# Patient Record
Sex: FEMALE | Race: WHITE | NOT HISPANIC OR LATINO | Employment: OTHER | URBAN - METROPOLITAN AREA
[De-identification: names, ages, dates, MRNs, and addresses within clinical notes are randomized per-mention and may not be internally consistent; named-entity substitution may affect disease eponyms.]

---

## 2017-05-05 ENCOUNTER — OFFICE VISIT (OUTPATIENT)
Dept: FAMILY MEDICINE | Facility: CLINIC | Age: 42
End: 2017-05-05
Payer: COMMERCIAL

## 2017-05-05 ENCOUNTER — DOCUMENTATION ONLY (OUTPATIENT)
Dept: FAMILY MEDICINE | Facility: CLINIC | Age: 42
End: 2017-05-05

## 2017-05-05 VITALS
HEIGHT: 67 IN | TEMPERATURE: 98 F | BODY MASS INDEX: 35.5 KG/M2 | RESPIRATION RATE: 12 BRPM | HEART RATE: 69 BPM | OXYGEN SATURATION: 96 % | SYSTOLIC BLOOD PRESSURE: 123 MMHG | WEIGHT: 226.19 LBS | DIASTOLIC BLOOD PRESSURE: 80 MMHG

## 2017-05-05 DIAGNOSIS — R31.9 HEMATURIA: ICD-10-CM

## 2017-05-05 DIAGNOSIS — N39.0 URINARY TRACT INFECTION WITH HEMATURIA, SITE UNSPECIFIED: Primary | ICD-10-CM

## 2017-05-05 DIAGNOSIS — R31.9 URINARY TRACT INFECTION WITH HEMATURIA, SITE UNSPECIFIED: Primary | ICD-10-CM

## 2017-05-05 LAB
BILIRUB SERPL-MCNC: ABNORMAL MG/DL
BLOOD URINE, POC: 250
COLOR, POC UA: ABNORMAL
GLUCOSE UR QL STRIP: ABNORMAL
KETONES UR QL STRIP: ABNORMAL
LEUKOCYTE ESTERASE URINE, POC: ABNORMAL
NITRITE, POC UA: ABNORMAL
PH, POC UA: 5
PROTEIN, POC: ABNORMAL
SPECIFIC GRAVITY, POC UA: 1.01
UROBILINOGEN, POC UA: ABNORMAL

## 2017-05-05 PROCEDURE — 87086 URINE CULTURE/COLONY COUNT: CPT

## 2017-05-05 PROCEDURE — 87077 CULTURE AEROBIC IDENTIFY: CPT

## 2017-05-05 PROCEDURE — 87088 URINE BACTERIA CULTURE: CPT

## 2017-05-05 PROCEDURE — 87186 SC STD MICRODIL/AGAR DIL: CPT

## 2017-05-05 PROCEDURE — 81002 URINALYSIS NONAUTO W/O SCOPE: CPT | Mod: S$GLB,,, | Performed by: FAMILY MEDICINE

## 2017-05-05 PROCEDURE — 1160F RVW MEDS BY RX/DR IN RCRD: CPT | Mod: S$GLB,,, | Performed by: FAMILY MEDICINE

## 2017-05-05 PROCEDURE — 99999 PR PBB SHADOW E&M-EST. PATIENT-LVL IV: CPT | Mod: PBBFAC,,, | Performed by: FAMILY MEDICINE

## 2017-05-05 PROCEDURE — 99213 OFFICE O/P EST LOW 20 MIN: CPT | Mod: 25,S$GLB,, | Performed by: FAMILY MEDICINE

## 2017-05-05 RX ORDER — SULFAMETHOXAZOLE AND TRIMETHOPRIM 800; 160 MG/1; MG/1
1 TABLET ORAL 2 TIMES DAILY
Qty: 20 TABLET | Refills: 0 | Status: SHIPPED | OUTPATIENT
Start: 2017-05-05 | End: 2017-08-31

## 2017-05-05 NOTE — MR AVS SNAPSHOT
[facility]  [address] [facility] [place] [address]  Phone: [phone]  Fax: [phone]                  Carolina Andrews   2017 11:20 AM   Office Visit    Description:  Female : 1975   Provider:  Desean Crump MD   Department:  [facility] Medicine           Reason for Visit     Urinary Tract Infection           Diagnoses this Visit        Comments    Urinary tract infection with hematuria, site unspecified    -  Primary     Hematuria                To Do List           Goals (5 Years of Data)     None       These Medications        Disp Refills Start End    sulfamethoxazole-trimethoprim 800-160mg (BACTRIM DS) 800-160 mg Tab 20 tablet 0 2017     Take 1 tablet by mouth 2 (two) times daily. - Oral    Pharmacy: [id number]  [address]  #: [phone]         [facility] On [facility]     [facility] On [facility] Nurse Care Line -  Assistance  Unless otherwise directed by your provider, please contact [facility], our nurse care line that is available for  assistance.     Registered nurses in the [facility] provide: appointment scheduling, clinical advisement, health education, and other advisory services.  Call: [phone] (toll free)               Medications           Message regarding Medications     Verify the changes and/or additions to your medication regime listed below are the same as discussed with your clinician today.  If any of these changes or additions are incorrect, please notify your healthcare provider.        START taking these NEW medications        Refills    sulfamethoxazole-trimethoprim 800-160mg (BACTRIM DS) 800-160 mg Tab 0    Sig: Take 1 tablet by mouth 2 (two) times daily.    Class: Normal    Route: Oral      STOP taking these medications     ketoconazole (NIZORAL) 2 % shampoo Apply topically every 30 days.            Verify that the below list of medications is an accurate representation of  "the medications you are currently taking.  If none reported, the list may be blank. If incorrect, please contact your healthcare provider. Carry this list with you in case of emergency.           Current Medications     clobetasol (CLOBEX) 0.05 % topical spray Apply topically once a week.     sulfamethoxazole-trimethoprim 800-160mg (BACTRIM DS) 800-160 mg Tab Take 1 tablet by mouth 2 (two) times daily.           Clinical Reference Information           Your Vitals Were     BP Pulse Temp Resp Height Weight    123/80 (BP Location: Right arm, Patient Position: Sitting, BP Method: Automatic) 69 97.9 °F (36.6 °C) (Oral) 12 5' 7" (1.702 m) 102.6 kg (226 lb 3.1 oz)    Last Period SpO2 BMI          04/25/2017 (Exact Date) 96% 35.43 kg/m2        Blood Pressure          Most Recent Value    BP  123/80      Allergies as of 5/5/2017     Pcn [Penicillins]      Immunizations Administered on Date of Encounter - 5/5/2017     None      Orders Placed During Today's Visit      Normal Orders This Visit    POCT URINE DIPSTICK WITHOUT MICROSCOPE     Urine culture     Future Labs/Procedures Expected by Expires    Urinalysis  5/5/2017 5/6/2018    Urine culture  5/5/2017 5/6/2018      Instructions      Walking for Fitness  Fitness walking has something for everyone, even people who are already fit. Walking is one of the safest ways to condition your body aerobically. It can boost energy, help you lose weight, and reduce stress.    Physical benefits  · Walking strengthens your heart and lungs, and tones your muscles.  · When walking, your feet land with less impact than in other sports. This reduces chances of muscle, bone, and joint injury.  · Regular walking improves your cholesterol levels and lowers your risk of heart disease. And it helps you control your blood sugar if you have diabetes.  · Walking is a weight-bearing activity, which helps maintain bone density. This can help prevent osteoporosis.  Personal rewards  · Taking walks can " help you relax and manage stress. And fitness walking may make you feel better about yourself.  · Walking can help you sleep better at night and make you less likely to be depressed.  · Regular walking may help maintain your memory as you get older.  · Walking is a great way to spend extra time with friends and family members. Be sure to invite your dog along!  Q&A about fitness walking  Q: Will walking keep me fit?  A: Yes. Regular walking at the right pace gives you all the benefits of other aerobic activities, such as jogging and swimming.  Q: Will walking help me lose weight and keep it off?  A: Yes. Per mile, walking can burn as many calories as jogging. Your health care provider can help work walking into your weight-loss plan.  Q: Is walking safe for my health?  A: Yes. Walking is safe if you have high blood pressure, diabetes, heart disease, or other conditions. Talk to your health care provider before you start.  Date Last Reviewed: 5/9/2015 © 2000-2016 [employer]. [address] [place] [address]. All rights reserved. This information is not intended as a substitute for professional medical care. Always follow your healthcare professional's instructions.        Weight Management: Getting Started  Healthy bodies come in all shapes and sizes. Not all bodies are made to be thin. For some people, a healthy weight is higher than the average weight listed on weight charts. Your healthcare provider can help you decide on a healthy weight for you.    Reasons to lose weight  Losing weight can help with some health problems, such as high blood pressure, heart disease, diabetes, sleep apnea, and arthritis. You may also feel more energy.  Set your long-term goal  Your goal doesn't even have to be a specific weight. You may decide on a fitness goal (such as being able to walk 10 miles a week), or a health goal (such as lowering your blood pressure). Choose a goal that is measurable and  reasonable, so you know when you've reached it. A goal of reaching a BMI of less than 25 is not always reasonable (or possible).   Make an action plan  Habits dont change overnight. Setting your goals too high can leave you feeling discouraged if you cant reach them. Be realistic. Choose one or two small changes you can make now. Set an action plan for how you are going to make these changes. When you can stick to this plan, keep making a few more small changes. Taking small steps will help you stay on the path to success.  Track your progress  Write down your goals. Then, keep a daily record of your progress. Write down what you eat and how active you are. This record lets you look back on how much youve done. It may also help when youre feeling frustrated. Reward yourself for success. Even if you dont reach every goal, give yourself credit for what you do get done.  Get support  Encouragement from others can help make losing weight easier. Ask your family members and friends for support. They may even want to join you. Also look to your healthcare provider, registered dietitian, and  for help. Your local hospital can give you more information about nutrition, exercise, and weight loss.  Date Last Reviewed: 1/31/2016  © 2785-1169 [employer] [address] [place] [address], [place] [address]. All rights reserved. This information is not intended as a substitute for professional medical care. Always follow your healthcare professional's instructions.             Language Assistance Services     ATTENTION: Language assistance services are available, free of charge. Please call [phone].      ATENCIÓN: Si habla español, tiene a gooden disposición servicios gratuitos de asistencia lingüística. Llame al [phone].     CHÚ Ý: N?u b?n nói Ti?ng Vi?t, có các d?ch v? h? tr? ngôn ng? mi?n phí dành cho b?n. G?i s? [phone].         [facility] complies with applicable  Federal civil rights laws and does not discriminate on the basis of race, color, national origin, age, disability, or sex.

## 2017-05-05 NOTE — PROGRESS NOTES
Subjective:       Patient ID: Carolina Andrews is a 41 y.o. female.    Chief Complaint: Urinary Tract Infection    HPI Comments: 41 year old female with history of urinary tract infections comes in with pain in the right flank last week and blood in urine associated with frequency and urgency last night.  She is pain free at this time.  She has no fever or chills.  Last uti was E.coli partially resistant to macrobid and ampicillin.      Past Medical History:  No date: Blindness  No date: Mental disorder      Comment: Periodic antidepressant use since childhood,                not currently taking or endorsing depressive                ideation  No date: Pinched nerve    Past Surgical History:  2009: ANKLE FRACTURE SURGERY      Current Outpatient Prescriptions:     clobetasol (CLOBEX) 0.05 % topical spray, Apply topically once a week. , Disp: , Rfl:     sulfamethoxazole-trimethoprim 800-160mg (BACTRIM DS) 800-160 mg Tab, Take 1 tablet by mouth 2 (two) times daily., Disp: 20 tablet, Rfl: 0        Urinary Tract Infection    Associated symptoms include flank pain, frequency, hematuria and urgency. Pertinent negatives include no chills, nausea or vomiting.     Review of Systems   Constitutional: Negative for chills and fever.   Gastrointestinal: Negative for nausea and vomiting.   Genitourinary: Positive for flank pain, frequency, hematuria and urgency.   Musculoskeletal: Positive for back pain.       Objective:      Physical Exam   Constitutional: She appears well-developed. No distress.   Good blood pressure control  Afebrile     Abdominal: Soft. Normal appearance and bowel sounds are normal. She exhibits no distension, no pulsatile liver, no fluid wave, no ascites and no mass. There is no hepatosplenomegaly. There is tenderness in the suprapubic area and left lower quadrant. There is no rigidity, no rebound, no guarding and no CVA tenderness.   Skin: She is not diaphoretic.   Nursing note and vitals reviewed.       Assessment:       1. Urinary tract infection with hematuria, site unspecified    2. Hematuria        Plan:       1. Urinary tract infection with hematuria, site unspecified  Urine with trace leukocytes and negative nitrate but 250+red cells, suspicious for stone  She is asymptomatic at this time.  Offered CT/renal stone study now or treat for infection, await culture, and recheck urine for blood in two weeks, scan if still blood or if symptoms develop.  She prefers to wait on the scan  - POCT URINE DIPSTICK WITHOUT MICROSCOPE  - Urine culture; Future  - Urine culture  - Urinalysis; Future  - sulfamethoxazole-trimethoprim 800-160mg (BACTRIM DS) 800-160 mg Tab; Take 1 tablet by mouth 2 (two) times daily.  Dispense: 20 tablet; Refill: 0    2. Hematuria

## 2017-05-05 NOTE — PATIENT INSTRUCTIONS

## 2017-05-05 NOTE — PROGRESS NOTES
Pre-Visit Chart Review  For Appointment Scheduled on 5-4-17    Health Maintenance Due   Topic Date Due    TETANUS VACCINE  06/09/1994

## 2017-05-08 ENCOUNTER — PATIENT MESSAGE (OUTPATIENT)
Dept: FAMILY MEDICINE | Facility: CLINIC | Age: 42
End: 2017-05-08

## 2017-05-08 LAB — BACTERIA UR CULT: NORMAL

## 2017-08-31 ENCOUNTER — OFFICE VISIT (OUTPATIENT)
Dept: OBSTETRICS AND GYNECOLOGY | Facility: CLINIC | Age: 42
End: 2017-08-31
Payer: COMMERCIAL

## 2017-08-31 VITALS
HEIGHT: 67 IN | HEART RATE: 80 BPM | BODY MASS INDEX: 36.22 KG/M2 | WEIGHT: 230.81 LBS | SYSTOLIC BLOOD PRESSURE: 129 MMHG | DIASTOLIC BLOOD PRESSURE: 90 MMHG

## 2017-08-31 DIAGNOSIS — Z12.31 SCREENING MAMMOGRAM, ENCOUNTER FOR: ICD-10-CM

## 2017-08-31 DIAGNOSIS — Z01.419 ENCOUNTER FOR WELL WOMAN EXAM: Primary | ICD-10-CM

## 2017-08-31 DIAGNOSIS — N92.6 IRREGULAR PERIODS/MENSTRUAL CYCLES: ICD-10-CM

## 2017-08-31 PROCEDURE — 99999 PR PBB SHADOW E&M-EST. PATIENT-LVL IV: CPT | Mod: PBBFAC,,, | Performed by: NURSE PRACTITIONER

## 2017-08-31 PROCEDURE — 99396 PREV VISIT EST AGE 40-64: CPT | Mod: S$GLB,,, | Performed by: NURSE PRACTITIONER

## 2017-08-31 PROCEDURE — 88175 CYTOPATH C/V AUTO FLUID REDO: CPT

## 2017-08-31 NOTE — PROGRESS NOTES
Chief Complaint   Patient presents with    Well Woman       History of Present Illness: Carolina Andrews is a 42 y.o. female that presents today 8/31/2017 for well gyn visit.  Pt here for well woman exam. Pt denies ever having any abnormal pap smears in the past. Pt reports that her cycles have been irregular her entire life and she usually has a cycle about every 8 weeks. She did have a regular cycle in May and then in June (6/17) she had about a day of spotting. She had no cycle in July and then she reports that she has had off and on spotting in August for about 2 weeks. Pt has never been pregnant before and she is not on birth control and does not desire to be on birth control. She is sexually active with her  and does not desire STD testing. Pt reports that she is due for a mammogram. No other complaints or concerns noted.        Past Medical History:   Diagnosis Date    Blindness     Mental disorder     Periodic antidepressant use since childhood, not currently taking or endorsing depressive ideation    Pinched nerve        Past Surgical History:   Procedure Laterality Date    ANKLE FRACTURE SURGERY  2009       Family History   Problem Relation Age of Onset    Hypertension Father     Lung disease Father      calapse x 3     Cancer Sister 23     polyps found incidentally    Vision loss Sister     Retinal detachment Mother     Colon cancer Paternal Grandfather     Cancer Paternal Grandfather      colon    Stroke Maternal Grandmother     Colon cancer Paternal Aunt     Cancer Paternal Aunt      colon    No Known Problems Maternal Uncle     No Known Problems Paternal Uncle     Parkinsonism Maternal Grandfather     Cancer Maternal Grandfather      prostate    Cancer Paternal Grandmother      skin cancer in colon     Alzheimer's disease Paternal Grandmother     Heart disease Paternal Aunt     Breast cancer Neg Hx     Diabetes Neg Hx     Miscarriages / Stillbirths Neg Hx        Social  "History     Social History    Marital status:      Spouse name: N/A    Number of children: N/A    Years of education: N/A     Occupational History     Religion [employer]     Social History Main Topics    Smoking status: Never Smoker    Smokeless tobacco: Never Used    Alcohol use 3.6 oz/week     6 Glasses of wine per week    Drug use: No    Sexual activity: Yes     Partners: Male     Birth control/ protection: None     Other Topics Concern    None     Social History Narrative    None       OB History    Para Term  AB Living   0 0 0 0 0 0   SAB TAB Ectopic Multiple Live Births   0 0 0 0               Current Outpatient Prescriptions   Medication Sig Dispense Refill    clobetasol (CLOBEX) 0.05 % topical spray Apply topically once a week.        No current facility-administered medications for this visit.        Review of patient's allergies indicates:   Allergen Reactions    Pcn [penicillins]      rash       Review of Symptoms:  GENERAL: Denies weight gain or weight loss. Feeling well overall.   SKIN: Denies rash or lesions.   ABDOMEN: No abdominal pain, constipation, diarrhea, nausea, vomiting or rectal bleeding.   URINARY: No frequency, dysuria, hematuria, or burning on urination.    BP (!) 129/90   Pulse 80   Ht 5' 7" (1.702 m)   Wt 104.7 kg (230 lb 13.2 oz)   LMP 2017     Physical Exam:  APPEARANCE: Well nourished, well developed, in no acute distress.  SKIN: Normal skin turgor, no lesions.  RESPIRATORY: Normal respiratory effort with no retractions or use of accessory muscles  ABDOMEN: Soft. No tenderness or masses. No hepatosplenomegaly. No hernias.  BREASTS: Symmetrical, no skin changes or visible lesions. No palpable masses, nipple discharge or adenopathy bilaterally.  PELVIC: Normal external female genitalia without lesions. Normal hair distribution. Adequate perineal body, normal urethral meatus. Urethra with no masses.  Bladder nontender. Vagina moist and well rugated " without lesions or discharge; old/brown blood in vaginal vault. Cervix pink and without lesions. No significant cystocele or rectocele. Bimanual exam showed uterus normal size, shape, position, mobile and nontender. Adnexa without masses or tenderness. Urethra and bladder normal. PAP DONE    ASSESSMENT/PLAN:  Encounter for well woman exam  -     Mammo Digital Screening Bilat With CAD; Future; Expected date: 08/31/2017  -     Liquid-based pap smear, screening    Irregular periods/menstrual cycles  -     US Pelvis Complete Non OB; Future; Expected date: 08/31/2017  -     TSH; Future; Expected date: 08/31/2017    Screening mammogram, encounter for  -     Mammo Digital Screening Bilat With CAD; Future; Expected date: 08/31/2017      -Instructed pt that an order would be placed for a pelvic ultrasound to rule out any abnormalities that may be causing the irregularity of her cycles/bleeding. Also, informed her that if her thyroid level is out of normal range this can also cause some irregularities with the menstrual cycle. Pt okay with this testing and verbalized understanding. She also reports, after my explanation to her that birth control would be an option to help regulate her cycle, that she does not want to be on birth control.     Patient was counseled today on Pap guidelines, recommendation for pelvic exams, mammograms starting annually at age 40, Colonoscopy after the age of 50, Dexa Bone Scan and calcium and vitamin D supplementation in menopause and to see her PCP for other health maintenance.       Follow-up:  Will follow-up once results are received  RTC if symptoms worsen or do not improve  RTC in 1 year for annual exam or as needed

## 2017-09-05 ENCOUNTER — HOSPITAL ENCOUNTER (OUTPATIENT)
Dept: RADIOLOGY | Facility: CLINIC | Age: 42
Discharge: HOME | End: 2017-09-05
Attending: NURSE PRACTITIONER
Payer: COMMERCIAL

## 2017-09-05 ENCOUNTER — PATIENT MESSAGE (OUTPATIENT)
Dept: OBSTETRICS AND GYNECOLOGY | Facility: CLINIC | Age: 42
End: 2017-09-05

## 2017-09-05 DIAGNOSIS — Z01.419 ENCOUNTER FOR WELL WOMAN EXAM: ICD-10-CM

## 2017-09-05 DIAGNOSIS — Z12.31 SCREENING MAMMOGRAM, ENCOUNTER FOR: ICD-10-CM

## 2017-09-05 DIAGNOSIS — N92.6 IRREGULAR PERIODS/MENSTRUAL CYCLES: ICD-10-CM

## 2017-09-05 PROCEDURE — 77067 SCR MAMMO BI INCL CAD: CPT | Mod: TC

## 2017-09-05 PROCEDURE — 76830 TRANSVAGINAL US NON-OB: CPT | Mod: 26,,, | Performed by: RADIOLOGY

## 2017-09-05 PROCEDURE — 77063 BREAST TOMOSYNTHESIS BI: CPT | Mod: 26,,, | Performed by: RADIOLOGY

## 2017-09-05 PROCEDURE — 76856 US EXAM PELVIC COMPLETE: CPT | Mod: 26,,, | Performed by: RADIOLOGY

## 2017-09-05 PROCEDURE — 77067 SCR MAMMO BI INCL CAD: CPT | Mod: 26,,, | Performed by: RADIOLOGY

## 2017-09-05 PROCEDURE — 76856 US EXAM PELVIC COMPLETE: CPT | Mod: TC,PO

## 2017-09-07 ENCOUNTER — TELEPHONE (OUTPATIENT)
Dept: OBSTETRICS AND GYNECOLOGY | Facility: CLINIC | Age: 42
End: 2017-09-07

## 2017-09-07 NOTE — TELEPHONE ENCOUNTER
Please inform pt that her thyroid level came back normal. Instruct her that if she would like to do menopausal lab work related to her irregular cycles we can order that. However, many things can cause cycle irregularities such as stress, diet changes, exercise changes, etc... I understanding she had spotting throughout the month of August, but since this is a one time occurrence I would like her to just monitor her cycles and if this continues to occur then let us know.     Thanks!

## 2017-09-07 NOTE — TELEPHONE ENCOUNTER
Patient informed of results and recommendations as noted by SHAUNA Vazquez NP. Patient verbalized understanding.  She declines menopause labs at this time./lpd

## 2017-09-08 ENCOUNTER — OFFICE VISIT (OUTPATIENT)
Dept: OPTOMETRY | Facility: CLINIC | Age: 42
End: 2017-09-08
Payer: COMMERCIAL

## 2017-09-08 DIAGNOSIS — H52.7 REFRACTIVE ERROR: ICD-10-CM

## 2017-09-08 DIAGNOSIS — H35.53 CONE DYSTROPHY: Primary | ICD-10-CM

## 2017-09-08 DIAGNOSIS — H54.8 LEGALLY BLIND: ICD-10-CM

## 2017-09-08 PROCEDURE — 92014 COMPRE OPH EXAM EST PT 1/>: CPT | Mod: S$GLB,,, | Performed by: OPTOMETRIST

## 2017-09-08 PROCEDURE — 99999 PR PBB SHADOW E&M-EST. PATIENT-LVL I: CPT | Mod: PBBFAC,,, | Performed by: OPTOMETRIST

## 2017-09-08 NOTE — PROGRESS NOTES
HPI     Presenting Complaint: Pt here today for yearly ocular health check. Pt   has seen Dr. Morel 07/16/2015 and determined legally blind. Pt has has no   changes to vision in the last year.         (-) headaches  (-) diplopia   (-) flashes / (-) floaters      Last edited by Remy Boss, OD on 9/8/2017  1:42 PM. (History)            Assessment /Plan     For exam results, see Encounter Report.    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 Stable VA, no improvement on refraction. Hx of ocular auras, continue to monitor. Return if any problems, otherwise 1 year for comprehensive.

## 2017-11-30 ENCOUNTER — HOSPITAL ENCOUNTER (OUTPATIENT)
Dept: RADIOLOGY | Facility: HOSPITAL | Age: 42
Discharge: HOME | End: 2017-11-30
Attending: FAMILY MEDICINE
Payer: COMMERCIAL

## 2017-11-30 ENCOUNTER — OFFICE VISIT (OUTPATIENT)
Dept: FAMILY MEDICINE | Facility: CLINIC | Age: 42
End: 2017-11-30
Payer: COMMERCIAL

## 2017-11-30 ENCOUNTER — TELEPHONE (OUTPATIENT)
Dept: FAMILY MEDICINE | Facility: CLINIC | Age: 42
End: 2017-11-30

## 2017-11-30 VITALS
SYSTOLIC BLOOD PRESSURE: 126 MMHG | RESPIRATION RATE: 18 BRPM | WEIGHT: 228.81 LBS | HEIGHT: 67 IN | TEMPERATURE: 98 F | BODY MASS INDEX: 35.91 KG/M2 | HEART RATE: 62 BPM | DIASTOLIC BLOOD PRESSURE: 84 MMHG

## 2017-11-30 DIAGNOSIS — R51.9 SEVERE HEADACHE: Primary | ICD-10-CM

## 2017-11-30 DIAGNOSIS — R51.9 SEVERE HEADACHE: ICD-10-CM

## 2017-11-30 PROCEDURE — 70450 CT HEAD/BRAIN W/O DYE: CPT | Mod: 26,,, | Performed by: RADIOLOGY

## 2017-11-30 PROCEDURE — 99214 OFFICE O/P EST MOD 30 MIN: CPT | Mod: S$GLB,,, | Performed by: FAMILY MEDICINE

## 2017-11-30 PROCEDURE — 70450 CT HEAD/BRAIN W/O DYE: CPT | Mod: TC

## 2017-11-30 PROCEDURE — 99999 PR PBB SHADOW E&M-EST. PATIENT-LVL III: CPT | Mod: PBBFAC,,, | Performed by: FAMILY MEDICINE

## 2017-11-30 RX ORDER — BUTALBITAL, ACETAMINOPHEN AND CAFFEINE 50; 325; 40 MG/1; MG/1; MG/1
1 TABLET ORAL EVERY 4 HOURS PRN
Qty: 30 TABLET | Refills: 0 | Status: SHIPPED | OUTPATIENT
Start: 2017-11-30 | End: 2017-12-30

## 2017-11-30 NOTE — TELEPHONE ENCOUNTER
Patient had pain start last night at base of head and spread to her temples- states pain was sever- pain has not resolved but is less than last night- appt made today.

## 2017-11-30 NOTE — TELEPHONE ENCOUNTER
----- Message from Francisca Leroy sent at 11/30/2017  9:10 AM CST -----  Pt states / last night she had a pain in her head / started at base of skull / spread to both sides of ears .. Did ease up / but still having some pain / asking to be seen today / call pt at  [phone]

## 2017-11-30 NOTE — PROGRESS NOTES
"Subjective:       Patient ID: Carolina Andrews is a 42 y.o. female.    Chief Complaint: Headache (pain in back on head/neck, started to feel hot then painful, vision blurred a little )    Remote (teens) history of migraines; none like this in the past.      Headache    This is a new problem. The current episode started yesterday. The problem occurs constantly. The problem has been waxing and waning. The pain is located in the bilateral, occipital and temporal region. The pain quality is not similar to prior headaches. The quality of the pain is described as aching. The pain is severe ("most excrutiating headache of my life"). Associated symptoms include blurred vision and nausea. Pertinent negatives include no abdominal pain, eye watering, fever, numbness or weakness. She has tried NSAIDs for the symptoms. The treatment provided mild (Pain from "11" to a "6") relief.     Review of Systems   Constitutional: Negative for fever.   Eyes: Positive for blurred vision.   Respiratory: Negative for shortness of breath.    Cardiovascular: Negative for chest pain.   Gastrointestinal: Positive for nausea. Negative for abdominal pain.   Skin: Negative for rash.   Neurological: Positive for headaches. Negative for weakness and numbness.   All other systems reviewed and are negative.      Objective:      Physical Exam   Constitutional: She appears well-developed and well-nourished.   Eyes: Pupils are equal, round, and reactive to light. No scleral icterus.   Neck: Neck supple.   Cardiovascular: Normal rate and regular rhythm.    No murmur heard.  Pulmonary/Chest: Effort normal and breath sounds normal.   Musculoskeletal: She exhibits no edema or tenderness.   Lymphadenopathy:     She has no cervical adenopathy.   Neurological: She is alert. She has normal strength. No cranial nerve deficit. She displays a negative Romberg sign.   Reflex Scores:       Patellar reflexes are 2+ on the right side and 1+ on the left side.  Skin: Skin is " warm and dry.       Assessment:       1. Severe headache        Plan:         Carolina was seen today for headache.    Diagnoses and all orders for this visit:    Severe headache  -     CT Head Without Contrast; Future    Stat CT now; R/O SAH; anuerysm.    CT normal; Fioricet prescribed.  F/u with PCP/Team tomorrow if not improved.

## 2017-12-05 ENCOUNTER — DOCUMENTATION ONLY (OUTPATIENT)
Dept: FAMILY MEDICINE | Facility: CLINIC | Age: 42
End: 2017-12-05

## 2017-12-05 NOTE — PROGRESS NOTES
Pre-Visit Chart Review  For Appointment Scheduled on 12-11-17    Health Maintenance Due   Topic Date Due    TETANUS VACCINE  06/09/1994    Influenza Vaccine  08/01/2017

## 2017-12-11 ENCOUNTER — OFFICE VISIT (OUTPATIENT)
Dept: OPTOMETRY | Facility: CLINIC | Age: 42
End: 2017-12-11
Payer: COMMERCIAL

## 2017-12-11 ENCOUNTER — OFFICE VISIT (OUTPATIENT)
Dept: FAMILY MEDICINE | Facility: CLINIC | Age: 42
End: 2017-12-11
Attending: FAMILY MEDICINE
Payer: COMMERCIAL

## 2017-12-11 VITALS
HEART RATE: 63 BPM | DIASTOLIC BLOOD PRESSURE: 78 MMHG | HEIGHT: 67 IN | SYSTOLIC BLOOD PRESSURE: 104 MMHG | WEIGHT: 230.63 LBS | RESPIRATION RATE: 12 BRPM | TEMPERATURE: 98 F | OXYGEN SATURATION: 98 % | BODY MASS INDEX: 36.2 KG/M2

## 2017-12-11 DIAGNOSIS — H54.7 POOR VISION: ICD-10-CM

## 2017-12-11 DIAGNOSIS — M54.81 BILATERAL OCCIPITAL NEURALGIA: Primary | ICD-10-CM

## 2017-12-11 DIAGNOSIS — H53.10 SUBJECTIVE VISUAL DISTURBANCE: Primary | ICD-10-CM

## 2017-12-11 DIAGNOSIS — H35.53 CONE DYSTROPHY: ICD-10-CM

## 2017-12-11 PROCEDURE — 99999 PR PBB SHADOW E&M-EST. PATIENT-LVL II: CPT | Mod: PBBFAC,,, | Performed by: OPTOMETRIST

## 2017-12-11 PROCEDURE — 99213 OFFICE O/P EST LOW 20 MIN: CPT | Mod: S$GLB,,, | Performed by: FAMILY MEDICINE

## 2017-12-11 PROCEDURE — 99999 PR PBB SHADOW E&M-EST. PATIENT-LVL IV: CPT | Mod: PBBFAC,,, | Performed by: FAMILY MEDICINE

## 2017-12-11 PROCEDURE — 92012 INTRM OPH EXAM EST PATIENT: CPT | Mod: S$GLB,,, | Performed by: OPTOMETRIST

## 2017-12-11 RX ORDER — GABAPENTIN 300 MG/1
CAPSULE ORAL
Qty: 90 CAPSULE | Refills: 5 | Status: SHIPPED | OUTPATIENT
Start: 2017-12-11 | End: 2018-06-08 | Stop reason: ALTCHOICE
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
## 2017-12-11 NOTE — PROGRESS NOTES
"HPI     Presenting Complaint: Pt here today for increase blurry vision over the 8   days. Pt states got severe headache in lower back of head and since then   vision has been more blurry that usual. Pt states vision is dim and says   she get "heat wave" dark and clear spots. Pt states she is struggling to   get the details of what she is looking at near and far.    (-) headaches  (-) diplopia   (+) flashes pt states flashes of circles are less than previous visit     (-) floaters      Last edited by Remy Boss, OD on 12/11/2017  1:44 PM. (History)            Assessment /Plan     For exam results, see Encounter Report.    Subjective visual disturbance    Cone dystrophy      Subjective visual disturbance, dx with occipital neuralgia. Pt also notes low BP. Stable ocular health, IOP within normal limits. Discussed possible etiologies. [facility] improved in office today with new [facility] charts. Continue treatment and f/u as directed by PCP. Return with me if worsening or no resolution.                  "

## 2017-12-11 NOTE — PATIENT INSTRUCTIONS

## 2018-06-08 ENCOUNTER — OFFICE VISIT (OUTPATIENT)
Dept: FAMILY MEDICINE | Facility: CLINIC | Age: 43
End: 2018-06-08
Payer: MEDICAID

## 2018-06-08 VITALS
TEMPERATURE: 98 F | BODY MASS INDEX: 35.94 KG/M2 | HEIGHT: 67 IN | SYSTOLIC BLOOD PRESSURE: 132 MMHG | HEART RATE: 78 BPM | WEIGHT: 229 LBS | DIASTOLIC BLOOD PRESSURE: 79 MMHG

## 2018-06-08 DIAGNOSIS — E66.9 OBESITY (BMI 30-39.9): ICD-10-CM

## 2018-06-08 DIAGNOSIS — M67.472 GANGLION CYST OF LEFT FOOT: Primary | ICD-10-CM

## 2018-06-08 DIAGNOSIS — H35.53 CONE DYSTROPHY: ICD-10-CM

## 2018-06-08 PROCEDURE — 99999 PR PBB SHADOW E&M-EST. PATIENT-LVL III: CPT | Mod: PBBFAC,,, | Performed by: NURSE PRACTITIONER

## 2018-06-08 PROCEDURE — 99213 OFFICE O/P EST LOW 20 MIN: CPT | Mod: PBBFAC,PO | Performed by: NURSE PRACTITIONER

## 2018-06-08 PROCEDURE — 99213 OFFICE O/P EST LOW 20 MIN: CPT | Mod: S$PBB,,, | Performed by: NURSE PRACTITIONER

## 2018-06-08 NOTE — PATIENT INSTRUCTIONS
Ganglion Cyst    A ganglion cyst usually is a painless bump on the wrist or finger joint. It connects to the joint capsule and grows like a balloon on a stalk. It is filled with joint fluid. The cause of a ganglion cyst is not known.   If the cyst puts pressure on a nearby nerve it may cause pain. Otherwise, cysts are painless and harmless. Most tend to disappear over time without treatment. Do not try to drain or break the cyst at home. This can cause harm and does not cure the problem.  If you are having pain from the cyst, a temporary wrist splint may be helpful to limit wrist motion. If this does not help, the fluid can be removed from the cyst. This should shrink the size of the cyst. If this doesnt give relief, the ganglion can be removed by surgery.  Home care  · If you are having wrist pain, use a wrist splint for 1-2 weeks at a time. You can buy one at many drug stores without a prescription.  · You may use over-the-counter pain medicine to control pain, unless another medicine was prescribed. If you have chronic liver or kidney disease or ever had a stomach ulcer or GI bleeding, talk with your healthcare provider before using these medicines.    · If a needle was used to drain the cyst fluid or inject medicine into it, keep the site clean and covered with a bandage for the first 24 hours. If a pressure dressing was applied, leave it in place for the time advised.  Follow-up care  Follow up with your healthcare provider, or as advised by our staff. Make an appointment for a repeat exam if pain continues for more than 2 weeks in a wrist splint.  When to seek medical advice  Call your healthcare provider right away if any of these occur:  · Increasing pain in the wrist  · Redness over the cyst  · Fluid draining from the cyst  · Numbness or tingling in the hand or arm  Date Last Reviewed: 11/20/2015  © 8333-7523 [employer]. [address] [place] [address], [place] [address]. All rights reserved. This  information is not intended as a substitute for professional medical care. Always follow your healthcare professional's instructions.        Ganglion Cyst: Foot  A ganglion is a fluid-filled swelling of the lining of a joint or tendon. Ganglions can form on any part of the foot. But they most often appear on the ankle or top of the foot. Ganglions tend to change in size and often grow slowly.  Causes  Repeated irritation can weaken the lining of a joint or tendon. This can lead to ganglions. People who wear boots are more likely to get ganglions. This type of footwear puts stress on the foot and ankle. Bone spurs (bony outgrowths) may also cause ganglions by irritating the joints and tendons.  Symptoms  Ganglions often form with no symptoms. But the ganglion may put pressure on the nerves and the overlying skin. This can cause tingling, numbness, or pain. Ganglions sometimes swell. Their size can change with different activities or a change in weather.  How are they diagnosed?  Ganglions are sometimes mistaken for tumors. So its important to have a complete exam done. Tests may be done to confirm the diagnosis.  Medical history  Your healthcare provider will ask you questions. These include how long youve had the ganglion and what kind of symptoms youre feeling. He or she may ask if its changed in size or if its size varies based on your activities.  Physical exam  Your healthcare provider may do a translumination exam. This involves shining a light through the swelling. (Usually, you can see through a ganglion, but not through a tumor.) When your foot is palpated (pressed), a ganglion feels spongy and the fluid moves from side to side.  Tests  If a bone spur is suspected, X-rays may be needed. Fluid removal (needle aspiration) may be done. It also helps to decrease pain. To confirm a ganglion, magnetic resonance imaging (MRI) may be done. This reveals images of soft tissue and bone. Sometimes, special dyes may  be injected into the area to show the outline.  How are ganglions treated?  Ganglions may be hard to treat without surgery. But nonsurgical methods may be helpful in relieving some of your symptoms.  Nonsurgical care  · Pads placed around the ganglion can ease pressure and friction.  · Fluid removal may also relieve symptoms. This is done with a needle. A steroid may be injected at the same time. But ganglions may recur.  · Limiting movements or activities that increase pain may bring relief.  · Icing the ganglion for 15 to 20 minutes may relieve inflammation and pain for a short time.  · If inflammation is severe, your healthcare provider may treat your symptoms with medicine.  Surgical treatment  Surgery may be needed if a ganglion is causing ongoing or severe pain. The entire ganglion wall is removed during the procedure. Some nearby tissue may also be removed.  After surgery  You may feel pain, swelling, numbness, or tingling for several weeks following surgery. You should be able to walk soon afterward. But your foot may need to be wrapped or in a cast, boot, or hard shoe. Be sure to see your healthcare provider if you notice any future problems. Surgery is usually successful. But there is a chance that the ganglion will recur.  Date Last Reviewed: 11/14/2015  © 6845-8700 [employer]. [address] [place] [address], [place] [address]. All rights reserved. This information is not intended as a substitute for professional medical care. Always follow your healthcare professional's instructions.

## 2018-06-08 NOTE — PROGRESS NOTES
Subjective:       Patient ID: Carolina Andrews is a 43 y.o. female.    Chief Complaint: Foot Pain    HPI   Chief Complaint  Chief Complaint   Patient presents with    Foot Pain       HPI  Carolina Andrews is a 43 y.o. female with medical diagnoses as listed in the medical history and problem list that presents with c/o left foot pain for 3 weeks and rates a '2' on 0-10 scale today. First noticed a small pea sized lump on top center of left foot that eventually enlarged to current size of about 1.5 cm in diameter and raised.  Painful if she wears a shoe that presses on the area.  Describes as an ache to the area affected.  Appears to be a ganglion cyst. Patient has not tried any relieving treatment.  Aggravating factors include wearing a shoe that covers area affected.  Symptoms are constant,  BMI today is 35.87 and patient has lost 1 pound since last visit 12/11/18.  Established patient with last clinic appointment 12/11/2017.        PAST MEDICAL HISTORY:  Past Medical History:   Diagnosis Date    Blindness     Mental disorder     Periodic antidepressant use since childhood, not currently taking or endorsing depressive ideation    Pinched nerve        PAST SURGICAL HISTORY:  Past Surgical History:   Procedure Laterality Date    ANKLE FRACTURE SURGERY  2009       SOCIAL HISTORY:  Social History     Social History    Marital status:      Spouse name: N/A    Number of children: N/A    Years of education: N/A     Occupational History     [employer] [address]     Social History Main Topics    Smoking status: Never Smoker    Smokeless tobacco: Never Used    Alcohol use 3.6 oz/week     6 Glasses of wine per week    Drug use: No    Sexual activity: Yes     Partners: Male     Birth control/ protection: None     Other Topics Concern    Not on file     Social History Narrative    No narrative on file       FAMILY HISTORY:  Family History   Problem Relation Age of Onset    Hypertension Father     Lung disease  Father         calapse x 3     Stroke Father         in eye     Cancer Sister 23        polyps found incidentally    Vision loss Sister     Lung disease Sister         spontanious lung collapse    Retinal detachment Mother     Colon cancer Paternal Grandfather     Cancer Paternal Grandfather         colon    Stroke Maternal Grandmother     Colon cancer Paternal Aunt     Cancer Paternal Aunt         colon    No Known Problems Maternal Uncle     No Known Problems Paternal Uncle     Parkinsonism Maternal Grandfather     Cancer Maternal Grandfather         prostate    Cancer Paternal Grandmother         skin cancer in colon     Alzheimer's disease Paternal Grandmother     Heart disease Paternal Aunt     Breast cancer Neg Hx     Diabetes Neg Hx     Miscarriages / Stillbirths Neg Hx        ALLERGIES AND MEDICATIONS: updated and reviewed.  Review of patient's allergies indicates:   Allergen Reactions    Pcn [penicillins]      rash     No current outpatient prescriptions on file.     No current facility-administered medications for this visit.      Review of Systems   Constitutional: Negative for activity change, appetite change, chills, fatigue and fever.        Patient has active lifestyle, swims for exercise in summer.   HENT: Negative for congestion, rhinorrhea, sinus pain, sinus pressure and sore throat.         Some sinus allergies and takes claritin over the counter with relief.   Eyes: Positive for visual disturbance.        Legally blind both eyes, congenital cone dysfunction   Respiratory: Negative for cough and shortness of breath.    Cardiovascular: Negative for chest pain and palpitations.   Gastrointestinal: Negative for abdominal pain, constipation, diarrhea, nausea and vomiting.   Genitourinary: Negative for difficulty urinating, dysuria and menstrual problem.        Periods are irregular, LMP 5/5/18   Musculoskeletal: Positive for arthralgias.        Pain to top of left foot.   Skin:  "Negative for rash and wound.        Lump to top center left foot   Neurological: Negative for dizziness, numbness and headaches.   Psychiatric/Behavioral: Negative for dysphoric mood and sleep disturbance. The patient is not nervous/anxious.        Objective:       Vitals:    06/08/18 1349   BP: 132/79   BP Location: Right arm   Patient Position: Sitting   BP Method: Large (Automatic)   Pulse: 78   Temp: 98.2 °F (36.8 °C)   TempSrc: Oral   Weight: 103.9 kg (229 lb)   Height: 5' 7" (1.702 m)     Physical Exam   Constitutional: She is oriented to person, place, and time. Vital signs are normal. She appears well-developed and well-nourished. No distress.   HENT:   Head: Normocephalic and atraumatic.   Right Ear: External ear normal.   Left Ear: External ear normal.   Eyes: Conjunctivae and lids are normal. Right conjunctiva is not injected. Left conjunctiva is not injected.   Neck: Normal carotid pulses present. Carotid bruit is not present.   Cardiovascular: Normal rate, regular rhythm, S1 normal, S2 normal, normal heart sounds, intact distal pulses and normal pulses.    No murmur heard.  Pulses:       Carotid pulses are 2+ on the right side, and 2+ on the left side.       Radial pulses are 2+ on the right side, and 2+ on the left side.        Posterior tibial pulses are 2+ on the right side, and 2+ on the left side.   Left ankle is swollen/larger compared to right, musculoskeletal related, previous fracture with surgical repair to left ankle   Pulmonary/Chest: Effort normal and breath sounds normal. No respiratory distress. She has no decreased breath sounds. She has no wheezes. She has no rhonchi. She has no rales.   Musculoskeletal: Normal range of motion.   Lymphadenopathy:     She has no cervical adenopathy.   Neurological: She is alert and oriented to person, place, and time. She has normal strength.   Skin: Skin is warm, dry and intact. She is not diaphoretic. No pallor.        Psychiatric: She has a normal " mood and affect. Her speech is normal and behavior is normal. Judgment and thought content normal. Cognition and memory are normal.   Nursing note and vitals reviewed.      Assessment:       1. Ganglion cyst of left foot    2. Cone dystrophy    3. Obesity (BMI 30-39.9)        Plan:   Carolina was seen today for foot pain.    Diagnoses and all orders for this visit:    Ganglion cyst of left foot   Treatment options discussed including watch and wait, drainage, and surgery   Will watch and wait for now as will be wearing open shoes for summer and is not likely to be painful or cause a problem   Educational handouts provided. Ganglion cyst   Advised will refer to appropriate surgeon if removal is desired in future  Cone dystrophy   Patient is legally blind to bilateral eyes  Obesity (BMI 30-39.9)   BMI 35.87 with 1 pound weight loss since 12/11/17   Weight loss recommended with well balanced diet and portion controlled meals and increased activity.     Follow-up if symptoms worsen or fail to improve.     Patient readiness: acceptance and barriers:readiness    During the course of the visit the patient was educated and counseled about the following:     Obesity:   General weight loss/lifestyle modification strategies discussed (elicit support from others; identify saboteurs; non-food rewards, etc).  Informal exercise measures discussed, e.g. taking stairs instead of elevator.  Regular aerobic exercise program discussed.    Goals: Obesity: Reduce calorie intake and BMI    Did patient meet goals/outcomes: Yes    The following self management tools provided: declined    Patient Instructions (the written plan) was given to the patient/family.     Time spent with patient: 30 minutes    Barriers to medications present (no )    Adverse reactions to current medications (no)    Over the counter medications reviewed (Yes)

## 2018-06-09 PROBLEM — M67.472 GANGLION CYST OF LEFT FOOT: Status: ACTIVE | Noted: 2018-06-09

## 2018-10-17 ENCOUNTER — HOSPITAL ENCOUNTER (EMERGENCY)
Facility: HOSPITAL | Age: 43
Discharge: HOME | End: 2018-10-17
Attending: EMERGENCY MEDICINE
Payer: MEDICAID

## 2018-10-17 ENCOUNTER — DOCUMENTATION ONLY (OUTPATIENT)
Dept: FAMILY MEDICINE | Facility: CLINIC | Age: 43
End: 2018-10-17

## 2018-10-17 ENCOUNTER — TELEPHONE (OUTPATIENT)
Dept: SURGERY | Facility: CLINIC | Age: 43
End: 2018-10-17

## 2018-10-17 VITALS
TEMPERATURE: 97 F | DIASTOLIC BLOOD PRESSURE: 72 MMHG | OXYGEN SATURATION: 97 % | SYSTOLIC BLOOD PRESSURE: 125 MMHG | HEART RATE: 93 BPM | RESPIRATION RATE: 20 BRPM

## 2018-10-17 DIAGNOSIS — K82.8 PORCELAIN GALLBLADDER: ICD-10-CM

## 2018-10-17 DIAGNOSIS — R16.0 LIVER MASSES: ICD-10-CM

## 2018-10-17 DIAGNOSIS — F41.0 ANXIETY ATTACK: Primary | ICD-10-CM

## 2018-10-17 LAB
ALBUMIN SERPL BCP-MCNC: 4 G/DL
ALP SERPL-CCNC: 82 U/L
ALT SERPL W/O P-5'-P-CCNC: 27 U/L
ANION GAP SERPL CALC-SCNC: 13 MMOL/L
AST SERPL-CCNC: 25 U/L
B-HCG UR QL: NEGATIVE
BASOPHILS # BLD AUTO: 0 K/UL
BASOPHILS NFR BLD: 0.2 %
BILIRUB SERPL-MCNC: 0.3 MG/DL
BNP SERPL-MCNC: 14 PG/ML
BUN SERPL-MCNC: 9 MG/DL
CALCIUM SERPL-MCNC: 9.5 MG/DL
CHLORIDE SERPL-SCNC: 110 MMOL/L
CO2 SERPL-SCNC: 18 MMOL/L
CREAT SERPL-MCNC: 0.8 MG/DL
CTP QC/QA: YES
D DIMER PPP IA.FEU-MCNC: 0.64 MG/L FEU
DIFFERENTIAL METHOD: NORMAL
EOSINOPHIL # BLD AUTO: 0.1 K/UL
EOSINOPHIL NFR BLD: 1.5 %
ERYTHROCYTE [DISTWIDTH] IN BLOOD BY AUTOMATED COUNT: 12.9 %
EST. GFR  (AFRICAN AMERICAN): >60 ML/MIN/1.73 M^2
EST. GFR  (NON AFRICAN AMERICAN): >60 ML/MIN/1.73 M^2
GLUCOSE SERPL-MCNC: 112 MG/DL
HCT VFR BLD AUTO: 43.9 %
HGB BLD-MCNC: 15.1 G/DL
LYMPHOCYTES # BLD AUTO: 3.3 K/UL
LYMPHOCYTES NFR BLD: 38.8 %
MCH RBC QN AUTO: 30.8 PG
MCHC RBC AUTO-ENTMCNC: 34.5 G/DL
MCV RBC AUTO: 89 FL
MONOCYTES # BLD AUTO: 0.4 K/UL
MONOCYTES NFR BLD: 4.9 %
NEUTROPHILS # BLD AUTO: 4.6 K/UL
NEUTROPHILS NFR BLD: 54.6 %
PLATELET # BLD AUTO: 246 K/UL
PMV BLD AUTO: 9.9 FL
POTASSIUM SERPL-SCNC: 3.7 MMOL/L
PROT SERPL-MCNC: 7.9 G/DL
RBC # BLD AUTO: 4.92 M/UL
SODIUM SERPL-SCNC: 141 MMOL/L
TROPONIN I SERPL DL<=0.01 NG/ML-MCNC: <0.006 NG/ML
WBC # BLD AUTO: 8.4 K/UL

## 2018-10-17 PROCEDURE — 94640 AIRWAY INHALATION TREATMENT: CPT

## 2018-10-17 PROCEDURE — 85025 COMPLETE CBC W/AUTO DIFF WBC: CPT

## 2018-10-17 PROCEDURE — 99285 EMERGENCY DEPT VISIT HI MDM: CPT | Mod: 25

## 2018-10-17 PROCEDURE — 83880 ASSAY OF NATRIURETIC PEPTIDE: CPT

## 2018-10-17 PROCEDURE — 36415 COLL VENOUS BLD VENIPUNCTURE: CPT

## 2018-10-17 PROCEDURE — 63600175 PHARM REV CODE 636 W HCPCS: Performed by: EMERGENCY MEDICINE

## 2018-10-17 PROCEDURE — 84484 ASSAY OF TROPONIN QUANT: CPT

## 2018-10-17 PROCEDURE — 81025 URINE PREGNANCY TEST: CPT | Performed by: EMERGENCY MEDICINE

## 2018-10-17 PROCEDURE — 25000242 PHARM REV CODE 250 ALT 637 W/ HCPCS: Performed by: EMERGENCY MEDICINE

## 2018-10-17 PROCEDURE — 80053 COMPREHEN METABOLIC PANEL: CPT

## 2018-10-17 PROCEDURE — 85379 FIBRIN DEGRADATION QUANT: CPT

## 2018-10-17 PROCEDURE — 25500020 PHARM REV CODE 255: Performed by: EMERGENCY MEDICINE

## 2018-10-17 PROCEDURE — 96374 THER/PROPH/DIAG INJ IV PUSH: CPT | Mod: 59

## 2018-10-17 RX ORDER — LORAZEPAM 2 MG/ML
1 INJECTION INTRAMUSCULAR
Status: COMPLETED | OUTPATIENT
Start: 2018-10-17 | End: 2018-10-17

## 2018-10-17 RX ORDER — ALBUTEROL SULFATE 2.5 MG/.5ML
2.5 SOLUTION RESPIRATORY (INHALATION) EVERY 4 HOURS PRN
Status: DISCONTINUED | OUTPATIENT
Start: 2018-10-17 | End: 2018-10-17 | Stop reason: HOSPADM

## 2018-10-17 RX ORDER — SODIUM CHLORIDE 9 MG/ML
INJECTION, SOLUTION INTRAVENOUS
Status: DISCONTINUED
Start: 2018-10-17 | End: 2018-10-17 | Stop reason: HOSPADM

## 2018-10-17 RX ADMIN — ALBUTEROL SULFATE 2.5 MG: 2.5 SOLUTION RESPIRATORY (INHALATION) at 01:10

## 2018-10-17 RX ADMIN — IOHEXOL 100 ML: 350 INJECTION, SOLUTION INTRAVENOUS at 03:10

## 2018-10-17 RX ADMIN — LORAZEPAM 1 MG: 2 INJECTION, SOLUTION INTRAMUSCULAR; INTRAVENOUS at 02:10

## 2018-10-17 NOTE — DISCHARGE INSTRUCTIONS
You have several masses in the  liver that need to be further evaluated with an MRI and possibly a gastroenterologist .  This can be arranged by your regular physician.  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  Please call your regular physician in the general surgeon this week to schedule the next available follow-up appointment.

## 2018-10-17 NOTE — TELEPHONE ENCOUNTER
Pt has medicaid and will be switching to medicare on 11/01/18 she will call back when insurance changes

## 2018-10-17 NOTE — TELEPHONE ENCOUNTER
----- Message from Rei Garcia sent at 10/17/2018 12:10 PM CDT -----  Contact: patient  Type:  Sooner Apoointment Request    Caller is requesting a sooner appointment.  Caller declined first available appointment listed below.  Caller will not accept being placed on the waitlist and is requesting a message be sent to doctor.    Name of Caller:  Carolina  When is the first available appointment?  n/a  Symptoms:  ER f/u, Eval. Gall Bladder  Best Call Back Number:  [phone], or home [phone]  Additional Information:  Was advised to follow up as soon as possible.

## 2018-10-17 NOTE — PROGRESS NOTES
Pre-Visit Chart Review  For Appointment Scheduled on 11-2-18    Health Maintenance Due   Topic Date Due    TETANUS VACCINE  06/09/1994    Influenza Vaccine  08/01/2018

## 2018-10-17 NOTE — ED PROVIDER NOTES
Encounter Date: 10/17/2018       History     Chief Complaint   Patient presents with    Shortness of Breath     having trouble taking a breath     Patient is a 43-year-old female with a past medical history of blindness, depression, who presents the emergency room for evaluation of shortness of breath with difficulty taking a deep breath.  Patient states around 12 30 this evening she was lying down about the fall asleep when she had difficulty breathing in and breathing out.  She states it is hard to breathe in her out at the same time feels like she needs to breathe more air into her lungs and breathing out was hard on the way here.  She denies that her throat is closing.  She denies any dysphagia or odynophagia.  She has no rash or facial swelling or tongue swelling.  She denies any new medicines.  She denies any esophageal foreign bodies of food bolus sensations.  She has no wheezing.  She feels like a heaviness over the top part of her chest.  She denies any history of pulmonary embolism, CHF, acute flash pulmonary edema, acute coronary syndrome, COPD, pneumonia, asthma.  She does not have any stridor is cough.  She is very anxious.          Review of patient's allergies indicates:   Allergen Reactions    Pcn [penicillins]      rash     Past Medical History:   Diagnosis Date    Blindness     Mental disorder     Periodic antidepressant use since childhood, not currently taking or endorsing depressive ideation    Pinched nerve      Past Surgical History:   Procedure Laterality Date    ANKLE FRACTURE SURGERY  2009     Family History   Problem Relation Age of Onset    Hypertension Father     Lung disease Father         calapse x 3     Stroke Father         in eye     Cancer Sister 23        polyps found incidentally    Vision loss Sister     Lung disease Sister         spontanious lung collapse    Retinal detachment Mother     Colon cancer Paternal Grandfather     Cancer Paternal Grandfather          colon    Stroke Maternal Grandmother     Colon cancer Paternal Aunt     Cancer Paternal Aunt         colon    No Known Problems Maternal Uncle     No Known Problems Paternal Uncle     Parkinsonism Maternal Grandfather     Cancer Maternal Grandfather         prostate    Cancer Paternal Grandmother         skin cancer in colon     Alzheimer's disease Paternal Grandmother     Heart disease Paternal Aunt     Breast cancer Neg Hx     Diabetes Neg Hx     Miscarriages / Stillbirths Neg Hx      Social History     Tobacco Use    Smoking status: Never Smoker    Smokeless tobacco: Never Used   Substance Use Topics    Alcohol use: Yes     Alcohol/week: 3.6 oz     Types: 6 Glasses of wine per week    Drug use: No     Review of Systems   Constitutional: Negative for fatigue and fever.   HENT: Negative for congestion, postnasal drip, rhinorrhea, sore throat, trouble swallowing and voice change.    Respiratory: Positive for shortness of breath. Negative for cough, chest tightness, wheezing and stridor.    Cardiovascular: Negative for chest pain, palpitations and leg swelling.   Gastrointestinal: Negative for abdominal pain, constipation, nausea and vomiting.   Endocrine: Negative for polydipsia and polyuria.   Genitourinary: Negative for dysuria.   Musculoskeletal: Negative for arthralgias and myalgias.   Skin: Negative for rash.   Neurological: Negative for weakness, numbness and headaches.   Psychiatric/Behavioral: The patient is nervous/anxious.        Physical Exam     Initial Vitals [10/17/18 0105]   BP Pulse Resp Temp SpO2   (!) 200/97 102 18 97 °F (36.1 °C) 98 %      MAP       --         Physical Exam    Nursing note and vitals reviewed.  Constitutional: She appears well-developed and well-nourished. She appears distressed (Patient appears very anxious and mild respiratory distress).   HENT:   Head: Normocephalic and atraumatic.   Right Ear: External ear normal.   Left Ear: External ear normal.    Mouth/Throat: Oropharynx is clear and moist.   No stridor or hoarseness.  Patient appears to be breath-holding.   Eyes: Conjunctivae and EOM are normal. Pupils are equal, round, and reactive to light. No scleral icterus.   Neck: Neck supple. No JVD present.   No tenderness over the trachea.   Cardiovascular: Normal rate, regular rhythm and normal heart sounds.   Pulmonary/Chest: Breath sounds normal. No stridor. She has no wheezes. She has no rhonchi. She has no rales.   Patient almost appears to be breath-holding.  No expiratory stridor.  She will not answer questions.  She does not appear to have wheezing rales or rhonchi.   Abdominal: Soft. There is no tenderness.   Musculoskeletal: She exhibits no edema.   Neurological: She is alert and oriented to person, place, and time. She has normal strength. No sensory deficit.   Skin: Skin is warm and dry.   Psychiatric: She has a normal mood and affect.         ED Course   Procedures  Labs Reviewed   CBC W/ AUTO DIFFERENTIAL   COMPREHENSIVE METABOLIC PANEL   D DIMER, QUANTITATIVE   TROPONIN I   B-TYPE NATRIURETIC PEPTIDE     EKG Readings: (Independently Interpreted)   Other EKG Interpretations: Rate 101, sinus tachycardia with a mild sinus arrhythmia, normal axis, normal intervals, no ST segment elevation or depression.       Imaging Results    None       X-Rays:   Independently Interpreted Readings:   Other Readings:  Chest x-ray shows no acute cardiopulmonary process.    Medical Decision Making:   Initial Assessment:   Patient presents very anxious and holding her breath.  Differential Diagnosis:   Appears to be an anxiety attack and I doubt this is allergic reaction, stridor, foreign body in the airway, heart failure, pulmonary embolism, pneumothorax, pneumonia.  ED Management:  Patient was given Ativan and albuterol nebulizer.  She was not wheezing but after Ativan and albuterol she is now able to speak and breathe with no breath-holding.  Chest x-ray shows no  acute cardiopulmonary process.  I believe this is mostly anxiety that could have been triggered by aspiration when lying down with possible mild reflux.  I discussed the findings of the CT with the patient that she needs close follow-up with her regular doctor for further outpatient imaging with an MRI.                      Clinical Impression:   The primary encounter diagnosis was Anxiety attack. Diagnoses of Liver masses and Porcelain gallbladder were also pertinent to this visit.                             Hipolito Feliz MD  10/18/18 0000

## 2018-10-18 ENCOUNTER — TELEPHONE (OUTPATIENT)
Dept: SURGERY | Facility: CLINIC | Age: 43
End: 2018-10-18

## 2018-10-18 ENCOUNTER — TELEPHONE (OUTPATIENT)
Dept: FAMILY MEDICINE | Facility: CLINIC | Age: 43
End: 2018-10-18

## 2018-10-18 NOTE — TELEPHONE ENCOUNTER
----- Message from Andria Grimes sent at 10/18/2018  3:43 PM CDT -----  Contact: Self  Type:  Patient Returning Call    Who Called:  Patient   Who Left Message for Patient:  Mercedes   Does the patient know what this is regarding?:    Best Call Back Number:  [phone] or [phone]  Additional Information:

## 2018-10-18 NOTE — TELEPHONE ENCOUNTER
----- Message from Mario Reyes sent at 10/18/2018 11:34 AM CDT -----  Type:  Patient Returning Call    Who Called:  Patient  Who Left Message for Patient:  Monty Lynne  Does the patient know what this is regarding?:  No  Best Call Back Number: [phone]

## 2018-10-19 ENCOUNTER — DOCUMENTATION ONLY (OUTPATIENT)
Dept: FAMILY MEDICINE | Facility: CLINIC | Age: 43
End: 2018-10-19

## 2018-10-25 ENCOUNTER — OFFICE VISIT (OUTPATIENT)
Dept: FAMILY MEDICINE | Facility: CLINIC | Age: 43
End: 2018-10-25
Attending: FAMILY MEDICINE
Payer: MEDICAID

## 2018-10-25 VITALS
OXYGEN SATURATION: 96 % | BODY MASS INDEX: 36.02 KG/M2 | WEIGHT: 229.5 LBS | RESPIRATION RATE: 12 BRPM | DIASTOLIC BLOOD PRESSURE: 74 MMHG | HEART RATE: 67 BPM | TEMPERATURE: 98 F | SYSTOLIC BLOOD PRESSURE: 100 MMHG | HEIGHT: 67 IN

## 2018-10-25 DIAGNOSIS — K82.8 PORCELAIN GALLBLADDER: ICD-10-CM

## 2018-10-25 DIAGNOSIS — R91.1 PULMONARY NODULE, LEFT: ICD-10-CM

## 2018-10-25 DIAGNOSIS — E66.01 SEVERE OBESITY (BMI 35.0-35.9 WITH COMORBIDITY): ICD-10-CM

## 2018-10-25 DIAGNOSIS — R10.11 RUQ ABDOMINAL PAIN: Primary | ICD-10-CM

## 2018-10-25 DIAGNOSIS — D37.6 NEOPLASM OF UNCERTAIN BEHAVIOR OF LIVER: ICD-10-CM

## 2018-10-25 DIAGNOSIS — R06.02 SHORTNESS OF BREATH: ICD-10-CM

## 2018-10-25 DIAGNOSIS — D49.0 LIVER NEOPLASM: ICD-10-CM

## 2018-10-25 DIAGNOSIS — K80.20 GALLSTONES: ICD-10-CM

## 2018-10-25 PROCEDURE — 99214 OFFICE O/P EST MOD 30 MIN: CPT | Mod: S$PBB,,, | Performed by: FAMILY MEDICINE

## 2018-10-25 PROCEDURE — 99214 OFFICE O/P EST MOD 30 MIN: CPT | Mod: PBBFAC,PO | Performed by: FAMILY MEDICINE

## 2018-10-25 PROCEDURE — 99999 PR PBB SHADOW E&M-EST. PATIENT-LVL IV: CPT | Mod: PBBFAC,,, | Performed by: FAMILY MEDICINE

## 2018-10-25 RX ORDER — GABAPENTIN 300 MG/1
300 CAPSULE ORAL 2 TIMES DAILY PRN
COMMUNITY
Start: 2018-07-02 | End: 2019-05-06 | Stop reason: SDUPTHER

## 2018-10-25 NOTE — PROGRESS NOTES
Subjective:       Patient ID: Carolina Andrews is a 43 y.o. female.    Chief Complaint: Hospital Follow Up    43-year-old female comes in to follow-up from an emergency room visit at [facility] on October 17.  The patient had been lying on her stomach listening to television when she got up to get ready for bed.  On arising she felt a restriction in her ability to inhale.  She states it felt as if her diaphragm was paralyzed.  She had some discomfort in the right upper quadrant but did not note radiation.  She did feel some bloating but no nausea vomiting and did not recall any bowel changes.  She thinks she had eaten pizza prior to onset.  She went to the emergency room where a D-dimer was mildly elevated, a chest x-ray was negative so she had a CTA done that was negative for pulmonary embolism.  It did show a small 5 mm left lower lobe nodule that needs a follow-up in a year but also seen was a 5 cm and an 8 cm liver mass along with porcelain gallbladder and gallstones.  The patient does have a family history of gallbladder disease but no known personal history.  She did have an ultrasound of the abdomen in May of 2002 at [facility] that reported 4 small hepatic nodules ranging from subcentimeter sized to a maximum of 1.6 cm that were felt to be hemangiomas.  She has no other known liver imaging in the past.  She has no smoking history and would be considered low risk for the lung nodule    Past Medical History:  No date: Blindness  No date: Mental disorder      Comment:  Periodic antidepressant use since childhood, not                currently taking or endorsing depressive ideation  No date: Pinched nerve    Past Surgical History:  2009: ANKLE FRACTURE SURGERY    Current Outpatient Medications on File Prior to Visit:  gabapentin (NEURONTIN) 300 MG capsule, Take 300 mg by mouth daily as needed., Disp: , Rfl:     No current facility-administered medications on file prior to visit.            Review of Systems   Constitutional: Negative for chills and fever.   Respiratory: Positive for shortness of breath. Negative for cough, chest tightness and wheezing.    Cardiovascular: Negative for chest pain and palpitations.   Gastrointestinal: Positive for abdominal distention and abdominal pain. Negative for constipation, diarrhea, nausea and vomiting.       Objective:      Physical Exam   Constitutional: She appears well-developed. No distress.   Good blood pressure control  Severe obesity with a BMI of 35.9 she is down 1.1 lb from her December 11, 2017 visit   Cardiovascular: Normal rate, regular rhythm and normal heart sounds. Exam reveals no gallop and no friction rub.   No murmur heard.  Pulmonary/Chest: Effort normal and breath sounds normal. No stridor. No respiratory distress. She has no wheezes. She has no rales. She exhibits no tenderness.   Abdominal: Soft. Bowel sounds are normal. She exhibits no distension. There is no hepatosplenomegaly. There is tenderness in the right upper quadrant and left lower quadrant. There is no rigidity, no rebound, no guarding, no CVA tenderness, no tenderness at McBurney's point and negative Chino's sign.   Skin: She is not diaphoretic.   Psychiatric: She has a normal mood and affect. Her behavior is normal. Thought content normal.   Nursing note and vitals reviewed.      Assessment:       1. RUQ abdominal pain    2. Shortness of breath    3. Neoplasm of uncertain behavior of liver    4. Porcelain gallbladder    5. Gallstones    6. Pulmonary nodule, left    7. Liver neoplasm    8. Severe obesity (BMI 35.0-35.9 with comorbidity)        Plan:       1. RUQ abdominal pain  Suspect she had an episode of choledocholithiasis now resolved but does have evidence of liver neoplasms possibly enlarged hemangiomas from previous scan as well as porcelain gallbladder  - MRI Abdomen W WO Contrast; Future    2. Shortness of breath  Probably diaphragmatic.  Patient does not  typically have anxiety issues  - MRI Abdomen W WO Contrast; Future    3. Neoplasm of uncertain behavior of liver  See above  - MRI Abdomen W WO Contrast; Future    4. Porcelain gallbladder  See above  - MRI Abdomen W WO Contrast; Future    5. Gallstones  See above  - MRI Abdomen W WO Contrast; Future    6. Pulmonary nodule, left  Follow-up CT scan chest without contrast in 12 months  - MRI Abdomen W WO Contrast; Future    7. Liver neoplasm  See above  - MRI Abdomen W WO Contrast; Future    8. Severe obesity (BMI 35.0-35.9 with comorbidity)

## 2018-10-25 NOTE — PATIENT INSTRUCTIONS

## 2018-10-31 ENCOUNTER — HOSPITAL ENCOUNTER (OUTPATIENT)
Dept: RADIOLOGY | Facility: HOSPITAL | Age: 43
Discharge: HOME | End: 2018-10-31
Attending: FAMILY MEDICINE
Payer: MEDICAID

## 2018-10-31 DIAGNOSIS — R06.02 SHORTNESS OF BREATH: ICD-10-CM

## 2018-10-31 DIAGNOSIS — K82.8 PORCELAIN GALLBLADDER: ICD-10-CM

## 2018-10-31 DIAGNOSIS — K80.20 GALLSTONES: ICD-10-CM

## 2018-10-31 DIAGNOSIS — D49.0 LIVER NEOPLASM: ICD-10-CM

## 2018-10-31 DIAGNOSIS — R91.1 PULMONARY NODULE, LEFT: ICD-10-CM

## 2018-10-31 DIAGNOSIS — D37.6 NEOPLASM OF UNCERTAIN BEHAVIOR OF LIVER: ICD-10-CM

## 2018-10-31 DIAGNOSIS — R10.11 RUQ ABDOMINAL PAIN: ICD-10-CM

## 2018-10-31 PROCEDURE — 74183 MRI ABD W/O CNTR FLWD CNTR: CPT | Mod: TC

## 2018-10-31 PROCEDURE — 74183 MRI ABD W/O CNTR FLWD CNTR: CPT | Mod: 26,,, | Performed by: RADIOLOGY

## 2018-10-31 PROCEDURE — 25500020 PHARM REV CODE 255: Performed by: FAMILY MEDICINE

## 2018-10-31 PROCEDURE — A9585 GADOBUTROL INJECTION: HCPCS | Performed by: FAMILY MEDICINE

## 2018-10-31 RX ORDER — GADOBUTROL 604.72 MG/ML
INJECTION INTRAVENOUS
Status: DISCONTINUED
Start: 2018-10-31 | End: 2018-11-01 | Stop reason: HOSPADM

## 2018-10-31 RX ORDER — GADOBUTROL 604.72 MG/ML
10 INJECTION INTRAVENOUS
Status: COMPLETED | OUTPATIENT
Start: 2018-10-31 | End: 2018-10-31

## 2018-10-31 RX ORDER — SODIUM CHLORIDE 9 MG/ML
INJECTION, SOLUTION INTRAVENOUS
Status: DISCONTINUED
Start: 2018-10-31 | End: 2018-11-01 | Stop reason: HOSPADM

## 2018-10-31 RX ADMIN — GADOBUTROL 10 ML: 604.72 INJECTION INTRAVENOUS at 06:10

## 2018-11-02 ENCOUNTER — OFFICE VISIT (OUTPATIENT)
Dept: FAMILY MEDICINE | Facility: CLINIC | Age: 43
End: 2018-11-02
Attending: FAMILY MEDICINE
Payer: MEDICARE

## 2018-11-02 VITALS
DIASTOLIC BLOOD PRESSURE: 80 MMHG | HEIGHT: 67 IN | WEIGHT: 229.94 LBS | OXYGEN SATURATION: 95 % | TEMPERATURE: 98 F | SYSTOLIC BLOOD PRESSURE: 112 MMHG | RESPIRATION RATE: 12 BRPM | HEART RATE: 70 BPM | BODY MASS INDEX: 36.09 KG/M2

## 2018-11-02 DIAGNOSIS — R10.9 ABDOMINAL PAIN, UNSPECIFIED ABDOMINAL LOCATION: Primary | ICD-10-CM

## 2018-11-02 DIAGNOSIS — K21.9 GASTROESOPHAGEAL REFLUX DISEASE, ESOPHAGITIS PRESENCE NOT SPECIFIED: ICD-10-CM

## 2018-11-02 DIAGNOSIS — K80.20 CALCULUS OF GALLBLADDER WITHOUT CHOLECYSTITIS WITHOUT OBSTRUCTION: ICD-10-CM

## 2018-11-02 DIAGNOSIS — K29.70 GASTRITIS, PRESENCE OF BLEEDING UNSPECIFIED, UNSPECIFIED CHRONICITY, UNSPECIFIED GASTRITIS TYPE: ICD-10-CM

## 2018-11-02 PROCEDURE — 99999 PR PBB SHADOW E&M-EST. PATIENT-LVL IV: CPT | Mod: PBBFAC,,, | Performed by: FAMILY MEDICINE

## 2018-11-02 PROCEDURE — 3008F BODY MASS INDEX DOCD: CPT | Mod: S$GLB,,, | Performed by: FAMILY MEDICINE

## 2018-11-02 PROCEDURE — 99214 OFFICE O/P EST MOD 30 MIN: CPT | Mod: S$GLB,,, | Performed by: FAMILY MEDICINE

## 2018-11-02 RX ORDER — PANTOPRAZOLE SODIUM 40 MG/1
40 TABLET, DELAYED RELEASE ORAL DAILY
Qty: 30 TABLET | Refills: 1 | Status: SHIPPED | OUTPATIENT
Start: 2018-11-02 | End: 2019-01-29

## 2018-11-02 RX ORDER — IBUPROFEN 200 MG
400 TABLET ORAL 2 TIMES DAILY PRN
COMMUNITY
End: 2019-01-29

## 2018-11-02 NOTE — PROGRESS NOTES
Subjective:       Patient ID: Carolina Andrews is a 43 y.o. female.    Chief Complaint: Hospital Follow Up (ER mass on liver )    43-year-old female comes in for an ER follow-up when she presented October 17 with shortness of breath.  History suggested anxiety and there is also suggestion of a possible aspiration.  She does have frequent episodes of reflux while lying in bed.  During the workup they did a CTA to rule out pulmonary embolism and found a 5 mm left lower lobe pulmonary nodule in a low risk patient that will need follow-up.  Also found were 8 cm and 5 cm masses in the dome of the liver along with what appeared to be a porcelain gallbladder.  The patient was referred here for follow-up.  MRI did not confirm a porcelain gallbladder but she did have a 3.6 cm gallstone with no evidence of biliary obstruction and an 8 cm and 5 cm mass in the dome of the liver consistent with hemangiomas.  There were 2 smaller lesions also consistent with hemangiomas.  The patient is having abdominal and chest symptoms somewhat variable in presentation.  The pain starts in the epigastric area and in most cases it radiates downward into the abdomen and sometimes to the left subscapular area.  On 1 event the 1 that presented to the emergency room it radiated to the right upper quadrant and somewhat to the right subscapular area that would be consistent more with gallbladder disease but that was only 1 event.  The pains usually occur later at night in the evening and she has not associated it with eating or any particular foods.  She does have some bloating but is not a prominent symptom.  She has not had a change in bowel habits.  She has not noted any urinary complaints.  At this moment she is symptom free.    Past Medical History:  No date: Blindness  No date: Mental disorder      Comment:  Periodic antidepressant use since childhood, not                currently taking or endorsing depressive ideation  No date: Pinched  nerve    Past Surgical History:  2009: ANKLE FRACTURE SURGERY    Current Outpatient Medications on File Prior to Visit:  gabapentin (NEURONTIN) 300 MG capsule, Take 300 mg by mouth daily as needed., Disp: , Rfl:   ibuprofen (ADVIL,MOTRIN) 200 MG tablet, Take 400 mg by mouth 2 (two) times daily as needed for Pain., Disp: , Rfl:     No current facility-administered medications on file prior to visit.           Review of Systems   Constitutional: Negative for chills and fever.   Gastrointestinal: Positive for abdominal pain. Negative for abdominal distention, anal bleeding, blood in stool, constipation, diarrhea, nausea and vomiting.       Objective:      Physical Exam   Constitutional: She appears well-developed. No distress.   Severe obesity with a BMI of 36.0 she is up 0.4 lb from her October 25, 2018 visit.  Blood pressure is under good control with no fever normal respiration and normal oxygen saturation     HENT:   Head: Normocephalic and atraumatic.   Abdominal: Soft. Bowel sounds are normal. She exhibits no distension and no mass. There is no hepatosplenomegaly, splenomegaly or hepatomegaly. There is tenderness in the right upper quadrant and epigastric area. There is no rebound, no guarding, no tenderness at McBurney's point and negative Chino's sign. No hernia.   Skin: She is not diaphoretic.   Psychiatric: She has a normal mood and affect. Her behavior is normal. Thought content normal.   Nursing note and vitals reviewed.      Assessment:       1. Abdominal pain, unspecified abdominal location    2. Calculus of gallbladder without cholecystitis without obstruction    3. Gastritis, presence of bleeding unspecified, unspecified chronicity, unspecified gastritis type    4. Gastroesophageal reflux disease, esophagitis presence not specified        Plan:       1. Abdominal pain, unspecified abdominal location  Pain somewhat variable in presentation.  Her most predominant symptoms suggest reflux and possibly  gastritis or an ulcer.  She has been using ibuprofen which puts her at increased risk.  Will start her on pantoprazole 40 mg daily for 1-2 months and get GI consult for possible upper endoscopy.  - Ambulatory referral to Gastroenterology    2. Calculus of gallbladder without cholecystitis without obstruction  Large 3.6 cm gallstone solitary stone with no sign of obstruction.  One event did suggest choledocholithiasis but only 1 out of a number of occurrences.  We could do a HIDA scan but I doubt it would add to understanding of her problem.  Will await GI consult results and she may need to have a cholecystectomy.  - Ambulatory referral to Gastroenterology    3. Gastritis, presence of bleeding unspecified, unspecified chronicity, unspecified gastritis type  See above  - pantoprazole (PROTONIX) 40 MG tablet; Take 1 tablet (40 mg total) by mouth once daily.  Dispense: 30 tablet; Refill: 1  - Ambulatory referral to Gastroenterology    4. Gastroesophageal reflux disease, esophagitis presence not specified  Anti reflux measures discussed including avoiding eating or drinking within 2 hr of bedtime and raising head of bed.  - pantoprazole (PROTONIX) 40 MG tablet; Take 1 tablet (40 mg total) by mouth once daily.  Dispense: 30 tablet; Refill: 1  - Ambulatory referral to Gastroenterology

## 2018-11-02 NOTE — PATIENT INSTRUCTIONS

## 2018-11-07 ENCOUNTER — OFFICE VISIT (OUTPATIENT)
Dept: GASTROENTEROLOGY | Facility: CLINIC | Age: 43
End: 2018-11-07
Attending: FAMILY MEDICINE
Payer: MEDICARE

## 2018-11-07 VITALS
BODY MASS INDEX: 35.88 KG/M2 | DIASTOLIC BLOOD PRESSURE: 69 MMHG | SYSTOLIC BLOOD PRESSURE: 120 MMHG | HEART RATE: 81 BPM | WEIGHT: 229.06 LBS

## 2018-11-07 DIAGNOSIS — R07.9 NONSPECIFIC CHEST PAIN: ICD-10-CM

## 2018-11-07 DIAGNOSIS — D18.03 LIVER HEMANGIOMA: ICD-10-CM

## 2018-11-07 DIAGNOSIS — R10.13 EPIGASTRIC PAIN: Primary | ICD-10-CM

## 2018-11-07 DIAGNOSIS — R93.2 ABNORMAL FINDINGS ON DIAGNOSTIC IMAGING OF GALL BLADDER: ICD-10-CM

## 2018-11-07 DIAGNOSIS — K80.20 CALCULUS OF GALLBLADDER WITHOUT CHOLECYSTITIS WITHOUT OBSTRUCTION: ICD-10-CM

## 2018-11-07 DIAGNOSIS — K59.09 CHRONIC CONSTIPATION: ICD-10-CM

## 2018-11-07 DIAGNOSIS — R12 HEARTBURN: ICD-10-CM

## 2018-11-07 PROCEDURE — 99204 OFFICE O/P NEW MOD 45 MIN: CPT | Mod: S$GLB,,, | Performed by: NURSE PRACTITIONER

## 2018-11-07 PROCEDURE — 3008F BODY MASS INDEX DOCD: CPT | Mod: S$GLB,,, | Performed by: NURSE PRACTITIONER

## 2018-11-07 PROCEDURE — 99999 PR PBB SHADOW E&M-EST. PATIENT-LVL III: CPT | Mod: PBBFAC,,, | Performed by: NURSE PRACTITIONER

## 2018-11-07 NOTE — PROGRESS NOTES
"Subjective:       Patient ID: Carolina Andrews is a 43 y.o. female Body mass index is 35.88 kg/m².    Chief Complaint: Abdominal Pain (& Gastroesophageal Reflux)    This patient is new to me.  Referring Provider:  Dr. Crump for abdominal pain, gallstones, gastritis and GERD    Patient is here with a family member, whom assisted with history.      Abdominal Pain   This is a recurrent problem. Episode onset: had similar pain in 2012, recurred in 10/2018. The problem occurs intermittently (once every few months typically but over the past month occurring about once a week). The most recent episode lasted 4 hours. The problem has been unchanged. Pain location: starts in mid-chest then to epigastric pain. The pain is at a severity of 0/10 (currently). Quality: pressure; "feels like a gas bubble moving through intestines" The abdominal pain radiates to the back. Associated symptoms include belching (increased the day after she has abdominal pain), constipation (chronic, bowel movements once every 2-3 days) and flatus. Pertinent negatives include no diarrhea, dysuria, fever, hematochezia, melena, nausea, vomiting or weight loss. Nothing aggravates the pain. Relieved by: drinking water helps. She has tried proton pump inhibitors (protonix 40 mg daily, ibuprofen PRN taken maybe once a week to once every few weeks) for the symptoms. Prior diagnostic workup includes ultrasound (& MRI abdomen). Her past medical history is significant for gallstones and GERD (heartburn occurs once every 2 weeks and lasts a few days, worse with bending forward). There is no history of Crohn's disease, pancreatitis or PUD.     Review of Systems   Constitutional: Positive for appetite change (decreased when symptoms first started, improving since the ER visit 10/17/18). Negative for chills, fatigue, fever and weight loss.   HENT: Negative for sore throat and trouble swallowing.    Respiratory: Negative for cough, choking and shortness of breath (has " resolved since the ER).    Cardiovascular: Positive for chest pain (denies currently).   Gastrointestinal: Positive for abdominal pain, constipation (chronic, bowel movements once every 2-3 days) and flatus. Negative for anal bleeding, blood in stool, diarrhea, hematochezia, melena, nausea, rectal pain and vomiting.   Genitourinary: Negative for difficulty urinating, dysuria and flank pain.   Neurological: Negative for weakness.       Past Medical History:   Diagnosis Date    Blindness     Cholelithiasis     Liver hemangioma     Mental disorder     Periodic antidepressant use since childhood, not currently taking or endorsing depressive ideation    Pinched nerve      Past Surgical History:   Procedure Laterality Date    ANKLE FRACTURE SURGERY  2009    UPPER GASTROINTESTINAL ENDOSCOPY  ~1998    normal findings per patient report     Family History   Problem Relation Age of Onset    Hypertension Father     Lung disease Father         calapse x 3     Stroke Father         in eye     Cancer Sister 23        polyps found incidentally    Vision loss Sister     Lung disease Sister         spontanious lung collapse    GI problems Sister     Retinal detachment Mother     Cancer Paternal Grandfather     Stroke Maternal Grandmother     Colon cancer Paternal Aunt     Colon polyps Maternal Uncle     No Known Problems Paternal Uncle     Parkinsonism Maternal Grandfather     Cancer Maternal Grandfather         prostate    Colon polyps Maternal Grandfather     Cancer Paternal Grandmother         skin cancer in colon     Alzheimer's disease Paternal Grandmother     Heart disease Paternal Aunt     Breast cancer Neg Hx     Diabetes Neg Hx     Miscarriages / Stillbirths Neg Hx     Crohn's disease Neg Hx     Ulcerative colitis Neg Hx      Wt Readings from Last 10 Encounters:   11/07/18 103.9 kg (229 lb 0.9 oz)   11/02/18 104.3 kg (229 lb 15 oz)   10/25/18 104.1 kg (229 lb 8 oz)   06/08/18 103.9 kg (229 lb)    12/11/17 104.6 kg (230 lb 9.6 oz)   11/30/17 103.8 kg (228 lb 13.4 oz)   08/31/17 104.7 kg (230 lb 13.2 oz)   05/05/17 102.6 kg (226 lb 3.1 oz)   11/14/16 101.1 kg (222 lb 14.2 oz)   06/14/16 100.7 kg (222 lb 0.1 oz)     Lab Results   Component Value Date    WBC 8.40 10/17/2018    HGB 15.1 10/17/2018    HCT 43.9 10/17/2018    MCV 89 10/17/2018     10/17/2018     CMP  Sodium   Date Value Ref Range Status   10/17/2018 141 136 - 145 mmol/L Final     Potassium   Date Value Ref Range Status   10/17/2018 3.7 3.5 - 5.1 mmol/L Final     Chloride   Date Value Ref Range Status   10/17/2018 110 95 - 110 mmol/L Final     CO2   Date Value Ref Range Status   10/17/2018 18 (L) 23 - 29 mmol/L Final     Glucose   Date Value Ref Range Status   10/17/2018 112 (H) 70 - 110 mg/dL Final     BUN, Bld   Date Value Ref Range Status   10/17/2018 9 6 - 20 mg/dL Final     Creatinine   Date Value Ref Range Status   10/17/2018 0.8 0.5 - 1.4 mg/dL Final     Calcium   Date Value Ref Range Status   10/17/2018 9.5 8.7 - 10.5 mg/dL Final     Total Protein   Date Value Ref Range Status   10/17/2018 7.9 6.0 - 8.4 g/dL Final     Albumin   Date Value Ref Range Status   10/17/2018 4.0 3.5 - 5.2 g/dL Final     Total Bilirubin   Date Value Ref Range Status   10/17/2018 0.3 0.1 - 1.0 mg/dL Final     Comment:     For infants and newborns, interpretation of results should be based  on gestational age, weight and in agreement with clinical  observations.  Premature Infant recommended reference ranges:  Up to 24 hours.............<8.0 mg/dL  Up to 48 hours............<12.0 mg/dL  3-5 days..................<15.0 mg/dL  6-29 days.................<15.0 mg/dL       Alkaline Phosphatase   Date Value Ref Range Status   10/17/2018 82 55 - 135 U/L Final     AST   Date Value Ref Range Status   10/17/2018 25 10 - 40 U/L Final     ALT   Date Value Ref Range Status   10/17/2018 27 10 - 44 U/L Final     Anion Gap   Date Value Ref Range Status   10/17/2018 13 8 - 16  mmol/L Final     eGFR if    Date Value Ref Range Status   10/17/2018 >60 >60 mL/min/1.73 m^2 Final     eGFR if non    Date Value Ref Range Status   10/17/2018 >60 >60 mL/min/1.73 m^2 Final     Comment:     Calculation used to obtain the estimated glomerular filtration  rate (eGFR) is the CKD-EPI equation.        Lab Results   Component Value Date    TSH 1.975 09/05/2017     Reviewed prior medical records including radiology report of 10/17/18 CTA chest and ER visit note; 10/31/18 MRI abdomen; & endoscopy history (see surgical history).    Objective:      Physical Exam   Constitutional: She is oriented to person, place, and time. She appears well-developed and well-nourished. No distress.   HENT:   Mouth/Throat: Oropharynx is clear and moist and mucous membranes are normal. No oral lesions. No oropharyngeal exudate.   Eyes: Conjunctivae are normal. No scleral icterus.   Cardiovascular: Normal rate.   Pulmonary/Chest: Effort normal and breath sounds normal. No respiratory distress. She has no wheezes.   Abdominal: Soft. Normal appearance and bowel sounds are normal. She exhibits no distension, no abdominal bruit and no mass. There is tenderness (mild) in the epigastric area. There is no rigidity, no rebound, no guarding, no tenderness at McBurney's point and negative Chino's sign. No hernia.   Neurological: She is alert and oriented to person, place, and time.   Skin: Skin is warm and dry. No rash noted. She is not diaphoretic. No erythema. No pallor.   Non-jaundiced   Psychiatric: She has a normal mood and affect. Her behavior is normal. Judgment and thought content normal.   Nursing note and vitals reviewed.      Assessment:       1. Epigastric pain    2. Liver hemangioma    3. Calculus of gallbladder without cholecystitis without obstruction    4. Abnormal findings on diagnostic imaging of gall bladder    5. Nonspecific chest pain    6. Heartburn    7. Chronic constipation         Plan:       Epigastric pain  - schedule EGD, discussed procedure with patient, patient verbalized understanding    Liver hemangioma  - follow-up with PCP for continued evaluation and management; if specialist is needed, recommend seeing hepatology.    Calculus of gallbladder without cholecystitis without obstruction & Abnormal findings on diagnostic imaging of gall bladder  -     Ambulatory referral to General Surgery  - recommend low fat diet  - discussed diagnosis & that it can cause symptoms in some patients, while other patients with it can be asymptomatic; recommend seeing general surgery for further evaluation and management, patient verbalized understanding    Nonspecific chest pain  - follow-up with PCP &/or cardiologist for continued evaluation and management  - if experiencing symptoms of headache, chest pain, shortness of breath, and/or blurred vision, recommend going to ER for further evaluation and management    Heartburn  - schedule EGD, discussed procedure with patient, patient verbalized understanding  - CONTINUE PROTONIX 40 MG ONCE DAILY AS DIRECTED, take in the morning 30-60 minutes before breakfast, discussed about possible long term use of medication (prefer to use lowest effective dose or discontinuing if possible), pt verbalized understanding  -discussed about the different types of medications used to treat reflux and how to use them, antacids can be used PRN for breakthrough heartburn symptoms by reducing stomach acid that is already produced, H2 blockers (zantac) work by limiting the amount acid production, & PPI's work to block acid production and are taken daily, patient verbalized understanding.  -Educated patient on lifestyle modifications to help control/reduce reflux/abdominal pain including: avoid large meals, avoid eating within 2-3 hours of bedtime (avoid late night eating & lying down soon after eating), elevate head of bed if nocturnal symptoms are present, smoking cessation (if  current smoker), & weight loss (if overweight).   -Educated to avoid known foods which trigger reflux symptoms & to minimize/avoid high-fat foods, chocolate, caffeine, citrus, alcohol, & tomato products.  -Advised to avoid/limit use of NSAID's, since they can cause GI upset, bleeding, and/or ulcers. If needed, take with food.     Chronic constipation  Recommended daily exercise as tolerated, adequate water intake (six 8-oz glasses of water daily), and high fiber diet. OTC fiber supplements are recommended if diet does not reach daily fiber goal (25 grams daily), such as Metamucil, Citrucel, or FiberCon (take as directed, separate from other oral medications by >2 hours).  -Recommend taking an OTC stool softener such as Colace as directed to avoid hard stools and straining with bowel movements PRN  -If still no improvement with these measures, call/follow-up  - Possible COLONOSCOPY pending results of testing and if symptoms persist    Follow-up in about 1 month (around 12/7/2018), or if symptoms worsen or fail to improve.      If no improvement in symptoms or symptoms worsen, call/follow-up at clinic or go to ER.

## 2018-11-07 NOTE — LETTER
November 7, 2018      Desean Crump MD  [address]  [place] [address]           [facility]  [address]  [place] [address]  Phone: [phone]          Patient: Carolina Andrews   MR Number: [id number]   YOB: 1975   Date of Visit: 11/7/2018       Dear Dr. Desean Crump:    Thank you for referring Carolina Andrews to me for evaluation. Attached you will find relevant portions of my assessment and plan of care.    If you have questions, please do not hesitate to call me. I look forward to following Carolina Andrews along with you.    Sincerely,    Hafsa Power, [facility]    Enclosure  CC:  No Recipients    If you would like to receive this communication electronically, please contact [email or web address] or [phone] to request more information on [employer] Link access.    For providers and/or their staff who would like to refer a patient to [facility], please contact us through our one-stop-shop provider referral line, [facility] , at [phone].    If you feel you have received this communication in error or would no longer like to receive these types of communications, please e-mail [email or web address]

## 2018-11-07 NOTE — PATIENT INSTRUCTIONS
Uncertain Causes of Chest Pain    Chest pain can happen for a number of reasons. Sometimes the cause can't be determined. If your condition does not seem serious, and your pain does not appear to be coming from your heart, your healthcare provider may recommend watching it closely. Sometimes the signs of a serious problem take more time to appear. Many problems not related to your heart can cause chest pain.These include:  · Musculoskeletal. Costochondritis, an inflammation of the tissues around the ribs that can occur from trauma or overuse injuries  · Respiratory. Pneumonia, pneumothorax, or pneumonitis (inflammation of the lining of the chest and lungs)  · Gastrointestinal. Esophageal reflux, heartburn, or gallbladder disease  · Anxiety and panic disorders  · Nerve compression and neuritis  · Miscellaneous problems such as aortic aneurysm or pulmonary embolism (a blood clot in the lungs)  Home care  After your visit, follow these recommendations:  · Rest today and avoid strenuous activity.  · Take any prescribed medicine as directed.  · Be aware of any recurrent chest pain and notice any changes  Follow-up care  Follow up with your healthcare provider if you do not start to feel better within 24 hours, or as advised.  Call 911  Call 911 if any of these occur:  · A change in the type of pain: if it feels different, becomes more severe, lasts longer, or begins to spread into your shoulder, arm, neck, jaw or back  · Shortness of breath or increased pain with breathing  · Weakness, dizziness, or fainting  · Rapid heart beat  · Crushing sensation in your chest  When to seek medical advice  Call your healthcare provider right away if any of the following occur:  · Cough with dark colored sputum (phlegm) or blood  · Fever of 100.4ºF (38ºC) or higher, or as directed by your healthcare provider  · Swelling, pain or redness in one leg  · Shortness of breath  Date Last Reviewed: 12/30/2015  © 5452-4959 [facility]  [employer]. [address] [place] [address]. All rights reserved. This information is not intended as a substitute for professional medical care. Always follow your healthcare professional's instructions.        GERD (Adult)    The esophagus is a tube that carries food from the mouth to the stomach. A valve at the lower end of the esophagus prevents stomach acid from flowing upward. When this valve doesn't work properly, stomach contents may repeatedly flow back up (reflux) into the esophagus. This is called gastroesophageal reflux disease (GERD). GERD can irritate the esophagus. It can cause problems with swallowing or breathing. In severe cases, GERD can cause recurrent pneumonia or other serious problems.  Symptoms of reflux include burning, pressure or sharp pain in the upper abdomen or mid to lower chest. The pain can spread to the neck, back, or shoulder. There may be belching, an acid taste in the back of the throat, chronic cough, or sore throat or hoarseness. GERD symptoms often occur during the day after a big meal. They can also occur at night when lying down.   Home care  Lifestyle changes can help reduce symptoms. If needed, medicines may be prescribed. Symptoms often improve with treatment, but if treatment is stopped, the symptoms often return after a few months. So most persons with GERD will need to continue treatment.  Lifestyle changes  · Limit or avoid fatty, fried, and spicy foods, as well as coffee, chocolate, mint, and foods with high acid content such as tomatoes and citrus fruit and juices (orange, grapefruit, lemon).  · Dont eat large meals, especially at night. Frequent, smaller meals are best. Do not lie down right after eating. And dont eat anything 3 hours before going to bed.  · Avoid drinking alcohol and smoking. As much as possible, stay away from second hand smoke.  · If you are overweight, losing weight will reduce symptoms.   · Avoid wearing tight clothing around  "your stomach area.  · If your symptoms occur during sleep, use a foam wedge to elevate your upper body (not just your head.) Or, place 4" blocks under the head of your bed.  Medicines  If needed, medicines can help relieve the symptoms of GERD and prevent damage to the esophagus. Discuss a medicine plan with your healthcare provider. This may include one or more of the following medicines:  · Antacids to help neutralize the normal acids in your stomach.  · Acid blockers (H2 blockers) to decrease acid production.  · Acid inhibitors (PPIs) to decrease acid production in a different way than the blockers. They may work better, but can take a little longer to take effect.  Take an antacid 30-60 minutes after eating and at bedtime, but not at the same time as an acid blocker.  Try not to take medicines such as ibuprofen and aspirin. If you are taking aspirin for your heart or other medical reasons, talk to your healthcare provider about stopping it.  Follow-up care  Follow up with your healthcare provider or as advised by our staff.  When to seek medical advice  Call your healthcare provider if any of the following occur:  · Stomach pain gets worse or moves to the lower right abdomen (appendix area)  · Chest pain appears or gets worse, or spreads to the back, neck, shoulder, or arm  · Frequent vomiting (cant keep down liquids)  · Blood in the stool or vomit (red or black in color)  · Feeling weak or dizzy  · Fever of 100.4ºF (38ºC) or higher, or as directed by your healthcare provider  Date Last Reviewed: 6/23/2015  © 1552-7226 [employer]. [address] [place] [address], [place] [address]. All rights reserved. This information is not intended as a substitute for professional medical care. Always follow your healthcare professional's instructions.        "

## 2018-11-15 ENCOUNTER — OFFICE VISIT (OUTPATIENT)
Dept: OPTOMETRY | Facility: CLINIC | Age: 43
End: 2018-11-15
Payer: MEDICARE

## 2018-11-15 DIAGNOSIS — H35.53 CONE DYSTROPHY: Primary | ICD-10-CM

## 2018-11-15 DIAGNOSIS — H52.7 REFRACTIVE ERROR: ICD-10-CM

## 2018-11-15 PROCEDURE — 92015 DETERMINE REFRACTIVE STATE: CPT | Mod: S$GLB,,, | Performed by: OPTOMETRIST

## 2018-11-15 PROCEDURE — 92014 COMPRE OPH EXAM EST PT 1/>: CPT | Mod: S$GLB,,, | Performed by: OPTOMETRIST

## 2018-11-15 PROCEDURE — 99999 PR PBB SHADOW E&M-EST. PATIENT-LVL II: CPT | Mod: PBBFAC,,, | Performed by: OPTOMETRIST

## 2018-11-15 NOTE — PROGRESS NOTES
HPI     Presenting Complaint: Pt here today for yearly eye exam. History of cone   dystrophy. Pt states near and distance vision has been blurry. Pt states   she has been getting dizzy when walking in a store because her eyes are   having hard time adjusting to motion. Pt states near vision is blurry.     Ophthalmic medication / drops: None    (-) headaches  (-) diplopia   (-) flashes / (-) floaters      Last edited by Remy Boss, OD on 11/15/2018  9:40 AM. (History)            Assessment /Plan     For exam results, see Encounter Report.    Cone dystrophy    Refractive error      Cone dystrophy OU, stable. Refracted and trial framed in office with subjective improvement. Dispensed updated spectacle Rx. Discussed various spectacle lens options. Discussed adaptation period to new specs. Discussed low vision consult as needed.      RTC in 1 year for comprehensive eye exam, or sooner prn.

## 2018-11-19 ENCOUNTER — ANESTHESIA (OUTPATIENT)
Dept: ENDOSCOPY | Facility: HOSPITAL | Age: 43
End: 2018-11-19
Payer: MEDICARE

## 2018-11-19 ENCOUNTER — HOSPITAL ENCOUNTER (OUTPATIENT)
Facility: HOSPITAL | Age: 43
Discharge: HOME | End: 2018-11-19
Attending: INTERNAL MEDICINE | Admitting: INTERNAL MEDICINE
Payer: MEDICARE

## 2018-11-19 ENCOUNTER — ANESTHESIA EVENT (OUTPATIENT)
Dept: ENDOSCOPY | Facility: HOSPITAL | Age: 43
End: 2018-11-19
Payer: MEDICARE

## 2018-11-19 VITALS
OXYGEN SATURATION: 95 % | RESPIRATION RATE: 20 BRPM | BODY MASS INDEX: 35.94 KG/M2 | HEART RATE: 63 BPM | TEMPERATURE: 98 F | WEIGHT: 229 LBS | DIASTOLIC BLOOD PRESSURE: 75 MMHG | SYSTOLIC BLOOD PRESSURE: 103 MMHG | HEIGHT: 67 IN

## 2018-11-19 DIAGNOSIS — R10.13 EPIGASTRIC PAIN: ICD-10-CM

## 2018-11-19 DIAGNOSIS — K31.7 GASTRIC POLYP: ICD-10-CM

## 2018-11-19 DIAGNOSIS — K29.70 GASTRITIS, PRESENCE OF BLEEDING UNSPECIFIED, UNSPECIFIED CHRONICITY, UNSPECIFIED GASTRITIS TYPE: Primary | ICD-10-CM

## 2018-11-19 DIAGNOSIS — K44.9 HIATAL HERNIA: ICD-10-CM

## 2018-11-19 LAB
B-HCG UR QL: NEGATIVE
CTP QC/QA: YES

## 2018-11-19 PROCEDURE — 43251 EGD REMOVE LESION SNARE: CPT | Performed by: INTERNAL MEDICINE

## 2018-11-19 PROCEDURE — 25000003 PHARM REV CODE 250: Performed by: INTERNAL MEDICINE

## 2018-11-19 PROCEDURE — 63600175 PHARM REV CODE 636 W HCPCS: Performed by: NURSE ANESTHETIST, CERTIFIED REGISTERED

## 2018-11-19 PROCEDURE — 43239 EGD BIOPSY SINGLE/MULTIPLE: CPT | Mod: 59 | Performed by: INTERNAL MEDICINE

## 2018-11-19 PROCEDURE — D9220A PRA ANESTHESIA: Mod: ANES,,, | Performed by: ANESTHESIOLOGY

## 2018-11-19 PROCEDURE — 88305 TISSUE EXAM BY PATHOLOGIST: CPT | Mod: 26,,, | Performed by: PATHOLOGY

## 2018-11-19 PROCEDURE — 81025 URINE PREGNANCY TEST: CPT | Performed by: INTERNAL MEDICINE

## 2018-11-19 PROCEDURE — 88305 TISSUE EXAM BY PATHOLOGIST: CPT | Performed by: PATHOLOGY

## 2018-11-19 PROCEDURE — 27201089 HC SNARE, DISP (ANY): Performed by: INTERNAL MEDICINE

## 2018-11-19 PROCEDURE — 43239 EGD BIOPSY SINGLE/MULTIPLE: CPT | Mod: 59,,, | Performed by: INTERNAL MEDICINE

## 2018-11-19 PROCEDURE — D9220A PRA ANESTHESIA: Mod: CRNA,,, | Performed by: NURSE ANESTHETIST, CERTIFIED REGISTERED

## 2018-11-19 PROCEDURE — 37000008 HC ANESTHESIA 1ST 15 MINUTES: Performed by: INTERNAL MEDICINE

## 2018-11-19 PROCEDURE — 00731 ANES UPR GI NDSC PX NOS: CPT | Performed by: INTERNAL MEDICINE

## 2018-11-19 PROCEDURE — 43251 EGD REMOVE LESION SNARE: CPT | Mod: ,,, | Performed by: INTERNAL MEDICINE

## 2018-11-19 PROCEDURE — 27201012 HC FORCEPS, HOT/COLD, DISP: Performed by: INTERNAL MEDICINE

## 2018-11-19 RX ORDER — LIDOCAINE HYDROCHLORIDE 20 MG/ML
INJECTION, SOLUTION EPIDURAL; INFILTRATION; INTRACAUDAL; PERINEURAL
Status: DISCONTINUED
Start: 2018-11-19 | End: 2018-11-19 | Stop reason: HOSPADM

## 2018-11-19 RX ORDER — LIDOCAINE HCL/PF 100 MG/5ML
SYRINGE (ML) INTRAVENOUS
Status: DISCONTINUED | OUTPATIENT
Start: 2018-11-19 | End: 2018-11-19

## 2018-11-19 RX ORDER — PROPOFOL 10 MG/ML
INJECTION, EMULSION INTRAVENOUS
Status: COMPLETED
Start: 2018-11-19 | End: 2018-11-19

## 2018-11-19 RX ORDER — SODIUM CHLORIDE 9 MG/ML
INJECTION, SOLUTION INTRAVENOUS CONTINUOUS
Status: DISCONTINUED | OUTPATIENT
Start: 2018-11-19 | End: 2018-11-19 | Stop reason: HOSPADM

## 2018-11-19 RX ORDER — PROPOFOL 10 MG/ML
VIAL (ML) INTRAVENOUS
Status: DISCONTINUED | OUTPATIENT
Start: 2018-11-19 | End: 2018-11-19

## 2018-11-19 RX ADMIN — PROPOFOL 100 MG: 10 INJECTION, EMULSION INTRAVENOUS at 11:11

## 2018-11-19 RX ADMIN — LIDOCAINE HYDROCHLORIDE 75 MG: 20 INJECTION, SOLUTION INTRAVENOUS at 11:11

## 2018-11-19 RX ADMIN — PROPOFOL 40 MG: 10 INJECTION, EMULSION INTRAVENOUS at 11:11

## 2018-11-19 RX ADMIN — SODIUM CHLORIDE 1000 ML: 0.9 INJECTION, SOLUTION INTRAVENOUS at 09:11

## 2018-11-19 NOTE — INTERVAL H&P NOTE
The patient has been examined and the H&P has been reviewed:    I concur with the findings and no changes have occurred since H&P was written.    Anesthesia/Surgery risks, benefits and alternative options discussed and understood by patient/family.          Active Hospital Problems    Diagnosis  POA    Epigastric pain [R10.13]  Yes      Resolved Hospital Problems   No resolved problems to display.

## 2018-11-19 NOTE — DISCHARGE INSTRUCTIONS
"Discharge Instructions: After Your Surgery/Procedure  Youve just had surgery. During surgery you were given medicine called anesthesia to keep you relaxed and free of pain. After surgery you may have some pain or nausea. This is common. Here are some tips for feeling better and getting well after surgery.     Stay on schedule with your medication.   Going home  Your doctor or nurse will show you how to take care of yourself when you go home. He or she will also answer your questions. Have an adult family member or friend drive you home.      For your safety we recommend these precaution for the first 24 hours after your procedure:  · Do not drive or use heavy equipment.  · Do not make important decisions or sign legal papers.  · Do not drink alcohol.  · Have someone stay with you, if needed. He or she can watch for problems and help keep you safe.  · Your concentration, balance, coordination, and judgement may be impaired for many hours after anesthesia.  Use caution when ambulating or standing up.     · You may feel weak and "washed out" after anesthesia and surgery.      Subtle residual effects of general anesthesia or sedation with regional / local anesthesia can last more than 24 hours.  Rest for the remainder of the day or longer if your Doctor/Surgeon has advised you to do so.  Although you may feel normal within the first 24 hours, your reflexes and mental ability may be impaired without you realizing it.  You may feel dizzy, lightheaded or sleepy for 24 hours or longer.      Be sure to go to all follow-up visits with your doctor. And rest after your surgery for as long as your doctor tells you to.  Coping with pain  If you have pain after surgery, pain medicine will help you feel better. Take it as told, before pain becomes severe. Also, ask your doctor or pharmacist about other ways to control pain. This might be with heat, ice, or relaxation. And follow any other instructions your surgeon or nurse gives " you.  Tips for taking pain medicine  To get the best relief possible, remember these points:  · Pain medicines can upset your stomach. Taking them with a little food may help.  · Most pain relievers taken by mouth need at least 20 to 30 minutes to start to work.  · Taking medicine on a schedule can help you remember to take it. Try to time your medicine so that you can take it before starting an activity. This might be before you get dressed, go for a walk, or sit down for dinner.  · Constipation is a common side effect of pain medicines. Call your doctor before taking any medicines such as laxatives or stool softeners to help ease constipation. Also ask if you should skip any foods. Drinking lots of fluids and eating foods such as fruits and vegetables that are high in fiber can also help. Remember, do not take laxatives unless your surgeon has prescribed them.  · Drinking alcohol and taking pain medicine can cause dizziness and slow your breathing. It can even be deadly. Do not drink alcohol while taking pain medicine.  · Pain medicine can make you react more slowly to things. Do not drive or run machinery while taking pain medicine.  Your health care provider may tell you to take acetaminophen to help ease your pain. Ask him or her how much you are supposed to take each day. Acetaminophen or other pain relievers may interact with your prescription medicines or other over-the-counter (OTC) drugs. Some prescription medicines have acetaminophen and other ingredients. Using both prescription and OTC acetaminophen for pain can cause you to overdose. Read the labels on your OTC medicines with care. This will help you to clearly know the list of ingredients, how much to take, and any warnings. It may also help you not take too much acetaminophen. If you have questions or do not understand the information, ask your pharmacist or health care provider to explain it to you before you take the OTC medicine.  Managing  nausea  Some people have an upset stomach after surgery. This is often because of anesthesia, pain, or pain medicine, or the stress of surgery. These tips will help you handle nausea and eat healthy foods as you get better. If you were on a special food plan before surgery, ask your doctor if you should follow it while you get better. These tips may help:  · Do not push yourself to eat. Your body will tell you when to eat and how much.  · Start off with clear liquids and soup. They are easier to digest.  · Next try semi-solid foods, such as mashed potatoes, applesauce, and gelatin, as you feel ready.  · Slowly move to solid foods. Dont eat fatty, rich, or spicy foods at first.  · Do not force yourself to have 3 large meals a day. Instead eat smaller amounts more often.  · Take pain medicines with a small amount of solid food, such as crackers or toast, to avoid nausea.     Call your surgeon if  · You still have pain an hour after taking medicine. The medicine may not be strong enough.  · You feel too sleepy, dizzy, or groggy. The medicine may be too strong.  · You have side effects like nausea, vomiting, or skin changes, such as rash, itching, or hives.       If you have obstructive sleep apnea  You were given anesthesia medicine during surgery to keep you comfortable and free of pain. After surgery, you may have more apnea spells because of this medicine and other medicines you were given. The spells may last longer than usual.   At home:  · Keep using the continuous positive airway pressure (CPAP) device when you sleep. Unless your health care provider tells you not to, use it when you sleep, day or night. CPAP is a common device used to treat obstructive sleep apnea.  · Talk with your provider before taking any pain medicine, muscle relaxants, or sedatives. Your provider will tell you about the possible dangers of taking these medicines.  © 7273-0127 [employer]. [address] [place] [address]  [place] [address]. All rights reserved. This information is not intended as a substitute for professional medical care. Always follow your healthcare professional's instructions.    Gastritis (Adult)    Gastritis is inflammation and irritation of the stomach lining. It can be present for a short time (acute) or be long lasting (chronic). Gastritis is often caused by infection with bacteria called H pylori. More than a third of people in the [place] have this bacteria in their bodies. In many cases, H pylori causes no problems or symptoms. In some people, though, the infection irritates the stomach lining and causes gastritis. Other causes of stomach irritation include drinking alcohol or taking pain-relieving medicines called NSAIDs (such as aspirin or ibuprofen).   Symptoms of gastritis can include:  · Abdominal pain or bloating  · Loss of appetite  · Nausea or vomiting  · Vomiting blood or having black stools  · Feeling more tired than usual  An inflamed and irritated stomach lining is more likely to develop a sore called an ulcer. To help prevent this, gastritis should be treated.  Home care  If needed, medicines may be prescribed. If you have H pylori infection, treating it will likely relieve your symptoms. Other changes can help reduce stomach irritation and help it heal.  · If you have been prescribed medicines for H pylori infection, take them as directed. Take all of the medicine until it is finished or your healthcare provider tells you to stop, even if you feel better.  · Your healthcare provider may recommend avoiding NSAIDs. If you take daily aspirin for your heart or other medical reasons, do not stop without talking to your healthcare provider first.  · Avoid drinking alcohol.  · Stop smoking. Smoking can irritate the stomach and delay healing. As much as possible, stay away from second hand smoke.  Follow-up care  Follow up with your healthcare provider, or as advised by our staff. Testing may be needed to check  for inflammation or an ulcer.  When to seek medical advice  Call your healthcare provider for any of the following:  · Stomach pain that gets worse or moves to the lower right abdomen (appendix area)  · Chest pain that appears or gets worse, or spreads to the back, neck, shoulder, or arm  · Frequent vomiting (cant keep down liquids)  · Blood in the stool or vomit (red or black in color)  · Feeling weak or dizzy  · Fever of 100.4ºF (38ºC) or higher, or as directed by your healthcare provider  Date Last Reviewed: 6/22/2015  © 2030-2282 [employer]. [address] [place] [address], [place] [address]. All rights reserved. This information is not intended as a substitute for professional medical care. Always follow your healthcare professional's instructions.

## 2018-11-19 NOTE — [employer] PATIENT INSTRUCTIONS
Discharge Summary/Instructions after an Endoscopic Procedure  Patient Name: Carolina Andrews  Patient MRN: [id number]  Patient YOB: 1975 Monday, November 19, 2018  Dale Armstrong MD  RESTRICTIONS:  During your procedure today, you received medications for sedation.  These   medications may affect your judgment, balance and coordination.  Therefore,   for 24 hours, you have the following restrictions:   - DO NOT drive a car, operate machinery, make legal/financial decisions,   sign important papers or drink alcohol.    ACTIVITY:  Today: no heavy lifting, straining or running due to procedural   sedation/anesthesia.  The following day: return to full activity including work.  DIET:  Eat and drink normally unless instructed otherwise.     TREATMENT FOR COMMON SIDE EFFECTS:  - Mild abdominal pain, nausea, belching, bloating or excessive gas:  rest,   eat lightly and use a heating pad.  - Sore Throat: treat with throat lozenges and/or gargle with warm salt   water.  - Because air was used during the procedure, expelling large amounts of air   from your rectum or belching is normal.  - If a bowel prep was taken, you may not have a bowel movement for 1-3 days.    This is normal.  SYMPTOMS TO WATCH FOR AND REPORT TO YOUR PHYSICIAN:  1. Abdominal pain or bloating, other than gas cramps.  2. Chest pain.  3. Back pain.  4. Signs of infection such as: chills or fever occurring within 24 hours   after the procedure.  5. Rectal bleeding, which would show as bright red, maroon, or black stools.   (A tablespoon of blood from the rectum is not serious, especially if   hemorrhoids are present.)  6. Vomiting.  7. Weakness or dizziness.  GO DIRECTLY TO THE NEAREST EMERGENCY ROOM IF YOU HAVE ANY OF THE FOLLOWING:      Difficulty breathing              Chills and/or fever over 101 F   Persistent vomiting and/or vomiting blood   Severe abdominal pain   Severe chest pain   Black, tarry stools   Bleeding- more than one  tablespoon   Any other symptom or condition that you feel may need urgent attention  Your doctor recommends these additional instructions:  If any biopsies were taken, your doctors clinic will contact you in 1 to 2   weeks with any results.  - Patient has a contact number available for emergencies.  The signs and   symptoms of potential delayed complications were discussed with the   patient.  Return to normal activities tomorrow.  Written discharge   instructions were provided to the patient.   - Resume previous diet.   - Continue present medications.   - No aspirin, ibuprofen, naproxen, or other non-steroidal anti-inflammatory   drugs.   - Await pathology results.   - Discharge patient to home (ambulatory).   - Follow an antireflux regimen.   - Return to nurse practitioner in 6 weeks.  For questions, problems or results please call your physician - Dale Armstrong MD at Work:  [phone].  [facility], EMERGENCY ROOM PHONE NUMBER: [phone]  IF A COMPLICATION OR EMERGENCY SITUATION ARISES AND YOU ARE UNABLE TO REACH   YOUR PHYSICIAN - GO DIRECTLY TO THE EMERGENCY ROOM.  Dale Armstrong MD  11/19/2018 11:07:22 AM  This report has been verified and signed electronically.  [employer]

## 2018-11-19 NOTE — ANESTHESIA POSTPROCEDURE EVALUATION
"Anesthesia Post Evaluation    Patient: Carolina Andrews    Procedure(s) Performed: Procedure(s) (LRB):  EGD (ESOPHAGOGASTRODUODENOSCOPY) (N/A)    Final Anesthesia Type: general  Patient location during evaluation: PACU  Patient participation: Yes- Able to Participate  Level of consciousness: awake and alert and oriented  Post-procedure vital signs: reviewed and stable  Pain management: adequate  Airway patency: patent  PONV status at discharge: No PONV  Anesthetic complications: no      Cardiovascular status: blood pressure returned to baseline  Respiratory status: unassisted, spontaneous ventilation and room air  Hydration status: euvolemic  Follow-up not needed.        Visit Vitals  /75   Pulse 63   Temp 36.6 °C (97.9 °F) (Temporal)   Resp 20   Ht 5' 7" (1.702 m)   Wt 103.9 kg (229 lb)   LMP 10/20/2018 (Exact Date)   SpO2 95%   Breastfeeding? No   BMI 35.87 kg/m²       Pain/Radha Score: Pain Assessment Performed: Yes (11/19/2018  9:48 AM)  Presence of Pain: complains of pain/discomfort (11/19/2018  9:48 AM)  Pain Rating Prior to Med Admin: 2 (11/19/2018  9:48 AM)  Radha Score: 9 (11/19/2018 11:25 AM)        "

## 2018-11-19 NOTE — TRANSFER OF CARE
"Anesthesia Transfer of Care Note    Patient: Carolina Andrews    Procedure(s) Performed: Procedure(s) (LRB):  EGD (ESOPHAGOGASTRODUODENOSCOPY) (N/A)    Patient location: PACU    Anesthesia Type: general    Transport from OR: Transported from OR on room air with adequate spontaneous ventilation    Post pain: adequate analgesia    Post assessment: no apparent anesthetic complications and tolerated procedure well    Post vital signs: stable    Level of consciousness: awake, alert and oriented    Nausea/Vomiting: no nausea/vomiting    Complications: none    Transfer of care protocol was followed      Last vitals:   Visit Vitals  /77   Pulse 70   Temp 36.6 °C (97.9 °F) (Temporal)   Resp 20   Ht 5' 7" (1.702 m)   Wt 103.9 kg (229 lb)   LMP 10/20/2018 (Exact Date)   SpO2 97%   Breastfeeding? No   BMI 35.87 kg/m²     "

## 2018-11-19 NOTE — ANESTHESIA PREPROCEDURE EVALUATION
11/19/2018  Carolina Andrews is a 43 y.o., female.    Anesthesia Evaluation    I have reviewed the Patient Summary Reports.    I have reviewed the Nursing Notes.   I have reviewed the Medications.     Review of Systems  Anesthesia Hx:  No problems with previous Anesthesia Denies Hx of Anesthetic complications    Social:  Non-Smoker    Cardiovascular:   Denies Hypertension.  Denies MI.  Denies CAD.    Denies CABG/stent.   Denies Angina.    Pulmonary:   Denies COPD.  Denies Asthma.  Denies Recent URI.    Renal/:   Denies Chronic Renal Disease.     Hepatic/GI:   Denies GERD. Denies Liver Disease.    Neurological:   Denies TIA. Denies CVA. Denies Seizures.    Endocrine:   Denies Diabetes. Denies Hypothyroidism.    Psych:   Denies Psychiatric History.          Physical Exam  General:  Morbid Obesity    Airway/Jaw/Neck:  Airway Findings: Mouth Opening: Normal Tongue: Normal  General Airway Assessment: Adult, Good  Mallampati: II  Improves to II with phonation.  TM Distance: 4-6 cm      Dental:  Dental Findings: In tact   Chest/Lungs:  Chest/Lungs Findings: Clear to auscultation, Normal Respiratory Rate     Heart/Vascular:  Heart Findings: Rate: Normal  Rhythm: Regular Rhythm  Sounds: Normal  Heart murmur: negative       Mental Status:  Mental Status Findings:  Cooperative, Alert and Oriented         Anesthesia Plan  Type of Anesthesia, risks & benefits discussed:  Anesthesia Type:  general  Patient's Preference:   Intra-op Monitoring Plan: standard ASA monitors  Intra-op Monitoring Plan Comments:   Post Op Pain Control Plan:   Post Op Pain Control Plan Comments:   Induction:   IV  Beta Blocker:  Patient is not currently on a Beta-Blocker (No further documentation required).       Informed Consent: Patient understands risks and agrees with Anesthesia plan.  Questions answered. Anesthesia consent signed with  patient.  ASA Score: 2     Day of Surgery Review of History & Physical: I have interviewed and examined the patient. I have reviewed the patient's H&P dated:  There are no significant changes.          Ready For Surgery From Anesthesia Perspective.

## 2019-01-29 ENCOUNTER — OFFICE VISIT (OUTPATIENT)
Dept: GASTROENTEROLOGY | Facility: CLINIC | Age: 44
End: 2019-01-29
Payer: MEDICARE

## 2019-01-29 ENCOUNTER — HOSPITAL ENCOUNTER (OUTPATIENT)
Dept: RADIOLOGY | Facility: HOSPITAL | Age: 44
Discharge: HOME | End: 2019-01-29
Attending: NURSE PRACTITIONER
Payer: MEDICARE

## 2019-01-29 VITALS
SYSTOLIC BLOOD PRESSURE: 125 MMHG | BODY MASS INDEX: 36.13 KG/M2 | HEART RATE: 75 BPM | RESPIRATION RATE: 18 BRPM | HEIGHT: 67 IN | WEIGHT: 230.19 LBS | DIASTOLIC BLOOD PRESSURE: 76 MMHG

## 2019-01-29 DIAGNOSIS — K44.9 HIATAL HERNIA: ICD-10-CM

## 2019-01-29 DIAGNOSIS — K80.20 CALCULUS OF GALLBLADDER WITHOUT CHOLECYSTITIS WITHOUT OBSTRUCTION: ICD-10-CM

## 2019-01-29 DIAGNOSIS — R07.9 NONSPECIFIC CHEST PAIN: ICD-10-CM

## 2019-01-29 DIAGNOSIS — K59.09 CHRONIC CONSTIPATION: ICD-10-CM

## 2019-01-29 DIAGNOSIS — Z98.890 HISTORY OF ESOPHAGOGASTRODUODENOSCOPY (EGD): Primary | ICD-10-CM

## 2019-01-29 DIAGNOSIS — R10.13 EPIGASTRIC PAIN: ICD-10-CM

## 2019-01-29 DIAGNOSIS — R10.11 RUQ ABDOMINAL PAIN: ICD-10-CM

## 2019-01-29 DIAGNOSIS — Z87.19 HISTORY OF GASTRITIS: ICD-10-CM

## 2019-01-29 DIAGNOSIS — Z80.0 FAMILY HISTORY OF COLON CANCER: ICD-10-CM

## 2019-01-29 DIAGNOSIS — D18.03 LIVER HEMANGIOMA: ICD-10-CM

## 2019-01-29 PROCEDURE — 99214 OFFICE O/P EST MOD 30 MIN: CPT | Mod: S$GLB,,, | Performed by: NURSE PRACTITIONER

## 2019-01-29 PROCEDURE — 74019 XR ABDOMEN FLAT AND ERECT: ICD-10-PCS | Mod: 26,,, | Performed by: RADIOLOGY

## 2019-01-29 PROCEDURE — 99999 PR PBB SHADOW E&M-EST. PATIENT-LVL IV: CPT | Mod: PBBFAC,,, | Performed by: NURSE PRACTITIONER

## 2019-01-29 PROCEDURE — 74019 RADEX ABDOMEN 2 VIEWS: CPT | Mod: TC,FY

## 2019-01-29 PROCEDURE — 99999 PR PBB SHADOW E&M-EST. PATIENT-LVL IV: ICD-10-PCS | Mod: PBBFAC,,, | Performed by: NURSE PRACTITIONER

## 2019-01-29 PROCEDURE — 74019 RADEX ABDOMEN 2 VIEWS: CPT | Mod: 26,,, | Performed by: RADIOLOGY

## 2019-01-29 PROCEDURE — 3008F BODY MASS INDEX DOCD: CPT | Mod: S$GLB,,, | Performed by: NURSE PRACTITIONER

## 2019-01-29 PROCEDURE — 99214 PR OFFICE/OUTPT VISIT, EST, LEVL IV, 30-39 MIN: ICD-10-PCS | Mod: S$GLB,,, | Performed by: NURSE PRACTITIONER

## 2019-01-29 PROCEDURE — 3008F PR BODY MASS INDEX (BMI) DOCUMENTED: ICD-10-PCS | Mod: S$GLB,,, | Performed by: NURSE PRACTITIONER

## 2019-01-29 RX ORDER — ACETAMINOPHEN 500 MG
500 TABLET ORAL EVERY 6 HOURS PRN
COMMUNITY

## 2019-01-29 RX ORDER — POLYETHYLENE GLYCOL 3350 17 G/17G
17 POWDER, FOR SOLUTION ORAL DAILY
Qty: 510 G | Refills: 2 | COMMUNITY
Start: 2019-01-29 | End: 2019-02-28

## 2019-01-29 RX ORDER — PANTOPRAZOLE SODIUM 20 MG/1
20 TABLET, DELAYED RELEASE ORAL DAILY
Qty: 30 TABLET | Refills: 3 | Status: SHIPPED | OUTPATIENT
Start: 2019-01-29 | End: 2019-05-06 | Stop reason: SDUPTHER

## 2019-01-29 NOTE — PATIENT INSTRUCTIONS
Gastritis (Adult)    Gastritis is inflammation and irritation of the stomach lining. It can be present for a short time (acute) or be long lasting (chronic). Gastritis is often caused by infection with bacteria called H pylori. More than a third of people in the [place] have this bacteria in their bodies. In many cases, H pylori causes no problems or symptoms. In some people, though, the infection irritates the stomach lining and causes gastritis. Other causes of stomach irritation include drinking alcohol or taking pain-relieving medicines called NSAIDs (such as aspirin or ibuprofen).   Symptoms of gastritis can include:  · Abdominal pain or bloating  · Loss of appetite  · Nausea or vomiting  · Vomiting blood or having black stools  · Feeling more tired than usual  An inflamed and irritated stomach lining is more likely to develop a sore called an ulcer. To help prevent this, gastritis should be treated.  Home care  If needed, medicines may be prescribed. If you have H pylori infection, treating it will likely relieve your symptoms. Other changes can help reduce stomach irritation and help it heal.  · If you have been prescribed medicines for H pylori infection, take them as directed. Take all of the medicine until it is finished or your healthcare provider tells you to stop, even if you feel better.  · Your healthcare provider may recommend avoiding NSAIDs. If you take daily aspirin for your heart or other medical reasons, do not stop without talking to your healthcare provider first.  · Avoid drinking alcohol.  · Stop smoking. Smoking can irritate the stomach and delay healing. As much as possible, stay away from second hand smoke.  Follow-up care  Follow up with your healthcare provider, or as advised by our staff. Testing may be needed to check for inflammation or an ulcer.  When to seek medical advice  Call your healthcare provider for any of the following:  · Stomach pain that gets worse or moves to the lower  right abdomen (appendix area)  · Chest pain that appears or gets worse, or spreads to the back, neck, shoulder, or arm  · Frequent vomiting (cant keep down liquids)  · Blood in the stool or vomit (red or black in color)  · Feeling weak or dizzy  · Fever of 100.4ºF (38ºC) or higher, or as directed by your healthcare provider  Date Last Reviewed: 6/22/2015 © 2000-2017 [employer]. [address] [place] [address], [place] [address]. All rights reserved. This information is not intended as a substitute for professional medical care. Always follow your healthcare professional's instructions.        What are Gallstones?    The gallbladder stores bile, a fluid made by the liver. Bile helps digest fats in the foods you eat. Gallstones form when certain substances in the bile crystallize and become solid. In some cases, the stones dont cause any symptoms. In others, they irritate the walls of the gallbladder. More serious problems can occur if stones move into nearby ducts (such as the common bile duct) and cause blockages. This can block the flow of bile and lead to pain, nausea, and infection.  Common symptoms  Gallbladder problems can cause painful attacks, often after a meal. Some people have only one attack. Others have many. Common symptoms include:  · Severe, steady pain or aching in the upper right belly (abdomen), back, or right shoulder blade, lasting from 30 minutes to several hours  · A dull ache beneath the ribs or breastbone  · Nausea, upset stomach, or vomiting  · A buildup of too much bile in the blood (jaundice), which causes yellowing of the skin and eyes, dark urine, and itching. Call your healthcare provider right away if this occurs.  Risk factors for gallstones  You are more at risk for gallstones if you:  · Are a woman  · Are obese  · Have a history of very fast (rapid) weight loss  · Have certain intestinal diseases, such as Crohns disease  · Have certain blood diseases, such as sickle cell  anemia  · Have a family background that includes Native Americans or [place] Americans  Diagnosis  Ultrasound is often used to look at the gallbladder and measure the size and exact location of gallstone.  Blood tests are used to measure liver enzymes and bilirubin. Both of these may be high if the gallstones are blocking bile flow or irritating the liver.  Treating gallstones  If your stones are not causing symptoms, you may choose to delay treatment. But if youve had one or more painful attacks, your healthcare provider will likely recommend removing your gallbladder. This prevents more stones from forming and causing attacks. It also helps prevent problems, such as stones passing into the ducts and causing infection or pancreatitis. After the gallbladder is removed, your liver will still make bile to aid digestion.  If youre pregnant  Hormone changes during pregnancy can make bile more likely to form stones. If your gallbladder needs to be removed, your doctor will talk with you about the timing for surgery. In some cases, it can be delayed until after childbirth. In others, you may have surgery during pregnancy. This helps protect you and your babys health.   Date Last Reviewed: 12/1/2016  © 4129-3400 [employer]. [address] [place] [address]. All rights reserved. This information is not intended as a substitute for professional medical care. Always follow your healthcare professional's instructions.        Treating Gallstones    The gallbladder is an organ that stores bile. This is a substance that helps with digestion. Deposits in bile can clump together, creating hard, pebble-like stones. These gallstones may not cause any symptoms. In most cases, gallstones have no symptoms. In some cases, though, they irritate the walls of the gallbladder. Or they can block flow of bile out of the gallbladder. If they fall into the common bile duct, stones can block the flow of bile into the  small bowel. This can lead to jaundice, pain, or serious infection. Stones are treated only if you have symptoms. If treatment is needed, your healthcare provider will discuss your choices with you. The most common treatments are listed below.  Medicine  Medicines to dissolve gallstones don't really work.   ERCP  ERCP (endoscopic retrograde cholangiopancreatography) is an outpatient procedure that needs heavy sedation to remove stones. It uses a thin tube with video and X-rays to locate stones blocking the flow of bile. The stones are then removed. ERCP may be done alone. Or it may be used before surgery is done to remove the gallbladder.  Surgery  A cholecystectomy is an operation to remove the gallbladder and its contents (gallstones). Today, most cholecystectomies are done laparoscopically. This means using several very small abdominal incisions. Cholecystectomy may also be done with the traditional single, larger incision.   Prevent future symptoms  After treatment, you should follow a low-fat diet. This diet includes lean meats, lean poultry, and fish. Avoid full-fat dairy products. And limit vegetable oils. Read food labels to be sure theyre low in fat.   Date Last Reviewed: 5/1/2016  © 0340-2931 [employer]. [address] [place] [address], [place] [address]. All rights reserved. This information is not intended as a substitute for professional medical care. Always follow your healthcare professional's instructions.

## 2019-01-29 NOTE — PROGRESS NOTES
"Subjective:       Patient ID: Carolina Andrews is a 43 y.o. female Body mass index is 36.05 kg/m².    Chief Complaint: Abdominal Pain (pressure and burning in upper abd, GERD)    This patient is established with Dr. Armstrong & myself.    Abdominal Pain   This is a recurrent (follow-up) problem. Episode onset: had similar pain in 2012, recurred in 10/2018; resolved after EGD when on PPI; stopped PPI after 2 month course as advised by PCP which was ~1/1/19; recurred ~ 1/8/19- not as severe as before. Episode frequency: almost daily- lasts most of the day. The pain is located in the RUQ and epigastric region (starts in mid-chest then to epigastric & RUQ). The pain is at a severity of 2/10 (currently). Quality: pressure; "feels like a gas bubble moving through intestines" Associated symptoms include belching (frequent), constipation (chronic, bowel movements once every 2 days, varies from pellet-like stool to soft formed stool; drinks alot of water daily; reports having to strain more with bowel movements) and flatus. Pertinent negatives include no diarrhea, dysuria, fever, hematochezia, melena, nausea, vomiting or weight loss. Exacerbated by: bending forward. Relieved by: drinking water helps. She has tried proton pump inhibitors (PAST: protonix 40 mg- helped) for the symptoms. Prior diagnostic workup includes upper endoscopy (& MRI abdomen). Her past medical history is significant for gallstones and GERD (heartburn occurs once every 2 weeks and lasts a few hours). There is no history of Crohn's disease, pancreatitis or PUD.     Review of Systems   Constitutional: Negative for appetite change (back to normal), chills, fatigue, fever and weight loss.   HENT: Negative for sore throat and trouble swallowing.    Respiratory: Negative for cough, choking and shortness of breath.    Cardiovascular: Positive for chest pain (SEE HPI FOR ABDOMINAL PAIN).   Gastrointestinal: Positive for abdominal pain, constipation (chronic, bowel " movements once every 2 days, varies from pellet-like stool to soft formed stool; drinks alot of water daily; reports having to strain more with bowel movements) and flatus. Negative for anal bleeding, blood in stool, diarrhea, hematochezia, melena, nausea, rectal pain and vomiting.   Genitourinary: Negative for difficulty urinating, dysuria and flank pain.   Neurological: Negative for weakness.       Patient's last menstrual period was 01/26/2019 (exact date).    Past Medical History:   Diagnosis Date    Blindness     Cholelithiasis     Liver hemangioma     Mental disorder     Periodic antidepressant use since childhood, not currently taking or endorsing depressive ideation    Pinched nerve      Past Surgical History:   Procedure Laterality Date    ANKLE FRACTURE SURGERY  2009    EGD (ESOPHAGOGASTRODUODENOSCOPY) N/A 11/19/2018    Performed by [facility]    UPPER GASTROINTESTINAL ENDOSCOPY  ~1998    normal findings per patient report     Family History   Problem Relation Age of Onset    Hypertension Father     Lung disease Father         calapse x 3     Stroke Father         in eye     Cancer Sister 23        polyps found incidentally    Vision loss Sister     Lung disease Sister         spontanious lung collapse    GI problems Sister     Retinal detachment Mother     Cancer Paternal Grandfather     Stroke Maternal Grandmother     Colon cancer Paternal Aunt     Colon polyps Maternal Uncle     No Known Problems Paternal Uncle     Parkinsonism Maternal Grandfather     Cancer Maternal Grandfather         prostate    Colon polyps Maternal Grandfather     Cancer Paternal Grandmother         skin cancer in colon     Alzheimer's disease Paternal Grandmother     Heart disease Paternal Aunt     Breast cancer Neg Hx     Diabetes Neg Hx     Miscarriages / Stillbirths Neg Hx     Crohn's disease Neg Hx     Ulcerative colitis Neg Hx     Glaucoma Neg Hx      Wt Readings from  Last 10 Encounters:   01/29/19 104.4 kg (230 lb 2.6 oz)   11/19/18 103.9 kg (229 lb)   11/07/18 103.9 kg (229 lb 0.9 oz)   11/02/18 104.3 kg (229 lb 15 oz)   10/25/18 104.1 kg (229 lb 8 oz)   06/08/18 103.9 kg (229 lb)   12/11/17 104.6 kg (230 lb 9.6 oz)   11/30/17 103.8 kg (228 lb 13.4 oz)   08/31/17 104.7 kg (230 lb 13.2 oz)   05/05/17 102.6 kg (226 lb 3.1 oz)     Lab Results   Component Value Date    WBC 8.40 10/17/2018    HGB 15.1 10/17/2018    HCT 43.9 10/17/2018    MCV 89 10/17/2018     10/17/2018     CMP  Sodium   Date Value Ref Range Status   10/17/2018 141 136 - 145 mmol/L Final     Potassium   Date Value Ref Range Status   10/17/2018 3.7 3.5 - 5.1 mmol/L Final     Chloride   Date Value Ref Range Status   10/17/2018 110 95 - 110 mmol/L Final     CO2   Date Value Ref Range Status   10/17/2018 18 (L) 23 - 29 mmol/L Final     Glucose   Date Value Ref Range Status   10/17/2018 112 (H) 70 - 110 mg/dL Final     BUN, Bld   Date Value Ref Range Status   10/17/2018 9 6 - 20 mg/dL Final     Creatinine   Date Value Ref Range Status   10/17/2018 0.8 0.5 - 1.4 mg/dL Final     Calcium   Date Value Ref Range Status   10/17/2018 9.5 8.7 - 10.5 mg/dL Final     Total Protein   Date Value Ref Range Status   10/17/2018 7.9 6.0 - 8.4 g/dL Final     Albumin   Date Value Ref Range Status   10/17/2018 4.0 3.5 - 5.2 g/dL Final     Total Bilirubin   Date Value Ref Range Status   10/17/2018 0.3 0.1 - 1.0 mg/dL Final     Comment:     For infants and newborns, interpretation of results should be based  on gestational age, weight and in agreement with clinical  observations.  Premature Infant recommended reference ranges:  Up to 24 hours.............<8.0 mg/dL  Up to 48 hours............<12.0 mg/dL  3-5 days..................<15.0 mg/dL  6-29 days.................<15.0 mg/dL       Alkaline Phosphatase   Date Value Ref Range Status   10/17/2018 82 55 - 135 U/L Final     AST   Date Value Ref Range Status   10/17/2018 25 10 - 40 U/L  "Final     ALT   Date Value Ref Range Status   10/17/2018 27 10 - 44 U/L Final     Anion Gap   Date Value Ref Range Status   10/17/2018 13 8 - 16 mmol/L Final     eGFR if    Date Value Ref Range Status   10/17/2018 >60 >60 mL/min/1.73 m^2 Final     eGFR if non    Date Value Ref Range Status   10/17/2018 >60 >60 mL/min/1.73 m^2 Final     Comment:     Calculation used to obtain the estimated glomerular filtration  rate (eGFR) is the CKD-EPI equation.        Lab Results   Component Value Date    TSH 1.975 09/05/2017     Reviewed prior medical records including radiology report of 10/17/18 CTA chest and ER visit note; 10/31/18 MRI abdomen; & endoscopy history (see surgical history).    11/19/18 EGD was reviewed and procedure report states:   "Findings:       The examined esophagus was normal.       The Z-line was regular and was found 38 cm from the incisors.       A small hiatal hernia was present.       A single 5 mm sessile polyp with no stigmata of recent bleeding was        found in the gastric body. The polyp was removed with a cold snare.        Resection and retrieval were complete.       Diffuse mild inflammation characterized by congestion (edema) and        erythema was found in the gastric antrum. Biopsies were taken with a        cold forceps for histology.       The examined duodenum was normal.  Impression:          - Normal esophagus.                       - Z-line regular, 38 cm from the incisors.                       - Small hiatal hernia.                       - A single gastric polyp. Resected and retrieved.                       - Gastritis. Biopsied.                       - Normal examined duodenum.  Recommendation:      - Patient has a contact number available for                        emergencies. The signs and symptoms of potential                        delayed complications were discussed with the                        patient. Return to normal activities " "tomorrow.                        Written discharge instructions were provided to the                        patient.                       - Resume previous diet.                       - Continue present medications.                       - No aspirin, ibuprofen, naproxen, or other                        non-steroidal anti-inflammatory drugs.                       - Await pathology results.                       - Discharge patient to home (ambulatory).                       - Follow an antireflux regimen.                       - Return to nurse practitioner in 6 weeks.".  Biopsy results:   "FINAL PATHOLOGIC DIAGNOSIS  Fragments of gastric oxyntic and non-oxyntic mucosa (submitted as antrum and body biopsy):  -Mild-to-moderate chronic inflammation with focal lymphoid aggregate formation but no acute activity  predominating within the non-oxyntic component  -No Helicobacter are identified on H&E stain"  Objective:      Physical Exam   Constitutional: She is oriented to person, place, and time. She appears well-developed and well-nourished. No distress.   HENT:   Mouth/Throat: Mucous membranes are normal.   Eyes: No scleral icterus.   Cardiovascular: Normal rate.   Pulmonary/Chest: Effort normal and breath sounds normal. No respiratory distress. She has no wheezes.   Abdominal: Soft. Normal appearance and bowel sounds are normal. She exhibits no distension, no abdominal bruit and no mass. There is tenderness (mild) in the right upper quadrant. There is no rigidity, no rebound, no guarding, no tenderness at McBurney's point and negative Chino's sign.   Neurological: She is alert and oriented to person, place, and time.   Skin: Skin is warm and dry. No rash noted. She is not diaphoretic. No erythema. No pallor.   Non-jaundiced   Psychiatric: She has a normal mood and affect. Her behavior is normal. Judgment and thought content normal.   Nursing note and vitals reviewed.      Assessment:       1. History of " esophagogastroduodenoscopy (EGD)    2. Hiatal hernia    3. History of gastritis    4. Calculus of gallbladder without cholecystitis without obstruction    5. Chronic constipation    6. Family history of colon cancer    7. Nonspecific chest pain    8. RUQ abdominal pain    9. Epigastric pain        Plan:       History of esophagogastroduodenoscopy (EGD) & History of gastritis  -  START   pantoprazole (PROTONIX) 20 MG tablet; Take 1 tablet (20 mg total) by mouth once daily.  Dispense: 30 tablet; Refill: 3, - take in the morning 30-60 minutes before breakfast, discussed about possible long term use of medication (prefer to use lowest effective dose or discontinuing if possible) and discussed the risks & benefits with taking a reflux medication long term, and to take OTC calcium and vitamin d supplements as directed (such as Citracal +D), pt verbalized understanding  -discussed about the different types of medications used to treat reflux and how to use them, antacids can be used PRN for breakthrough heartburn symptoms by reducing stomach acid that is already produced, H2 blockers (zantac) work by limiting the amount acid production, & PPI's work to block acid production and are taken daily, patient verbalized understanding.  -Educated patient on lifestyle modifications to help control/reduce reflux/abdominal pain including: avoid large meals, avoid eating within 2-3 hours of bedtime (avoid late night eating & lying down soon after eating), elevate head of bed if nocturnal symptoms are present, smoking cessation (if current smoker), & weight loss (if overweight).   -Educated to avoid known foods which trigger reflux symptoms & to minimize/avoid high-fat foods, chocolate, caffeine, citrus, alcohol, & tomato products.  -Advised to avoid/limit use of NSAID's, since they can cause GI upset, bleeding, and/or ulcers. If needed, take with food.     Hiatal hernia  - START    pantoprazole (PROTONIX) 20 MG tablet; Take 1 tablet  (20 mg total) by mouth once daily.  Dispense: 30 tablet; Refill: 3  -discussed diagnosis with patient & that it is usually managed by controlling reflux symptoms, surgery is an option, but usually performed if reflux is uncontrolled by medication management and lifestyle/dietary modifications; if symptoms persist despite medication management and lifestyle/dietary modifications, we can refer to general surgery to consult about surgical options, patient verbalized understanding    Calculus of gallbladder without cholecystitis without obstruction  -     Ambulatory referral to General Surgery  - recommend low fat diet  - discussed diagnosis & that it can cause symptoms in some patients, while other patients with it can be asymptomatic; recommend seeing general surgery for further evaluation and management, patient verbalized understanding    Chronic constipation  -  START   polyethylene glycol (GLYCOLAX) 17 gram/dose powder; Take 17 g by mouth once daily.  Dispense: 510 g; Refill: 2 (OTC)  -     X-Ray Abdomen Flat And Erect; Future; Expected date: 01/29/2019  Recommended daily exercise as tolerated, adequate water intake (six 8-oz glasses of water daily), and high fiber diet. OTC fiber supplements are recommended if diet does not reach daily fiber goal (25 grams daily), such as Metamucil, Citrucel, or FiberCon (take as directed, separate from other oral medications by >2 hours).  -Recommend taking an OTC stool softener such as Colace as directed to avoid hard stools and straining with bowel movements PRN  - If no improvement with above recommendations, try intermittently dosed Dulcolax OTC as directed (every 3-4  days) PRN to facilitate bowel movements  -If still no improvement with these measures, call/follow-up  - schedule Colonoscopy, discussed procedure with the patient, patient verbalized understanding    Family history of colon cancer  - schedule Colonoscopy, discussed procedure with the patient, patient  verbalized understanding    RUQ abdominal pain & Epigastric pain  -     Ambulatory referral to General Surgery  START PROTONIX AS DIRECTED    Liver hemangioma  - follow-up with PCP for continued evaluation and management; if specialist is needed, recommend seeing hepatology.    Nonspecific chest pain  - follow-up with PCP &/or cardiologist for continued evaluation and management  - if experiencing symptoms of headache, chest pain, shortness of breath, and/or blurred vision, recommend going to ER for further evaluation and management    Follow-up in about 1 month (around 2/28/2019), or if symptoms worsen or fail to improve.      If no improvement in symptoms or symptoms worsen, call/follow-up at clinic or go to ER.

## 2019-01-29 NOTE — H&P (VIEW-ONLY)
"Subjective:       Patient ID: Carolina Andrews is a 43 y.o. female Body mass index is 36.05 kg/m².    Chief Complaint: Abdominal Pain (pressure and burning in upper abd, GERD)    This patient is established with Dr. Armstrong & myself.    Abdominal Pain   This is a recurrent (follow-up) problem. Episode onset: had similar pain in 2012, recurred in 10/2018; resolved after EGD when on PPI; stopped PPI after 2 month course as advised by PCP which was ~1/1/19; recurred ~ 1/8/19- not as severe as before. Episode frequency: almost daily- lasts most of the day. The pain is located in the RUQ and epigastric region (starts in mid-chest then to epigastric & RUQ). The pain is at a severity of 2/10 (currently). Quality: pressure; "feels like a gas bubble moving through intestines" Associated symptoms include belching (frequent), constipation (chronic, bowel movements once every 2 days, varies from pellet-like stool to soft formed stool; drinks alot of water daily; reports having to strain more with bowel movements) and flatus. Pertinent negatives include no diarrhea, dysuria, fever, hematochezia, melena, nausea, vomiting or weight loss. Exacerbated by: bending forward. Relieved by: drinking water helps. She has tried proton pump inhibitors (PAST: protonix 40 mg- helped) for the symptoms. Prior diagnostic workup includes upper endoscopy (& MRI abdomen). Her past medical history is significant for gallstones and GERD (heartburn occurs once every 2 weeks and lasts a few hours). There is no history of Crohn's disease, pancreatitis or PUD.     Review of Systems   Constitutional: Negative for appetite change (back to normal), chills, fatigue, fever and weight loss.   HENT: Negative for sore throat and trouble swallowing.    Respiratory: Negative for cough, choking and shortness of breath.    Cardiovascular: Positive for chest pain (SEE HPI FOR ABDOMINAL PAIN).   Gastrointestinal: Positive for abdominal pain, constipation (chronic, bowel " movements once every 2 days, varies from pellet-like stool to soft formed stool; drinks alot of water daily; reports having to strain more with bowel movements) and flatus. Negative for anal bleeding, blood in stool, diarrhea, hematochezia, melena, nausea, rectal pain and vomiting.   Genitourinary: Negative for difficulty urinating, dysuria and flank pain.   Neurological: Negative for weakness.       Patient's last menstrual period was 01/26/2019 (exact date).    Past Medical History:   Diagnosis Date    Blindness     Cholelithiasis     Liver hemangioma     Mental disorder     Periodic antidepressant use since childhood, not currently taking or endorsing depressive ideation    Pinched nerve      Past Surgical History:   Procedure Laterality Date    ANKLE FRACTURE SURGERY  2009    EGD (ESOPHAGOGASTRODUODENOSCOPY) N/A 11/19/2018    Performed by [facility]    UPPER GASTROINTESTINAL ENDOSCOPY  ~1998    normal findings per patient report     Family History   Problem Relation Age of Onset    Hypertension Father     Lung disease Father         calapse x 3     Stroke Father         in eye     Cancer Sister 23        polyps found incidentally    Vision loss Sister     Lung disease Sister         spontanious lung collapse    GI problems Sister     Retinal detachment Mother     Cancer Paternal Grandfather     Stroke Maternal Grandmother     Colon cancer Paternal Aunt     Colon polyps Maternal Uncle     No Known Problems Paternal Uncle     Parkinsonism Maternal Grandfather     Cancer Maternal Grandfather         prostate    Colon polyps Maternal Grandfather     Cancer Paternal Grandmother         skin cancer in colon     Alzheimer's disease Paternal Grandmother     Heart disease Paternal Aunt     Breast cancer Neg Hx     Diabetes Neg Hx     Miscarriages / Stillbirths Neg Hx     Crohn's disease Neg Hx     Ulcerative colitis Neg Hx     Glaucoma Neg Hx      Wt Readings from  Last 10 Encounters:   01/29/19 104.4 kg (230 lb 2.6 oz)   11/19/18 103.9 kg (229 lb)   11/07/18 103.9 kg (229 lb 0.9 oz)   11/02/18 104.3 kg (229 lb 15 oz)   10/25/18 104.1 kg (229 lb 8 oz)   06/08/18 103.9 kg (229 lb)   12/11/17 104.6 kg (230 lb 9.6 oz)   11/30/17 103.8 kg (228 lb 13.4 oz)   08/31/17 104.7 kg (230 lb 13.2 oz)   05/05/17 102.6 kg (226 lb 3.1 oz)     Lab Results   Component Value Date    WBC 8.40 10/17/2018    HGB 15.1 10/17/2018    HCT 43.9 10/17/2018    MCV 89 10/17/2018     10/17/2018     CMP  Sodium   Date Value Ref Range Status   10/17/2018 141 136 - 145 mmol/L Final     Potassium   Date Value Ref Range Status   10/17/2018 3.7 3.5 - 5.1 mmol/L Final     Chloride   Date Value Ref Range Status   10/17/2018 110 95 - 110 mmol/L Final     CO2   Date Value Ref Range Status   10/17/2018 18 (L) 23 - 29 mmol/L Final     Glucose   Date Value Ref Range Status   10/17/2018 112 (H) 70 - 110 mg/dL Final     BUN, Bld   Date Value Ref Range Status   10/17/2018 9 6 - 20 mg/dL Final     Creatinine   Date Value Ref Range Status   10/17/2018 0.8 0.5 - 1.4 mg/dL Final     Calcium   Date Value Ref Range Status   10/17/2018 9.5 8.7 - 10.5 mg/dL Final     Total Protein   Date Value Ref Range Status   10/17/2018 7.9 6.0 - 8.4 g/dL Final     Albumin   Date Value Ref Range Status   10/17/2018 4.0 3.5 - 5.2 g/dL Final     Total Bilirubin   Date Value Ref Range Status   10/17/2018 0.3 0.1 - 1.0 mg/dL Final     Comment:     For infants and newborns, interpretation of results should be based  on gestational age, weight and in agreement with clinical  observations.  Premature Infant recommended reference ranges:  Up to 24 hours.............<8.0 mg/dL  Up to 48 hours............<12.0 mg/dL  3-5 days..................<15.0 mg/dL  6-29 days.................<15.0 mg/dL       Alkaline Phosphatase   Date Value Ref Range Status   10/17/2018 82 55 - 135 U/L Final     AST   Date Value Ref Range Status   10/17/2018 25 10 - 40 U/L  "Final     ALT   Date Value Ref Range Status   10/17/2018 27 10 - 44 U/L Final     Anion Gap   Date Value Ref Range Status   10/17/2018 13 8 - 16 mmol/L Final     eGFR if    Date Value Ref Range Status   10/17/2018 >60 >60 mL/min/1.73 m^2 Final     eGFR if non    Date Value Ref Range Status   10/17/2018 >60 >60 mL/min/1.73 m^2 Final     Comment:     Calculation used to obtain the estimated glomerular filtration  rate (eGFR) is the CKD-EPI equation.        Lab Results   Component Value Date    TSH 1.975 09/05/2017     Reviewed prior medical records including radiology report of 10/17/18 CTA chest and ER visit note; 10/31/18 MRI abdomen; & endoscopy history (see surgical history).    11/19/18 EGD was reviewed and procedure report states:   "Findings:       The examined esophagus was normal.       The Z-line was regular and was found 38 cm from the incisors.       A small hiatal hernia was present.       A single 5 mm sessile polyp with no stigmata of recent bleeding was        found in the gastric body. The polyp was removed with a cold snare.        Resection and retrieval were complete.       Diffuse mild inflammation characterized by congestion (edema) and        erythema was found in the gastric antrum. Biopsies were taken with a        cold forceps for histology.       The examined duodenum was normal.  Impression:          - Normal esophagus.                       - Z-line regular, 38 cm from the incisors.                       - Small hiatal hernia.                       - A single gastric polyp. Resected and retrieved.                       - Gastritis. Biopsied.                       - Normal examined duodenum.  Recommendation:      - Patient has a contact number available for                        emergencies. The signs and symptoms of potential                        delayed complications were discussed with the                        patient. Return to normal activities " "tomorrow.                        Written discharge instructions were provided to the                        patient.                       - Resume previous diet.                       - Continue present medications.                       - No aspirin, ibuprofen, naproxen, or other                        non-steroidal anti-inflammatory drugs.                       - Await pathology results.                       - Discharge patient to home (ambulatory).                       - Follow an antireflux regimen.                       - Return to nurse practitioner in 6 weeks.".  Biopsy results:   "FINAL PATHOLOGIC DIAGNOSIS  Fragments of gastric oxyntic and non-oxyntic mucosa (submitted as antrum and body biopsy):  -Mild-to-moderate chronic inflammation with focal lymphoid aggregate formation but no acute activity  predominating within the non-oxyntic component  -No Helicobacter are identified on H&E stain"  Objective:      Physical Exam   Constitutional: She is oriented to person, place, and time. She appears well-developed and well-nourished. No distress.   HENT:   Mouth/Throat: Mucous membranes are normal.   Eyes: No scleral icterus.   Cardiovascular: Normal rate.   Pulmonary/Chest: Effort normal and breath sounds normal. No respiratory distress. She has no wheezes.   Abdominal: Soft. Normal appearance and bowel sounds are normal. She exhibits no distension, no abdominal bruit and no mass. There is tenderness (mild) in the right upper quadrant. There is no rigidity, no rebound, no guarding, no tenderness at McBurney's point and negative Chino's sign.   Neurological: She is alert and oriented to person, place, and time.   Skin: Skin is warm and dry. No rash noted. She is not diaphoretic. No erythema. No pallor.   Non-jaundiced   Psychiatric: She has a normal mood and affect. Her behavior is normal. Judgment and thought content normal.   Nursing note and vitals reviewed.      Assessment:       1. History of " esophagogastroduodenoscopy (EGD)    2. Hiatal hernia    3. History of gastritis    4. Calculus of gallbladder without cholecystitis without obstruction    5. Chronic constipation    6. Family history of colon cancer    7. Nonspecific chest pain    8. RUQ abdominal pain    9. Epigastric pain        Plan:       History of esophagogastroduodenoscopy (EGD) & History of gastritis  -  START   pantoprazole (PROTONIX) 20 MG tablet; Take 1 tablet (20 mg total) by mouth once daily.  Dispense: 30 tablet; Refill: 3, - take in the morning 30-60 minutes before breakfast, discussed about possible long term use of medication (prefer to use lowest effective dose or discontinuing if possible) and discussed the risks & benefits with taking a reflux medication long term, and to take OTC calcium and vitamin d supplements as directed (such as Citracal +D), pt verbalized understanding  -discussed about the different types of medications used to treat reflux and how to use them, antacids can be used PRN for breakthrough heartburn symptoms by reducing stomach acid that is already produced, H2 blockers (zantac) work by limiting the amount acid production, & PPI's work to block acid production and are taken daily, patient verbalized understanding.  -Educated patient on lifestyle modifications to help control/reduce reflux/abdominal pain including: avoid large meals, avoid eating within 2-3 hours of bedtime (avoid late night eating & lying down soon after eating), elevate head of bed if nocturnal symptoms are present, smoking cessation (if current smoker), & weight loss (if overweight).   -Educated to avoid known foods which trigger reflux symptoms & to minimize/avoid high-fat foods, chocolate, caffeine, citrus, alcohol, & tomato products.  -Advised to avoid/limit use of NSAID's, since they can cause GI upset, bleeding, and/or ulcers. If needed, take with food.     Hiatal hernia  - START    pantoprazole (PROTONIX) 20 MG tablet; Take 1 tablet  (20 mg total) by mouth once daily.  Dispense: 30 tablet; Refill: 3  -discussed diagnosis with patient & that it is usually managed by controlling reflux symptoms, surgery is an option, but usually performed if reflux is uncontrolled by medication management and lifestyle/dietary modifications; if symptoms persist despite medication management and lifestyle/dietary modifications, we can refer to general surgery to consult about surgical options, patient verbalized understanding    Calculus of gallbladder without cholecystitis without obstruction  -     Ambulatory referral to General Surgery  - recommend low fat diet  - discussed diagnosis & that it can cause symptoms in some patients, while other patients with it can be asymptomatic; recommend seeing general surgery for further evaluation and management, patient verbalized understanding    Chronic constipation  -  START   polyethylene glycol (GLYCOLAX) 17 gram/dose powder; Take 17 g by mouth once daily.  Dispense: 510 g; Refill: 2 (OTC)  -     X-Ray Abdomen Flat And Erect; Future; Expected date: 01/29/2019  Recommended daily exercise as tolerated, adequate water intake (six 8-oz glasses of water daily), and high fiber diet. OTC fiber supplements are recommended if diet does not reach daily fiber goal (25 grams daily), such as Metamucil, Citrucel, or FiberCon (take as directed, separate from other oral medications by >2 hours).  -Recommend taking an OTC stool softener such as Colace as directed to avoid hard stools and straining with bowel movements PRN  - If no improvement with above recommendations, try intermittently dosed Dulcolax OTC as directed (every 3-4  days) PRN to facilitate bowel movements  -If still no improvement with these measures, call/follow-up  - schedule Colonoscopy, discussed procedure with the patient, patient verbalized understanding    Family history of colon cancer  - schedule Colonoscopy, discussed procedure with the patient, patient  verbalized understanding    RUQ abdominal pain & Epigastric pain  -     Ambulatory referral to General Surgery  START PROTONIX AS DIRECTED    Liver hemangioma  - follow-up with PCP for continued evaluation and management; if specialist is needed, recommend seeing hepatology.    Nonspecific chest pain  - follow-up with PCP &/or cardiologist for continued evaluation and management  - if experiencing symptoms of headache, chest pain, shortness of breath, and/or blurred vision, recommend going to ER for further evaluation and management    Follow-up in about 1 month (around 2/28/2019), or if symptoms worsen or fail to improve.      If no improvement in symptoms or symptoms worsen, call/follow-up at clinic or go to ER.

## 2019-01-31 ENCOUNTER — TELEPHONE (OUTPATIENT)
Dept: GASTROENTEROLOGY | Facility: CLINIC | Age: 44
End: 2019-01-31

## 2019-01-31 ENCOUNTER — PATIENT MESSAGE (OUTPATIENT)
Dept: GASTROENTEROLOGY | Facility: CLINIC | Age: 44
End: 2019-01-31

## 2019-01-31 RX ORDER — SYRING-NEEDL,DISP,INSUL,0.3 ML 29 G X1/2"
295 SYRINGE, EMPTY DISPOSABLE MISCELLANEOUS ONCE
Qty: 295 ML | Refills: 0 | COMMUNITY
Start: 2019-01-31 | End: 2019-01-31

## 2019-01-31 NOTE — TELEPHONE ENCOUNTER
Please call to inform & review the results with the patient- radiology report of the Abdominal x-ray showed unremarkable bowel gas pattern, no signs of intestinal obstruction and moderate amount of stool in the colon which is consistent with/suggest history of constipation.  Can try one time dose of OTC magnesium citrate (295ml- take as directed) to help clear the stool out of the colon. Otherwise, continue previous recommendations as discussed.    If no improvement in symptoms or symptoms worsen, call/follow-up at clinic or go to ER.  Please release results to patient's mychart once you have discussed results and recommendations with patient.  Thanks,  Hafsa TAYLOR-C

## 2019-02-04 ENCOUNTER — ANESTHESIA (OUTPATIENT)
Dept: ENDOSCOPY | Facility: HOSPITAL | Age: 44
End: 2019-02-04
Payer: MEDICARE

## 2019-02-04 ENCOUNTER — HOSPITAL ENCOUNTER (OUTPATIENT)
Facility: HOSPITAL | Age: 44
Discharge: HOME | End: 2019-02-04
Attending: INTERNAL MEDICINE | Admitting: INTERNAL MEDICINE
Payer: MEDICARE

## 2019-02-04 ENCOUNTER — ANESTHESIA EVENT (OUTPATIENT)
Dept: ENDOSCOPY | Facility: HOSPITAL | Age: 44
End: 2019-02-04
Payer: MEDICARE

## 2019-02-04 VITALS
BODY MASS INDEX: 35.31 KG/M2 | DIASTOLIC BLOOD PRESSURE: 81 MMHG | WEIGHT: 225 LBS | TEMPERATURE: 98 F | RESPIRATION RATE: 16 BRPM | SYSTOLIC BLOOD PRESSURE: 122 MMHG | OXYGEN SATURATION: 95 % | HEART RATE: 68 BPM | HEIGHT: 67 IN

## 2019-02-04 DIAGNOSIS — K63.5 POLYP OF COLON, UNSPECIFIED PART OF COLON, UNSPECIFIED TYPE: Primary | ICD-10-CM

## 2019-02-04 DIAGNOSIS — R10.9 ABDOMINAL PAIN: ICD-10-CM

## 2019-02-04 DIAGNOSIS — K57.30 DIVERTICULOSIS OF LARGE INTESTINE WITHOUT HEMORRHAGE: ICD-10-CM

## 2019-02-04 DIAGNOSIS — K64.8 INTERNAL HEMORRHOIDS: ICD-10-CM

## 2019-02-04 LAB
B-HCG UR QL: NEGATIVE
CTP QC/QA: YES

## 2019-02-04 PROCEDURE — D9220A PRA ANESTHESIA: Mod: CRNA,,, | Performed by: NURSE ANESTHETIST, CERTIFIED REGISTERED

## 2019-02-04 PROCEDURE — 63600175 PHARM REV CODE 636 W HCPCS: Performed by: NURSE ANESTHETIST, CERTIFIED REGISTERED

## 2019-02-04 PROCEDURE — D9220A PRA ANESTHESIA: ICD-10-PCS | Mod: ANES,,, | Performed by: ANESTHESIOLOGY

## 2019-02-04 PROCEDURE — D9220A PRA ANESTHESIA: Mod: ANES,,, | Performed by: ANESTHESIOLOGY

## 2019-02-04 PROCEDURE — 27201012 HC FORCEPS, HOT/COLD, DISP: Performed by: INTERNAL MEDICINE

## 2019-02-04 PROCEDURE — 45385 PR COLONOSCOPY,REMV LESN,SNARE: ICD-10-PCS | Mod: ,,, | Performed by: INTERNAL MEDICINE

## 2019-02-04 PROCEDURE — 45385 COLONOSCOPY W/LESION REMOVAL: CPT | Performed by: INTERNAL MEDICINE

## 2019-02-04 PROCEDURE — 45385 COLONOSCOPY W/LESION REMOVAL: CPT | Mod: ,,, | Performed by: INTERNAL MEDICINE

## 2019-02-04 PROCEDURE — 45380 PR COLONOSCOPY,BIOPSY: ICD-10-PCS | Mod: 59,,, | Performed by: INTERNAL MEDICINE

## 2019-02-04 PROCEDURE — 88305 TISSUE SPECIMEN TO PATHOLOGY - SURGERY: ICD-10-PCS | Mod: 26,,, | Performed by: PATHOLOGY

## 2019-02-04 PROCEDURE — D9220A PRA ANESTHESIA: ICD-10-PCS | Mod: CRNA,,, | Performed by: NURSE ANESTHETIST, CERTIFIED REGISTERED

## 2019-02-04 PROCEDURE — 25000003 PHARM REV CODE 250: Performed by: INTERNAL MEDICINE

## 2019-02-04 PROCEDURE — 27201089 HC SNARE, DISP (ANY): Performed by: INTERNAL MEDICINE

## 2019-02-04 PROCEDURE — 37000008 HC ANESTHESIA 1ST 15 MINUTES: Performed by: INTERNAL MEDICINE

## 2019-02-04 PROCEDURE — 45380 COLONOSCOPY AND BIOPSY: CPT | Mod: 59,,, | Performed by: INTERNAL MEDICINE

## 2019-02-04 PROCEDURE — 81025 URINE PREGNANCY TEST: CPT | Performed by: INTERNAL MEDICINE

## 2019-02-04 PROCEDURE — 37000009 HC ANESTHESIA EA ADD 15 MINS: Performed by: INTERNAL MEDICINE

## 2019-02-04 PROCEDURE — 88305 TISSUE EXAM BY PATHOLOGIST: CPT | Mod: 59 | Performed by: PATHOLOGY

## 2019-02-04 PROCEDURE — 45380 COLONOSCOPY AND BIOPSY: CPT | Performed by: INTERNAL MEDICINE

## 2019-02-04 RX ORDER — LIDOCAINE HYDROCHLORIDE 20 MG/ML
INJECTION, SOLUTION EPIDURAL; INFILTRATION; INTRACAUDAL; PERINEURAL
Status: DISCONTINUED
Start: 2019-02-04 | End: 2019-02-04 | Stop reason: HOSPADM

## 2019-02-04 RX ORDER — PROPOFOL 10 MG/ML
INJECTION, EMULSION INTRAVENOUS
Status: COMPLETED
Start: 2019-02-04 | End: 2019-02-04

## 2019-02-04 RX ORDER — SODIUM CHLORIDE 9 MG/ML
INJECTION, SOLUTION INTRAVENOUS CONTINUOUS
Status: DISCONTINUED | OUTPATIENT
Start: 2019-02-04 | End: 2019-02-04 | Stop reason: HOSPADM

## 2019-02-04 RX ORDER — LIDOCAINE HCL/PF 100 MG/5ML
SYRINGE (ML) INTRAVENOUS
Status: DISCONTINUED | OUTPATIENT
Start: 2019-02-04 | End: 2019-02-04

## 2019-02-04 RX ORDER — PROPOFOL 10 MG/ML
VIAL (ML) INTRAVENOUS
Status: DISCONTINUED | OUTPATIENT
Start: 2019-02-04 | End: 2019-02-04

## 2019-02-04 RX ADMIN — PROPOFOL 50 MG: 10 INJECTION, EMULSION INTRAVENOUS at 11:02

## 2019-02-04 RX ADMIN — LIDOCAINE HYDROCHLORIDE 50 MG: 20 INJECTION, SOLUTION INTRAVENOUS at 10:02

## 2019-02-04 RX ADMIN — SODIUM CHLORIDE: 0.9 INJECTION, SOLUTION INTRAVENOUS at 09:02

## 2019-02-04 RX ADMIN — PROPOFOL 100 MG: 10 INJECTION, EMULSION INTRAVENOUS at 10:02

## 2019-02-04 RX ADMIN — PROPOFOL 50 MG: 10 INJECTION, EMULSION INTRAVENOUS at 10:02

## 2019-02-04 NOTE — TRANSFER OF CARE
"Anesthesia Transfer of Care Note    Patient: Carolina Andrews    Procedure(s) Performed: Procedure(s) (LRB):  COLONOSCOPY (N/A)    Patient location: PACU    Anesthesia Type: general    Transport from OR: Transported from OR on room air with adequate spontaneous ventilation    Post pain: adequate analgesia    Post assessment: no apparent anesthetic complications and tolerated procedure well    Post vital signs: stable    Level of consciousness: sedated    Nausea/Vomiting: no nausea/vomiting    Complications: none    Transfer of care protocol was followed      Last vitals:   Visit Vitals  /87 (BP Location: Left arm, Patient Position: Lying)   Pulse 89   Temp 36.5 °C (97.7 °F) (Temporal)   Resp 16   Ht 5' 7" (1.702 m)   Wt 102.1 kg (225 lb)   LMP 01/26/2019 (Exact Date)   SpO2 95%   Breastfeeding? No   BMI 35.24 kg/m²     "

## 2019-02-04 NOTE — ANESTHESIA PREPROCEDURE EVALUATION
02/04/2019  Carolina Andrews is a 43 y.o., female.    Pre-op Assessment    I have reviewed the Patient Summary Reports.     I have reviewed the Nursing Notes.   I have reviewed the Medications.     Review of Systems  Anesthesia Hx:  No problems with previous Anesthesia Denies Hx of Anesthetic complications    Social:  Non-Smoker    Cardiovascular:   Denies Hypertension.  Denies MI.  Denies CAD.    Denies CABG/stent.   Denies Angina.    Pulmonary:   Denies COPD.  Denies Asthma.  Denies Recent URI.    Renal/:   Denies Chronic Renal Disease.     Hepatic/GI:   Denies GERD. Denies Liver Disease.    Neurological:   Denies TIA. Denies CVA. Denies Seizures.    Endocrine:   Denies Diabetes. Denies Hypothyroidism.    Psych:   Denies Psychiatric History.          Physical Exam  General:  Morbid Obesity    Airway/Jaw/Neck:  Airway Findings: Mouth Opening: Normal Tongue: Normal  General Airway Assessment: Adult, Good  Mallampati: II  Improves to II with phonation.  TM Distance: 4-6 cm      Dental:  Dental Findings: In tact   Chest/Lungs:  Chest/Lungs Findings: Clear to auscultation, Normal Respiratory Rate     Heart/Vascular:  Heart Findings: Rate: Normal  Rhythm: Regular Rhythm  Sounds: Normal  Heart murmur: negative       Mental Status:  Mental Status Findings:  Cooperative, Alert and Oriented         Anesthesia Plan  Type of Anesthesia, risks & benefits discussed:  Anesthesia Type:  general  Patient's Preference:   Intra-op Monitoring Plan: standard ASA monitors  Intra-op Monitoring Plan Comments:   Post Op Pain Control Plan:   Post Op Pain Control Plan Comments:   Induction:   IV  Beta Blocker:  Patient is not currently on a Beta-Blocker (No further documentation required).       Informed Consent: Patient understands risks and agrees with Anesthesia plan.  Questions answered. Anesthesia consent signed with  patient.  ASA Score: 2     Day of Surgery Review of History & Physical: I have interviewed and examined the patient. I have reviewed the patient's H&P dated:  There are no significant changes.          Ready For Surgery From Anesthesia Perspective.                                                                                                                  02/04/2019  Carolina Andrews is a 43 y.o., female.    Anesthesia Evaluation    I have reviewed the Patient Summary Reports.    I have reviewed the Nursing Notes.   I have reviewed the Medications.     Review of Systems  Anesthesia Hx:  No problems with previous Anesthesia Denies Hx of Anesthetic complications    Social:  Non-Smoker    Cardiovascular:   Denies Hypertension.  Denies MI.  Denies CAD.    Denies CABG/stent.   Denies Angina.    Pulmonary:   Denies COPD.  Denies Asthma.  Denies Recent URI.    Renal/:   Denies Chronic Renal Disease.     Hepatic/GI:   Denies GERD. Denies Liver Disease.    Neurological:   Denies TIA. Denies CVA. Denies Seizures.    Endocrine:   Denies Diabetes. Denies Hypothyroidism.    Psych:   Denies Psychiatric History.          Physical Exam  General:  Morbid Obesity    Airway/Jaw/Neck:  Airway Findings: Mouth Opening: Normal Tongue: Normal  General Airway Assessment: Adult, Good  Mallampati: II  Improves to II with phonation.  TM Distance: 4-6 cm      Dental:  Dental Findings: In tact   Chest/Lungs:  Chest/Lungs Findings: Clear to auscultation, Normal Respiratory Rate     Heart/Vascular:  Heart Findings: Rate: Normal  Rhythm: Regular Rhythm  Sounds: Normal  Heart murmur: negative       Mental Status:  Mental Status Findings:  Cooperative, Alert and Oriented         Anesthesia Plan  Type of Anesthesia, risks & benefits discussed:  Anesthesia Type:  general  Patient's Preference:   Intra-op Monitoring Plan: standard ASA monitors  Intra-op Monitoring Plan Comments:   Post Op Pain Control Plan:   Post Op Pain Control Plan Comments:    Induction:   IV  Beta Blocker:  Patient is not currently on a Beta-Blocker (No further documentation required).       Informed Consent: Patient understands risks and agrees with Anesthesia plan.  Questions answered. Anesthesia consent signed with patient.  ASA Score: 2     Day of Surgery Review of History & Physical: I have interviewed and examined the patient. I have reviewed the patient's H&P dated:  There are no significant changes.          Ready For Surgery From Anesthesia Perspective.

## 2019-02-04 NOTE — ANESTHESIA POSTPROCEDURE EVALUATION
"Anesthesia Post Evaluation    Patient: Carolina Andrews    Procedure(s) Performed: Procedure(s) (LRB):  COLONOSCOPY (N/A)    Final Anesthesia Type: general  Patient location during evaluation: PACU  Patient participation: Yes- Able to Participate  Level of consciousness: awake and alert and oriented  Post-procedure vital signs: reviewed and stable  Pain management: adequate  Airway patency: patent  PONV status at discharge: No PONV  Anesthetic complications: no      Cardiovascular status: blood pressure returned to baseline  Respiratory status: unassisted, spontaneous ventilation and room air  Hydration status: euvolemic  Follow-up not needed.        Visit Vitals  /81   Pulse 68   Temp 36.6 °C (97.8 °F) (Temporal)   Resp 16   Ht 5' 7" (1.702 m)   Wt 102.1 kg (225 lb)   LMP 01/26/2019 (Exact Date)   SpO2 95%   Breastfeeding? No   BMI 35.24 kg/m²       Pain/Radha Score: No Data Recorded      "

## 2019-02-04 NOTE — PLAN OF CARE
Vss, brenda po fluids, denies pain, ambulates easily. IV removed, catheter intact. Discharge instructions provided and states understanding. States ready to go home.  Discharged from facility with family.

## 2019-02-04 NOTE — [employer] PATIENT INSTRUCTIONS
Discharge Summary/Instructions after an Endoscopic Procedure  Patient Name: Carolina Andrews  Patient MRN: [id number]  Patient YOB: 1975 Monday, February 04, 2019  Dale Armstrong MD  RESTRICTIONS:  During your procedure today, you received medications for sedation.  These   medications may affect your judgment, balance and coordination.  Therefore,   for 24 hours, you have the following restrictions:   - DO NOT drive a car, operate machinery, make legal/financial decisions,   sign important papers or drink alcohol.    ACTIVITY:  Today: no heavy lifting, straining or running due to procedural   sedation/anesthesia.  The following day: return to full activity including work.  DIET:  Eat and drink normally unless instructed otherwise.     TREATMENT FOR COMMON SIDE EFFECTS:  - Mild abdominal pain, nausea, belching, bloating or excessive gas:  rest,   eat lightly and use a heating pad.  - Sore Throat: treat with throat lozenges and/or gargle with warm salt   water.  - Because air was used during the procedure, expelling large amounts of air   from your rectum or belching is normal.  - If a bowel prep was taken, you may not have a bowel movement for 1-3 days.    This is normal.  SYMPTOMS TO WATCH FOR AND REPORT TO YOUR PHYSICIAN:  1. Abdominal pain or bloating, other than gas cramps.  2. Chest pain.  3. Back pain.  4. Signs of infection such as: chills or fever occurring within 24 hours   after the procedure.  5. Rectal bleeding, which would show as bright red, maroon, or black stools.   (A tablespoon of blood from the rectum is not serious, especially if   hemorrhoids are present.)  6. Vomiting.  7. Weakness or dizziness.  GO DIRECTLY TO THE NEAREST EMERGENCY ROOM IF YOU HAVE ANY OF THE FOLLOWING:      Difficulty breathing              Chills and/or fever over 101 F   Persistent vomiting and/or vomiting blood   Severe abdominal pain   Severe chest pain   Black, tarry stools   Bleeding- more than one  tablespoon   Any other symptom or condition that you feel may need urgent attention  Your doctor recommends these additional instructions:  If any biopsies were taken, your doctors clinic will contact you in 1 to 2   weeks with any results.  - Patient has a contact number available for emergencies.  The signs and   symptoms of potential delayed complications were discussed with the   patient.  Return to normal activities tomorrow.  Written discharge   instructions were provided to the patient.   - High fiber diet.   - Continue present medications.   - Await pathology results.   - Repeat colonoscopy in 3 - 5 years for surveillance.   - Discharge patient to home (ambulatory).   - Return to my office PRN.  For questions, problems or results please call your physician - Dale Armstrong MD at Work:  [phone].  [facility], EMERGENCY ROOM PHONE NUMBER: [phone]  IF A COMPLICATION OR EMERGENCY SITUATION ARISES AND YOU ARE UNABLE TO REACH   YOUR PHYSICIAN - GO DIRECTLY TO THE EMERGENCY ROOM.  Dale Armstrong MD  2/4/2019 11:22:06 AM  This report has been verified and signed electronically.  [employer]

## 2019-02-04 NOTE — DISCHARGE INSTRUCTIONS
Understanding Colon and Rectal Polyps    The colon (also called the large intestine) is a muscular tube that forms the last part of the digestive tract. It absorbs water and stores food waste. The colon is about 4 to 6 feet long. The rectum is the last 6 inches of the colon. The colon and rectum have a smooth lining composed of millions of cells. Changes in these cells can lead to growths in the colon that can become cancerous and should be removed. Multiple tests are available to screen for colon cancer, but the colonoscopy is the most recommended test. During colonoscopy, these polyps can be removed. How often you need this test depends on many things including your condition, your family history, symptoms, and what the findings were at the previous colonoscopy.   When the colon lining changes  Changes that happen in the cells that line the colon or rectum can lead to growths called polyps. Over a period of years, polyps can turn cancerous. Removing polyps early may prevent cancer from ever forming.  Polyps  Polyps are fleshy clumps of tissue that form on the lining of the colon or rectum. Small polyps are usually benign (not cancerous). However, over time, cells in a polyp can change and become cancerous. Certain types of polyps known as adenomatous polyps are premalignant. The risk for invasive cancer increases with the size of the polyp and certain cell and gene features. This means that they can become cancerous if they're not removed. Hyperplastic polyps are benign. They can grow quite large and not turn cancerous.   Cancer  Almost all colorectal cancers start when polyp cells begin growing abnormally. As a cancerous tumor grows, it may involve more and more of the colon or rectum. In time, cancer can also grow beyond the colon or rectum and spread to nearby organs or to glands called lymph nodes. The cells can also travel to other parts of the body. This is known as metastasis. The earlier a cancerous  tumor is removed, the better the chance of preventing its spread.    Date Last Reviewed: 8/1/2016  © 9650-4208 [employer]. [address] [place] [address], [place] [address]. All rights reserved. This information is not intended as a substitute for professional medical care. Always follow your healthcare professional's instructions.        High-Fiber Diet  Fiber is in fruits, vegetables, cereals, and grains. Fiber passes through your body undigested. A high-fiber diet helps food move through your intestinal tract. The added bulk is helpful in preventing constipation. In people with diverticulosis, fiber helps clean out the pouches along the colon wall. It also prevents new pouches from forming. A high-fiber diet reduces the risk of colon cancer. It also lowers blood cholesterol and prevents high blood sugar in people with diabetes.    The fiber-rich foods listed below should be part of your diet. If you are not used to high-fiber foods, start with 1 or 2 foods from this list. Every 3 to 4 days add a new one to your diet. Do this until you are eating 4 high-fiber foods per day. This should give you 20 to 35 grams of fiber a day. It is also important to drink a lot of water when you are on this diet. You should have 6 to 8 glasses of water a day. Water makes the fiber swell and increases the benefit.  Foods high in dietary fiber  The following foods are high in dietary fiber:  · Breads. Breads made with 100% whole-wheat flour; joshua, wheat, or rye crackers; whole-grain tortillas, bran muffins.  · Cereals. Whole-grain and bran cereals with bran (shredded wheat, wheat flakes, raisin bran, corn bran); oatmeal, rolled oats, granola, and brown rice.  · Fruits. Fresh fruits and their edible skins (pears, prunes, raisins, berries, apples, and apricots); bananas, citrus fruit, mangoes, pineapple; and prune juice.  · Nuts. Any nuts and seeds.  · Vegetables. Best served raw or lightly cooked. All types, especially: green  peas, celery, eggplant, potatoes, spinach, broccoli, [place] sprouts, winter squash, carrots, cauliflower, soybeans, lentils, and fresh and dried beans of all kinds.  · Other. Popcorn, any spices.  Date Last Reviewed: 8/1/2016 © 2000-2017 [employer]. [address] [place] [address], [place] [address]. All rights reserved. This information is not intended as a substitute for professional medical care. Always follow your healthcare professional's instructions.        Diverticulosis    Diverticulosis means that small pouches have formed in the wall of your large intestine (colon). Most often, this problem causes no symptoms and is common as people age. But the pouches in the colon are at risk of becoming infected. When this happens, the condition is called diverticulitis. Although most people with diverticulosis never develop diverticulitis, it is still not uncommon. Rectal bleeding can also occur and in less common situations, a type of colon inflammation called colitis.  While most people do not have symptoms, some people with diverticulosis may have:  · Abdominal cramps and pain  · Bloating  · Constipation  · Change in bowel habits  Causes  The exact cause of diverticulosis (and diverticulitis) has not been proved, but a few things are associated with the condition:  · Low-fiber diet  · Constipation  · Lack of exercise  Your healthcare provider will talk with you about how to manage your condition. Diet changes may be all that are needed to help control diverticulosis and prevent progression to diverticulitis. If you develop diverticulitis, you will likely need other treatments.  Home care  You may be told to take fiber supplements daily. Fiber adds bulk to the stool so that it passes through the colon more easily. Stool softeners may be recommended. You may also be given medications for pain relief. Be sure to take all medications as directed.  In the past, people were told to avoid corn, nuts, and seeds.  This is no longer necessary.  Follow these guidelines when caring for yourself at home:  · Eat unprocessed foods that are high in fiber. Whole grains, fruits, and vegetables are good choices.  · Drink 6 to 8 glasses of water every day unless your healthcare provider has you limit how much fluid you should have.  · Watch for changes in your bowel movements. Tell your provider if you notice any changes.  · Begin an exercise program. Ask your provider how to get started. Generally, walking is the best.  · Get plenty of rest and sleep.  Follow-up care  Follow up with your healthcare provider, or as advised. Regular visits may be needed to check on your health. Sometimes special procedures such as colonoscopy, are needed after an episode of diverticulitis or blooding. Be sure to keep all your appointments.  If a stool sample was taken, or cultures were done, you should be told if they are positive, or if your treatment needs to be changed. You can call as directed for the results.  If X-rays were done, a radiologist will look at them. You will be told if there is a change in your treatment.  If antibiotics were prescribed, be sure to finish them all.  When to seek medical advice  Call your healthcare provider right away if any of these occur:  · Fever of 100.4°F (38°C) or higher, or as directed by your healthcare provider  · Severe cramps in the lower left side of the abdomen or pain that is getting worse  · Tenderness in the lower left side of the abdomen or worsening pain throughout the abdomen  · Diarrhea or constipation that doesn't get better within 24 hours  · Nausea and vomiting  · Bleeding from the rectum  Call 911  Call emergency services if any of the following occur:  · Trouble breathing  · Confusion  · Very drowsy or trouble awakening  · Fainting or loss of consciousness  · Rapid heart rate  · Chest pain  Date Last Reviewed: 12/30/2015  © 4420-8250 [employer]. [address] [place] [address], [place],  "[place] [address]. All rights reserved. This information is not intended as a substitute for professional medical care. Always follow your healthcare professional's instructions.      Discharge Instructions: After Your Surgery/Procedure  Youve just had surgery. During surgery you were given medicine called anesthesia to keep you relaxed and free of pain. After surgery you may have some pain or nausea. This is common. Here are some tips for feeling better and getting well after surgery.     Stay on schedule with your medication.   Going home  Your doctor or nurse will show you how to take care of yourself when you go home. He or she will also answer your questions. Have an adult family member or friend drive you home.      For your safety we recommend these precaution for the first 24 hours after your procedure:  · Do not drive or use heavy equipment.  · Do not make important decisions or sign legal papers.  · Do not drink alcohol.  · Have someone stay with you, if needed. He or she can watch for problems and help keep you safe.  · Your concentration, balance, coordination, and judgement may be impaired for many hours after anesthesia.  Use caution when ambulating or standing up.     · You may feel weak and "washed out" after anesthesia and surgery.      Subtle residual effects of general anesthesia or sedation with regional / local anesthesia can last more than 24 hours.  Rest for the remainder of the day or longer if your Doctor/Surgeon has advised you to do so.  Although you may feel normal within the first 24 hours, your reflexes and mental ability may be impaired without you realizing it.  You may feel dizzy, lightheaded or sleepy for 24 hours or longer.      Be sure to go to all follow-up visits with your doctor. And rest after your surgery for as long as your doctor tells you to.  Coping with pain  If you have pain after surgery, pain medicine will help you feel better. Take it as told, before pain becomes severe. " Also, ask your doctor or pharmacist about other ways to control pain. This might be with heat, ice, or relaxation. And follow any other instructions your surgeon or nurse gives you.  Tips for taking pain medicine  To get the best relief possible, remember these points:  · Pain medicines can upset your stomach. Taking them with a little food may help.  · Most pain relievers taken by mouth need at least 20 to 30 minutes to start to work.  · Taking medicine on a schedule can help you remember to take it. Try to time your medicine so that you can take it before starting an activity. This might be before you get dressed, go for a walk, or sit down for dinner.  · Constipation is a common side effect of pain medicines. Call your doctor before taking any medicines such as laxatives or stool softeners to help ease constipation. Also ask if you should skip any foods. Drinking lots of fluids and eating foods such as fruits and vegetables that are high in fiber can also help. Remember, do not take laxatives unless your surgeon has prescribed them.  · Drinking alcohol and taking pain medicine can cause dizziness and slow your breathing. It can even be deadly. Do not drink alcohol while taking pain medicine.  · Pain medicine can make you react more slowly to things. Do not drive or run machinery while taking pain medicine.  Your health care provider may tell you to take acetaminophen to help ease your pain. Ask him or her how much you are supposed to take each day. Acetaminophen or other pain relievers may interact with your prescription medicines or other over-the-counter (OTC) drugs. Some prescription medicines have acetaminophen and other ingredients. Using both prescription and OTC acetaminophen for pain can cause you to overdose. Read the labels on your OTC medicines with care. This will help you to clearly know the list of ingredients, how much to take, and any warnings. It may also help you not take too  much acetaminophen. If you have questions or do not understand the information, ask your pharmacist or health care provider to explain it to you before you take the OTC medicine.  Managing nausea  Some people have an upset stomach after surgery. This is often because of anesthesia, pain, or pain medicine, or the stress of surgery. These tips will help you handle nausea and eat healthy foods as you get better. If you were on a special food plan before surgery, ask your doctor if you should follow it while you get better. These tips may help:  · Do not push yourself to eat. Your body will tell you when to eat and how much.  · Start off with clear liquids and soup. They are easier to digest.  · Next try semi-solid foods, such as mashed potatoes, applesauce, and gelatin, as you feel ready.  · Slowly move to solid foods. Dont eat fatty, rich, or spicy foods at first.  · Do not force yourself to have 3 large meals a day. Instead eat smaller amounts more often.  · Take pain medicines with a small amount of solid food, such as crackers or toast, to avoid nausea.     Call your surgeon if  · You still have pain an hour after taking medicine. The medicine may not be strong enough.  · You feel too sleepy, dizzy, or groggy. The medicine may be too strong.  · You have side effects like nausea, vomiting, or skin changes, such as rash, itching, or hives.       If you have obstructive sleep apnea  You were given anesthesia medicine during surgery to keep you comfortable and free of pain. After surgery, you may have more apnea spells because of this medicine and other medicines you were given. The spells may last longer than usual.   At home:  · Keep using the continuous positive airway pressure (CPAP) device when you sleep. Unless your health care provider tells you not to, use it when you sleep, day or night. CPAP is a common device used to treat obstructive sleep apnea.  · Talk with your provider before taking any pain medicine,  muscle relaxants, or sedatives. Your provider will tell you about the possible dangers of taking these medicines.  © 2331-7891 [employer]. [address] [place] [address], [place] [address]. All rights reserved. This information is not intended as a substitute for professional medical care. Always follow your healthcare professional's instructions.

## 2019-02-07 ENCOUNTER — PATIENT MESSAGE (OUTPATIENT)
Dept: GASTROENTEROLOGY | Facility: CLINIC | Age: 44
End: 2019-02-07

## 2019-05-06 ENCOUNTER — LAB VISIT (OUTPATIENT)
Dept: LAB | Facility: HOSPITAL | Age: 44
End: 2019-05-06
Attending: FAMILY MEDICINE
Payer: MEDICARE

## 2019-05-06 ENCOUNTER — DOCUMENTATION ONLY (OUTPATIENT)
Dept: FAMILY MEDICINE | Facility: CLINIC | Age: 44
End: 2019-05-06

## 2019-05-06 ENCOUNTER — OFFICE VISIT (OUTPATIENT)
Dept: FAMILY MEDICINE | Facility: CLINIC | Age: 44
End: 2019-05-06
Attending: FAMILY MEDICINE
Payer: MEDICARE

## 2019-05-06 VITALS
HEIGHT: 67 IN | DIASTOLIC BLOOD PRESSURE: 78 MMHG | WEIGHT: 229.94 LBS | OXYGEN SATURATION: 97 % | RESPIRATION RATE: 12 BRPM | HEART RATE: 70 BPM | SYSTOLIC BLOOD PRESSURE: 110 MMHG | BODY MASS INDEX: 36.09 KG/M2 | TEMPERATURE: 98 F

## 2019-05-06 DIAGNOSIS — E66.01 SEVERE OBESITY (BMI 35.0-39.9) WITH COMORBIDITY: ICD-10-CM

## 2019-05-06 DIAGNOSIS — H93.13 TINNITUS OF BOTH EARS: ICD-10-CM

## 2019-05-06 DIAGNOSIS — R25.1 TREMOR: ICD-10-CM

## 2019-05-06 DIAGNOSIS — Z87.19 HISTORY OF GASTRITIS: ICD-10-CM

## 2019-05-06 DIAGNOSIS — K44.9 HIATAL HERNIA: ICD-10-CM

## 2019-05-06 DIAGNOSIS — R41.3 MEMORY DEFICIT: Primary | ICD-10-CM

## 2019-05-06 DIAGNOSIS — R29.818 NEUROLOGICAL DEFICIT PRESENT: ICD-10-CM

## 2019-05-06 DIAGNOSIS — R42 LIGHTHEADED: ICD-10-CM

## 2019-05-06 DIAGNOSIS — R41.3 MEMORY DEFICIT: ICD-10-CM

## 2019-05-06 LAB
ALBUMIN SERPL BCP-MCNC: 3.9 G/DL (ref 3.5–5.2)
ALP SERPL-CCNC: 79 U/L (ref 55–135)
ALT SERPL W/O P-5'-P-CCNC: 37 U/L (ref 10–44)
ANION GAP SERPL CALC-SCNC: 9 MMOL/L (ref 8–16)
AST SERPL-CCNC: 32 U/L (ref 10–40)
BASOPHILS # BLD AUTO: 0.04 K/UL (ref 0–0.2)
BASOPHILS NFR BLD: 0.7 % (ref 0–1.9)
BILIRUB SERPL-MCNC: 0.5 MG/DL (ref 0.1–1)
BUN SERPL-MCNC: 11 MG/DL (ref 6–20)
CALCIUM SERPL-MCNC: 9.7 MG/DL (ref 8.7–10.5)
CHLORIDE SERPL-SCNC: 106 MMOL/L (ref 95–110)
CO2 SERPL-SCNC: 23 MMOL/L (ref 23–29)
CREAT SERPL-MCNC: 0.8 MG/DL (ref 0.5–1.4)
DIFFERENTIAL METHOD: ABNORMAL
EOSINOPHIL # BLD AUTO: 0.1 K/UL (ref 0–0.5)
EOSINOPHIL NFR BLD: 1.8 % (ref 0–8)
ERYTHROCYTE [DISTWIDTH] IN BLOOD BY AUTOMATED COUNT: 13.2 % (ref 11.5–14.5)
EST. GFR  (AFRICAN AMERICAN): >60 ML/MIN/1.73 M^2
EST. GFR  (NON AFRICAN AMERICAN): >60 ML/MIN/1.73 M^2
FOLATE SERPL-MCNC: 5 NG/ML (ref 4–24)
GLUCOSE SERPL-MCNC: 87 MG/DL (ref 70–110)
HCT VFR BLD AUTO: 44.2 % (ref 37–48.5)
HGB BLD-MCNC: 14.4 G/DL (ref 12–16)
IMM GRANULOCYTES # BLD AUTO: 0.04 K/UL (ref 0–0.04)
IMM GRANULOCYTES NFR BLD AUTO: 0.7 % (ref 0–0.5)
LYMPHOCYTES # BLD AUTO: 2 K/UL (ref 1–4.8)
LYMPHOCYTES NFR BLD: 32.9 % (ref 18–48)
MCH RBC QN AUTO: 30.6 PG (ref 27–31)
MCHC RBC AUTO-ENTMCNC: 32.6 G/DL (ref 32–36)
MCV RBC AUTO: 94 FL (ref 82–98)
MONOCYTES # BLD AUTO: 0.3 K/UL (ref 0.3–1)
MONOCYTES NFR BLD: 5.2 % (ref 4–15)
NEUTROPHILS # BLD AUTO: 3.6 K/UL (ref 1.8–7.7)
NEUTROPHILS NFR BLD: 58.7 % (ref 38–73)
NRBC BLD-RTO: 0 /100 WBC
PLATELET # BLD AUTO: 261 K/UL (ref 150–350)
PMV BLD AUTO: 11.2 FL (ref 9.2–12.9)
POTASSIUM SERPL-SCNC: 4.3 MMOL/L (ref 3.5–5.1)
PROT SERPL-MCNC: 7.7 G/DL (ref 6–8.4)
RBC # BLD AUTO: 4.71 M/UL (ref 4–5.4)
SODIUM SERPL-SCNC: 138 MMOL/L (ref 136–145)
TSH SERPL DL<=0.005 MIU/L-ACNC: 1.71 UIU/ML (ref 0.4–4)
VIT B12 SERPL-MCNC: 302 PG/ML (ref 210–950)
WBC # BLD AUTO: 6.11 K/UL (ref 3.9–12.7)

## 2019-05-06 PROCEDURE — 3008F PR BODY MASS INDEX (BMI) DOCUMENTED: ICD-10-PCS | Mod: S$GLB,,, | Performed by: FAMILY MEDICINE

## 2019-05-06 PROCEDURE — 99215 PR OFFICE/OUTPT VISIT, EST, LEVL V, 40-54 MIN: ICD-10-PCS | Mod: S$GLB,,, | Performed by: FAMILY MEDICINE

## 2019-05-06 PROCEDURE — 36415 COLL VENOUS BLD VENIPUNCTURE: CPT | Mod: PO

## 2019-05-06 PROCEDURE — 99999 PR PBB SHADOW E&M-EST. PATIENT-LVL V: CPT | Mod: PBBFAC,,, | Performed by: FAMILY MEDICINE

## 2019-05-06 PROCEDURE — 3008F BODY MASS INDEX DOCD: CPT | Mod: S$GLB,,, | Performed by: FAMILY MEDICINE

## 2019-05-06 PROCEDURE — 82300 ASSAY OF CADMIUM: CPT

## 2019-05-06 PROCEDURE — 82746 ASSAY OF FOLIC ACID SERUM: CPT

## 2019-05-06 PROCEDURE — 84443 ASSAY THYROID STIM HORMONE: CPT

## 2019-05-06 PROCEDURE — 85025 COMPLETE CBC W/AUTO DIFF WBC: CPT

## 2019-05-06 PROCEDURE — 82607 VITAMIN B-12: CPT

## 2019-05-06 PROCEDURE — 99999 PR PBB SHADOW E&M-EST. PATIENT-LVL V: ICD-10-PCS | Mod: PBBFAC,,, | Performed by: FAMILY MEDICINE

## 2019-05-06 PROCEDURE — 80053 COMPREHEN METABOLIC PANEL: CPT

## 2019-05-06 PROCEDURE — 99215 OFFICE O/P EST HI 40 MIN: CPT | Mod: S$GLB,,, | Performed by: FAMILY MEDICINE

## 2019-05-06 RX ORDER — GABAPENTIN 300 MG/1
300 CAPSULE ORAL 2 TIMES DAILY PRN
Qty: 180 CAPSULE | Refills: 1 | Status: SHIPPED | OUTPATIENT
Start: 2019-05-06 | End: 2019-05-16

## 2019-05-06 RX ORDER — PANTOPRAZOLE SODIUM 20 MG/1
20 TABLET, DELAYED RELEASE ORAL DAILY
Qty: 90 TABLET | Refills: 3 | Status: SHIPPED | OUTPATIENT
Start: 2019-05-06 | End: 2020-05-05

## 2019-05-06 NOTE — PROGRESS NOTES
Subjective:       Patient ID: Carolina Andrews is a 43 y.o. female.    Chief Complaint: Dizziness (light headed) and Memory Loss (memory problems)    A 43-year-old left-handed female comes in with a history of several months of increasingly worsening memory and concentration problems associated with tremors, lightheadedness and dizziness but not vertigo, and worsening tinnitus.  The patient has a history of cone dystrophy and legal blindness, depression, liver hemangioma, arthritis, and gallstones.  About a year and a half ago she was seen for a severe headache during which a CT scan of the brain was done which showed no evidence of any intracranial pathology.  She has been having superior nuchal line occipital headaches since that time and has been given gabapentin that she uses twice a day on an as needed basis.  She reports she has been having more frequent headaches and has been using the gabapentin somewhat more than usual.  She also takes Protonix for reflux symptoms but is only been using that steadily for several months.  I doubt she has had time to developed a B12 deficiency but it is consideration along with gabapentin potential for side effects, concentration impairment, and sedation.  The patient's  is present and confirms what she is saying, elaborating on the memory deficit.  She historically has had a very good memory and has been able to multi task but now has difficulty with even simple tasks.  [place] are on [place] but it is a part of a large Association and is managed by an independent company.  They do report that recently there was a very strong chlorine odor to the water but they were not notified that there was any problem with the system.    Past Medical History:  No date: Arthritis  No date: Blindness  No date: Cholelithiasis  No date: Liver hemangioma  No date: Mental disorder      Comment:  Periodic antidepressant use since childhood, not                currently taking or  endorsing depressive ideation  No date: Pinched nerve    Past Surgical History:  2009: ANKLE FRACTURE SURGERY  2/4/2019: COLONOSCOPY; N/A      Comment:  Performed by Dale Childers MD at [facility] ENDO  11/19/2018: EGD (ESOPHAGOGASTRODUODENOSCOPY); N/A      Comment:  Performed by Dale Childers MD at [facility] ENDO  ~1998: UPPER GASTROINTESTINAL ENDOSCOPY      Comment:  normal findings per patient report    Review of patient's family history indicates:  Problem: Hypertension      Relation: Father          Age of Onset: (Not Specified)  Problem: Lung disease      Relation: Father          Age of Onset: (Not Specified)          Comment: calapse x 3   Problem: Stroke      Relation: Father          Age of Onset: (Not Specified)          Comment: in eye   Problem: Cancer      Relation: Sister          Age of Onset: 23          Comment: polyps found incidentally  Problem: Vision loss      Relation: Sister          Age of Onset: (Not Specified)  Problem: Lung disease      Relation: Sister          Age of Onset: (Not Specified)          Comment: spontanious lung collapse  Problem: GI problems      Relation: Sister          Age of Onset: (Not Specified)  Problem: Retinal detachment      Relation: Mother          Age of Onset: (Not Specified)  Problem: Cancer      Relation: Paternal Grandfather          Age of Onset: (Not Specified)  Problem: Stroke      Relation: Maternal Grandmother          Age of Onset: (Not Specified)  Problem: Colon cancer      Relation: Paternal Aunt          Age of Onset: (Not Specified)  Problem: Colon polyps      Relation: Maternal Uncle          Age of Onset: (Not Specified)  Problem: No Known Problems      Relation: Paternal Uncle          Age of Onset: (Not Specified)  Problem: Parkinsonism      Relation: Maternal Grandfather          Age of Onset: (Not Specified)  Problem: Cancer      Relation: Maternal Grandfather          Age of Onset: (Not Specified)          Comment: prostate  Problem: Colon  polyps      Relation: Maternal Grandfather          Age of Onset: (Not Specified)  Problem: Cancer      Relation: Paternal Grandmother          Age of Onset: (Not Specified)          Comment: skin cancer in colon   Problem: Alzheimer's disease      Relation: Paternal Grandmother          Age of Onset: (Not Specified)  Problem: Heart disease      Relation: Paternal Aunt          Age of Onset: (Not Specified)  Problem: Breast cancer      Relation: Neg Hx          Age of Onset: (Not Specified)  Problem: Diabetes      Relation: Neg Hx          Age of Onset: (Not Specified)  Problem: Miscarriages / Stillbirths      Relation: Neg Hx          Age of Onset: (Not Specified)  Problem: Crohn's disease      Relation: Neg Hx          Age of Onset: (Not Specified)  Problem: Ulcerative colitis      Relation: Neg Hx          Age of Onset: (Not Specified)  Problem: Glaucoma      Relation: Neg Hx          Age of Onset: (Not Specified)    Social History    Tobacco Use      Smoking status: Never Smoker      Smokeless tobacco: Never Used    Alcohol use: Yes      Alcohol/week: 3.6 oz      Types: 6 Glasses of wine per week      Frequency: 2-3 times a week      Drinks per session: 1 or 2      Binge frequency: Less than monthly      Comment: 2-3/ week wine or beer    Drug use: No    Current Outpatient Medications on File Prior to Visit:  acetaminophen (TYLENOL) 500 MG tablet, Take 500 mg by mouth every 6 (six) hours as needed for Pain., Disp: , Rfl:   (DISCONTINUED) gabapentin (NEURONTIN) 300 MG capsule, Take 300 mg by mouth 2 (two) times daily as needed. , Disp: , Rfl:   (DISCONTINUED) pantoprazole (PROTONIX) 20 MG tablet, Take 1 tablet (20 mg total) by mouth once daily., Disp: 30 tablet, Rfl: 3    No current facility-administered medications on file prior to visit.         Review of Systems   Constitutional: Negative for activity change and unexpected weight change.   HENT: Negative for hearing loss, rhinorrhea and trouble swallowing.     Eyes: Negative for discharge and visual disturbance.   Respiratory: Negative for chest tightness and wheezing.    Cardiovascular: Negative for chest pain and palpitations.   Gastrointestinal: Negative for blood in stool, constipation, diarrhea and vomiting.   Endocrine: Negative for polydipsia and polyuria.   Genitourinary: Negative for difficulty urinating, dysuria, hematuria and menstrual problem.   Musculoskeletal: Positive for neck pain. Negative for arthralgias and joint swelling.   Neurological: Positive for weakness and headaches.   Psychiatric/Behavioral: Positive for confusion, decreased concentration and sleep disturbance (Minor increase in frequency of awakenings). Negative for dysphoric mood.       Objective:      Physical Exam   Constitutional: She is oriented to person, place, and time. She appears well-developed. No distress.   Good blood pressure control  Severe obesity with a BMI of 36.0 weight is steady in unchanged from her November 2, 2018 visit   HENT:   Head: Normocephalic and atraumatic.   Right Ear: External ear normal.   Left Ear: External ear normal.   Nose: Nose normal.   Mouth/Throat: Oropharynx is clear and moist. No oropharyngeal exudate.   Eyes: Pupils are equal, round, and reactive to light. Conjunctivae and EOM are normal. Right eye exhibits no discharge. Left eye exhibits no discharge. No scleral icterus.   Neck: Normal range of motion. Neck supple. No JVD present. No tracheal deviation present. No thyromegaly present.   Cardiovascular: Normal rate, regular rhythm and normal heart sounds. Exam reveals no gallop and no friction rub.   No murmur heard.  Carotid pulses 2+ no bruit   Pulmonary/Chest: Effort normal and breath sounds normal. No stridor. No respiratory distress. She has no wheezes. She has no rales. She exhibits no tenderness.   Abdominal: Soft. Bowel sounds are normal. She exhibits no distension and no mass. There is no tenderness. There is no rebound and no guarding.  No hernia.   Musculoskeletal: Normal range of motion. She exhibits no edema or tenderness.   Lymphadenopathy:     She has no cervical adenopathy.   Neurological: She is alert and oriented to person, place, and time. She has normal reflexes. She displays no atrophy and no tremor. No cranial nerve deficit. She exhibits normal muscle tone. She displays no seizure activity. GCS eye subscore is 4. GCS verbal subscore is 5. GCS motor subscore is 6.   Reflex Scores:       Tricep reflexes are 2+ on the right side and 2+ on the left side.       Bicep reflexes are 2+ on the right side and 2+ on the left side.       Brachioradialis reflexes are 2+ on the right side and 2+ on the left side.       Patellar reflexes are 2+ on the right side and 2+ on the left side.       Achilles reflexes are 2+ on the right side and 2+ on the left side.  Patient seems to have a slight decrease in strength in shoulder abduction, biceps and triceps on the left side.   strength is relatively symmetrical but I would expect it to be slightly better on her left/dominant side.  Patient was able to recall 4/4 terms immediately and remotely on a memory recall test   Skin: Skin is warm and dry. No rash noted. She is not diaphoretic. No erythema.   Psychiatric: She has a normal mood and affect. Her behavior is normal. Judgment and thought content normal.   Nursing note and vitals reviewed.      Assessment:       1. Memory deficit    2. Lightheaded    3. Tremor    4. Neurological deficit present    5. Tinnitus of both ears    6. Severe obesity (BMI 35.0-39.9) with comorbidity    7. Hiatal hernia    8. History of gastritis        Plan:       1. Memory deficit  - Folate; Future  - Vitamin B12; Future  - TSH; Future  - Comprehensive metabolic panel; Future  - CBC auto differential; Future  - Ambulatory referral to Neurology  - Heavy Metals Screen, Blood (Quantitative); Future    2. Lightheaded  - Ambulatory referral to Neurology    3. Tremor  - Ambulatory  referral to Neurology    4. Neurological deficit present  - MRI Brain W WO Contrast; Future  - Ambulatory referral to Neurology    5. Tinnitus of both ears  - MRI Brain W WO Contrast; Future  - Ambulatory referral to Neurology    6. Severe obesity (BMI 35.0-39.9) with comorbidity    7. Hiatal hernia  - pantoprazole (PROTONIX) 20 MG tablet; Take 1 tablet (20 mg total) by mouth once daily.  Dispense: 90 tablet; Refill: 3    8. History of gastritis  - pantoprazole (PROTONIX) 20 MG tablet; Take 1 tablet (20 mg total) by mouth once daily.  Dispense: 90 tablet; Refill: 3

## 2019-05-06 NOTE — PROGRESS NOTES
Pre-Visit Chart Review  For Appointment Scheduled on 5-6-19    Health Maintenance Due   Topic Date Due    TETANUS VACCINE  06/09/1994    Pneumococcal Vaccine (Medium Risk) (1 of 1 - PPSV23) 06/09/1995

## 2019-05-06 NOTE — PATIENT INSTRUCTIONS

## 2019-05-08 LAB
ARSENIC BLD-MCNC: <1 NG/ML (ref 0–12)
CADMIUM BLD-MCNC: <0.2 NG/ML (ref 0–4.9)
CITY: NORMAL
COUNTY: NORMAL
GUARDIAN FIRST NAME: NORMAL
GUARDIAN LAST NAME: NORMAL
HOME PHONE: NORMAL
LEAD BLDV-MCNC: <1 MCG/DL (ref 0–4.9)
MERCURY BLD-MCNC: 1 NG/ML (ref 0–9)
RACE: NORMAL
STATE: NORMAL
STREET ADDRESS: NORMAL
VENOUS/CAPILLARY: NORMAL
ZIP: NORMAL

## 2019-05-10 ENCOUNTER — HOSPITAL ENCOUNTER (OUTPATIENT)
Dept: RADIOLOGY | Facility: HOSPITAL | Age: 44
Discharge: HOME | End: 2019-05-10
Attending: FAMILY MEDICINE
Payer: MEDICARE

## 2019-05-10 DIAGNOSIS — H93.13 TINNITUS OF BOTH EARS: ICD-10-CM

## 2019-05-10 DIAGNOSIS — R29.818 NEUROLOGICAL DEFICIT PRESENT: ICD-10-CM

## 2019-05-10 PROCEDURE — 70553 MRI BRAIN STEM W/O & W/DYE: CPT | Mod: TC

## 2019-05-10 PROCEDURE — A9585 GADOBUTROL INJECTION: HCPCS | Performed by: FAMILY MEDICINE

## 2019-05-10 PROCEDURE — 70553 MRI BRAIN STEM W/O & W/DYE: CPT | Mod: 26,,, | Performed by: RADIOLOGY

## 2019-05-10 PROCEDURE — 25500020 PHARM REV CODE 255: Performed by: FAMILY MEDICINE

## 2019-05-10 PROCEDURE — 70553 MRI BRAIN W WO CONTRAST: ICD-10-PCS | Mod: 26,,, | Performed by: RADIOLOGY

## 2019-05-10 RX ORDER — GADOBUTROL 604.72 MG/ML
10 INJECTION INTRAVENOUS
Status: COMPLETED | OUTPATIENT
Start: 2019-05-10 | End: 2019-05-10

## 2019-05-10 RX ADMIN — GADOBUTROL 10 ML: 604.72 INJECTION INTRAVENOUS at 06:05

## 2019-05-16 ENCOUNTER — OFFICE VISIT (OUTPATIENT)
Dept: NEUROLOGY | Facility: CLINIC | Age: 44
End: 2019-05-16
Attending: FAMILY MEDICINE
Payer: MEDICARE

## 2019-05-16 VITALS
RESPIRATION RATE: 20 BRPM | WEIGHT: 230.5 LBS | HEIGHT: 67 IN | BODY MASS INDEX: 36.18 KG/M2 | HEART RATE: 82 BPM | SYSTOLIC BLOOD PRESSURE: 123 MMHG | DIASTOLIC BLOOD PRESSURE: 86 MMHG

## 2019-05-16 DIAGNOSIS — H93.19 TINNITUS, UNSPECIFIED LATERALITY: ICD-10-CM

## 2019-05-16 DIAGNOSIS — R41.3 COMPLAINTS OF MEMORY DISTURBANCE: Primary | ICD-10-CM

## 2019-05-16 DIAGNOSIS — I99.9 VASCULAR ABNORMALITY: ICD-10-CM

## 2019-05-16 DIAGNOSIS — R42 DIZZINESS: ICD-10-CM

## 2019-05-16 DIAGNOSIS — R42 VERTIGO: ICD-10-CM

## 2019-05-16 PROCEDURE — 3008F BODY MASS INDEX DOCD: CPT | Mod: S$GLB,,, | Performed by: PSYCHIATRY & NEUROLOGY

## 2019-05-16 PROCEDURE — 3008F PR BODY MASS INDEX (BMI) DOCUMENTED: ICD-10-PCS | Mod: S$GLB,,, | Performed by: PSYCHIATRY & NEUROLOGY

## 2019-05-16 PROCEDURE — 99205 OFFICE O/P NEW HI 60 MIN: CPT | Mod: S$GLB,,, | Performed by: PSYCHIATRY & NEUROLOGY

## 2019-05-16 PROCEDURE — 99205 PR OFFICE/OUTPT VISIT, NEW, LEVL V, 60-74 MIN: ICD-10-PCS | Mod: S$GLB,,, | Performed by: PSYCHIATRY & NEUROLOGY

## 2019-05-16 PROCEDURE — 99999 PR PBB SHADOW E&M-EST. PATIENT-LVL IV: ICD-10-PCS | Mod: PBBFAC,,, | Performed by: PSYCHIATRY & NEUROLOGY

## 2019-05-16 PROCEDURE — 99999 PR PBB SHADOW E&M-EST. PATIENT-LVL IV: CPT | Mod: PBBFAC,,, | Performed by: PSYCHIATRY & NEUROLOGY

## 2019-05-16 RX ORDER — GABAPENTIN 300 MG/1
600 CAPSULE ORAL 2 TIMES DAILY
Qty: 120 CAPSULE | Refills: 11 | Status: ON HOLD | OUTPATIENT
Start: 2019-05-16 | End: 2019-08-06 | Stop reason: CLARIF

## 2019-05-16 NOTE — LETTER
May 16, 2019      Desean Crump MD  [address] [facility] [address]           [facility]  [address] [facility] [address] [place] [address]  Phone: [phone]  Fax: [phone]          Patient: Carolina Andrews   MR Number: [id number]   YOB: 1975   Date of Visit: 5/16/2019       Dear Dr. Desean Crump:    Thank you for referring Carolina Andrews to me for evaluation. Attached you will find relevant portions of my assessment and plan of care.    If you have questions, please do not hesitate to call me. I look forward to following Carolina Andrews along with you.    Sincerely,    Eugenie Leroy MD    Enclosure  CC:  No Recipients    If you would like to receive this communication electronically, please contact [email or web address] or [phone] to request more information on [employer] access.    For providers and/or their staff who would like to refer a patient to [facility], please contact us through our one-stop-shop provider referral line, [facility], at [phone].    If you feel you have received this communication in error or would no longer like to receive these types of communications, please e-mail [email or web address]

## 2019-05-16 NOTE — PROGRESS NOTES
Date: 5/16/2019    Patient ID: Carolina Andrews is a 43 y.o. female.    Referring Provider:  Desean Crump MD    Chief Complaint: Memory Loss; Headache; and Dizziness      History of Present Illness:  Ms. Andrews is a 43 y.o. female who presents referred by Desean Crump MD today for evaluation of memory troubles, dizziness, and tinnitus with abnormal MRI. The patient was accompanied by her  who also contributed to the following history.     She describes several months of worsening memory and concentration associated with tremors, lightheadedness, and dizziness and worsening tinnitus. She noticed memory troubles at first. She has had trouble remembering things. She is lightheaded or dizzy and looses balance. Her  notes that the memory trouble has been present for about 1 year. She has always had tinnitus mildly but it is much more noticeable in the past 4-5 months. She can't go to sleep without having noise on because in the silence, the tinnitus is very bothersome. The dizziness she describes as coming on when she stops and her head feels like it continues to move.     In the past couple of years, she has lost a lot of vision and is legally blind now. She states she was born with a cone dysfunction and this has been worsening recent since the age of 40. One of her sisters has a similar condition with the eyes.     She has been placed on gabapentin for the headaches and this worked previously. She stopped taking it when the headaches when away. Now her headaches are behind the right eye and the temple area. She describes it as a sharp pain. She is having headaches every day. She will find that her nose, lips, or fingers are tingly. Her neck also hurts significantly. She is now taking gabapentin 300 mg BID. She thinks the headaches are slightly better but still not gone. No kidney issues. She has liver hemangiomas and she has a large gallstone.     Three glasses of wine will help with her  headaches and neck pain. Her  states she does not snore at night.     She last had a hearing test through work 15 years ago. No history of deafness in the family other than older family members with hearing loss.     She has anxiety and has woken up in a panic attack before. She feels like her anxiety is worsening lately too.     Review of Systems:   14 systems reviewed - All other systems were reviewed and negative except as mentioned in the HPI    Allergies:  Review of patient's allergies indicates:   Allergen Reactions    Pcn [penicillins]      rash       Current Medications:  Current Outpatient Medications   Medication Sig Dispense Refill    acetaminophen (TYLENOL) 500 MG tablet Take 500 mg by mouth every 6 (six) hours as needed for Pain.      gabapentin (NEURONTIN) 300 MG capsule Take 2 capsules (600 mg total) by mouth 2 (two) times daily. 120 capsule 11    pantoprazole (PROTONIX) 20 MG tablet Take 1 tablet (20 mg total) by mouth once daily. 90 tablet 3     No current facility-administered medications for this visit.        Past Medical History:  Past Medical History:   Diagnosis Date    Arthritis     Blindness     Cholelithiasis     Liver hemangioma     Mental disorder     Periodic antidepressant use since childhood, not currently taking or endorsing depressive ideation    Pinched nerve        Past Surgical History:  Past Surgical History:   Procedure Laterality Date    ANKLE FRACTURE SURGERY  2009    COLONOSCOPY N/A 2/4/2019    Performed by [facility]ers MD [facility]O    EGD (ESOPHAGOGASTRODUODENOSCOPY) N/A 11/19/2018    Performed by [facility] MD at [facility] ENDO    UPPER GASTROINTESTINAL ENDOSCOPY  ~1998    normal findings per patient report       Family History:  family history includes Alzheimer's disease in her paternal grandmother; Cancer in her maternal grandfather, paternal grandfather, and paternal grandmother; Cancer (age of onset: 23) in her sister; Colon cancer in her  "paternal aunt; Colon polyps in her maternal grandfather and maternal uncle; GI problems in her sister; Heart disease in her paternal aunt; Hypertension in her father; Lung disease in her father and sister; No Known Problems in her paternal uncle; Parkinsonism in her maternal grandfather; Retinal detachment in her mother; Stroke in her father and maternal grandmother; Vision loss in her sister.    Social History:   reports that she has never smoked. She has never used smokeless tobacco. She reports that she drinks about 3.6 oz of alcohol per week. She reports that she does not use drugs.    Physical Exam:  Vitals:    05/16/19 1355   BP: 123/86   Pulse: 82   Resp: 20   Weight: 104.6 kg (230 lb 8 oz)   Height: 5' 7" (1.702 m)   PainSc:   3   PainLoc: Head     Body mass index is 36.1 kg/m².  General: Well developed, well nourished.  No acute distress.  Eyes: no ocular hemorrhages  Musculoskeletal: No obvious joint deformities, moves all extremities well.  Peripheral vascular: No edema noted    Neurological Exam:  Mental status: Awake, alert, and oriented to person, place, and time. Recent and remote memory appear to be intact. Attention, concentration, and fund of knowledge regarding recent events normal. MOCA 26/30  Speech/Language: No dysarthria or aphasia on conversation.   Cranial nerves: Visual fields full. Pupils equal round and reactive to light, extraocular movements intact, facial strength and sensation intact bilaterally, palate and tongue midline, hearing grossly intact bilaterally. Shoulder shrug normal bilaterally.   Motor: 5 out of 5 strength throughout the upper and lower extremities bilaterally. Normal bulk and tone.   Sensation: Intact to light touch and vibration bilaterally.  DTR: 2+ at the knees and biceps bilaterally.  Coordination: Finger-nose-finger testing and rapid alternating movements normal bilaterally. No tremor.   Gait: Normal gait            Data:  I have personally reviewed the referring " provider's notes, labs, & imaging made available to me today.       Labs:  CBC:   Lab Results   Component Value Date    WBC 6.11 05/06/2019    HGB 14.4 05/06/2019    HCT 44.2 05/06/2019     05/06/2019    MCV 94 05/06/2019    RDW 13.2 05/06/2019     BMP:   Lab Results   Component Value Date     05/06/2019    K 4.3 05/06/2019     05/06/2019    CO2 23 05/06/2019    BUN 11 05/06/2019    CREATININE 0.8 05/06/2019    GLU 87 05/06/2019    CALCIUM 9.7 05/06/2019     LFTS;   Lab Results   Component Value Date    PROT 7.7 05/06/2019    ALBUMIN 3.9 05/06/2019    BILITOT 0.5 05/06/2019    AST 32 05/06/2019    ALKPHOS 79 05/06/2019    ALT 37 05/06/2019     COAGS: No results found for: INR, PROTIME, PTT  FLP:   Lab Results   Component Value Date    CHOL 206 (H) 08/05/2014    HDL 50 08/05/2014    LDLCALC 117.2 08/05/2014    TRIG 194 (H) 08/05/2014    CHOLHDL 24.3 08/05/2014         Imaging:  I have personally reviewed the imaging, MRI brain shows normal brain parenchyma. There is a reported vascular loop in the right inner ear.     Assessment and Plan:  Ms. Andrews is a 43 y.o. female referred to me by Desean Crump MD for evaluation of memory loss, dizziness, tinnitus, and abnormal MRI.     In regards to the memory, we discussed that her MOCA is on the cusp of normal. I suspect it is likely inattention and anxiety. We will check neuropsych testing, EEG, and vitamin D level. I will see her back after testing to review.     In regards to the vertigo and tinnitus, I will refer to ENT for evaluation and to comment on the vascular loop in the IAC and if that is playing any role in this. We will obtain MRA brain for further evaluation of the reported vascular abnormality.     In regards to the headaches, I advised she can continue to uptitrate the gabapentin. It worked for her previously--she just might need higher doses.     Complaints of memory disturbance  -     VITAMIN D; Future; Expected date: 05/16/2019  -      EEG,w/awake & asleep record; Future  -     Neuropsychological testing; Future; Expected date: 05/17/2019    Dizziness  -     Ambulatory consult to ENT  -     VITAMIN D; Future; Expected date: 05/16/2019  -     EEG,w/awake & asleep record; Future  -     MRA Brain; Future; Expected date: 05/16/2019  -     Neuropsychological testing; Future; Expected date: 05/17/2019    Vascular abnormality    Vertigo    Tinnitus, unspecified laterality    Other orders  -     gabapentin (NEURONTIN) 300 MG capsule; Take 2 capsules (600 mg total) by mouth 2 (two) times daily.  Dispense: 120 capsule; Refill: 11

## 2019-05-25 ENCOUNTER — HOSPITAL ENCOUNTER (OUTPATIENT)
Dept: RADIOLOGY | Facility: HOSPITAL | Age: 44
Discharge: HOME | End: 2019-05-25
Attending: PSYCHIATRY & NEUROLOGY
Payer: MEDICARE

## 2019-05-25 DIAGNOSIS — R42 DIZZINESS: ICD-10-CM

## 2019-05-25 PROCEDURE — 70544 MR ANGIOGRAPHY HEAD W/O DYE: CPT | Mod: 26,,, | Performed by: RADIOLOGY

## 2019-05-25 PROCEDURE — 70544 MR ANGIOGRAPHY HEAD W/O DYE: CPT | Mod: TC

## 2019-05-25 PROCEDURE — 70544 MRA BRAIN WITHOUT CONTRAST: ICD-10-PCS | Mod: 26,,, | Performed by: RADIOLOGY

## 2019-05-28 ENCOUNTER — PATIENT MESSAGE (OUTPATIENT)
Dept: NEUROLOGY | Facility: CLINIC | Age: 44
End: 2019-05-28

## 2019-05-28 ENCOUNTER — HOSPITAL ENCOUNTER (OUTPATIENT)
Dept: NEUROLOGY | Facility: HOSPITAL | Age: 44
Discharge: HOME | End: 2019-05-28
Attending: PSYCHIATRY & NEUROLOGY
Payer: MEDICARE

## 2019-05-28 DIAGNOSIS — R42 DIZZINESS: ICD-10-CM

## 2019-05-28 DIAGNOSIS — R41.3 COMPLAINTS OF MEMORY DISTURBANCE: ICD-10-CM

## 2019-05-28 PROCEDURE — 95816 PR EEG,W/AWAKE & DROWSY RECORD: ICD-10-PCS | Mod: 26,,, | Performed by: PSYCHIATRY & NEUROLOGY

## 2019-05-28 PROCEDURE — 95816 EEG AWAKE AND DROWSY: CPT | Mod: 26,,, | Performed by: PSYCHIATRY & NEUROLOGY

## 2019-05-28 PROCEDURE — 95819 EEG AWAKE AND ASLEEP: CPT

## 2019-05-28 NOTE — PROCEDURES
DATE OF STUDY:  05/28/2019    EEG NUMBER:  ON-.    REQUESTING BY:  Eugenie Leroy M.D.    ELECTROENCEPHALOGRAM REPORT    METHODOLOGY:  Electroencephalographic (EEG) recording is recorded with   electrodes placed according to the International 10-20 placement system.  Thirty   two (32) channels of digital signal (sampling rate of 512/sec), including T1   and T2, were simultaneously recorded from the scalp and may include EKG, EMG,   and/or eye monitors.  Recording band pass was 0.1 to 512 Hz.  Digital video   recording of the patient is simultaneously recorded with the EEG.  The patient   is instructed to report clinical symptoms which may occur during the recording   session.  EEG and video recording are stored and archived in digital format.    Activation procedures, which include photic stimulation, hyperventilation and   instructing patients to perform simple tasks, are done in selected patients.    The EEG is displayed on a monitor screen and can be reviewed using different   montages.  Computer assisted-analysis is employed to detect spike and   electrographic seizure activity.  The entire record is submitted for computer   analysis.  The entire recording is visually reviewed, and the times identified   by computer analysis as being spikes or seizures are reviewed again.    Compressed spectral analysis (CSA) is also performed on the activity recorded   from each individual channel.  This is displayed as a power display of   frequencies from 0 to 30 Hz over time.  The CSA is reviewed looking for   asymmetries in power between homologous areas of the scalp, then compared with   the original EEG recording.    eSNF software was also utilized in the review of this study.  This software   suite analyzes the EEG recording in multiple domains.  Coherence and rhythmicity   are computed to identify EEG sections which may contain organized seizures.    Each channel undergoes analysis to detect the presence of  spike and sharp waves   which have special and morphological characteristics of epileptic activity.  The   routine EEG recording is converted from special into frequency domain.  This is   then displayed comparing homologous areas to identify areas of significant   asymmetry.  Algorithm to identify non-cortically generated artifact is used to   separate artifact from the EEG.    EEG FINDINGS:  The patient was awake at the onset of the recording.  There was a   voltage asymmetry noted between the 2 hemispheres with amplitude higher on the   left.  The intrinsic frequency is, however, the same.  There was an irregular   posterior dominant rhythm of 9 Hz to 10 Hz seen in the occipital, parietal and   posterior temporal areas bilaterally.  There was a mixture of midrange and some   lower range beta activity intermixed noted diffusely.  When the patient was   quiet, the posterior dominant was fairly rhythmic and remained symmetrical.    With sleep, the posterior dominant rhythm was replaced by a generalized mixture   of theta and beta frequencies and was punctuated by sleep spindles and   generalized bursts of vertex maximum delta.  No significant lateralized or focal   findings were noted.  No spike or sharp wave activity was recorded.    Intermittent photic stimulation was carried out, which produced a mild driving   response without provoking any pathological discharges.  ____ minutes of   hyperventilation was carried out, which produced some mild symmetrical   disorganization and slowing, which normalized soon after overbreathing ceased.    IMPRESSION:  Normal waking and sleeping EEG.    CLINICAL CORRELATION:  The patient is a 43-year-old female who reported having   problems with memory and forgets tasks that she is trying to complete.  She also   reports brief sensations of dizziness.  She also has a history of anxiety and   is having more difficulty in managing this symptom.  This recording, however,   does not  show any evidence of cortical dysfunction nor an irritative process.    There are significant asymmetries in amplitude, which usually is a result of   different thickness of the scalp or skull, however, in reviewing the MRI, this   was not noted.  However, it was felt that the electrocortical activity was   symmetrical and normal.      RR/IN  dd: 05/28/2019 09:46:51 (CDT)  td: 05/28/2019 10:04:30 (CDT)  Doc ID   #[id number]  Job ID #[id number]    CC:

## 2019-05-31 ENCOUNTER — TELEPHONE (OUTPATIENT)
Dept: NEUROLOGY | Facility: CLINIC | Age: 44
End: 2019-05-31

## 2019-05-31 NOTE — TELEPHONE ENCOUNTER
Spoke with patient regarding scheduled appointment with Dr. Lopez . Date, time, and location confirmed.

## 2019-05-31 NOTE — TELEPHONE ENCOUNTER
----- Message from Lana Crump sent at 5/31/2019  9:15 AM CDT -----  Contact: Carolina  Type: Needs Medical Advice    Who Called:  Patient   Best Call Back Number: [phone]  Additional Information:  Calling as was told via [facility] that she needed to see a neurosurgeon but has not heard from office regarding the appointment. Thanks!

## 2019-06-12 ENCOUNTER — TELEPHONE (OUTPATIENT)
Dept: FAMILY MEDICINE | Facility: CLINIC | Age: 44
End: 2019-06-12

## 2019-06-12 ENCOUNTER — HOSPITAL ENCOUNTER (EMERGENCY)
Facility: HOSPITAL | Age: 44
Discharge: HOME | End: 2019-06-12
Attending: EMERGENCY MEDICINE
Payer: MEDICARE

## 2019-06-12 VITALS
SYSTOLIC BLOOD PRESSURE: 143 MMHG | HEART RATE: 86 BPM | RESPIRATION RATE: 16 BRPM | OXYGEN SATURATION: 100 % | HEIGHT: 67 IN | WEIGHT: 225 LBS | TEMPERATURE: 98 F | DIASTOLIC BLOOD PRESSURE: 58 MMHG | BODY MASS INDEX: 35.31 KG/M2

## 2019-06-12 DIAGNOSIS — F41.1 ANXIETY REACTION: Primary | ICD-10-CM

## 2019-06-12 LAB
ALBUMIN SERPL BCP-MCNC: 3.9 G/DL (ref 3.5–5.2)
ALP SERPL-CCNC: 80 U/L (ref 55–135)
ALT SERPL W/O P-5'-P-CCNC: 27 U/L (ref 10–44)
ANION GAP SERPL CALC-SCNC: 9 MMOL/L (ref 8–16)
AST SERPL-CCNC: 23 U/L (ref 10–40)
B-HCG UR QL: NEGATIVE
BASOPHILS # BLD AUTO: 0 K/UL (ref 0–0.2)
BASOPHILS NFR BLD: 0.3 % (ref 0–1.9)
BILIRUB SERPL-MCNC: 0.4 MG/DL (ref 0.1–1)
BUN SERPL-MCNC: 9 MG/DL (ref 6–20)
CALCIUM SERPL-MCNC: 10 MG/DL (ref 8.7–10.5)
CHLORIDE SERPL-SCNC: 107 MMOL/L (ref 95–110)
CO2 SERPL-SCNC: 26 MMOL/L (ref 23–29)
CREAT SERPL-MCNC: 0.8 MG/DL (ref 0.5–1.4)
CTP QC/QA: YES
DIFFERENTIAL METHOD: NORMAL
EOSINOPHIL # BLD AUTO: 0.1 K/UL (ref 0–0.5)
EOSINOPHIL NFR BLD: 1.2 % (ref 0–8)
ERYTHROCYTE [DISTWIDTH] IN BLOOD BY AUTOMATED COUNT: 13.6 % (ref 11.5–14.5)
EST. GFR  (AFRICAN AMERICAN): >60 ML/MIN/1.73 M^2
EST. GFR  (NON AFRICAN AMERICAN): >60 ML/MIN/1.73 M^2
GLUCOSE SERPL-MCNC: 90 MG/DL (ref 70–110)
HCT VFR BLD AUTO: 43.4 % (ref 37–48.5)
HGB BLD-MCNC: 14.6 G/DL (ref 12–16)
LYMPHOCYTES # BLD AUTO: 2.4 K/UL (ref 1–4.8)
LYMPHOCYTES NFR BLD: 32 % (ref 18–48)
MCH RBC QN AUTO: 30.2 PG (ref 27–31)
MCHC RBC AUTO-ENTMCNC: 33.5 G/DL (ref 32–36)
MCV RBC AUTO: 90 FL (ref 82–98)
MONOCYTES # BLD AUTO: 0.4 K/UL (ref 0.3–1)
MONOCYTES NFR BLD: 5.7 % (ref 4–15)
NEUTROPHILS # BLD AUTO: 4.5 K/UL (ref 1.8–7.7)
NEUTROPHILS NFR BLD: 60.8 % (ref 38–73)
PLATELET # BLD AUTO: 253 K/UL (ref 150–350)
PMV BLD AUTO: 9.3 FL (ref 9.2–12.9)
POTASSIUM SERPL-SCNC: 3.8 MMOL/L (ref 3.5–5.1)
PROT SERPL-MCNC: 7.7 G/DL (ref 6–8.4)
RBC # BLD AUTO: 4.81 M/UL (ref 4–5.4)
SODIUM SERPL-SCNC: 142 MMOL/L (ref 136–145)
WBC # BLD AUTO: 7.4 K/UL (ref 3.9–12.7)

## 2019-06-12 PROCEDURE — 25000003 PHARM REV CODE 250: Performed by: EMERGENCY MEDICINE

## 2019-06-12 PROCEDURE — 80053 COMPREHEN METABOLIC PANEL: CPT

## 2019-06-12 PROCEDURE — 99284 EMERGENCY DEPT VISIT MOD MDM: CPT

## 2019-06-12 PROCEDURE — 81025 URINE PREGNANCY TEST: CPT | Performed by: EMERGENCY MEDICINE

## 2019-06-12 PROCEDURE — 36415 COLL VENOUS BLD VENIPUNCTURE: CPT

## 2019-06-12 PROCEDURE — 85025 COMPLETE CBC W/AUTO DIFF WBC: CPT

## 2019-06-12 RX ORDER — ALPRAZOLAM 0.25 MG/1
0.5 TABLET ORAL
Status: COMPLETED | OUTPATIENT
Start: 2019-06-12 | End: 2019-06-12

## 2019-06-12 RX ORDER — CLONAZEPAM 1 MG/1
1 TABLET ORAL 2 TIMES DAILY PRN
Qty: 15 TABLET | Refills: 0 | Status: ON HOLD | OUTPATIENT
Start: 2019-06-12 | End: 2019-07-15 | Stop reason: CLARIF

## 2019-06-12 RX ADMIN — ALPRAZOLAM 0.5 MG: 0.25 TABLET ORAL at 04:06

## 2019-06-12 NOTE — ED NOTES
C/o facial, hands, and feet tingling with blurry vision and increased anxiety for the past week even non labored respirations family remains in room aware to notify nurse of needs or concerns.

## 2019-06-12 NOTE — ED PROVIDER NOTES
Encounter Date: 6/12/2019    SCRIBE #1 NOTE: I, Abbie Cox, am scribing for, and in the presence of, Dr. Silverio Waldrop.       History     Chief Complaint   Patient presents with    Tingling     pt reports tingling in face, and all 4 extremities for several days       Time seen by provider: 4:04 PM on 06/12/2019    Carolina Andrews is a 44 y.o. female who presents the ED with complaints of tingling on her face, hands, and feet since x1 week ago. The patient reports that an MRA x2 weeks ago detected an aneurism and she has an appointment with her surgeon tomorrow. The patient complains of light-headedness, dizziness, and worsening vision. The patient describes bilateral distal extremity paresthesias and perioral paresthesia. She also reports that her anxiety has worsened that past couple of months. She states that she is having a hard time functioning. She denies being pregnant but mentions that she has a vitamin deficiency. PMHx of mental disorder, blindness, pinched nerve, liver hemangioma, cholelithiasis, and arthritis. SHx includes ankle fracture surgery, upper gastrointestinal endoscopy, esophagogastroduodenoscopy, and colonoscopy. Penicillin allergy noted.    The history is provided by the patient.     Review of patient's allergies indicates:   Allergen Reactions    Pcn [penicillins]      rash     Past Medical History:   Diagnosis Date    Arthritis     Blindness     Cholelithiasis     Liver hemangioma     Mental disorder     Periodic antidepressant use since childhood, not currently taking or endorsing depressive ideation    Pinched nerve      Past Surgical History:   Procedure Laterality Date    ANKLE FRACTURE SURGERY  2009    COLONOSCOPY N/A 2/4/2019    [facility]formed [facility] at [facility]    EGD (ESOPHAGOGASTRODUODENOSCOPY) N/A 11/19/2018    [facility] at [facility]    UPPER GASTROINTESTINAL ENDOSCOPY  ~1998    normal findings per patient report     Family History  "  Problem Relation Age of Onset    Hypertension Father     Lung disease Father         calapse x 3     Stroke Father         in eye     Cancer Sister 23        polyps found incidentally    Vision loss Sister     Lung disease Sister         spontanious lung collapse    GI problems Sister     Retinal detachment Mother     Cancer Paternal Grandfather     Stroke Maternal Grandmother     Colon cancer Paternal Aunt     Colon polyps Maternal Uncle     No Known Problems Paternal Uncle     Parkinsonism Maternal Grandfather     Cancer Maternal Grandfather         prostate    Colon polyps Maternal Grandfather     Cancer Paternal Grandmother         skin cancer in colon     Alzheimer's disease Paternal Grandmother     Heart disease Paternal Aunt     Breast cancer Neg Hx     Diabetes Neg Hx     Miscarriages / Stillbirths Neg Hx     Crohn's disease Neg Hx     Ulcerative colitis Neg Hx     Glaucoma Neg Hx      Social History     Tobacco Use    Smoking status: Never Smoker    Smokeless tobacco: Never Used   Substance Use Topics    Alcohol use: Yes     Alcohol/week: 3.6 oz     Types: 6 Glasses of wine per week     Frequency: 2-3 times a week     Drinks per session: 1 or 2     Binge frequency: Less than monthly     Comment: 2-3/ week wine or beer    Drug use: No     Review of Systems   Constitutional: Positive for activity change ("hard time functioning"). Negative for appetite change, chills, fatigue and fever.   HENT: Negative for facial swelling.    Eyes: Positive for visual disturbance ("worsening vision").   Respiratory: Negative for apnea and shortness of breath.    Cardiovascular: Negative for chest pain and palpitations.   Gastrointestinal: Negative for abdominal distention and abdominal pain.   Genitourinary: Negative for difficulty urinating.   Musculoskeletal: Negative for neck pain.   Skin: Negative for pallor and rash.   Neurological: Positive for dizziness, light-headedness and numbness " "("tingling" in face, hands, and feet). Negative for headaches.   Hematological: Does not bruise/bleed easily.   Psychiatric/Behavioral: Negative for agitation. The patient is nervous/anxious.        Physical Exam     Initial Vitals [06/12/19 1553]   BP Pulse Resp Temp SpO2   (!) 143/58 86 16 98 °F (36.7 °C) 100 %      MAP       --         Physical Exam    Nursing note and vitals reviewed.  Constitutional: She appears well-developed and well-nourished.  Non-toxic appearance. No distress.   HENT:   Head: Normocephalic and atraumatic.   Eyes: EOM are normal. Pupils are equal, round, and reactive to light.   Neck: Normal range of motion. Neck supple. No neck rigidity. No JVD present.   Cardiovascular: Normal rate, regular rhythm, normal heart sounds and intact distal pulses. Exam reveals no gallop and no friction rub.    No murmur heard.  Pulmonary/Chest: Breath sounds normal. She has no wheezes. She has no rhonchi. She has no rales.   Abdominal: Soft. Bowel sounds are normal. She exhibits no distension. There is no tenderness. There is no rebound and no guarding.   Musculoskeletal: Normal range of motion.   Neurological: She is alert and oriented to person, place, and time. She has normal strength and normal reflexes. No cranial nerve deficit or sensory deficit. She exhibits normal muscle tone. Coordination normal. GCS eye subscore is 4. GCS verbal subscore is 5. GCS motor subscore is 6.   Skin: Skin is warm and dry.   Psychiatric: Her speech is normal and behavior is normal. Her mood appears anxious. She is not actively hallucinating.   Anxious and tearful.         ED Course   Procedures  Labs Reviewed   CBC W/ AUTO DIFFERENTIAL   COMPREHENSIVE METABOLIC PANEL   POCT URINE PREGNANCY          Imaging Results    None          Medical Decision Making:   History:   Old Medical Records: I decided to obtain old medical records.  Initial Assessment:   44-year-old woman presents with tingling in bilateral hands and feet and " perioral paresthesias for 1 week.  She was diagnosed 2 weeks ago with an anterior communicating artery cerebral aneurysm.  She denies any headache at this time.  She has had gradually worsening vision changes but no acute changes at this time.  Patient became very tearful on examination and showing tachypnea with obvious anxiety. I have low suspicion that she has an acute aneurysmal rupture.  Laboratory evaluation performed showed no grave electrolyte abnormalities.  I believe she what she is experiencing is likely anxiety reaction related.  She was given Xanax in the emergency department with improvement.  She has close follow-up with Neurosurgery in 1 day.  I believe she is appropriate for outpatient follow-up and further evaluation.  She will be given a short course of benzodiazepine therapy as she is rightfully anxious given this recent diagnosis and to prevent increase in blood pressure due to her anxiety. Patient is discharged improved in no acute distress.  Return precautions discussed.  Clinical Tests:   Lab Tests: Reviewed            Scribe Attestation:   Scribe #1: I performed the above scribed service and the documentation accurately describes the services I performed. I attest to the accuracy of the note.    I, Benjie Hannon, personally performed the services described in this documentation. All medical record entries made by the scribe were at my direction and in my presence.  I have reviewed the chart and agree that the record reflects my personal performance and is accurate and complete. Silverio Waldrop MD.  11:38 AM 06/13/2019       DISCLAIMER: This note was prepared with [employer] Direct voice recognition transcription software. Garbled syntax, mangled pronouns, and other bizarre constructions may be attributed to that software system.             Clinical Impression:       ICD-10-CM ICD-9-CM   1. Anxiety reaction F41.1 300.00         Disposition:   Disposition: Discharged  Condition:  Stable                        Silverio Waldrop MD  06/13/19 1139

## 2019-06-12 NOTE — TELEPHONE ENCOUNTER
Pt presented to clinic today with her mother for her 3:40pm appointment with JAEL Hollis, however the appointment was made in error for tomorrow. Pt c/o tingling sensation on her face, BUE and BLE. Pt also c/o weakness. She stated she recently Dx with aneurysm and seeing neurosurgeon tomorrow regarding this matter. I apologized to patient regarding appointment being made in error and advised to go directly to ER. Pt verbalized understanding and her mother will be the one driving her to ER.

## 2019-06-13 ENCOUNTER — INITIAL CONSULT (OUTPATIENT)
Dept: NEUROSURGERY | Facility: CLINIC | Age: 44
End: 2019-06-13
Payer: MEDICARE

## 2019-06-13 VITALS
DIASTOLIC BLOOD PRESSURE: 90 MMHG | HEIGHT: 67 IN | HEART RATE: 78 BPM | BODY MASS INDEX: 36.34 KG/M2 | SYSTOLIC BLOOD PRESSURE: 123 MMHG | WEIGHT: 231.5 LBS

## 2019-06-13 DIAGNOSIS — I67.1 ANEURYSM OF ANTERIOR COMMUNICATING ARTERY: Primary | ICD-10-CM

## 2019-06-13 PROCEDURE — 99999 PR PBB SHADOW E&M-EST. PATIENT-LVL III: CPT | Mod: PBBFAC,,, | Performed by: PHYSICIAN ASSISTANT

## 2019-06-13 PROCEDURE — 3008F PR BODY MASS INDEX (BMI) DOCUMENTED: ICD-10-PCS | Mod: S$GLB,,, | Performed by: PHYSICIAN ASSISTANT

## 2019-06-13 PROCEDURE — 99204 OFFICE O/P NEW MOD 45 MIN: CPT | Mod: S$GLB,,, | Performed by: PHYSICIAN ASSISTANT

## 2019-06-13 PROCEDURE — 99204 PR OFFICE/OUTPT VISIT, NEW, LEVL IV, 45-59 MIN: ICD-10-PCS | Mod: S$GLB,,, | Performed by: PHYSICIAN ASSISTANT

## 2019-06-13 PROCEDURE — 3008F BODY MASS INDEX DOCD: CPT | Mod: S$GLB,,, | Performed by: PHYSICIAN ASSISTANT

## 2019-06-13 PROCEDURE — 99999 PR PBB SHADOW E&M-EST. PATIENT-LVL III: ICD-10-PCS | Mod: PBBFAC,,, | Performed by: PHYSICIAN ASSISTANT

## 2019-06-13 NOTE — LETTER
June 13, 2019      Eugenie Leroy MD  [address] [facility] [address] [place] [address]           [facility]  [address] [facility] [address] [place] [address]  Phone: [phone]  Fax: [phone]          Patient: Carolina Andrews   MR Number: [id number]   YOB: 1975   Date of Visit: 6/13/2019       Dear Dr. Eugenie Leroy:    Thank you for referring Carolina Andrews to me for evaluation. Attached you will find relevant portions of my assessment and plan of care.    If you have questions, please do not hesitate to call me. I look forward to following Carolina Andrews along with you.    Sincerely,    Larisa Hernández PA-C    Enclosure  CC:  No Recipients    If you would like to receive this communication electronically, please contact [email or web address] or [phone] to request more information on [email or web address] Link access.    For providers and/or their staff who would like to refer a patient to [facility], please contact us through our one-stop-shop provider referral line, [facility] , at [phone].    If you feel you have received this communication in error or would no longer like to receive these types of communications, please e-mail [email or web address]

## 2019-06-13 NOTE — PROGRESS NOTES
Neurosurgery History & Physical    Patient ID: Carolina Andrews is a 44 y.o. female.    Chief Complaint   Patient presents with    Neurologic Problem     cerebral aneurysm       Review of Systems    Past Medical History:   Diagnosis Date    Arthritis     Blindness     Cholelithiasis     Liver hemangioma     Mental disorder     Periodic antidepressant use since childhood, not currently taking or endorsing depressive ideation    Pinched nerve      Social History     Socioeconomic History    Marital status:      Spouse name: Not on file    Number of children: Not on file    Years of education: Not on file    Highest education level: Not on file   Occupational History     Employer: [employer]   Social Needs    Financial resource strain: Not hard at all    Food insecurity:     Worry: Never true     Inability: Never true    Transportation needs:     Medical: No     Non-medical: No   Tobacco Use    Smoking status: Never Smoker    Smokeless tobacco: Never Used   Substance and Sexual Activity    Alcohol use: Yes     Alcohol/week: 3.6 oz     Types: 6 Glasses of wine per week     Frequency: 2-3 times a week     Drinks per session: 1 or 2     Binge frequency: Less than monthly     Comment: 2-3/ week wine or beer    Drug use: No    Sexual activity: Yes     Partners: Male     Birth control/protection: None   Lifestyle    Physical activity:     Days per week: 0 days     Minutes per session: Not on file    Stress: To some extent   Relationships    Social connections:     Talks on phone: More than three times a week     Gets together: Three times a week     Attends [employer] service: Not on file     Active member of club or organization: No     Attends meetings of clubs or organizations: Never     Relationship status:    Other Topics Concern    Not on file   Social History Narrative    Not on file     Family History   Problem Relation Age of Onset    Hypertension Father     Lung disease  "Father         calapse x 3     Stroke Father         in eye     Cancer Sister 23        polyps found incidentally    Vision loss Sister     Lung disease Sister         spontanious lung collapse    GI problems Sister     Retinal detachment Mother     Cancer Paternal Grandfather     Stroke Maternal Grandmother     Colon cancer Paternal Aunt     Colon polyps Maternal Uncle     No Known Problems Paternal Uncle     Parkinsonism Maternal Grandfather     Cancer Maternal Grandfather         prostate    Colon polyps Maternal Grandfather     Cancer Paternal Grandmother         skin cancer in colon     Alzheimer's disease Paternal Grandmother     Heart disease Paternal Aunt     Breast cancer Neg Hx     Diabetes Neg Hx     Miscarriages / Stillbirths Neg Hx     Crohn's disease Neg Hx     Ulcerative colitis Neg Hx     Glaucoma Neg Hx      Review of patient's allergies indicates:   Allergen Reactions    Pcn [penicillins]      rash       Current Outpatient Medications:     acetaminophen (TYLENOL) 500 MG tablet, Take 500 mg by mouth every 6 (six) hours as needed for Pain., Disp: , Rfl:     clonazePAM (KLONOPIN) 1 MG tablet, Take 1 tablet (1 mg total) by mouth 2 (two) times daily as needed for Anxiety., Disp: 15 tablet, Rfl: 0    gabapentin (NEURONTIN) 300 MG capsule, Take 2 capsules (600 mg total) by mouth 2 (two) times daily., Disp: 120 capsule, Rfl: 11    pantoprazole (PROTONIX) 20 MG tablet, Take 1 tablet (20 mg total) by mouth once daily., Disp: 90 tablet, Rfl: 3  No current facility-administered medications for this visit.     Vitals:    06/13/19 1014   BP: (!) 123/90   Pulse: 78   Weight: 105 kg (231 lb 7.7 oz)   Height: 5' 7" (1.702 m)       Physical Exam    Neurologic Exam      Provider dictation:  44 year old right handed female nonsmoker is referred by Dr. Leroy for cerebral aneurysm noted on imaging as she was evaluated for headaches, tinnitus, poor concentration, headaches.    She has a " history of daily headaches, particularly at the right eye and temporal area.  Headaches are associated with facial tingling in the nose, lips.  She has a history of a congenital cone dyfunction with vision disturbance.  Since age 41 her vision has significantly declines and she is legally blind at this time.   PHQ:  12.     On exam she has Cranial Nerves II-XII intact with full 5/5 strength and no sensory deficits.       I have reviewed MRI with and without contrast and MRA brain revealing 4 mm anterior communicating artery aneurysm.  Vascular loop of the right AICA.  No hemodynamically significant stenosis of the intracranial arterial vasculature.    Ms. Andrews has known ROLAND aneurysm and right AICA vascular loop.  Recommend further assessment with DSA angiogram with Dr. Salgado and will schedule for follow up with Dr. Lopez in clinic after imaging is complete for further recommendations.    Visit Diagnosis:  Aneurysm of anterior communicating artery        Total time spent counseling greater than fifty percent of total visit time.  Counseling included discussion regarding imaging findings, diagnosis possibilities, treatment options, risks and benefits.   The patient had many questions regarding the options and long-term effects.

## 2019-06-14 ENCOUNTER — OFFICE VISIT (OUTPATIENT)
Dept: FAMILY MEDICINE | Facility: CLINIC | Age: 44
End: 2019-06-14
Attending: FAMILY MEDICINE
Payer: MEDICARE

## 2019-06-14 ENCOUNTER — DOCUMENTATION ONLY (OUTPATIENT)
Dept: FAMILY MEDICINE | Facility: CLINIC | Age: 44
End: 2019-06-14

## 2019-06-14 VITALS
BODY MASS INDEX: 36.27 KG/M2 | HEART RATE: 83 BPM | DIASTOLIC BLOOD PRESSURE: 72 MMHG | HEIGHT: 67 IN | OXYGEN SATURATION: 97 % | TEMPERATURE: 98 F | RESPIRATION RATE: 16 BRPM | SYSTOLIC BLOOD PRESSURE: 114 MMHG | WEIGHT: 231.06 LBS

## 2019-06-14 DIAGNOSIS — H93.A9 TINNITUS OF VASCULAR ORIGIN: ICD-10-CM

## 2019-06-14 DIAGNOSIS — F43.22 ADJUSTMENT DISORDER WITH ANXIETY: Primary | ICD-10-CM

## 2019-06-14 DIAGNOSIS — I67.1 ANTERIOR COMMUNICATING ARTERY ANEURYSM: ICD-10-CM

## 2019-06-14 PROCEDURE — 3008F PR BODY MASS INDEX (BMI) DOCUMENTED: ICD-10-PCS | Mod: S$GLB,,, | Performed by: FAMILY MEDICINE

## 2019-06-14 PROCEDURE — 99214 OFFICE O/P EST MOD 30 MIN: CPT | Mod: S$GLB,,, | Performed by: FAMILY MEDICINE

## 2019-06-14 PROCEDURE — 3008F BODY MASS INDEX DOCD: CPT | Mod: S$GLB,,, | Performed by: FAMILY MEDICINE

## 2019-06-14 PROCEDURE — 99999 PR PBB SHADOW E&M-EST. PATIENT-LVL IV: ICD-10-PCS | Mod: PBBFAC,,, | Performed by: FAMILY MEDICINE

## 2019-06-14 PROCEDURE — 99999 PR PBB SHADOW E&M-EST. PATIENT-LVL IV: CPT | Mod: PBBFAC,,, | Performed by: FAMILY MEDICINE

## 2019-06-14 PROCEDURE — 99214 PR OFFICE/OUTPT VISIT, EST, LEVL IV, 30-39 MIN: ICD-10-PCS | Mod: S$GLB,,, | Performed by: FAMILY MEDICINE

## 2019-06-14 RX ORDER — MULTIVIT WITH MINERALS/HERBS
1 TABLET ORAL DAILY
COMMUNITY

## 2019-06-14 RX ORDER — LORAZEPAM 1 MG/1
1 TABLET ORAL EVERY 8 HOURS PRN
Qty: 21 TABLET | Refills: 0 | Status: SHIPPED | OUTPATIENT
Start: 2019-06-14 | End: 2019-06-14 | Stop reason: SDUPTHER

## 2019-06-14 RX ORDER — VIT C/E/ZN/COPPR/LUTEIN/ZEAXAN 250MG-90MG
1000 CAPSULE ORAL DAILY
COMMUNITY

## 2019-06-14 RX ORDER — LORAZEPAM 1 MG/1
1 TABLET ORAL EVERY 8 HOURS PRN
Qty: 21 TABLET | Refills: 0 | Status: ON HOLD | OUTPATIENT
Start: 2019-06-14 | End: 2019-08-06 | Stop reason: CLARIF

## 2019-06-14 NOTE — PROGRESS NOTES
Subjective:       Patient ID: Carolina Andrews is a 44 y.o. female.    Chief Complaint: Hospital Follow Up (ER Anxiety)    44-year-old female seen May 6, 2019 with complaints of several months duration of worsening memory and concentration problems, tremors, lightheadedness and dizziness but not true vertigo, and worsening tinnitus.  She had been having occipital headaches in the vicinity of the superior nuchal line and was using gabapentin on an as-needed basis to control the headaches.  Headaches had been increasing in frequency and her use of gabapentin had increased as well.  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  During evaluation and MRI showed evidence of abnormality of the right anterior inferior cerebellar artery which may have contributed to her tinnitus.  She was seen by Neurology who next performed an MRA which confirmed a right AICA loop but also found an anterior communicating artery aneurysm about 4 mm in diameter.  She was referred to neuro surgery by Neurology.  She was seen on June 13 by Larisa Hernández who recommended an angiogram with Dr. Salgado to be followed by a visit with Dr. Lopez for further recommendations.  The angiogram does not appear to have been scheduled as of yet.  Prior to seeing Larisa Hernández the patient developed generalized numbness and tingling and call for an appointment here.  She was told that the appointment was for the same day when in fact it had been scheduled the following day and there was no one to see her here.  Due to her known aneurysm and the atypical neurologic symptoms she was referred to the emergency room where she was diagnosed with anxiety and given clonazepam.  She presents today with intermittent symptoms of numbness and tingling around the mouth and in the extremities and complaining of difficulty dealing with  the stress.  She has a remote history of anxiety and depression and recalls being treated with citalopram at that time approximately 20 years ago.  She does not believe that she was on the medication very long and has had little difficulty in the interval.  She would prefer not to take a daily medication for anxiety but something only as needed.    Past Medical History:  No date: Arthritis  No date: Blindness  No date: Cholelithiasis  No date: Liver hemangioma  No date: Mental disorder      Comment:  Periodic antidepressant use since childhood, not                currently taking or endorsing depressive ideation  No date: Pinched nerve    Past Surgical History:  2009: ANKLE FRACTURE SURGERY  2/4/2019: COLONOSCOPY; N/A      Comment:  Performed by Dale [facility]ers MD at [facility] ENDO  11/19/2018: EGD (ESOPHAGOGASTRODUODENOSCOPY); N/A      Comment:  Performed by Dale Childers MD at [facility] ENDO  ~1998: UPPER GASTROINTESTINAL ENDOSCOPY      Comment:  normal findings per patient report    Current Outpatient Medications on File Prior to Visit:  acetaminophen (TYLENOL) 500 MG tablet, Take 500 mg by mouth every 6 (six) hours as needed for Pain., Disp: , Rfl:   b complex vitamins tablet, Take 1 tablet by mouth once daily., Disp: , Rfl:   cholecalciferol, vitamin D3, (VITAMIN D3) 1,000 unit capsule, Take 1,000 Units by mouth once daily., Disp: , Rfl:   clonazePAM (KLONOPIN) 1 MG tablet, Take 1 tablet (1 mg total) by mouth 2 (two) times daily as needed for Anxiety., Disp: 15 tablet, Rfl: 0  gabapentin (NEURONTIN) 300 MG capsule, Take 2 capsules (600 mg total) by mouth 2 (two) times daily., Disp: 120 capsule, Rfl: 11  pantoprazole (PROTONIX) 20 MG tablet, Take 1 tablet (20 mg total) by mouth once daily., Disp: 90 tablet, Rfl: 3    No current facility-administered medications on file prior to visit.         Review of Systems   Constitutional: Negative for activity change and unexpected weight change.   HENT: Negative for hearing  loss, rhinorrhea and trouble swallowing.    Eyes: Negative for discharge and visual disturbance.   Respiratory: Negative for chest tightness and wheezing.    Cardiovascular: Negative for chest pain and palpitations.   Gastrointestinal: Negative for blood in stool, constipation, diarrhea and vomiting.   Endocrine: Negative for polydipsia and polyuria.   Genitourinary: Negative for difficulty urinating, dysuria, hematuria and menstrual problem.   Musculoskeletal: Positive for neck pain. Negative for arthralgias and joint swelling.   Neurological: Positive for weakness, numbness (With tingling) and headaches.   Psychiatric/Behavioral: Positive for confusion and decreased concentration. Negative for dysphoric mood.       Objective:      Physical Exam   Constitutional: She is oriented to person, place, and time. She appears well-developed. No distress.   Good blood pressure  Severe obesity with a BMI of 36.2 she is up 1.2 lb from her earlier visit May 6, 2019 with me   Cardiovascular: Normal rate and regular rhythm.   Neurological: She is alert and oriented to person, place, and time.   Skin: She is not diaphoretic.   Psychiatric: Her behavior is normal. Judgment and thought content normal. Her mood appears anxious. Her affect is angry (Justifiable anger at delays and poor treatment). Her affect is not labile and not inappropriate. Her speech is rapid and/or pressured (But normalizes as the discussion/interview proceeds and she calms down somewhat.). Her speech is not delayed, not tangential and not slurred. She is not agitated, not aggressive, not hyperactive, not slowed, not withdrawn and not combative. Cognition and memory are normal. She does not exhibit a depressed mood. She is attentive.   Nursing note and vitals reviewed.      Assessment:       1. Adjustment disorder with anxiety    2. Anterior communicating artery aneurysm    3. Tinnitus of vascular origin        Plan:       1. Adjustment disorder with  anxiety  Discussed options including use of ache quicker acting benzodiazepine for p.r.n. anxiety flares.  Use of an SSRI such as citalopram, Lexapro, or Paxil daily for suppression of anxiety if needed or if use of benzodiazepine is too frequent.  Discussed availability of Dr. Eubanks if needed.  As above, she prefers to use an as needed medication and not a daily medication.  She thinks once she gets the angiogram and a plan of care established that she will not need daily medication or counseling but is aware they are available.  - LORazepam (ATIVAN) 1 MG tablet; Take 1 tablet (1 mg total) by mouth every 8 (eight) hours as needed for Anxiety.  Dispense: 21 tablet; Refill: 0    2. Anterior communicating artery aneurysm  Pending angiogram    3. Tinnitus of vascular origin  Pending angiogram-may be due to the anterior inferior cerebellar artery loop adjacent to the right ear.

## 2019-06-14 NOTE — PATIENT INSTRUCTIONS

## 2019-06-15 ENCOUNTER — PATIENT MESSAGE (OUTPATIENT)
Dept: FAMILY MEDICINE | Facility: CLINIC | Age: 44
End: 2019-06-15

## 2019-06-17 ENCOUNTER — PATIENT MESSAGE (OUTPATIENT)
Dept: FAMILY MEDICINE | Facility: CLINIC | Age: 44
End: 2019-06-17

## 2019-06-17 DIAGNOSIS — I67.1 ANEURYSM OF ANTERIOR COMMUNICATING ARTERY: Primary | ICD-10-CM

## 2019-06-18 ENCOUNTER — TELEPHONE (OUTPATIENT)
Dept: FAMILY MEDICINE | Facility: CLINIC | Age: 44
End: 2019-06-18

## 2019-06-18 NOTE — TELEPHONE ENCOUNTER
----- Message from Larisa Hernández PA-C sent at 6/17/2019  1:03 PM CDT -----  Can you please see Dr. Crump's request below and let him know when she will be scheduled.    Thanks -   Larisa Hernández    ----- Message -----  From: Jeni Knott LPN  Sent: 6/17/2019  12:51 PM  To: IRINEO Mckinney Dr. is asking when patient will be scheduled for cerebral angiogram?

## 2019-06-19 DIAGNOSIS — I67.1 ANEURYSM OF ANTERIOR COMMUNICATING ARTERY: Primary | ICD-10-CM

## 2019-06-20 ENCOUNTER — TELEPHONE (OUTPATIENT)
Dept: FAMILY MEDICINE | Facility: CLINIC | Age: 44
End: 2019-06-20

## 2019-06-20 NOTE — TELEPHONE ENCOUNTER
----- Message from Larisa Hernández PA-C sent at 6/20/2019 11:37 AM CDT -----  Angiogram is scheduled for 7/8/19 with clinic follow up with Dr. Lopez 7/11/19.    ----- Message -----  From: Jeni Knott LPN  Sent: 6/17/2019  12:51 PM  To: IRINEO Mckinney Dr. is asking when patient will be scheduled for cerebral angiogram?

## 2019-07-02 ENCOUNTER — OFFICE VISIT (OUTPATIENT)
Dept: INTERVENTIONAL RADIOLOGY/VASCULAR | Facility: CLINIC | Age: 44
End: 2019-07-02
Payer: MEDICARE

## 2019-07-02 VITALS
HEIGHT: 67 IN | BODY MASS INDEX: 36.09 KG/M2 | SYSTOLIC BLOOD PRESSURE: 119 MMHG | DIASTOLIC BLOOD PRESSURE: 84 MMHG | HEART RATE: 88 BPM | WEIGHT: 229.94 LBS

## 2019-07-02 DIAGNOSIS — I72.9 ANEURYSM: ICD-10-CM

## 2019-07-02 DIAGNOSIS — I99.9 VASCULAR ABNORMALITY: Primary | ICD-10-CM

## 2019-07-02 PROCEDURE — 3008F BODY MASS INDEX DOCD: CPT | Mod: S$GLB,,, | Performed by: NURSE PRACTITIONER

## 2019-07-02 PROCEDURE — 99999 PR PBB SHADOW E&M-EST. PATIENT-LVL III: CPT | Mod: PBBFAC,,,

## 2019-07-02 PROCEDURE — 99204 OFFICE O/P NEW MOD 45 MIN: CPT | Mod: S$GLB,,, | Performed by: NURSE PRACTITIONER

## 2019-07-02 PROCEDURE — 99999 PR PBB SHADOW E&M-EST. PATIENT-LVL III: ICD-10-PCS | Mod: PBBFAC,,,

## 2019-07-02 PROCEDURE — 3008F PR BODY MASS INDEX (BMI) DOCUMENTED: ICD-10-PCS | Mod: S$GLB,,, | Performed by: NURSE PRACTITIONER

## 2019-07-02 PROCEDURE — 99204 PR OFFICE/OUTPT VISIT, NEW, LEVL IV, 45-59 MIN: ICD-10-PCS | Mod: S$GLB,,, | Performed by: NURSE PRACTITIONER

## 2019-07-02 NOTE — PROGRESS NOTES
Subjective:       Patient ID: Carolina Andrews is a 44 y.o. female.    Chief Complaint: Lesion    45 yo F who has a past medical history of Arthritis, Blindness, Cholelithiasis, Liver hemangioma, Mental disorder, and Pinched nerve. She endorses she had a WHOL in Nov 2017, and she experienced a syncopal episode at that time, with associated nausea. Presented to ED a few weeks ago with tingling in her face and was worked up with non-invasive imaging of brain. Further evaluation using MRA revealed vascular loop of the right AICA and anterior communicating artery aneurysm. She denies HA, dizziness, numbness, weakness, or other neurological deficits today.      Past Medical History:   Diagnosis Date    Arthritis     Blindness     Cholelithiasis     Liver hemangioma     Mental disorder     Periodic antidepressant use since childhood, not currently taking or endorsing depressive ideation    Pinched nerve      Past Surgical History:   Procedure Laterality Date    ANKLE FRACTURE SURGERY  2009    COLONOSCOPY N/A 2/4/2019    [facility]formed [facility]    EGD (ESOPHAGOGASTRODUODENOSCOPY) N/A 11/19/2018    [facility] at [facility] END[facility]    UPPER GASTROINTESTINAL ENDOSCOPY  ~1998    normal findings per patient report     Social History     Socioeconomic History    Marital status:      Spouse name: Not on file    Number of children: Not on file    Years of education: Not on file    Highest education level: Not on file   Occupational History     Employer: [employer]   Social Needs    Financial resource strain: Not hard at all    Food insecurity:     Worry: Never true     Inability: Never true    Transportation needs:     Medical: No     Non-medical: No   Tobacco Use    Smoking status: Never Smoker    Smokeless tobacco: Never Used   Substance and Sexual Activity    Alcohol use: Yes     Alcohol/week: 3.6 oz     Types: 6 Glasses of wine per week     Frequency: 2-3 times  a week     Drinks per session: 1 or 2     Binge frequency: Less than monthly     Comment: 2-3/ week wine or beer    Drug use: No    Sexual activity: Yes     Partners: Male     Birth control/protection: None   Lifestyle    Physical activity:     Days per week: 0 days     Minutes per session: Not on file    Stress: To some extent   Relationships    Social connections:     Talks on phone: More than three times a week     Gets together: Three times a week     Attends [employer] service: Not on file     Active member of club or organization: No     Attends meetings of clubs or organizations: Never     Relationship status:    Other Topics Concern    Not on file   Social History Narrative    Not on file     Family History   Problem Relation Age of Onset    Hypertension Father     Lung disease Father         calapse x 3     Stroke Father         in eye     Retinal detachment Father     Cancer Sister 23        polyps found incidentally    Vision loss Sister     Lung disease Sister         spontanious lung collapse    GI problems Sister     Retinal detachment Mother     Cancer Paternal Grandfather     Stroke Maternal Grandmother     Colon cancer Paternal Aunt     Colon polyps Maternal Uncle     No Known Problems Paternal Uncle     Parkinsonism Maternal Grandfather     Cancer Maternal Grandfather         prostate    Colon polyps Maternal Grandfather     Cancer Paternal Grandmother         skin cancer in colon     Alzheimer's disease Paternal Grandmother     Heart disease Paternal Aunt     Breast cancer Neg Hx     Diabetes Neg Hx     Miscarriages / Stillbirths Neg Hx     Crohn's disease Neg Hx     Ulcerative colitis Neg Hx     Glaucoma Neg Hx        Current Outpatient Medications:     acetaminophen (TYLENOL) 500 MG tablet, Take 500 mg by mouth every 6 (six) hours as needed for Pain., Disp: , Rfl:     b complex vitamins tablet, Take 1 tablet by mouth once daily., Disp: , Rfl:      "cholecalciferol, vitamin D3, (VITAMIN D3) 1,000 unit capsule, Take 1,000 Units by mouth once daily., Disp: , Rfl:     gabapentin (NEURONTIN) 300 MG capsule, Take 2 capsules (600 mg total) by mouth 2 (two) times daily., Disp: 120 capsule, Rfl: 11    LORazepam (ATIVAN) 1 MG tablet, Take 1 tablet (1 mg total) by mouth every 8 (eight) hours as needed for Anxiety., Disp: 21 tablet, Rfl: 0    pantoprazole (PROTONIX) 20 MG tablet, Take 1 tablet (20 mg total) by mouth once daily., Disp: 90 tablet, Rfl: 3    clonazePAM (KLONOPIN) 1 MG tablet, Take 1 tablet (1 mg total) by mouth 2 (two) times daily as needed for Anxiety., Disp: 15 tablet, Rfl: 0  Social History     Tobacco Use   Smoking Status Never Smoker   Smokeless Tobacco Never Used       Review of Systems   Constitutional: Negative for activity change.   HENT: Positive for tinnitus. Negative for ear discharge, ear pain, facial swelling, nosebleeds, postnasal drip and sinus pain.    Eyes: Positive for visual disturbance (decreased visual accuity in bilateral eyes: deemed legally blind). Negative for discharge.   Respiratory: Negative for cough and wheezing.    Cardiovascular: Negative for chest pain and leg swelling.   Gastrointestinal: Negative for abdominal distention and abdominal pain.   Endocrine: Negative for cold intolerance and heat intolerance.   Genitourinary: Negative for difficulty urinating and dyspareunia.   Musculoskeletal: Negative for arthralgias, back pain and gait problem.   Neurological: Negative for dizziness, tremors, seizures, syncope, facial asymmetry, speech difficulty, weakness, light-headedness, numbness and headaches.   Hematological: Negative for adenopathy. Does not bruise/bleed easily.   Psychiatric/Behavioral: Negative for agitation and behavioral problems.       Objective:      Vitals:    07/02/19 1346   BP: 119/84   Pulse: 88   Weight: 104.3 kg (229 lb 15 oz)   Height: 5' 7" (1.702 m)     Physical Exam   Constitutional: She is " oriented to person, place, and time. She appears well-developed and well-nourished. No distress.   HENT:   Head: Normocephalic and atraumatic.   Right Ear: External ear normal.   Left Ear: External ear normal.   Nose: Nose normal.   Mouth/Throat: Oropharynx is clear and moist.   Eyes: Pupils are equal, round, and reactive to light. Conjunctivae and EOM are normal. Right eye exhibits no discharge. Left eye exhibits no discharge.   Decreased visual acuity in bilateral eyes   Neck: Normal range of motion.   Cardiovascular: Normal rate and regular rhythm.   Pulmonary/Chest: Effort normal and breath sounds normal.   Abdominal: Soft. She exhibits no distension. There is no tenderness.   Musculoskeletal: Normal range of motion.   Neurological: She is alert and oriented to person, place, and time. No cranial nerve deficit or sensory deficit. She exhibits normal muscle tone. Coordination normal.   Skin: Skin is warm and dry. Capillary refill takes less than 2 seconds. No rash noted. She is not diaphoretic. No erythema. No pallor.   Psychiatric: She has a normal mood and affect. Her behavior is normal. Thought content normal.   Vitals reviewed.    Physical Exam:  Vitals reviewed.    Constitutional: She appears well-developed and well-nourished. She is not diaphoretic. No distress.     Eyes: Pupils are equal, round, and reactive to light. Conjunctivae and EOM are normal. Right eye exhibits no discharge. Left eye exhibits no discharge.   Decreased visual acuity in bilateral eyes     Cardiovascular: Normal rate and regular rhythm.     Abdominal: Soft.     Psych/Behavior: She is alert. She is oriented to person, place, and time. She has a normal mood and affect.         Assessment:   Review of Imaging:  Impression       1. 4 mm anterior communicating artery aneurysm.  Recommend cerebral angiogram or neuro surgical consultation for further characterization.    2. No hemodynamically significant stenosis of the intracranial arterial  vasculature.  3. Vascular loop of the right AICA as seen on comparison exam.  Finding is of uncertain clinical significance.      Electronically signed by: Maximo Pruitt  Date: 05/27/2019  Time: 12:45       Diagnoses:      1. Vascular abnormality    2. Aneurysm        Plan:   45 yo female with a known right AICA vascular loop and acom aneurysm presenting to clinic for further evaluation and management. Dr. Salgado reviewed imaging with the patient and showed her the relevant anatomy and findings. We discussed performing a cerebral angiogram to monitor evaluate vascular lesions and obtain measurements to further delineate management/treatment  We discussed how the procedure will be performed, risk/benefits, possible complications, and pre-post procedure expectations.The patient was amenable and would like to have the procedure on with Dr. Salgado. We reviewed signs and symptoms of stroke with the patient and when to seek emergency medical care. The patient vocalized understanding. They were given the opportunity to ask questions, and there were answered. Encouraged the patient follow up with their PCP and Dr. Lopez    I provided NP's card and clinic phone number and encouraged to call with any new questions/concerns. Overall she seemed satisfied with her care. I spent 45 minutes performing a direct face-to face visit with Ms. Corley and greater than 50% of that time was spent counseling/coordinating care. She verbalized understanding of all of the above. Was given appropriate time ask questions, and these were answered. She was encouraged to call office with any new questions, comments, or concerns.    This case was discussed with Dr. Salgado. MD approved management/plan for this patient.

## 2019-07-03 DIAGNOSIS — I67.1 ANEURYSM OF ANTERIOR COMMUNICATING ARTERY: Primary | ICD-10-CM

## 2019-07-12 ENCOUNTER — TELEPHONE (OUTPATIENT)
Dept: INTERVENTIONAL RADIOLOGY/VASCULAR | Facility: HOSPITAL | Age: 44
End: 2019-07-12

## 2019-07-12 DIAGNOSIS — I99.9 VASCULAR ABNORMALITY: ICD-10-CM

## 2019-07-12 DIAGNOSIS — I67.1 ANEURYSM OF ANTERIOR COMMUNICATING ARTERY: Primary | ICD-10-CM

## 2019-07-12 RX ORDER — MIDAZOLAM HYDROCHLORIDE 1 MG/ML
1 INJECTION INTRAMUSCULAR; INTRAVENOUS
Status: CANCELLED | OUTPATIENT
Start: 2019-07-12

## 2019-07-12 RX ORDER — FENTANYL CITRATE 50 UG/ML
50 INJECTION, SOLUTION INTRAMUSCULAR; INTRAVENOUS
Status: CANCELLED | OUTPATIENT
Start: 2019-07-12

## 2019-07-15 ENCOUNTER — HOSPITAL ENCOUNTER (OUTPATIENT)
Facility: HOSPITAL | Age: 44
Discharge: HOME | End: 2019-07-15
Attending: RADIOLOGY | Admitting: RADIOLOGY
Payer: MEDICARE

## 2019-07-15 VITALS
TEMPERATURE: 98 F | OXYGEN SATURATION: 96 % | HEART RATE: 58 BPM | RESPIRATION RATE: 16 BRPM | BODY MASS INDEX: 36.1 KG/M2 | WEIGHT: 230 LBS | HEIGHT: 67 IN | SYSTOLIC BLOOD PRESSURE: 109 MMHG | DIASTOLIC BLOOD PRESSURE: 72 MMHG

## 2019-07-15 DIAGNOSIS — I67.1 ANEURYSM OF ANTERIOR COMMUNICATING ARTERY: ICD-10-CM

## 2019-07-15 DIAGNOSIS — I67.9 CEREBROVASCULAR LESION: ICD-10-CM

## 2019-07-15 LAB
B-HCG UR QL: NEGATIVE
CTP QC/QA: YES

## 2019-07-15 PROCEDURE — 25000003 PHARM REV CODE 250: Performed by: NURSE PRACTITIONER

## 2019-07-15 PROCEDURE — 63600175 PHARM REV CODE 636 W HCPCS: Performed by: RADIOLOGY

## 2019-07-15 PROCEDURE — 25000003 PHARM REV CODE 250: Performed by: RADIOLOGY

## 2019-07-15 RX ORDER — ACETAMINOPHEN 325 MG/1
650 TABLET ORAL EVERY 6 HOURS PRN
Status: DISCONTINUED | OUTPATIENT
Start: 2019-07-15 | End: 2019-07-15 | Stop reason: HOSPADM

## 2019-07-15 RX ORDER — FENTANYL CITRATE 50 UG/ML
INJECTION, SOLUTION INTRAMUSCULAR; INTRAVENOUS CODE/TRAUMA/SEDATION MEDICATION
Status: COMPLETED | OUTPATIENT
Start: 2019-07-15 | End: 2019-07-15

## 2019-07-15 RX ORDER — LIDOCAINE HYDROCHLORIDE 10 MG/ML
1 INJECTION, SOLUTION EPIDURAL; INFILTRATION; INTRACAUDAL; PERINEURAL ONCE
Status: COMPLETED | OUTPATIENT
Start: 2019-07-15 | End: 2019-07-15

## 2019-07-15 RX ORDER — SODIUM CHLORIDE 0.9 % (FLUSH) 0.9 %
10 SYRINGE (ML) INJECTION
Status: DISCONTINUED | OUTPATIENT
Start: 2019-07-15 | End: 2019-07-15 | Stop reason: HOSPADM

## 2019-07-15 RX ORDER — SODIUM CHLORIDE 9 MG/ML
INJECTION, SOLUTION INTRAVENOUS CONTINUOUS
Status: DISCONTINUED | OUTPATIENT
Start: 2019-07-15 | End: 2019-07-15 | Stop reason: HOSPADM

## 2019-07-15 RX ORDER — MIDAZOLAM HYDROCHLORIDE 1 MG/ML
INJECTION INTRAMUSCULAR; INTRAVENOUS CODE/TRAUMA/SEDATION MEDICATION
Status: COMPLETED | OUTPATIENT
Start: 2019-07-15 | End: 2019-07-15

## 2019-07-15 RX ADMIN — ACETAMINOPHEN 650 MG: 325 TABLET ORAL at 06:07

## 2019-07-15 RX ADMIN — SODIUM CHLORIDE: 0.9 INJECTION, SOLUTION INTRAVENOUS at 02:07

## 2019-07-15 RX ADMIN — MIDAZOLAM HYDROCHLORIDE 2 MG: 1 INJECTION, SOLUTION INTRAMUSCULAR; INTRAVENOUS at 04:07

## 2019-07-15 RX ADMIN — LIDOCAINE HYDROCHLORIDE 1 MG: 10 INJECTION, SOLUTION EPIDURAL; INFILTRATION; INTRACAUDAL; PERINEURAL at 02:07

## 2019-07-15 RX ADMIN — FENTANYL CITRATE 50 MCG: 50 INJECTION, SOLUTION INTRAMUSCULAR; INTRAVENOUS at 04:07

## 2019-07-15 NOTE — H&P
Radiology History & Physical      SUBJECTIVE:     Chief Complaint: aneurysm and vascular loop    History of Present Illness:  Carolina Andrews is a 45 yo F who has a past medical history of Arthritis, Blindness, Cholelithiasis, Liver hemangioma, Mental disorder, and Pinched nerve. She endorses she had a WHOL in Nov 2017, and she experienced a syncopal episode at that time, with associated nausea. Presented to ED a few weeks ago with tingling in her face and was worked up with non-invasive imaging of brain. Further evaluation using MRA revealed vascular loop of the right AICA and anterior communicating artery aneurysm. She denies HA, dizziness, numbness, weakness, or other neurological deficits today.    Past Medical History:   Diagnosis Date    Arthritis     Blindness     Cholelithiasis     Liver hemangioma     Mental disorder     Periodic antidepressant use since childhood, not currently taking or endorsing depressive ideation    Pinched nerve      Past Surgical History:   Procedure Laterality Date    ANKLE FRACTURE SURGERY  2009    COLONOSCOPY N/A 2/4/2019    [facility]    EGD (ESOPHAGOGASTRODUODENOSCOPY) N/A 11/19/2018    [facility] by [facility] at [facility] ENDO    UPPER GASTROINTESTINAL ENDOSCOPY  ~1998    normal findings per patient report       Home Meds:   Prior to Admission medications    Medication Sig Start Date End Date Taking? Authorizing Provider   acetaminophen (TYLENOL) 500 MG tablet Take 500 mg by mouth every 6 (six) hours as needed for Pain.   Yes Historical Provider, MD   b complex vitamins tablet Take 1 tablet by mouth once daily.   Yes Historical Provider, MD   cholecalciferol, vitamin D3, (VITAMIN D3) 1,000 unit capsule Take 1,000 Units by mouth once daily.   Yes Historical Provider, MD   LORazepam (ATIVAN) 1 MG tablet Take 1 tablet (1 mg total) by mouth every 8 (eight) hours as needed for Anxiety. 6/14/19 7/15/19 Yes Desean Crump MD    gabapentin (NEURONTIN) 300 MG capsule Take 2 capsules (600 mg total) by mouth 2 (two) times daily. 5/16/19   Eugenie Leroy MD   pantoprazole (PROTONIX) 20 MG tablet Take 1 tablet (20 mg total) by mouth once daily. 5/6/19 5/5/20  Desean Crump MD   clonazePAM (KLONOPIN) 1 MG tablet Take 1 tablet (1 mg total) by mouth 2 (two) times daily as needed for Anxiety. 6/12/19 7/15/19  Silverio Waldrop MD     Anticoagulants/Antiplatelets: no anticoagulation    Allergies:   Review of patient's allergies indicates:   Allergen Reactions    Pcn [penicillins]      rash     Sedation History:  no adverse reactions    Review of Systems:   Hematological: no known coagulopathies  Respiratory: no shortness of breath  Cardiovascular: no chest pain  Gastrointestinal: no abdominal pain  Genito-Urinary: no dysuria  Musculoskeletal: negative  Neurological: no TIA or stroke symptoms         OBJECTIVE:     Vital Signs (Most Recent)  Temp: 98.4 °F (36.9 °C) (07/15/19 1400)  Pulse: 87 (07/15/19 1400)  Resp: 20 (07/15/19 1400)  BP: (!) 144/88 (07/15/19 1400)  SpO2: 98 % (07/15/19 1400)    Physical Exam:  ASA: 2  Mallampati: 2+    General: no acute distress  Mental Status: alert and oriented to person, place and time  HEENT: normocephalic, atraumatic  Chest: unlabored breathing  Heart: regular heart rate  Abdomen: nondistended  Extremity: moves all extremities  Neurologically intact without gross deficits  CN II-XII intact; STEVENS; SILT; facial symmetric; sen station intact    ASSESSMENT/PLAN:     Sedation Plan: moderate sedation  Patient will undergo cerebral angiogram.    GERRI Goel, FNP  Interventional Radiology  [phone] clinic

## 2019-07-15 NOTE — PROCEDURES
Radiology Post-Procedure Note    Pre Op Diagnosis: anterior communicating artery aneurysm    Post Op Diagnosis: same    Procedure: Cerebral angiogram    Procedure performed by: Vladimir Salgado MD    Written Informed Consent Obtained: Yes    Specimen Removed: NO    Estimated Blood Loss: Minimal    Procedure report:     A 6F sheath was placed into the right femoral artery and a 5F Fredy catheter was advanced into the aortic arch.  The left and right common carotid artery and left vertebral arteries were subselected and angiography of the brain was performed after injection into each of these vessels.    Preliminary interpretation: 3mm x 4mm anterior communicating artery aneurysm.  Please see Imaging report for full details.    A right femoral artery angiogram was performed, the sheath removed and hemostasis achieved using exoseal.  No hematoma was present at the time of hemostasis.    The patient tolerated the procedure well.   Patient remained neurologically intact and at baseline

## 2019-07-15 NOTE — PROGRESS NOTES
Procedure complete. Pt tolerated well. R groin site closed w/ 5fr exoseal. Hemostasis at 1730. Hob flat x2 hours ends at 1930 No s/s bleeding or hematoma at site..pt to recover in rocu, accompanied per Ir nurses. Bedside report to be given.

## 2019-07-15 NOTE — DISCHARGE SUMMARY
Radiology Discharge Summary      Hospital Course: No complications    Admit Date: 7/15/2019  Discharge Date: 07/15/2019     Instructions Given to Patient: Yes  Diet: Resume prior diet  Activity: no lifting, Driving, or Strenuous exercise for 7 days    Description of Condition on Discharge: Stable  Vital Signs (Most Recent): Temp: 98.4 °F (36.9 °C) (07/15/19 1400)  Pulse: (!) 51 (07/15/19 1737)  Resp: 18 (07/15/19 1737)  BP: 121/70 (07/15/19 1737)  SpO2: 98 % (07/15/19 1737)    Discharge Disposition: Home    Discharge Diagnosis: anterior communicating artery aneurysm/ Follow up in NeuroIR clinic in 1-2 weeks.

## 2019-07-16 ENCOUNTER — OFFICE VISIT (OUTPATIENT)
Dept: INTERVENTIONAL RADIOLOGY/VASCULAR | Facility: CLINIC | Age: 44
End: 2019-07-16
Payer: MEDICARE

## 2019-07-16 VITALS
DIASTOLIC BLOOD PRESSURE: 82 MMHG | SYSTOLIC BLOOD PRESSURE: 121 MMHG | BODY MASS INDEX: 35.91 KG/M2 | HEIGHT: 67 IN | WEIGHT: 228.81 LBS | HEART RATE: 94 BPM

## 2019-07-16 DIAGNOSIS — I67.1 ANEURYSM OF ANTERIOR COMMUNICATING ARTERY: ICD-10-CM

## 2019-07-16 DIAGNOSIS — I99.9 VASCULAR ABNORMALITY: Primary | ICD-10-CM

## 2019-07-16 PROCEDURE — 99999 PR PBB SHADOW E&M-EST. PATIENT-LVL III: CPT | Mod: PBBFAC,,,

## 2019-07-16 PROCEDURE — 3008F PR BODY MASS INDEX (BMI) DOCUMENTED: ICD-10-PCS | Mod: S$GLB,,, | Performed by: RADIOLOGY

## 2019-07-16 PROCEDURE — 99215 OFFICE O/P EST HI 40 MIN: CPT | Mod: S$GLB,,, | Performed by: RADIOLOGY

## 2019-07-16 PROCEDURE — 99215 PR OFFICE/OUTPT VISIT, EST, LEVL V, 40-54 MIN: ICD-10-PCS | Mod: S$GLB,,, | Performed by: RADIOLOGY

## 2019-07-16 PROCEDURE — 99999 PR PBB SHADOW E&M-EST. PATIENT-LVL III: ICD-10-PCS | Mod: PBBFAC,,,

## 2019-07-16 PROCEDURE — 3008F BODY MASS INDEX DOCD: CPT | Mod: S$GLB,,, | Performed by: RADIOLOGY

## 2019-07-16 NOTE — PROGRESS NOTES
Recovery complete. Pt tolerated well. Right groin site clean, dry, intact, no bleeding, no hematoma. Pain states headache is much better, 2/10. HOB increased at 1930 as per order. Pt tolerated well. Pt and  given site care and discharge instructions. Both verbalized understanding. Pt dressed and ambulated independently. Pt to discharge home in care of .

## 2019-07-16 NOTE — DISCHARGE INSTRUCTIONS
Please call with any questions or concerns.      Monday thru Friday 8:00 am - 4:30 pm    Interventional Radiology   [phone]    After Hours    Ask for the Radiology Intern on call  [phone]

## 2019-07-17 ENCOUNTER — PATIENT MESSAGE (OUTPATIENT)
Dept: FAMILY MEDICINE | Facility: CLINIC | Age: 44
End: 2019-07-17

## 2019-07-17 DIAGNOSIS — I67.1 ANEURYSM OF ANTERIOR COMMUNICATING ARTERY: ICD-10-CM

## 2019-07-17 DIAGNOSIS — F41.9 ANXIETY: Primary | ICD-10-CM

## 2019-07-17 DIAGNOSIS — I99.9 VASCULAR ABNORMALITY: Primary | ICD-10-CM

## 2019-07-17 RX ORDER — ASPIRIN 81 MG/1
81 TABLET ORAL DAILY
Qty: 30 TABLET | Refills: 11 | COMMUNITY
Start: 2019-07-17 | End: 2023-11-20

## 2019-07-17 RX ORDER — CLOPIDOGREL BISULFATE 75 MG/1
75 TABLET ORAL DAILY
Qty: 30 TABLET | Refills: 5 | Status: SHIPPED | OUTPATIENT
Start: 2019-07-17 | End: 2020-02-10

## 2019-07-17 NOTE — PROGRESS NOTES
Subjective:       Patient ID: Carolina Andrews is a 44 y.o. female.    Chief Complaint: Follow-up    45 yo F who has a past medical history of Arthritis, Blindness, Cholelithiasis, Liver hemangioma, Mental disorder, and Pinched nerve. She endorses she had a WHOL in Nov 2017, and she experienced a syncopal episode at that time, with associated nausea. Presented to ED a few weeks ago with tingling in her face and was worked up with non-invasive imaging of brain. Further evaluation using MRA revealed vascular loop of the right AICA and anterior communicating artery aneurysm. She underwent cerebral angiogram on 7/15 with Dr. Salgado and is here to follow up on results. She denies HA, dizziness, numbness, weakness, or other neurological deficits today.      Past Medical History:   Diagnosis Date    Arthritis     Blindness     Cholelithiasis     Liver hemangioma     Mental disorder     Periodic antidepressant use since childhood, not currently taking or endorsing depressive ideation    Pinched nerve      Past Surgical History:   Procedure Laterality Date    ANGIOGRAM-CEREBRAL N/A 7/15/2019    Performed by [facility] at [facility] OR [address] [place] [address]    ANKLE FRACTURE SURGERY  2009    COLONOSCOPY N/A 2/4/2019    Performed by [facility] at [facility]    EGD (ESOPHAGOGASTRODUODENOSCOPY) N/A 11/19/2018    Performed by [facility] at [facility]    UPPER GASTROINTESTINAL ENDOSCOPY  ~1998    normal findings per patient report     Social History     Socioeconomic History    Marital status:      Spouse name: Not on file    Number of children: Not on file    Years of education: Not on file    Highest education level: Not on file   Occupational History     Employer: [employer]   Social Needs    Financial resource strain: Not hard at all    Food insecurity:     Worry: Never true     Inability: Never true    Transportation needs:     Medical: No     Non-medical: No   Tobacco Use    Smoking  status: Never Smoker    Smokeless tobacco: Never Used   Substance and Sexual Activity    Alcohol use: Yes     Alcohol/week: 3.6 oz     Types: 6 Glasses of wine per week     Frequency: 2-3 times a week     Drinks per session: 1 or 2     Binge frequency: Less than monthly     Comment: 2-3/ week wine or beer    Drug use: No    Sexual activity: Yes     Partners: Male     Birth control/protection: None   Lifestyle    Physical activity:     Days per week: 0 days     Minutes per session: Not on file    Stress: To some extent   Relationships    Social connections:     Talks on phone: More than three times a week     Gets together: Three times a week     Attends Islam service: Not on file     Active member of club or organization: No     Attends meetings of clubs or organizations: Never     Relationship status:    Other Topics Concern    Not on file   Social History Narrative    Not on file     Family History   Problem Relation Age of Onset    Hypertension Father     Lung disease Father         calapse x 3     Stroke Father         in eye     Retinal detachment Father     Cancer Sister 23        polyps found incidentally    Vision loss Sister     Lung disease Sister         spontanious lung collapse    GI problems Sister     Retinal detachment Mother     Cancer Paternal Grandfather     Stroke Maternal Grandmother     Colon cancer Paternal Aunt     Colon polyps Maternal Uncle     No Known Problems Paternal Uncle     Parkinsonism Maternal Grandfather     Cancer Maternal Grandfather         prostate    Colon polyps Maternal Grandfather     Cancer Paternal Grandmother         skin cancer in colon     Alzheimer's disease Paternal Grandmother     Heart disease Paternal Aunt     Breast cancer Neg Hx     Diabetes Neg Hx     Miscarriages / Stillbirths Neg Hx     Crohn's disease Neg Hx     Ulcerative colitis Neg Hx     Glaucoma Neg Hx        Current Outpatient Medications:      acetaminophen (TYLENOL) 500 MG tablet, Take 500 mg by mouth every 6 (six) hours as needed for Pain., Disp: , Rfl:     aspirin (ECOTRIN) 81 MG EC tablet, Take 1 tablet (81 mg total) by mouth once daily., Disp: 30 tablet, Rfl: 11    b complex vitamins tablet, Take 1 tablet by mouth once daily., Disp: , Rfl:     cholecalciferol, vitamin D3, (VITAMIN D3) 1,000 unit capsule, Take 1,000 Units by mouth once daily., Disp: , Rfl:     clopidogrel (PLAVIX) 75 mg tablet, Take 1 tablet (75 mg total) by mouth once daily., Disp: 30 tablet, Rfl: 5    gabapentin (NEURONTIN) 300 MG capsule, Take 2 capsules (600 mg total) by mouth 2 (two) times daily., Disp: 120 capsule, Rfl: 11    LORazepam (ATIVAN) 1 MG tablet, Take 1 tablet (1 mg total) by mouth every 8 (eight) hours as needed for Anxiety., Disp: 21 tablet, Rfl: 0    pantoprazole (PROTONIX) 20 MG tablet, Take 1 tablet (20 mg total) by mouth once daily., Disp: 90 tablet, Rfl: 3  Social History     Tobacco Use   Smoking Status Never Smoker   Smokeless Tobacco Never Used       Review of Systems   Constitutional: Negative for activity change.   HENT: Positive for tinnitus. Negative for ear discharge, ear pain, facial swelling, nosebleeds, postnasal drip and sinus pain.    Eyes: Positive for visual disturbance (decreased visual accuity in bilateral eyes: deemed legally blind). Negative for discharge.   Respiratory: Negative for cough and wheezing.    Cardiovascular: Negative for chest pain and leg swelling.   Gastrointestinal: Negative for abdominal distention and abdominal pain.   Endocrine: Negative for cold intolerance and heat intolerance.   Genitourinary: Negative for difficulty urinating and dyspareunia.   Musculoskeletal: Negative for arthralgias, back pain and gait problem.   Neurological: Negative for dizziness, tremors, seizures, syncope, facial asymmetry, speech difficulty, weakness, light-headedness, numbness and headaches.   Hematological: Negative for adenopathy.  "Does not bruise/bleed easily.   Psychiatric/Behavioral: Negative for agitation and behavioral problems.       Objective:      Vitals:    07/16/19 1033   BP: 121/82   Pulse: 94   Weight: 103.8 kg (228 lb 13.4 oz)   Height: 5' 7" (1.702 m)     Physical Exam   Constitutional: She is oriented to person, place, and time. She appears well-developed and well-nourished. No distress.   HENT:   Head: Normocephalic and atraumatic.   Right Ear: External ear normal.   Left Ear: External ear normal.   Nose: Nose normal.   Mouth/Throat: Oropharynx is clear and moist.   Eyes: Pupils are equal, round, and reactive to light. Conjunctivae and EOM are normal. Right eye exhibits no discharge. Left eye exhibits no discharge.   Decreased visual acuity in bilateral eyes   Neck: Normal range of motion.   Cardiovascular: Normal rate and regular rhythm.   Pulmonary/Chest: Effort normal and breath sounds normal.   Abdominal: Soft. She exhibits no distension. There is no tenderness.   Musculoskeletal: Normal range of motion.   Neurological: She is alert and oriented to person, place, and time. No cranial nerve deficit or sensory deficit. She exhibits normal muscle tone. Coordination normal.   Skin: Skin is warm and dry. Capillary refill takes less than 2 seconds. No rash noted. She is not diaphoretic. No erythema. No pallor.   Psychiatric: She has a normal mood and affect. Her behavior is normal. Thought content normal.   Vitals reviewed.    Physical Exam:  Vitals reviewed.    Constitutional: She appears well-developed and well-nourished. She is not diaphoretic. No distress.     Eyes: Pupils are equal, round, and reactive to light. Conjunctivae and EOM are normal. Right eye exhibits no discharge. Left eye exhibits no discharge.   Decreased visual acuity in bilateral eyes     Cardiovascular: Normal rate and regular rhythm.     Abdominal: Soft.     Psych/Behavior: She is alert. She is oriented to person, place, and time. She has a normal mood and " affect.         Assessment:   Review of Imaging:  Impression       1. 4 mm anterior communicating artery aneurysm.  Recommend cerebral angiogram or neuro surgical consultation for further characterization.    2. No hemodynamically significant stenosis of the intracranial arterial vasculature.  3. Vascular loop of the right AICA as seen on comparison exam.  Finding is of uncertain clinical significance.      Electronically signed by: Maximo Pruitt  Date: 05/27/2019  Time: 12:45     Impression       Anterior communicating artery for measuring 4.6 x 2.7 mm with a wide neck.  This fills from the left carotid injections and projects superiorly and to the right.    This finding will be discussed further with the patient on an outpatient setting.  It is amenable for endovascular coiling, probably with stent assistance.      Electronically signed by: Vladimir Salgado MD  Date: 07/16/2019  Time: 10       Diagnoses:      1. Vascular abnormality    2. Aneurysm of anterior communicating artery        Plan:   45 yo female with a known right AICA vascular loop and acom aneurysm presenting to clinic for further evaluation and management. Reviewed the results of her angiogram with her and discussed management options of her anterior communicating artery aneurysm. Dr. Salgado discussed endovascular treatment via stent assisted coiling. He feels that given the location/size/patients age she would be a good candidate. Discussed how the procedure will be performed, risks (including, but not limited to, pain, bleeding, infection, damage to nearby structures, and the need for additional procedures), benefits, possible complications, pre-post procedure expectations, and alternatives. The patient voices understanding and all questions have been answered.  The patient agrees to proceed as planned. Written informed consent obtained. We discussed it would mean she would need to start DAPT 1 wk prior to treatment with stent and continue for 6  months, at which point we could obtain a post treatment angiogram to assess for recurrence/obliteration of the aneurysm. We reviewed signs and symptoms of stroke with the patient and when to seek emergency medical care. The patient vocalized understanding. They were given the opportunity to ask questions, and there were answered. Encouraged the patient follow up with their PCP and Dr. Lopez    Prior to her procedure she will need CBC/CMP/INR within last 30 days and morning of will require aspirin and plavix assays.  These have been ordered.    I provided NP's card and clinic phone number and encouraged to call with any new questions/concerns. Overall she seemed satisfied with her care. I spent 45 minutes performing a direct face-to face visit with Ms. Corley and greater than 50% of that time was spent counseling/coordinating care. She verbalized understanding of all of the above. Was given appropriate time ask questions, and these were answered. She was encouraged to call office with any new questions, comments, or concerns.    This case was discussed with Dr. Salgado. MD approved management/plan for this patient.

## 2019-07-18 RX ORDER — ESCITALOPRAM OXALATE 10 MG/1
10 TABLET ORAL DAILY
Qty: 30 TABLET | Refills: 2 | Status: SHIPPED | OUTPATIENT
Start: 2019-07-18 | End: 2019-08-12 | Stop reason: SDUPTHER

## 2019-08-01 ENCOUNTER — TELEPHONE (OUTPATIENT)
Dept: PSYCHIATRY | Facility: CLINIC | Age: 44
End: 2019-08-01

## 2019-08-01 ENCOUNTER — OFFICE VISIT (OUTPATIENT)
Dept: PSYCHIATRY | Facility: CLINIC | Age: 44
End: 2019-08-01
Payer: MEDICARE

## 2019-08-01 DIAGNOSIS — F32.A DEPRESSION, UNSPECIFIED DEPRESSION TYPE: ICD-10-CM

## 2019-08-01 DIAGNOSIS — R41.3 MEMORY LOSS: Primary | ICD-10-CM

## 2019-08-01 DIAGNOSIS — F41.9 ANXIETY: ICD-10-CM

## 2019-08-01 PROCEDURE — 90791 PR PSYCHIATRIC DIAGNOSTIC EVALUATION: ICD-10-PCS | Mod: S$GLB,,, | Performed by: PSYCHOLOGIST

## 2019-08-01 PROCEDURE — 96132 NRPSYC TST EVAL PHYS/QHP 1ST: CPT | Mod: S$GLB,,, | Performed by: PSYCHOLOGIST

## 2019-08-01 PROCEDURE — 96138 PSYCL/NRPSYC TECH 1ST: CPT | Mod: S$GLB,,, | Performed by: PSYCHOLOGIST

## 2019-08-01 PROCEDURE — 96138 PR PSYCH/NEUROPSYCH TEST ADMIN/SCORING, BY TECH, 2+ TESTS, 1ST 30 MIN: ICD-10-PCS | Mod: S$GLB,,, | Performed by: PSYCHOLOGIST

## 2019-08-01 PROCEDURE — 90791 PSYCH DIAGNOSTIC EVALUATION: CPT | Mod: S$GLB,,, | Performed by: PSYCHOLOGIST

## 2019-08-01 PROCEDURE — 96133 NRPSYC TST EVAL PHYS/QHP EA: CPT | Mod: S$GLB,,, | Performed by: PSYCHOLOGIST

## 2019-08-01 PROCEDURE — 96139 PR PSYCH/NEUROPSYCH TEST ADMIN/SCORING, BY TECH, 2+ TESTS, EA ADDTL 30 MIN: ICD-10-PCS | Mod: 59,S$GLB,, | Performed by: PSYCHOLOGIST

## 2019-08-01 PROCEDURE — 96133 PR NEUROPSYCHOLOGIC TEST EVAL SVCS, EA ADDTL HR: ICD-10-PCS | Mod: S$GLB,,, | Performed by: PSYCHOLOGIST

## 2019-08-01 PROCEDURE — 96132 PR NEUROPSYCHOLOGIC TEST EVAL SVCS, 1ST HR: ICD-10-PCS | Mod: S$GLB,,, | Performed by: PSYCHOLOGIST

## 2019-08-01 PROCEDURE — 96139 PSYCL/NRPSYC TST TECH EA: CPT | Mod: 59,S$GLB,, | Performed by: PSYCHOLOGIST

## 2019-08-01 NOTE — PROGRESS NOTES
Psychiatry Initial Visit (PhD/LCSW)    Date:   8/1/2019             CPT Code: 01609    Referred by:  Eugenie Leroy M.D.    Chief complaint/reason for encounter:  Neuropsychological Evaluation    Clinical status of patient:  Outpatient    Met with:  Patient and     History of present illness: Mrs. Carolina Andrews is a 35-year-old white  female referred for Neuropsychological Evaluation on an outpatient basis due to subjective memory decline for the past year or so. She reported she will want to tell her  something but then cant remember what she [id number] to say. She recently could not remember what she wanted to tell her doctor. She reported she has to repeat things in her mind to remember them. Her  agreed her memory seemed to decline about a year ago. He reported that yesterday she could not compute time, thinking there was 1 hour between 12:30 and 2:30. He reported she also gets confused with dates, such as saying in April that the previous month was May. He reported she has to tell him something immediately or she will forget it. They reported that it seemed to get worse a couple of months ago. More recently it seems to have improved when she started taking vitamins. Upon direct questioning, she also reported that she misplaces personal items in the home; forgets conversations; repeats herself; gets confused about what day it is; has to use lists to remind herself of things; has left the stove burner on; and loses her train of thought in conversation. She does not drive due to vision problems. She manages her medications, the family finances, and the household without difficulty.  Regarding a precipitant, she reported that at the end of 2017 she had an excruciating headache, passed out, and threw up. She sought medical help the next day, but the etiology of the headache was never found. Family history is significant for paternal grandmother with Alzheimers disease late in life. Mrs. Andrews  has a prior psychiatric history of mild anxiety and depression most of her life. She has been treated with various medications off and on by her PCP since age 5. At present, her PCP has recently prescribed Lexapro to supplement prn Ativan. Multiple medical problems have been diagnosed recently and she is stressed about these, contributing to increased depression and anxiety. She reported the Lexapro does seem to be helping as she no longer bursts into tears. She is still under stress and feels anxious and a bit depressed. She denied suicidal ideation. She was pleasant and cooperative in interview. She appeared a bit anxious, rubbing her hands. She also came to tears at one point during the interview when discussing medical problems.    Pain scale: noncontributory    Symptoms:  Mood: depressed mood  Anxiety:  anxious, worried, OCD traits  Substance abuse: denied  Cognitive functioning:  memory decline    Psychiatric history: as above     Medical history: memory disturbance, vascular abnormality, aneurysm of anterior communicating artery, cholelithiasis, dizziness, vertigo, tinnitus, arthritis, blindness, liver hemangioma, and pinched nerve. CT of the head completed on yielded these results:  .  MRI of the brain completed on 5/10/2019 yielded these results: 1. Vascular loop of the right IAC. 2. No acute intracranial abnormality. MRA of the brain completed on 5/25/2019 yielded these results: 1. 4 mm anterior communicating artery aneurysm. Recommend cerebral angiogram or neuro surgical consultation for further characterization. 2. No hemodynamically significant stenosis of the intracranial arterial vasculature. 3. Vascular loop of the right AICA as seen on comparison exam.  Finding is of uncertain clinical significance. MoCA completed in Neurology on 5/16/2019 yielded a score of 26/30, in the average range.     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 Now disabled.  twice,  once. One step-son. Lives with  and step-son. Former neighbor sometimes stays with them.  [employer], but inactive. Enjoys spending time with son, playing games on tablet.     Substance use:   Alcohol: social   Drugs: no   Tobacco: no   Caffeine: rare coffee    Strengths and Liabilities:   Strength:  Pt is expressive/articulate.   Liability:  Pt has subjective memory loss.    Mental Status Exam:  General appearance:  appears stated age, neatly dressed, well groomed  Speech:  normal rate and tone  Level of cooperation:  cooperative  Thought processes:  logical, goal-directed  Mood:  euthymic  Thought content:  no illusions, no visual hallucinations, no auditory hallucinations, no delusions, no active or passive homicidal thoughts, no active or passive suicidal ideation, no obsessions, no compulsions, no violence  Affect:  appropriate  Orientation:  oriented to person, place, and time  Memory:  Recent memory:  2 of 3 objects after brief delay.    Remote memory - intact  Attention span and concentration:  spelled HOUSE forward and backwards  Fund of general knowledge: 4 of 4 recent presidents  Abstract reasoning:    Similarities: abstract.    Proverbs: abstract.  Judgment and insight: fair  Language:  intact    Diagnostic impressions:  Memory loss R41.3  Anxiety F41.9  Depression F32.9    Plan:  Pt will complete Neuropsychological testing.  Report of Neuropsychological Evaluation will follow. It can be accessed through the Chart Review activity in Epic under the Notes tab.  It will be titled Psych Testing.  PCP will continue to manage medication for anxiety>depression.    Return to clinic:  as scheduled    Length of time:  45 minutes

## 2019-08-01 NOTE — PSYCH TESTING
[facility] [address] [place]  [address]  [phone]    REPORT OF NEUROPSYCHOLOGICAL EVALUATION    NAME:  Carolina Andrews   OC #:  [id number]  :  1975    REFERRED BY: Eugenie Leroy M.D.    EVALUATED BY:  Toshia Kaur, Ph.D., Clinical Psychologist  Diane Zamora, Psychometrician    DATE OF EVALUATION: 2019    EVALUATION PROCEDURES AND TIME:  Conducted by Psychologist:  Integration of patient data, interpretation of standardized test results and clinical data, clinical decision making, treatment planning and report  Conducted by Technician:  Test administration and scoring of the following tests:  Repeatable Battery for the Assessment of Neuropsychological Status (RBANS)  Temporal Orientation Test  Naming Test from the Neuropsychological Assessment Battery (NAB)  Controlled Oral Word Association Test  Facial Recognition Test  Valera Visual Motor Gestalt Test  Wechsler Memory Scale IV Logical Memory & Visual Reproduction Battery (WMS-IV LMVR)  Wisconsin Card Sorting Test - 64 (WCST-64)  Trail Making Test, Parts A & B  Rodriguez Depression Inventory - II (BDI-II)  Rodriguez Anxiety Inventory (HANY)  CPT Codes: 41521 - 1 unit; +89771 - 1 unit; 56979 - 1 unit; +56416 - 3 units   Time: Psychologist - 2 hours; Technician - 1 hour 57 minutes    EVALUATION FINDINGS:  The diagnostic interview revealed that Mrs. Carolina Andrews is a 35-year-old white  female referred for Neuropsychological Evaluation on an outpatient basis due to subjective memory decline for the past year or so. She reported she will want to tell her  something but then cant remember what she [id number] to say. She recently could not remember what she wanted to tell her doctor. She reported she has to repeat things in her mind to remember them. Her  agreed her memory seemed to decline about a year ago. He reported that yesterday she could not compute time, thinking there was 1 hour between 12:30 and 2:30. He reported  she also gets confused with dates, such as saying in April that the previous month was May. He reported she has to tell him something immediately or she will forget it. They reported that it seemed to get worse a couple of months ago. More recently it seems to have improved when she started taking vitamins. Upon direct questioning, she also reported that she misplaces personal items in the home; forgets conversations; repeats herself; gets confused about what day it is; has to use lists to remind herself of things; has left the stove burner on; and loses her train of thought in conversation. She does not drive due to vision problems. She manages her medications, the family finances, and the household without difficulty.  Regarding a precipitant, she reported that at the end of 2017 she had an excruciating headache, passed out, and threw up. She sought medical help the next day, but the etiology of the headache was never found. Family history is significant for paternal grandmother with Alzheimers disease late in life. Mrs. Andrews has a prior psychiatric history of mild anxiety and depression most of her life. She has been treated with various medications off and on by her PCP since age 5. At present, her PCP has recently prescribed Lexapro to supplement prn Ativan. Multiple medical problems have been diagnosed recently and she is stressed about these, contributing to increased depression and anxiety. She reported the Lexapro does seem to be helping as she no longer bursts into tears. She is still under stress and feels anxious and a bit depressed. She denied suicidal ideation. She was pleasant and cooperative in interview. She appeared a bit anxious, rubbing her hands. She also came to tears at one point during the interview when discussing medical problems. The Mental Status Exam suggested reduced recent memory.     The medical record also revealed medical history of memory disturbance, vascular abnormality,  aneurysm of anterior communicating artery, cholelithiasis, dizziness, vertigo, tinnitus, arthritis, blindness, liver hemangioma, and pinched nerve. CT of the head completed on yielded these results:  .  MRI of the brain completed on 5/10/2019 yielded these results: 1. Vascular loop of the right IAC. 2. No acute intracranial abnormality. MRA of the brain completed on 5/25/2019 yielded these results: 1. 4 mm anterior communicating artery aneurysm.  Recommend cerebral angiogram or neuro surgical consultation for further characterization. 2. No hemodynamically significant stenosis of the intracranial arterial vasculature. 3. Vascular loop of the right AICA as seen on comparison exam.  Finding is of uncertain clinical significance. MoCA completed in Neurology on 5/16/2019 yielded a score of 26/30, in the average range.     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  She was employed as a  prior to retiring on disability due to blindness 3 years ago.  She was alert and cooperative during the evaluation.  Effort on all tests was satisfactory to produce valid results.    Mrs. Andrews obtained a total score of 119 on the RBANS, which is at the 90th percentile, suggesting general cognitive functioning was in the high average range.  Five areas were tested.  The Immediate Memory Index revealed average functioning in the ability to remember verbal information immediately after it is presented, with a score of 109 at the 73rd percentile.  The Visuospatial/Constructional Index revealed superior functioning in the ability to perceive spatial relations and to construct a spatially accurate copy of a drawing, with a score of 121 at the 92nd percentile. The Language Index revealed high average functioning in the ability to respond verbally to either naming or retrieving learned material,  with a score of 114 at the 82nd percentile. Attention, or the capacity to remember and manipulate both visually and orally presented information in short-term storage, was average, with a score of 97 at the 42nd percentile. Delayed memory, or anterograde memory capacity, was superior, with a score of 122 at the 93rd percentile. For reference, the expected average score on each index is 100, which is at the 50th percentile.    The Temporal Orientation Test indicated she was oriented to day of the week, day of the month, month, year, and time of day.  Her score on this measure suggested no impairment in temporal orientation.    Naming, as assessed by the NAB Test, was average.  Verbal fluency, as assessed by the Controlled Oral Word Association Test, was in the superior range.    Performance on the Facial Recognition Test suggested no prosopagnosia was present, as her score was in the normal range.  Reproductions of Valera Visual Motor Gestalt Test designs revealed no evidence of constructional dyspraxia.    The WMS-IV LMVR was administered to further assess memory.  Her Immediate Memory Index of 115 was in the high average range and her Delayed Memory Index of 100 was in the average range. Her Auditory Memory Index of 98 was in the average range and her Visual Memory Index of 116 was in the high average range. The expected average score for each index is 100. Scaled scores of the memory indexes revealed average immediate auditory/verbal memory, delayed auditory/verbal memory, and delayed visual memory; and superior immediate visual memory.     The WCST-64 was administered to assess abstract reasoning and conceptualization.  Her categories completed score (5) was in the average range. Her total errors score (6) was in the high average range and her perseverative errors score (4) was in the average range.  Her performance suggested no impairment in abstract reasoning skills.    Her score on the Trail Making Test, Part  A, (46 seconds for completion) indicated that psychomotor speed was in the mildly impaired range.  Her score on Part B (73 seconds for completion) indicated that her ability to handle complex relationships which require rapid change of set, or mental flexibility, was in the average range.    The BDI-II was administered to assess for depression.  Her score of 16 suggested mild depression was present.  The HANY was administered to assess for anxiety.  Her score of 27 suggested severe anxiety was present.    SUMMARY AND RECOMMENDATIONS:  Mrs. Andrews was referred for Neuropsychological Evaluation on an outpatient basis due to subjective memory decline for the past year or so. Her general cognitive abilities as assessed by the RBANS were in the high average range, with superior delayed memory and visuospatial/constructional abilities; high average language; and average immediate verbal memory and attention.  Further assessment of specific cognitive abilities revealed an isolated deficit psychomotor speed, with intact temporal orientation, naming, verbal fluency, facial recognition, constructional ability, abstract reasoning, and mental flexibility.  Additional memory assessment revealed average immediate auditory/verbal memory, delayed auditory/verbal memory, and delayed visual memory; and superior immediate visual memory.  Personality test data suggested the presence of mild depression and severe anxiety.  Neuropsychological Evaluation results revealed an isolated and inconsequential impairment in psychomotor speed, with all other abilities including memory and mental flexibility (which requires psychomotor speed) being in at least the average range. Her subjective experience of memory impairment is likely related to the anxiety>depression which she acknowledged in interview and was indicated by testing. Reassurance that she is not having objective memory problems may prove useful. More aggressive treatment of  anxiety/depression may also be helpful. Her PCP will continue to manage psychiatric medication.

## 2019-08-05 ENCOUNTER — ANESTHESIA EVENT (OUTPATIENT)
Dept: ENDOSCOPY | Facility: HOSPITAL | Age: 44
DRG: 027 | End: 2019-08-05
Payer: MEDICARE

## 2019-08-05 ENCOUNTER — TELEPHONE (OUTPATIENT)
Dept: INTERVENTIONAL RADIOLOGY/VASCULAR | Facility: HOSPITAL | Age: 44
End: 2019-08-05

## 2019-08-05 NOTE — PRE-PROCEDURE INSTRUCTIONS
Preop instructions: NPO solids/ milk products after midnight or clears up to 2 hours before arrival (clear liquids are: water, apple juice, Gatorade & Jell-O, black coffee/no milk, cream or creamer), shower instructions, directions, leave all valuables at home, medication instructions for PM prior & am of procedure explained. Patient stated an understanding.      Patient denies any side effects or issues with anesthesia or sedation.    PT DENIES ANY PO FLUID RESTRICTIONS - ENCOURAGED  PO FLUIDS TODAY.                                                                                                                                   DIFFICULT IV STICK/ACUTE ANXIETY    PT IS LEGALLY BLIND

## 2019-08-06 ENCOUNTER — HOSPITAL ENCOUNTER (INPATIENT)
Facility: HOSPITAL | Age: 44
LOS: 1 days | Discharge: HOME | DRG: 027 | End: 2019-08-07
Attending: RADIOLOGY | Admitting: RADIOLOGY
Payer: MEDICARE

## 2019-08-06 ENCOUNTER — ANESTHESIA (OUTPATIENT)
Dept: ENDOSCOPY | Facility: HOSPITAL | Age: 44
DRG: 027 | End: 2019-08-06
Payer: MEDICARE

## 2019-08-06 DIAGNOSIS — I67.1 CEREBRAL ANEURYSM: ICD-10-CM

## 2019-08-06 DIAGNOSIS — I99.9 VASCULAR ABNORMALITY: ICD-10-CM

## 2019-08-06 DIAGNOSIS — I67.1 ANEURYSM OF ANTERIOR COMMUNICATING ARTERY: ICD-10-CM

## 2019-08-06 DIAGNOSIS — I67.1 ANTERIOR COMMUNICATING ARTERY ANEURYSM: Primary | ICD-10-CM

## 2019-08-06 PROBLEM — R51.9 HEADACHE: Status: ACTIVE | Noted: 2019-08-06

## 2019-08-06 PROBLEM — K21.9 GERD (GASTROESOPHAGEAL REFLUX DISEASE): Status: ACTIVE | Noted: 2019-08-06

## 2019-08-06 PROBLEM — F41.9 ANXIETY: Status: ACTIVE | Noted: 2019-08-06

## 2019-08-06 LAB
ABO + RH BLD: NORMAL
ALBUMIN SERPL BCP-MCNC: 3.5 G/DL (ref 3.5–5.2)
ALP SERPL-CCNC: 64 U/L (ref 55–135)
ALT SERPL W/O P-5'-P-CCNC: 25 U/L (ref 10–44)
ANION GAP SERPL CALC-SCNC: 6 MMOL/L (ref 8–16)
APTT BLDCRRT: 40.5 SEC (ref 21–32)
AST SERPL-CCNC: 23 U/L (ref 10–40)
B-HCG UR QL: NEGATIVE
BASOPHILS # BLD AUTO: 0.03 K/UL (ref 0–0.2)
BASOPHILS NFR BLD: 0.5 % (ref 0–1.9)
BILIRUB SERPL-MCNC: 0.6 MG/DL (ref 0.1–1)
BLD GP AB SCN CELLS X3 SERPL QL: NORMAL
BUN SERPL-MCNC: 8 MG/DL (ref 6–20)
CALCIUM SERPL-MCNC: 8.6 MG/DL (ref 8.7–10.5)
CHLORIDE SERPL-SCNC: 108 MMOL/L (ref 95–110)
CO2 SERPL-SCNC: 25 MMOL/L (ref 23–29)
CREAT SERPL-MCNC: 0.8 MG/DL (ref 0.5–1.4)
CTP QC/QA: YES
DIFFERENTIAL METHOD: ABNORMAL
EOSINOPHIL # BLD AUTO: 0.1 K/UL (ref 0–0.5)
EOSINOPHIL NFR BLD: 1 % (ref 0–8)
ERYTHROCYTE [DISTWIDTH] IN BLOOD BY AUTOMATED COUNT: 12.9 % (ref 11.5–14.5)
EST. GFR  (AFRICAN AMERICAN): >60 ML/MIN/1.73 M^2
EST. GFR  (NON AFRICAN AMERICAN): >60 ML/MIN/1.73 M^2
ESTIMATED AVG GLUCOSE: 103 MG/DL (ref 68–131)
GLUCOSE SERPL-MCNC: 103 MG/DL (ref 70–110)
HBA1C MFR BLD HPLC: 5.2 % (ref 4–5.6)
HCT VFR BLD AUTO: 39.8 % (ref 37–48.5)
HGB BLD-MCNC: 13.3 G/DL (ref 12–16)
IMM GRANULOCYTES # BLD AUTO: 0.04 K/UL (ref 0–0.04)
IMM GRANULOCYTES NFR BLD AUTO: 0.6 % (ref 0–0.5)
INR PPP: 1 (ref 0.8–1.2)
LYMPHOCYTES # BLD AUTO: 1.6 K/UL (ref 1–4.8)
LYMPHOCYTES NFR BLD: 26.1 % (ref 18–48)
MAGNESIUM SERPL-MCNC: 2.3 MG/DL (ref 1.6–2.6)
MCH RBC QN AUTO: 30.6 PG (ref 27–31)
MCHC RBC AUTO-ENTMCNC: 33.4 G/DL (ref 32–36)
MCV RBC AUTO: 92 FL (ref 82–98)
MONOCYTES # BLD AUTO: 0.3 K/UL (ref 0.3–1)
MONOCYTES NFR BLD: 5.1 % (ref 4–15)
NEUTROPHILS # BLD AUTO: 4.2 K/UL (ref 1.8–7.7)
NEUTROPHILS NFR BLD: 66.7 % (ref 38–73)
NRBC BLD-RTO: 0 /100 WBC
PA AA PRP-ACNC: 570 ARU (ref 620–672)
PHOSPHATE SERPL-MCNC: 3.1 MG/DL (ref 2.7–4.5)
PLATELET # BLD AUTO: 201 K/UL (ref 150–350)
PLATELET RESPONSE PLAVIX: 160 PRU (ref 194–418)
PMV BLD AUTO: 11.4 FL (ref 9.2–12.9)
POTASSIUM SERPL-SCNC: 4.1 MMOL/L (ref 3.5–5.1)
PROT SERPL-MCNC: 6.8 G/DL (ref 6–8.4)
PROTHROMBIN TIME: 10 SEC (ref 9–12.5)
RBC # BLD AUTO: 4.34 M/UL (ref 4–5.4)
SODIUM SERPL-SCNC: 139 MMOL/L (ref 136–145)
WBC # BLD AUTO: 6.24 K/UL (ref 3.9–12.7)

## 2019-08-06 PROCEDURE — 20000000 HC ICU ROOM

## 2019-08-06 PROCEDURE — 63600175 PHARM REV CODE 636 W HCPCS: Performed by: STUDENT IN AN ORGANIZED HEALTH CARE EDUCATION/TRAINING PROGRAM

## 2019-08-06 PROCEDURE — 83735 ASSAY OF MAGNESIUM: CPT

## 2019-08-06 PROCEDURE — D9220A PRA ANESTHESIA: ICD-10-PCS | Mod: CRNA,,, | Performed by: NURSE ANESTHETIST, CERTIFIED REGISTERED

## 2019-08-06 PROCEDURE — 86901 BLOOD TYPING SEROLOGIC RH(D): CPT

## 2019-08-06 PROCEDURE — 85610 PROTHROMBIN TIME: CPT

## 2019-08-06 PROCEDURE — D9220A PRA ANESTHESIA: Mod: ANES,,, | Performed by: ANESTHESIOLOGY

## 2019-08-06 PROCEDURE — 80053 COMPREHEN METABOLIC PANEL: CPT

## 2019-08-06 PROCEDURE — 25500020 PHARM REV CODE 255: Performed by: RADIOLOGY

## 2019-08-06 PROCEDURE — 25000003 PHARM REV CODE 250: Performed by: NURSE ANESTHETIST, CERTIFIED REGISTERED

## 2019-08-06 PROCEDURE — 85730 THROMBOPLASTIN TIME PARTIAL: CPT

## 2019-08-06 PROCEDURE — 63600175 PHARM REV CODE 636 W HCPCS: Performed by: NURSE ANESTHETIST, CERTIFIED REGISTERED

## 2019-08-06 PROCEDURE — D9220A PRA ANESTHESIA: ICD-10-PCS | Mod: ANES,,, | Performed by: ANESTHESIOLOGY

## 2019-08-06 PROCEDURE — 25000003 PHARM REV CODE 250: Performed by: STUDENT IN AN ORGANIZED HEALTH CARE EDUCATION/TRAINING PROGRAM

## 2019-08-06 PROCEDURE — 71000039 HC RECOVERY, EACH ADD'L HOUR

## 2019-08-06 PROCEDURE — 93010 EKG 12-LEAD: ICD-10-PCS | Mod: ,,, | Performed by: INTERNAL MEDICINE

## 2019-08-06 PROCEDURE — 85576 BLOOD PLATELET AGGREGATION: CPT

## 2019-08-06 PROCEDURE — 81025 URINE PREGNANCY TEST: CPT | Performed by: FAMILY MEDICINE

## 2019-08-06 PROCEDURE — 85025 COMPLETE CBC W/AUTO DIFF WBC: CPT

## 2019-08-06 PROCEDURE — 93005 ELECTROCARDIOGRAM TRACING: CPT

## 2019-08-06 PROCEDURE — 85576 BLOOD PLATELET AGGREGATION: CPT | Mod: 91

## 2019-08-06 PROCEDURE — 37000008 HC ANESTHESIA 1ST 15 MINUTES

## 2019-08-06 PROCEDURE — 63600175 PHARM REV CODE 636 W HCPCS: Performed by: FAMILY MEDICINE

## 2019-08-06 PROCEDURE — 93010 ELECTROCARDIOGRAM REPORT: CPT | Mod: ,,, | Performed by: INTERNAL MEDICINE

## 2019-08-06 PROCEDURE — 84100 ASSAY OF PHOSPHORUS: CPT

## 2019-08-06 PROCEDURE — 99222 1ST HOSP IP/OBS MODERATE 55: CPT | Mod: GC,,, | Performed by: PSYCHIATRY & NEUROLOGY

## 2019-08-06 PROCEDURE — 99222 PR INITIAL HOSPITAL CARE,LEVL II: ICD-10-PCS | Mod: GC,,, | Performed by: PSYCHIATRY & NEUROLOGY

## 2019-08-06 PROCEDURE — 71000033 HC RECOVERY, INTIAL HOUR

## 2019-08-06 PROCEDURE — 94761 N-INVAS EAR/PLS OXIMETRY MLT: CPT

## 2019-08-06 PROCEDURE — 83036 HEMOGLOBIN GLYCOSYLATED A1C: CPT

## 2019-08-06 PROCEDURE — 37000009 HC ANESTHESIA EA ADD 15 MINS

## 2019-08-06 PROCEDURE — D9220A PRA ANESTHESIA: Mod: CRNA,,, | Performed by: NURSE ANESTHETIST, CERTIFIED REGISTERED

## 2019-08-06 RX ORDER — MIDAZOLAM HYDROCHLORIDE 1 MG/ML
INJECTION, SOLUTION INTRAMUSCULAR; INTRAVENOUS
Status: DISCONTINUED | OUTPATIENT
Start: 2019-08-06 | End: 2019-08-06

## 2019-08-06 RX ORDER — SODIUM CHLORIDE 0.9 % (FLUSH) 0.9 %
10 SYRINGE (ML) INJECTION
Status: DISCONTINUED | OUTPATIENT
Start: 2019-08-06 | End: 2019-08-07 | Stop reason: HOSPADM

## 2019-08-06 RX ORDER — NICARDIPINE HYDROCHLORIDE 0.2 MG/ML
INJECTION INTRAVENOUS
Status: DISCONTINUED
Start: 2019-08-06 | End: 2019-08-06 | Stop reason: WASHOUT

## 2019-08-06 RX ORDER — SODIUM CHLORIDE 0.9 % (FLUSH) 0.9 %
10 SYRINGE (ML) INJECTION
Status: DISCONTINUED | OUTPATIENT
Start: 2019-08-06 | End: 2019-08-06

## 2019-08-06 RX ORDER — ONDANSETRON 2 MG/ML
INJECTION INTRAMUSCULAR; INTRAVENOUS
Status: DISCONTINUED | OUTPATIENT
Start: 2019-08-06 | End: 2019-08-06

## 2019-08-06 RX ORDER — LABETALOL HCL 20 MG/4 ML
10 SYRINGE (ML) INTRAVENOUS
Status: DISCONTINUED | OUTPATIENT
Start: 2019-08-06 | End: 2019-08-07 | Stop reason: HOSPADM

## 2019-08-06 RX ORDER — GLYCOPYRROLATE 0.2 MG/ML
INJECTION INTRAMUSCULAR; INTRAVENOUS
Status: DISCONTINUED | OUTPATIENT
Start: 2019-08-06 | End: 2019-08-06

## 2019-08-06 RX ORDER — PROPOFOL 10 MG/ML
VIAL (ML) INTRAVENOUS
Status: DISCONTINUED | OUTPATIENT
Start: 2019-08-06 | End: 2019-08-06

## 2019-08-06 RX ORDER — MIDAZOLAM HYDROCHLORIDE 5 MG/ML
INJECTION INTRAMUSCULAR; INTRAVENOUS
Status: DISCONTINUED | OUTPATIENT
Start: 2019-08-06 | End: 2019-08-06

## 2019-08-06 RX ORDER — PANTOPRAZOLE SODIUM 20 MG/1
20 TABLET, DELAYED RELEASE ORAL DAILY
Status: DISCONTINUED | OUTPATIENT
Start: 2019-08-07 | End: 2019-08-06

## 2019-08-06 RX ORDER — HEPARIN SODIUM 1000 [USP'U]/ML
INJECTION, SOLUTION INTRAVENOUS; SUBCUTANEOUS
Status: DISCONTINUED | OUTPATIENT
Start: 2019-08-06 | End: 2019-08-06

## 2019-08-06 RX ORDER — LIDOCAINE HCL/PF 100 MG/5ML
SYRINGE (ML) INTRAVENOUS
Status: DISCONTINUED | OUTPATIENT
Start: 2019-08-06 | End: 2019-08-06

## 2019-08-06 RX ORDER — GABAPENTIN 300 MG/1
600 CAPSULE ORAL NIGHTLY
COMMUNITY
End: 2021-03-02 | Stop reason: SDUPTHER

## 2019-08-06 RX ORDER — HEPARIN SODIUM 1000 [USP'U]/ML
3000 INJECTION, SOLUTION INTRAVENOUS; SUBCUTANEOUS ONCE
Status: DISCONTINUED | OUTPATIENT
Start: 2019-08-06 | End: 2019-08-06

## 2019-08-06 RX ORDER — ACETAMINOPHEN 325 MG/1
650 TABLET ORAL EVERY 6 HOURS PRN
Status: DISCONTINUED | OUTPATIENT
Start: 2019-08-06 | End: 2019-08-07 | Stop reason: HOSPADM

## 2019-08-06 RX ORDER — NEOSTIGMINE METHYLSULFATE 1 MG/ML
INJECTION, SOLUTION INTRAVENOUS
Status: DISCONTINUED | OUTPATIENT
Start: 2019-08-06 | End: 2019-08-06

## 2019-08-06 RX ORDER — CLOPIDOGREL BISULFATE 75 MG/1
75 TABLET ORAL DAILY
Status: DISCONTINUED | OUTPATIENT
Start: 2019-08-07 | End: 2019-08-07 | Stop reason: HOSPADM

## 2019-08-06 RX ORDER — ASPIRIN 81 MG/1
81 TABLET ORAL
Status: COMPLETED | OUTPATIENT
Start: 2019-08-07 | End: 2019-08-07

## 2019-08-06 RX ORDER — SODIUM CHLORIDE 9 MG/ML
INJECTION, SOLUTION INTRAVENOUS CONTINUOUS
Status: DISCONTINUED | OUTPATIENT
Start: 2019-08-06 | End: 2019-08-07

## 2019-08-06 RX ORDER — IODIXANOL 320 MG/ML
150 INJECTION, SOLUTION INTRAVASCULAR
Status: COMPLETED | OUTPATIENT
Start: 2019-08-06 | End: 2019-08-06

## 2019-08-06 RX ORDER — ROCURONIUM BROMIDE 10 MG/ML
INJECTION, SOLUTION INTRAVENOUS
Status: DISCONTINUED | OUTPATIENT
Start: 2019-08-06 | End: 2019-08-06

## 2019-08-06 RX ORDER — HYDROMORPHONE HYDROCHLORIDE 1 MG/ML
0.2 INJECTION, SOLUTION INTRAMUSCULAR; INTRAVENOUS; SUBCUTANEOUS EVERY 5 MIN PRN
Status: DISCONTINUED | OUTPATIENT
Start: 2019-08-06 | End: 2019-08-06 | Stop reason: HOSPADM

## 2019-08-06 RX ORDER — PANTOPRAZOLE SODIUM 40 MG/1
40 TABLET, DELAYED RELEASE ORAL DAILY
Status: DISCONTINUED | OUTPATIENT
Start: 2019-08-07 | End: 2019-08-07 | Stop reason: HOSPADM

## 2019-08-06 RX ORDER — GABAPENTIN 300 MG/1
600 CAPSULE ORAL 2 TIMES DAILY
Status: DISCONTINUED | OUTPATIENT
Start: 2019-08-06 | End: 2019-08-07 | Stop reason: HOSPADM

## 2019-08-06 RX ORDER — FENTANYL CITRATE 50 UG/ML
INJECTION, SOLUTION INTRAMUSCULAR; INTRAVENOUS
Status: DISCONTINUED | OUTPATIENT
Start: 2019-08-06 | End: 2019-08-06

## 2019-08-06 RX ORDER — ESCITALOPRAM OXALATE 10 MG/1
10 TABLET ORAL DAILY
Status: DISCONTINUED | OUTPATIENT
Start: 2019-08-07 | End: 2019-08-07 | Stop reason: HOSPADM

## 2019-08-06 RX ADMIN — GABAPENTIN 600 MG: 300 CAPSULE ORAL at 08:08

## 2019-08-06 RX ADMIN — SODIUM CHLORIDE: 0.9 INJECTION, SOLUTION INTRAVENOUS at 07:08

## 2019-08-06 RX ADMIN — SODIUM CHLORIDE, SODIUM GLUCONATE, SODIUM ACETATE, POTASSIUM CHLORIDE, MAGNESIUM CHLORIDE, SODIUM PHOSPHATE, DIBASIC, AND POTASSIUM PHOSPHATE: .53; .5; .37; .037; .03; .012; .00082 INJECTION, SOLUTION INTRAVENOUS at 11:08

## 2019-08-06 RX ADMIN — SODIUM CHLORIDE: 0.9 INJECTION, SOLUTION INTRAVENOUS at 04:08

## 2019-08-06 RX ADMIN — IODIXANOL 65 ML: 320 INJECTION, SOLUTION INTRAVASCULAR at 11:08

## 2019-08-06 RX ADMIN — FENTANYL CITRATE 50 MCG: 50 INJECTION, SOLUTION INTRAMUSCULAR; INTRAVENOUS at 09:08

## 2019-08-06 RX ADMIN — NEOSTIGMINE METHYLSULFATE 4 MG: 1 INJECTION INTRAVENOUS at 11:08

## 2019-08-06 RX ADMIN — ROCURONIUM BROMIDE 40 MG: 10 INJECTION, SOLUTION INTRAVENOUS at 09:08

## 2019-08-06 RX ADMIN — GLYCOPYRROLATE 0.6 MG: 0.2 INJECTION, SOLUTION INTRAMUSCULAR; INTRAVENOUS at 11:08

## 2019-08-06 RX ADMIN — LIDOCAINE HYDROCHLORIDE 80 MG: 20 INJECTION, SOLUTION INTRAVENOUS at 09:08

## 2019-08-06 RX ADMIN — HEPARIN SODIUM 1000 UNITS: 1000 INJECTION, SOLUTION INTRAVENOUS; SUBCUTANEOUS at 10:08

## 2019-08-06 RX ADMIN — FENTANYL CITRATE 25 MCG: 50 INJECTION, SOLUTION INTRAMUSCULAR; INTRAVENOUS at 11:08

## 2019-08-06 RX ADMIN — ONDANSETRON 4 MG: 2 INJECTION INTRAMUSCULAR; INTRAVENOUS at 10:08

## 2019-08-06 RX ADMIN — MIDAZOLAM HYDROCHLORIDE 2 MG: 1 INJECTION, SOLUTION INTRAMUSCULAR; INTRAVENOUS at 09:08

## 2019-08-06 RX ADMIN — HEPARIN SODIUM 7000 UNITS: 1000 INJECTION, SOLUTION INTRAVENOUS; SUBCUTANEOUS at 10:08

## 2019-08-06 RX ADMIN — ACETAMINOPHEN 650 MG: 325 TABLET ORAL at 04:08

## 2019-08-06 RX ADMIN — PROPOFOL 200 MG: 10 INJECTION, EMULSION INTRAVENOUS at 09:08

## 2019-08-06 NOTE — ASSESSMENT & PLAN NOTE
-Continue home escitalopram 10 mg Qdaily  -Gabapentin 600 mg BID  -HOLDING home PRN ativan (only taken 2x in last 2 weeks)

## 2019-08-06 NOTE — HPI
Mrs. Andrews is a 45 yo female w/ PMH significant for anxiety, and blindness (per pt, congenital cone problem) who presented for elective stent-assisted coiling of L ACOM aneurysm on 8/6/19.  Subsequently admitted to neuro ICU for monitoring.    Mrs. Andrews reports aneurysm was found after she was evaluated memory loss and dizziness.  No focal deficits.  No history of prior stroke/dvt/clot/MI/PE.      Reports that she has a bitemporal headache at home frequently, for which she takes tylenol PRN.  Reports taking occasional ativan (~[id number]) as needed for anxiety.  She is on aspirin/plavix at home.    Reports drinking a glass of wine ~2x/week.  Denies cigarette, recreational drug use.

## 2019-08-06 NOTE — SUBJECTIVE & OBJECTIVE
Past Medical History:   Diagnosis Date    Arthritis     Blindness     Cholelithiasis     Liver hemangioma     Mental disorder     Periodic antidepressant use since childhood, not currently taking or endorsing depressive ideation    Pinched nerve      Past Surgical History:   Procedure Laterality Date    ANGIOGRAM-CEREBRAL N/A 7/15/2019    Performed by [facility] OR 2ND FLR    ANKLE FRACTURE SURGERY  2009    COLONOSCOPY N/A 2/4/2019    Performed by Dale [facility]ers MD at [facility] ENDO    EGD (ESOPHAGOGASTRODUODENOSCOPY) N/A 11/19/2018    Performed by Dale Childers MD at [facility] ENDO    UPPER GASTROINTESTINAL ENDOSCOPY  ~1998    normal findings per patient report      No current facility-administered medications on file prior to encounter.      Current Outpatient Medications on File Prior to Encounter   Medication Sig Dispense Refill    acetaminophen (TYLENOL) 500 MG tablet Take 500 mg by mouth every 6 (six) hours as needed for Pain.      aspirin (ECOTRIN) 81 MG EC tablet Take 1 tablet (81 mg total) by mouth once daily. 30 tablet 11    b complex vitamins tablet Take 1 tablet by mouth once daily.      cholecalciferol, vitamin D3, (VITAMIN D3) 1,000 unit capsule Take 1,000 Units by mouth once daily.      clopidogrel (PLAVIX) 75 mg tablet Take 1 tablet (75 mg total) by mouth once daily. 30 tablet 5    escitalopram oxalate (LEXAPRO) 10 MG tablet Take 1 tablet (10 mg total) by mouth once daily. 30 tablet 2    gabapentin (NEURONTIN) 300 MG capsule Take 2 capsules (600 mg total) by mouth 2 (two) times daily. (Patient taking differently: Take 600 mg by mouth 2 (two) times daily as needed. ) 120 capsule 11    LORazepam (ATIVAN) 1 MG tablet Take 1 tablet (1 mg total) by mouth every 8 (eight) hours as needed for Anxiety. 21 tablet 0    pantoprazole (PROTONIX) 20 MG tablet Take 1 tablet (20 mg total) by mouth once daily. 90 tablet 3      Allergies: Pcn [penicillins]    Family History    Problem Relation Age of Onset    Hypertension Father     Lung disease Father         calapse x 3     Stroke Father         in eye     Retinal detachment Father     Cancer Sister 23        polyps found incidentally    Vision loss Sister     Lung disease Sister         spontanious lung collapse    GI problems Sister     Retinal detachment Mother     Cancer Paternal Grandfather     Stroke Maternal Grandmother     Colon cancer Paternal Aunt     Colon polyps Maternal Uncle     No Known Problems Paternal Uncle     Parkinsonism Maternal Grandfather     Cancer Maternal Grandfather         prostate    Colon polyps Maternal Grandfather     Cancer Paternal Grandmother         skin cancer in colon     Alzheimer's disease Paternal Grandmother     Heart disease Paternal Aunt     Breast cancer Neg Hx     Diabetes Neg Hx     Miscarriages / Stillbirths Neg Hx     Crohn's disease Neg Hx     Ulcerative colitis Neg Hx     Glaucoma Neg Hx      Social History     Tobacco Use    Smoking status: Never Smoker    Smokeless tobacco: Never Used   Substance Use Topics    Alcohol use: Yes     Alcohol/week: 3.6 oz     Types: 6 Glasses of wine per week     Frequency: 2-3 times a week     Drinks per session: 1 or 2     Binge frequency: Less than monthly     Comment: 2-3/ week wine or beer    Drug use: No     Review of Systems   Constitutional: Negative for chills and fever.   HENT: Negative for hearing loss.    Eyes: Positive for visual disturbance.   Respiratory: Negative for shortness of breath.    Cardiovascular: Negative for chest pain.   Gastrointestinal: Negative for abdominal pain, blood in stool and vomiting.   Genitourinary: Negative for hematuria.   Skin: Negative for rash.   Neurological: Negative for seizures, weakness and numbness.   Psychiatric/Behavioral: Negative for hallucinations.     Objective:     Vitals:    Temp: 97.2 °F (36.2 °C)  Pulse: 62  Rhythm: normal sinus rhythm  BP: (!) 138/90  MAP  (mmHg): 109  Resp: 14  SpO2: 98 %  O2 Device (Oxygen Therapy): room air    Temp  Min: 97.2 °F (36.2 °C)  Max: 98 °F (36.7 °C)  Pulse  Min: 57  Max: 82  BP  Min: 111/75  Max: 138/90  MAP (mmHg)  Min: 87  Max: 109  Resp  Min: 14  Max: 22  SpO2  Min: 95 %  Max: 99 %    No intake/output data recorded.           Physical Exam   Constitutional: She is oriented to person, place, and time. She appears well-developed and well-nourished. No distress.   HENT:   Head: Normocephalic.   Eyes: Conjunctivae are normal.   Cardiovascular: Normal rate, regular rhythm and normal heart sounds. Exam reveals no friction rub.   No murmur heard.  Pulmonary/Chest: Effort normal and breath sounds normal. No respiratory distress. She has no wheezes.   Abdominal: Soft. Bowel sounds are normal. She exhibits no distension. There is no tenderness.   Musculoskeletal: She exhibits no edema.   Neurological: She is alert and oriented to person, place, and time.   Skin: Skin is warm and dry.   Psychiatric: She has a normal mood and affect.       [id number]    alert, follows simple commands.  Oriented to month/year/day.    PERRL  EOMI  VFI grossly intact (reports light/movement in all quadrants)  Face symmetric  Facial sensation to light touch intact  Hearing symmetric  SCM, trapezius symmetric  Tongue protrudes midline, no lacerations    5/5 in BUE and BLE    2+ b/l biceps, brachioradialis, 1+ patellar    No clonus b/l.      Sensation to light touch intact throughout    Finger/nose intact b/l    Today I personally reviewed pertinent medications, lines/drains/airways, imaging, cardiology results, laboratory results, microbiology results, notably:    Recent Results (from the past 24 hour(s))   POCT urine pregnancy    Collection Time: 08/06/19  7:21 AM   Result Value Ref Range    POC Preg Test, Ur Negative Negative     Acceptable Yes    Platelet Response Plavix    Collection Time: 08/06/19  7:35 AM   Result Value Ref Range    Platelet Response  Plavix 160 (L) 194 - 418 PRU   Platelet Response Aspirin    Collection Time: 08/06/19  7:35 AM   Result Value Ref Range    Platelet Response Aspirin 570 (L) 620 - 672 ARU     Imaging  8/6/19 IR embolization: Per independent resident review and interpretation,  ALISON MARTINEZOM aneurysm coiling.

## 2019-08-06 NOTE — PLAN OF CARE
Problem: Adult Inpatient Plan of Care  Goal: Plan of Care Review  Outcome: Ongoing (interventions implemented as appropriate)  POC reviewed with pt and pt's spouse at 1730. Pt and pt's spouse verbalized understanding of thee POC. Right groin checked with moist drainage marked. Neurosurgery aware. Pt has head ache. Pt maybe discharged to home tomorrow. Questions and concerns addressed. Pt progressing towards goals of care. Please see flow sheet for detailed nursing assessment and VS. Will continue to monitor.

## 2019-08-06 NOTE — H&P
[facility]  Neurocritical Care  History & Physical    Admit Date: 8/6/2019  Service Date: 08/06/2019  Length of Stay: 0    Subjective:     Chief Complaint: Anterior communicating artery aneurysm    History of Present Illness: Mrs. Andrews is a 43 yo female w/ PMH significant for anxiety, and blindness (per pt, congenital cone problem) who presented for elective stent-assisted coiling of L ACOM aneurysm on 8/6/19.  Subsequently admitted to neuro ICU for monitoring.    Mrs. Andrews reports aneurysm was found after she was evaluated memory loss and dizziness.  No focal deficits.  No history of prior stroke/dvt/clot/MI/PE.      Reports that she has a bitemporal headache at home frequently, for which she takes tylenol PRN.  Reports taking occasional ativan (~[id number]) as needed for anxiety.  She is on aspirin/plavix at home.    Reports drinking a glass of wine ~2x/week.  Denies cigarette, recreational drug use.    Past Medical History:   Diagnosis Date    Arthritis     Blindness     Cholelithiasis     Liver hemangioma     Mental disorder     Periodic antidepressant use since childhood, not currently taking or endorsing depressive ideation    Pinched nerve      Past Surgical History:   Procedure Laterality Date    ANGIOGRAM-CEREBRAL N/A 7/15/2019    Performed by [facility] Provider at [facility] OR [facility] FLR    ANKLE FRACTURE SURGERY  2009    COLONOSCOPY N/A 2/4/2019    Performed by [facility] at [facility] ENDO    EGD (ESOPHAGOGASTRODUODENOSCOPY) N/A 11/19/2018    Performed by [facility] at [facility] ENDO    UPPER GASTROINTESTINAL ENDOSCOPY  ~1998    normal findings per patient report      No current facility-administered medications on file prior to encounter.      Current Outpatient Medications on File Prior to Encounter   Medication Sig Dispense Refill    acetaminophen (TYLENOL) 500 MG tablet Take 500 mg by mouth every 6 (six) hours as needed for Pain.      aspirin (ECOTRIN) 81 MG  EC tablet Take 1 tablet (81 mg total) by mouth once daily. 30 tablet 11    b complex vitamins tablet Take 1 tablet by mouth once daily.      cholecalciferol, vitamin D3, (VITAMIN D3) 1,000 unit capsule Take 1,000 Units by mouth once daily.      clopidogrel (PLAVIX) 75 mg tablet Take 1 tablet (75 mg total) by mouth once daily. 30 tablet 5    escitalopram oxalate (LEXAPRO) 10 MG tablet Take 1 tablet (10 mg total) by mouth once daily. 30 tablet 2    gabapentin (NEURONTIN) 300 MG capsule Take 2 capsules (600 mg total) by mouth 2 (two) times daily. (Patient taking differently: Take 600 mg by mouth 2 (two) times daily as needed. ) 120 capsule 11    LORazepam (ATIVAN) 1 MG tablet Take 1 tablet (1 mg total) by mouth every 8 (eight) hours as needed for Anxiety. 21 tablet 0    pantoprazole (PROTONIX) 20 MG tablet Take 1 tablet (20 mg total) by mouth once daily. 90 tablet 3      Allergies: Pcn [penicillins]    Family History   Problem Relation Age of Onset    Hypertension Father     Lung disease Father         calapse x 3     Stroke Father         in eye     Retinal detachment Father     Cancer Sister 23        polyps found incidentally    Vision loss Sister     Lung disease Sister         spontanious lung collapse    GI problems Sister     Retinal detachment Mother     Cancer Paternal Grandfather     Stroke Maternal Grandmother     Colon cancer Paternal Aunt     Colon polyps Maternal Uncle     No Known Problems Paternal Uncle     Parkinsonism Maternal Grandfather     Cancer Maternal Grandfather         prostate    Colon polyps Maternal Grandfather     Cancer Paternal Grandmother         skin cancer in colon     Alzheimer's disease Paternal Grandmother     Heart disease Paternal Aunt     Breast cancer Neg Hx     Diabetes Neg Hx     Miscarriages / Stillbirths Neg Hx     Crohn's disease Neg Hx     Ulcerative colitis Neg Hx     Glaucoma Neg Hx      Social History     Tobacco Use    Smoking  status: Never Smoker    Smokeless tobacco: Never Used   Substance Use Topics    Alcohol use: Yes     Alcohol/week: 3.6 oz     Types: 6 Glasses of wine per week     Frequency: 2-3 times a week     Drinks per session: 1 or 2     Binge frequency: Less than monthly     Comment: 2-3/ week wine or beer    Drug use: No     Review of Systems   Constitutional: Negative for chills and fever.   HENT: Negative for hearing loss.    Eyes: Positive for visual disturbance.   Respiratory: Negative for shortness of breath.    Cardiovascular: Negative for chest pain.   Gastrointestinal: Negative for abdominal pain, blood in stool and vomiting.   Genitourinary: Negative for hematuria.   Skin: Negative for rash.   Neurological: Negative for seizures, weakness and numbness.   Psychiatric/Behavioral: Negative for hallucinations.     Objective:     Vitals:    Temp: 97.2 °F (36.2 °C)  Pulse: 62  Rhythm: normal sinus rhythm  BP: (!) 138/90  MAP (mmHg): 109  Resp: 14  SpO2: 98 %  O2 Device (Oxygen Therapy): room air    Temp  Min: 97.2 °F (36.2 °C)  Max: 98 °F (36.7 °C)  Pulse  Min: 57  Max: 82  BP  Min: 111/75  Max: 138/90  MAP (mmHg)  Min: 87  Max: 109  Resp  Min: 14  Max: 22  SpO2  Min: 95 %  Max: 99 %    No intake/output data recorded.           Physical Exam   Constitutional: She is oriented to person, place, and time. She appears well-developed and well-nourished. No distress.   HENT:   Head: Normocephalic.   Eyes: Conjunctivae are normal.   Cardiovascular: Normal rate, regular rhythm and normal heart sounds. Exam reveals no friction rub.   No murmur heard.  Pulmonary/Chest: Effort normal and breath sounds normal. No respiratory distress. She has no wheezes.   Abdominal: Soft. Bowel sounds are normal. She exhibits no distension. There is no tenderness.   Musculoskeletal: She exhibits no edema.   Neurological: She is alert and oriented to person, place, and time.   Skin: Skin is warm and dry.   Psychiatric: She has a normal mood and  affect.       E4V5M6    alert, follows simple commands.  Oriented to month/year/day.    PERRL  EOMI  VFI grossly intact (reports light/movement in all quadrants)  Face symmetric  Facial sensation to light touch intact  Hearing symmetric  SCM, trapezius symmetric  Tongue protrudes midline, no lacerations    5/5 in BUE and BLE    2+ b/l biceps, brachioradialis, 1+ patellar    No clonus b/l.      Sensation to light touch intact throughout    Finger/nose intact b/l    Today I personally reviewed pertinent medications, lines/drains/airways, imaging, cardiology results, laboratory results, microbiology results, notably:    Recent Results (from the past 24 hour(s))   POCT urine pregnancy    Collection Time: 08/06/19  7:21 AM   Result Value Ref Range    POC Preg Test, Ur Negative Negative     Acceptable Yes    Platelet Response Plavix    Collection Time: 08/06/19  7:35 AM   Result Value Ref Range    Platelet Response Plavix 160 (L) 194 - 418 PRU   Platelet Response Aspirin    Collection Time: 08/06/19  7:35 AM   Result Value Ref Range    Platelet Response Aspirin 570 (L) 620 - 672 ARU     Imaging  8/6/19 IR embolization: Per independent resident review and interpretation,  L ACOM aneurysm coiling.              Assessment/Plan:     Neuro  * Anterior communicating artery aneurysm  Mrs. Andrews is a 45 yo female w/ PMH significant for anxiety, and blindness (per pt, congenital cone problem) who underwent successful elective stent-assisted coiling of L ACOM aneurysm on 8/6/19.  Subsequently admitted to neuro ICU for monitoring.    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  -No acute intervention    Endocrine  Severe obesity (BMI 35.0-35.9 with comorbidity)  Body mass index is 35.24 kg/m².  -Encourage weight loss    GI  GERD (gastroesophageal reflux disease)  Continue home protonix         The patient is being Prophylaxed for:  Venous Thromboembolism with: Mechanical  Stress Ulcer with: Not Applicable   Ventilator Pneumonia with: not applicable    Activity Orders          Diet NPO: NPO starting at 08/06 1443    Commode at bedside starting at 08/06 1443        Full Code    Nicolas Pool MD  Neurocritical Care  [facility]

## 2019-08-06 NOTE — ASSESSMENT & PLAN NOTE
Mrs. Andrews is a 43 yo female w/ PMH significant for anxiety, and blindness (per pt, congenital cone problem) who underwent successful elective stent-assisted coiling of L ACOM aneurysm on 8/6/19.  Subsequently admitted to neuro ICU for monitoring.    Plan  -Admit to neuro ICU  -Continue ASA/plavix   -SBP <160  -f/u any further IR recommendations  -Check baseline CBC, coags, electrolytes  -PT/OT/ST

## 2019-08-06 NOTE — TRANSFER OF CARE
"Anesthesia Transfer of Care Note    Patient: Carolina Andrews    Procedure(s) Performed: Procedure(s) (LRB):  ANGIOGRAM/EMBOLIZATION (N/A)    Patient location: PACU    Anesthesia Type: general    Transport from OR: Transported from OR on 6-10 L/min O2 by face mask with adequate spontaneous ventilation    Post pain: adequate analgesia    Post assessment: no apparent anesthetic complications and tolerated procedure well    Post vital signs: stable    Level of consciousness: awake and alert    Nausea/Vomiting: no nausea/vomiting    Complications: none    Transfer of care protocol was followed      Last vitals:   Visit Vitals  /69 (BP Location: Left arm, Patient Position: Lying)   Pulse 81   Temp 36.2 °C (97.2 °F) (Temporal)   Resp 16   Ht 5' 7" (1.702 m)   Wt 102.1 kg (225 lb)   LMP 07/06/2019   SpO2 99%   Breastfeeding? No   BMI 35.24 kg/m²     "

## 2019-08-06 NOTE — PLAN OF CARE
Plan of care reviewed with patient, patient verbalizes understanding. Pt arrived to  for Cerebral embolization. Pt oriented to unit and staff. Comfort measures utilized. Pt safely transferred from stretcher to procedural table. Fall risk reviewed with patient, fall risk interventions maintained. Safety strap applied, positioner pillows utilized to minimize pressure points. Blankets applied. Pt prepped and draped utilizing standard sterile technique. Patient placed on continuous monitoring, as required by sedation policy. Timeouts completed utilizing standard universal time-out, per department and facility policy. RN to remain at bedside, continuous monitoring maintained. Pt resting comfortably. Denies pain/discomfort. Will continue to monitor. See flow sheets for monitoring, medication administration, and updates. Patient under care of anesthesia staff.

## 2019-08-06 NOTE — ASSESSMENT & PLAN NOTE
Bitemporal, Reports consistent with her typical headache at home, for which she uses tylenol PRN  -Tylenol PRN  -monitor

## 2019-08-06 NOTE — ANESTHESIA PREPROCEDURE EVALUATION
08/06/2019  Carolina Andrews is a 44 y.o., female.    Anesthesia Evaluation    I have reviewed the Patient Summary Reports.    I have reviewed the Nursing Notes.   I have reviewed the Medications.     Review of Systems  Anesthesia Hx:  No problems with previous Anesthesia  History of prior surgery of interest to airway management or planning: Previous anesthesia: General  Denies Personal Hx of Anesthesia complications.   Cardiovascular:  Cardiovascular Normal     Pulmonary:  Pulmonary Normal    Renal/:  Renal/ Normal     Hepatic/GI:   Liver Disease,    Neurological:  Neurology Normal    Endocrine:  Endocrine Normal    Psych:   Psychiatric History        Patient Active Problem List   Diagnosis    Obesity (BMI 30-39.9)    History of ankle fracture    Posterior tibial tendonitis    Cone dystrophy    Ganglion cyst of left foot    Severe obesity (BMI 35.0-35.9 with comorbidity)    Epigastric pain    Abdominal pain    Vascular abnormality    Vertigo    Tinnitus    Complaints of memory disturbance    Aneurysm    Cerebrovascular lesion     Past Medical History:   Diagnosis Date    Arthritis     Blindness     Cholelithiasis     Liver hemangioma     Mental disorder     Periodic antidepressant use since childhood, not currently taking or endorsing depressive ideation    Pinched nerve      Past Surgical History:   Procedure Laterality Date    ANGIOGRAM-CEREBRAL N/A 7/15/2019    Performed by [facility] Diagnostic Provider at [facility] OR [address] [place] [address]    ANKLE FRACTURE SURGERY  2009    COLONOSCOPY N/A 2/4/2019    [facility] ENDO    EGD (ESOPHAGOGASTRODUODENOSCOPY) N/A 11/19/2018    Performed by [facility] at [facility] ENDO    UPPER GASTROINTESTINAL ENDOSCOPY  ~1998    normal findings per patient report         Physical Exam  General:  Well nourished    Airway/Jaw/Neck:  Airway  Findings: Mouth Opening: Normal Tongue: Normal  General Airway Assessment: Adult  Mallampati: II  TM Distance: Normal, at least 6 cm  Jaw/Neck Findings:  Neck ROM: Normal ROM      Dental:  Dental Findings: In tact   Chest/Lungs:  Chest/Lungs Findings: Clear to auscultation     Heart/Vascular:  Heart Findings: Rate: Normal  Rhythm: Regular Rhythm  Sounds: Normal        Mental Status:  Mental Status Findings:  Cooperative, Alert and Oriented         Anesthesia Plan  Type of Anesthesia, risks & benefits discussed:  Anesthesia Type:  general  Patient's Preference:   Intra-op Monitoring Plan: standard ASA monitors  Intra-op Monitoring Plan Comments:   Post Op Pain Control Plan: per primary service following discharge from PACU  Post Op Pain Control Plan Comments:   Induction:   IV  Beta Blocker:  Patient is not currently on a Beta-Blocker (No further documentation required).       Informed Consent: Patient understands risks and agrees with Anesthesia plan.  Questions answered. Anesthesia consent signed with patient.  ASA Score: 2     Day of Surgery Review of History & Physical:    H&P update referred to the surgeon.         Ready For Surgery From Anesthesia Perspective.

## 2019-08-06 NOTE — HOSPITAL COURSE
08/06/2019: Stent assisted coiling L ACOM aneurysm, admitted to neuro ICU.  08/07/2019: OLEGARIO, plan for discharge

## 2019-08-06 NOTE — PROCEDURES
Radiology Post-Procedure Note    Pre Op Diagnosis: ACom artery aneurysm    Post Op Diagnosis: Same    Procedure: Cerebral aneurysm coiling    Procedure performed by: Vladimir Salgado MD    Written Informed Consent Obtained: Yes    Specimen Removed: NO    Estimated Blood Loss: less than 50     Findings: Good post-op appearance following stent-assisted coiling of a left ACom aneurysm using Target coils and a 3x15mm Neuroform [place] stent.  Admit to neuro critical care, continue Plavix and ASA.      Right groin access closed using Angioseal.  No hematoma.    Patient tolerated procedure well.    Vladimir Salgado MD  Attending Radiologist  Interventional Neuroradiology

## 2019-08-06 NOTE — NURSING
Patient arrived to [facility] from PACU from IR from home     Type of stroke/diagnosis:  Elective coiling     TPA start and end time n/a    Thrombectomy start and end time n/a  Coiling end time 1106    Current symptoms: slight headache    Skin assessment done: Yes  Wounds noted: none, R groin site with some bloody drainage on gauze marked with sharpie     NCC notified: IRINEO Duffy

## 2019-08-06 NOTE — PROGRESS NOTES
Hemostasis achieved via right groin with use of angioseal closure device.  HOB to remain flat until 1306

## 2019-08-06 NOTE — H&P
Radiology History & Physical      SUBJECTIVE:     Chief Complaint: A comm aneurysm    History of Present Illness:  Carolina Andrews is a 44 y.o. female who presents for endovascular coiling of her ACom artery aneurysm.  Patient will undergo stent and coiling of this lesion.     Past Medical History:   Diagnosis Date    Arthritis     Blindness     Cholelithiasis     Liver hemangioma     Mental disorder     Periodic antidepressant use since childhood, not currently taking or endorsing depressive ideation    Pinched nerve      Past Surgical History:   Procedure Laterality Date    ANGIOGRAM-CEREBRAL N/A 7/15/2019    Performed by [facility] Diagnostic Provider at [facility] OR [facility]LR    ANKLE FRACTURE SURGERY  2009    COLONOSCOPY N/A 2/4/2019    [facility]formed [facility] at [facility] ENDO    EGD (ESOPHAGOGASTRODUODENOSCOPY) N/A 11/19/2018    Performed by [facility] at [facility] ENDO    UPPER GASTROINTESTINAL ENDOSCOPY  ~1998    normal findings per patient report       Home Meds:   Prior to Admission medications    Medication Sig Start Date End Date Taking? Authorizing Provider   acetaminophen (TYLENOL) 500 MG tablet Take 500 mg by mouth every 6 (six) hours as needed for Pain.   Yes Historical Provider, MD   aspirin (ECOTRIN) 81 MG EC tablet Take 1 tablet (81 mg total) by mouth once daily. 7/17/19 7/16/20 Yes Maria R Noe DNP   b complex vitamins tablet Take 1 tablet by mouth once daily.   Yes Historical Provider, MD   cholecalciferol, vitamin D3, (VITAMIN D3) 1,000 unit capsule Take 1,000 Units by mouth once daily.   Yes Historical Provider, MD   clopidogrel (PLAVIX) 75 mg tablet Take 1 tablet (75 mg total) by mouth once daily. 7/17/19 7/16/20 Yes Maria R Noe DNP   escitalopram oxalate (LEXAPRO) 10 MG tablet Take 1 tablet (10 mg total) by mouth once daily. 7/18/19 7/17/20 Yes Desean Crump MD   gabapentin (NEURONTIN) 300 MG capsule Take 2 capsules (600 mg total) by mouth 2 (two) times  daily.  Patient taking differently: Take 600 mg by mouth 2 (two) times daily as needed.  5/16/19  Yes Eugenie Leory MD   LORazepam (ATIVAN) 1 MG tablet Take 1 tablet (1 mg total) by mouth every 8 (eight) hours as needed for Anxiety. 6/14/19 8/5/19 Yes Desean Crump MD   pantoprazole (PROTONIX) 20 MG tablet Take 1 tablet (20 mg total) by mouth once daily. 5/6/19 5/5/20 Yes Desean Crump MD     Anticoagulants/Antiplatelets: aspirin and Plavix    Allergies:   Review of patient's allergies indicates:   Allergen Reactions    Pcn [penicillins] Rash     rash     Sedation History:  no adverse reactions    Review of Systems:   Hematological: no known coagulopathies  Respiratory: no shortness of breath  Cardiovascular: no chest pain  Gastrointestinal: no abdominal pain  Genito-Urinary: no dysuria  Musculoskeletal: negative  Neurological: no TIA or stroke symptoms         OBJECTIVE:     Vital Signs (Most Recent)  Temp: 98 °F (36.7 °C) (08/06/19 0710)  Pulse: 71 (08/06/19 0710)  Resp: 18 (08/06/19 0710)  BP: 115/77 (08/06/19 0710)  SpO2: 99 % (08/06/19 0710)    Physical Exam:  ASA: 2  Mallampati: 2    General: no acute distress  Mental Status: alert and oriented to person, place and time  HEENT: normocephalic, atraumatic  Chest: unlabored breathing  Heart: regular heart rate  Abdomen: nondistended  Extremity: moves all extremities    Laboratory  Lab Results   Component Value Date    INR 0.9 07/15/2019       Lab Results   Component Value Date    WBC 5.24 07/15/2019    HGB 14.6 07/15/2019    HCT 44.3 07/15/2019    MCV 93 07/15/2019     07/15/2019      Lab Results   Component Value Date    GLU 96 07/15/2019     07/15/2019    K 4.1 07/15/2019     07/15/2019    CO2 26 07/15/2019    BUN 8 07/15/2019    CREATININE 0.8 07/15/2019    CALCIUM 10.1 07/15/2019    ALT 23 07/15/2019    AST 21 07/15/2019    ALBUMIN 3.9 07/15/2019    BILITOT 0.5 07/15/2019    BILIDIR 0.1 05/28/2014       ASSESSMENT/PLAN:      Sedation Plan: general anesthesia  Patient will undergo cerebral angiogram with stent assisted coiling of her anterior communicating artery aneurysm.    Chandler Pyle M.D.  PGY-II Radiology  Pager: [phone]

## 2019-08-06 NOTE — PLAN OF CARE
Problem: Adult Inpatient Plan of Care  Goal: Plan of Care Review  Outcome: Ongoing (interventions implemented as appropriate)  Vital signs stable. Afebrile. Alert, oriented and following commands. Pt complaining of slight headache but not requiring PRN medications at this time. Denies nausea. Dressing to right groin remains intact with some drainage. Area marked. Due to void. Passed NATHALIA screen and regular diet ordered. POC reviewed with patient and . Understanding verbalized.

## 2019-08-07 ENCOUNTER — TELEPHONE (OUTPATIENT)
Dept: FAMILY MEDICINE | Facility: CLINIC | Age: 44
End: 2019-08-07

## 2019-08-07 VITALS
OXYGEN SATURATION: 97 % | BODY MASS INDEX: 38.06 KG/M2 | WEIGHT: 242.5 LBS | RESPIRATION RATE: 20 BRPM | HEIGHT: 67 IN | DIASTOLIC BLOOD PRESSURE: 78 MMHG | HEART RATE: 64 BPM | TEMPERATURE: 99 F | SYSTOLIC BLOOD PRESSURE: 122 MMHG

## 2019-08-07 LAB
ALBUMIN SERPL BCP-MCNC: 3.1 G/DL (ref 3.5–5.2)
ALP SERPL-CCNC: 60 U/L (ref 55–135)
ALT SERPL W/O P-5'-P-CCNC: 24 U/L (ref 10–44)
ANION GAP SERPL CALC-SCNC: 8 MMOL/L (ref 8–16)
APTT BLDCRRT: 26.3 SEC (ref 21–32)
AST SERPL-CCNC: 29 U/L (ref 10–40)
BASOPHILS # BLD AUTO: 0.02 K/UL (ref 0–0.2)
BASOPHILS NFR BLD: 0.5 % (ref 0–1.9)
BILIRUB SERPL-MCNC: 0.4 MG/DL (ref 0.1–1)
BUN SERPL-MCNC: 7 MG/DL (ref 6–20)
CALCIUM SERPL-MCNC: 8.2 MG/DL (ref 8.7–10.5)
CHLORIDE SERPL-SCNC: 110 MMOL/L (ref 95–110)
CO2 SERPL-SCNC: 19 MMOL/L (ref 23–29)
CREAT SERPL-MCNC: 0.7 MG/DL (ref 0.5–1.4)
DIFFERENTIAL METHOD: ABNORMAL
EOSINOPHIL # BLD AUTO: 0.1 K/UL (ref 0–0.5)
EOSINOPHIL NFR BLD: 2.3 % (ref 0–8)
ERYTHROCYTE [DISTWIDTH] IN BLOOD BY AUTOMATED COUNT: 13.1 % (ref 11.5–14.5)
EST. GFR  (AFRICAN AMERICAN): >60 ML/MIN/1.73 M^2
EST. GFR  (NON AFRICAN AMERICAN): >60 ML/MIN/1.73 M^2
GLUCOSE SERPL-MCNC: 91 MG/DL (ref 70–110)
HCT VFR BLD AUTO: 27.8 % (ref 37–48.5)
HGB BLD-MCNC: 9.2 G/DL (ref 12–16)
IMM GRANULOCYTES # BLD AUTO: 0.03 K/UL (ref 0–0.04)
IMM GRANULOCYTES NFR BLD AUTO: 0.8 % (ref 0–0.5)
INR PPP: 1.1 (ref 0.8–1.2)
LYMPHOCYTES # BLD AUTO: 1.2 K/UL (ref 1–4.8)
LYMPHOCYTES NFR BLD: 32 % (ref 18–48)
MAGNESIUM SERPL-MCNC: 2.3 MG/DL (ref 1.6–2.6)
MCH RBC QN AUTO: 30.6 PG (ref 27–31)
MCHC RBC AUTO-ENTMCNC: 33.1 G/DL (ref 32–36)
MCV RBC AUTO: 92 FL (ref 82–98)
MONOCYTES # BLD AUTO: 0.3 K/UL (ref 0.3–1)
MONOCYTES NFR BLD: 7 % (ref 4–15)
NEUTROPHILS # BLD AUTO: 2.2 K/UL (ref 1.8–7.7)
NEUTROPHILS NFR BLD: 57.4 % (ref 38–73)
NRBC BLD-RTO: 0 /100 WBC
PHOSPHATE SERPL-MCNC: 3.5 MG/DL (ref 2.7–4.5)
PLATELET # BLD AUTO: 144 K/UL (ref 150–350)
PMV BLD AUTO: 11.3 FL (ref 9.2–12.9)
POC ACTIVATED CLOTTING TIME K: 131 SEC (ref 74–137)
POC ACTIVATED CLOTTING TIME K: 230 SEC (ref 74–137)
POTASSIUM SERPL-SCNC: 4.6 MMOL/L (ref 3.5–5.1)
PROT SERPL-MCNC: 6.2 G/DL (ref 6–8.4)
PROTHROMBIN TIME: 11.5 SEC (ref 9–12.5)
RBC # BLD AUTO: 3.01 M/UL (ref 4–5.4)
SAMPLE: ABNORMAL
SAMPLE: NORMAL
SODIUM SERPL-SCNC: 137 MMOL/L (ref 136–145)
WBC # BLD AUTO: 3.84 K/UL (ref 3.9–12.7)

## 2019-08-07 PROCEDURE — 85730 THROMBOPLASTIN TIME PARTIAL: CPT

## 2019-08-07 PROCEDURE — 63600175 PHARM REV CODE 636 W HCPCS: Performed by: STUDENT IN AN ORGANIZED HEALTH CARE EDUCATION/TRAINING PROGRAM

## 2019-08-07 PROCEDURE — 83735 ASSAY OF MAGNESIUM: CPT

## 2019-08-07 PROCEDURE — 85025 COMPLETE CBC W/AUTO DIFF WBC: CPT

## 2019-08-07 PROCEDURE — 80053 COMPREHEN METABOLIC PANEL: CPT

## 2019-08-07 PROCEDURE — 99232 PR SUBSEQUENT HOSPITAL CARE,LEVL II: ICD-10-PCS | Mod: ,,, | Performed by: NURSE PRACTITIONER

## 2019-08-07 PROCEDURE — 99232 SBSQ HOSP IP/OBS MODERATE 35: CPT | Mod: ,,, | Performed by: NURSE PRACTITIONER

## 2019-08-07 PROCEDURE — 92523 SPEECH SOUND LANG COMPREHEN: CPT

## 2019-08-07 PROCEDURE — 85610 PROTHROMBIN TIME: CPT

## 2019-08-07 PROCEDURE — 25000003 PHARM REV CODE 250: Performed by: STUDENT IN AN ORGANIZED HEALTH CARE EDUCATION/TRAINING PROGRAM

## 2019-08-07 PROCEDURE — 84100 ASSAY OF PHOSPHORUS: CPT

## 2019-08-07 PROCEDURE — 97165 OT EVAL LOW COMPLEX 30 MIN: CPT

## 2019-08-07 PROCEDURE — 92610 EVALUATE SWALLOWING FUNCTION: CPT

## 2019-08-07 RX ADMIN — ESCITALOPRAM OXALATE 10 MG: 10 TABLET ORAL at 08:08

## 2019-08-07 RX ADMIN — CLOPIDOGREL 75 MG: 75 TABLET, FILM COATED ORAL at 08:08

## 2019-08-07 RX ADMIN — ACETAMINOPHEN 650 MG: 325 TABLET ORAL at 08:08

## 2019-08-07 RX ADMIN — ACETAMINOPHEN 650 MG: 325 TABLET ORAL at 01:08

## 2019-08-07 RX ADMIN — ASPIRIN 81 MG: 81 TABLET, COATED ORAL at 08:08

## 2019-08-07 RX ADMIN — GABAPENTIN 600 MG: 300 CAPSULE ORAL at 08:08

## 2019-08-07 RX ADMIN — SODIUM CHLORIDE: 0.9 INJECTION, SOLUTION INTRAVENOUS at 05:08

## 2019-08-07 RX ADMIN — PANTOPRAZOLE SODIUM 40 MG: 40 TABLET, DELAYED RELEASE ORAL at 08:08

## 2019-08-07 NOTE — CONSULTS
Inpatient consult to Physical Medicine Rehab  Consult performed by: BRANDON Carreon  Consult ordered by: Nicolas Pool MD  Reason for consult: assess rehab needs      Consult received.  Reviewed patient history and current admission.  Rehab team following.  Full consult to follow.    GERRI Albarado, DENIP-C  Physical Medicine & Rehabilitation   08/07/2019  Spectralink: [id number]

## 2019-08-07 NOTE — TELEPHONE ENCOUNTER
----- Message from Rufus Jaffe sent at 8/7/2019 11:07 AM CDT -----  Contact: Leslye Galdamez [facility]  Type: Needs Medical Advice    Who Called:  Leslye  Best Call Back Number: [phone]; [phone] (patient)  Additional Information: Caller would like to schedule 1 week hospital follow up. Please call to advise. Thanks!

## 2019-08-07 NOTE — HOSPITAL COURSE
8/7: NAEON. + headaches, with response to tylenol / left temporal to occipital radiation - similar to headaches in the past. B/l neck discomfort - post extubation. Otherwise negative concerns for worsening NIHSS. Plans for discharge on DAPT and follow-up in IR clinic.

## 2019-08-07 NOTE — SUBJECTIVE & OBJECTIVE
Review of Systems  Constitutional: Denies fevers, weight loss, chills, or weakness.  Eyes: Denies changes in vision.  ENT: Denies dysphagia, nasal discharge, ear pain or discharge.  Cardiovascular: Denies chest pain, palpitations, orthopnea, or claudication.  Respiratory: Denies shortness of breath, cough, hemoptysis, or wheezing.  GI: Denies nausea/vomitting, hematochezia, melena, abd pain, or changes in appetite.  : Denies dysuria, incontinence, or hematuria.  Musculoskeletal: Denies joint pain or myalgias.  Skin/breast: Denies rashes, lumps, lesions, or discharge.  Neurologic: Denies headache, dizziness, vertigo, or paresthesias.  Psychiatric: Denies changes in mood or hallucinations.  Endocrine: Denies polyuria, polydipsia, heat/cold intolerance.  Hematologic/Lymph: Denies lymphadenopathy, easy bruising or easy bleeding.  Allergic/Immunologic: Denies rash, rhinitis.   Objective:     Vitals:  Temp: 98.7 °F (37.1 °C)  Pulse: 64  Rhythm: normal sinus rhythm  BP: 122/78  MAP (mmHg): 95  Resp: 20  SpO2: 97 %  O2 Device (Oxygen Therapy): room air    Temp  Min: 97.2 °F (36.2 °C)  Max: 98.7 °F (37.1 °C)  Pulse  Min: 50  Max: 93  BP  Min: 97/64  Max: 149/71  MAP (mmHg)  Min: 77  Max: 109  Resp  Min: 11  Max: 33  SpO2  Min: 95 %  Max: 100 %    08/06 0701 - 08/07 0700  In: 2911.3 [P.O.:650; I.V.:2261.3]  Out: 1550 [Urine:1550]   Unmeasured Output  Urine Occurrence: 1  Stool Occurrence: 0  Pad Count: 1       Physical Exam  GA: Alert, comfortable, no acute distress.   HEENT: No scleral icterus or JVD.   Pulmonary: Clear to auscultation A/L.   Cardiac: RRR S1 & S2 w/o rubs/murmurs/gallops.   Abdominal: Bowel sounds present x 4. No appreciable hepatosplenomegaly.  Skin: No jaundice, rashes, or visible lesions.  Neuro:  --GCS: E3 V4 M6  --Mental Status:  AAOX4, follows all commands, clear speech  --Pupils 4->3mm, PERRL.   --Corneal reflex, gag, cough intact.  --STEVENS spont    Medications:  Continuous Scheduled  clopidogrel  75 mg Daily   escitalopram oxalate 10 mg Daily   gabapentin 600 mg BID   pantoprazole 40 mg Daily   PRN  acetaminophen 650 mg Q6H PRN   labetalol 10 mg Q15 Min PRN   sodium chloride 0.9% 10 mL PRN     Today I personally reviewed pertinent medications, lines/drains/airways, imaging, laboratory results,     Diet  Diet Adult Regular (IDDSI Level 7)

## 2019-08-07 NOTE — PLAN OF CARE
Problem: Adult Inpatient Plan of Care  Goal: Plan of Care Review  Outcome: Ongoing (interventions implemented as appropriate)  POC reviewed with pt at 0500. Pt verbalized understanding. Questions and concerns addressed. No acute events overnight. Neuro status remains unchanged from baseline. Tylenol admin x 1 for headache 6/6. AM labs collected. No replacements admin. Pt progressing toward goals. Will continue to monitor. See flowsheets for full assessment and VS info

## 2019-08-07 NOTE — PLAN OF CARE
08/07/19 1110   Post-Acute Status   Post-Acute Authorization Other   Other Status No Post-Acute Service Needs   Discharge Delays None known at this time       Leslye Duarte RN, CCRN-K, [facility]  Neuro-Critical Care   X [phone]

## 2019-08-07 NOTE — PROGRESS NOTES
Patient discharged via wheelchair accompanied by patient's  and transport tech. Pt belongings brought with patient.

## 2019-08-07 NOTE — PT/OT/SLP EVAL
"Speech Language Pathology Evaluation  Cognitive/Bedside Swallow/ Discharge Summary     Patient Name:  Carolina Andrews   MRN:  [id number]  Admitting Diagnosis: Anterior communicating artery aneurysm    Recommendations:                  General Recommendations:  Follow-up not indicated  Diet recommendations:  Regular, Thin   Aspiration Precautions: Standard aspiration precautions   General Precautions: Standard,    Communication strategies:  none    History:     Past Medical History:   Diagnosis Date    Arthritis     Blindness     Cholelithiasis     Liver hemangioma     Mental disorder     Periodic antidepressant use since childhood, not currently taking or endorsing depressive ideation    Pinched nerve        Past Surgical History:   Procedure Laterality Date    ANGIOGRAM-CEREBRAL N/A 7/15/2019    Performed by [facility] Provider at [facility] OR 2ND FLR    ANGIOGRAM/EMBOLIZATION N/A 8/6/2019    Performed by Andressa Surgeon at [facility] ANDRES[facility]    ANKLE FRACTURE SURGERY  2009    COLONOSCOPY N/A 2/4/2019    Performed by [facility]ers MD at [facility] ENDO    EGD (ESOPHAGOGASTRODUODENOSCOPY) N/A 11/19/2018    Performed by Dale [facility]ers MD at [facility] ENDO    UPPER GASTROINTESTINAL ENDOSCOPY  ~1998    normal findings per patient report       Social History: Patient lives with spouse and son     Modified Barium Swallow: N/A    Chest X-Rays: N/A    Prior diet: regular solid and thin liqiuds    Occupation/hobbies/homemaking: 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 Pt reports she has baseline memory issues but she uses "[facility]" and calendars to keep track of appointments.    Subjective     Pt awake alert and cooperative     Pain/Comfort:  · Pain Rating 1: 0/10  · Pain Rating Post-Intervention 1: 0/10    Objective:     Cognitive Status:    AAOx4, memory, reasoning skills,  problem solving skills, safety awareness, compare/contrast, " sequincing skills , organization skill,  all WFL       Receptive Language:   Comprehension:   WFL    Pragmatics:    WFL     Expressive Language:  Verbal:    .Verbal language skills were wfl with no evidence of aphasia.  Pt. Expressed their thoughts coherently in conversation with no evidence of confusion or word finding deficits      Motor Speech:  WFL    Voice:   WFL    Visual-Spatial:  WFL- donna clock without difficulty independently     Reading:   WFL pt will read functional material at a close distance     Written Expression:   WFL     Oral Musculature Evaluation  · Oral Musculature: WFL  · Dentition: present and adequate    Bedside Swallow Eval:   Consistencies Assessed:  · Thin liquids 6oz via single sips from open cup   · Solids entire AM meal tray      Oral Phase:   · WFL    Pharyngeal Phase:   · no overt clinical signs/symptoms of aspiration  · no overt clinical signs/symptoms of pharyngeal dysphagia    Compensatory Strategies  · None    Treatment:   Education provided to Pt re: SLP role in acute care setting, overall impressions and therapeutic goals. Whiteboard updated.    Assessment:     Carolina Andrews is a 44 y.o. female with an SLP diagnosis of intact oral feeding and swallowing skills and intact speech-language and cognitive skills. No further skilled speech services warranted.        Plan:     Plan of Care reviewed with:  patient   · SLP Follow-Up:  No       Discharge recommendations:  Discharge Facility/Level of Care Needs: home   Barriers to Discharge:  None    Time Tracking:     SLP Treatment Date:   08/07/19  Speech Start Time:  0808  Speech Stop Time:  0825     Speech Total Time (min):  17 min    Billable Minutes: Eval 17     Lucille Joyce CCC-SLP  08/07/2019

## 2019-08-07 NOTE — PLAN OF CARE
08/07/19 1103   Discharge Assessment   Assessment Type Discharge Planning Assessment   Confirmed/corrected address and phone number on facesheet? Yes   Assessment information obtained from? Patient   Expected Length of Stay (days) 1   Communicated expected length of stay with patient/caregiver yes   Prior to hospitilization cognitive status: Alert/Oriented   Prior to hospitalization functional status: Independent   Current cognitive status: Alert/Oriented   Current Functional Status: Independent   Lives With spouse;child(christopher), dependent   Able to Return to Prior Arrangements yes   Is patient able to care for self after discharge? Yes   Who are your caregiver(s) and their phone number(s)? Jamesdiana Andrews () [phone]   Patient's perception of discharge disposition home or selfcare   Readmission Within the Last 30 Days no previous admission in last 30 days   Patient currently being followed by outpatient case management? No   Patient currently receives any other outside agency services? No   Equipment Currently Used at Home none   Do you have any problems affording any of your prescribed medications? No   Is the patient taking medications as prescribed? yes   Does the patient have transportation home? Yes   Transportation Anticipated family or friend will provide   Does the patient receive services at the [facility] Clinic? No   Discharge Plan A Home with family   Discharge Plan B Home with family   DME Needed Upon Discharge  none   Patient/Family in Agreement with Plan yes         Discharge/ My Health Packet Folder Given to patient/family:      yes      PCP:  Desean Crump MD        Pharmacy:    [id number]  [place]  [address]  [place] [address]  Phone: [phone] Fax: [phone]        Emergency Contacts:    Extended Emergency Contact Information  Primary Emergency Contact: James Andrews  Address: [address] [place] [address] [place]  [facility]  Mobile Phone: [phone]  Relation: Spouse  Preferred language: English   needed? No    Insurance:  Payor: [employer] MGD MEDICARE / Plan: [place] "[employer] / Product Type: Medicare Advantage /       Leslye Duarte RN, CCRN-K, [facility]  Neuro-Critical Care   X [phone]

## 2019-08-07 NOTE — ASSESSMENT & PLAN NOTE
S/p ACOMM aneurysm elective stent assisted coiling - 7/7/19  On DAPT     NCC course   -- + headaches, with response to tylenol / left temporal to occipital radiation - similar to headaches in the past.   -- B/l neck discomfort - post extubation.   -- Otherwise negative concerns for worsening NIHSS.     Plans for discharge on DAPT and follow-up in IR clinic.

## 2019-08-07 NOTE — PLAN OF CARE
Problem: Occupational Therapy Goal  Goal: Occupational Therapy Goal  Goals not set 2* no further OT needed.  Outcome: Outcome(s) achieved Date Met: 08/07/19  OT evaluation completed.  ANTONELLA Bahena  8/7/2019

## 2019-08-07 NOTE — PROGRESS NOTES
Interval history:    NAEON. + headaches / B/L neck discomfort - negative concerns for worsening NIHSS     Review of Systems   Constitutional: Negative for chills and fever.   HENT: Negative for hearing loss.    Eyes: Positive for visual disturbance.   Respiratory: Negative for shortness of breath.    Cardiovascular: Negative for chest pain.   Gastrointestinal: Negative for abdominal pain, blood in stool and vomiting.   Genitourinary: Negative for hematuria.   Skin: Negative for rash.   Neurological: Negative for seizures, weakness and numbness.   Psychiatric/Behavioral: Negative for hallucinations.      Objective:      Vitals:     Temp: 97.2 °F (36.2 °C)  Pulse: 62  Rhythm: normal sinus rhythm  BP: (!) 138/90  MAP (mmHg): 109  Resp: 14  SpO2: 98 %  O2 Device (Oxygen Therapy): room air     Temp  Min: 97.2 °F (36.2 °C)  Max: 98 °F (36.7 °C)  Pulse  Min: 57  Max: 82  BP  Min: 111/75  Max: 138/90  MAP (mmHg)  Min: 87  Max: 109  Resp  Min: 14  Max: 22  SpO2  Min: 95 %  Max: 99 %     No intake/output data recorded.             Physical Exam   Constitutional: She is oriented to person, place, and time. She appears well-developed and well-nourished. No distress.   HENT:   Head: Normocephalic.   Eyes: Conjunctivae are normal.   Cardiovascular: Normal rate, regular rhythm and normal heart sounds. Exam reveals no friction rub.   No murmur heard.  Pulmonary/Chest: Effort normal and breath sounds normal. No respiratory distress. She has no wheezes.   Abdominal: Soft. Bowel sounds are normal. She exhibits no distension. There is no tenderness.   Musculoskeletal: She exhibits no edema.   Neurological: She is alert and oriented to person, place, and time.   Skin: Skin is warm and dry.   Psychiatric: She has a normal mood and affect.         E4V5M6     alert, follows simple commands.  Oriented to month/year/day.     PERRL  EOMI  VFI grossly intact (reports light/movement in all quadrants)  Face symmetric  Facial sensation to light  touch intact  Hearing symmetric  SCM, trapezius symmetric  Tongue protrudes midline, no lacerations     5/5 in BUE and BLE     2+ b/l biceps, brachioradialis, 1+ patellar     No clonus b/l.       Sensation to light touch intact throughout     Finger/nose intact b/l     Today I personally reviewed pertinent medications, lines/drains/airways, imaging, cardiology results, laboratory results, microbiology results, notably:        Scheduled Meds:   clopidogrel  75 mg Oral Daily    escitalopram oxalate  10 mg Oral Daily    gabapentin  600 mg Oral BID    pantoprazole  40 mg Oral Daily     Continuous Infusions:  PRN Meds:acetaminophen, labetalol, sodium chloride 0.9%    Vital signs in last 24 hours:  Temp:  [97.2 °F (36.2 °C)-98.5 °F (36.9 °C)] 98.5 °F (36.9 °C)  Pulse:  [50-93] 60  Resp:  [11-33] 15  SpO2:  [95 %-100 %] 96 %  BP: ()/(60-90) 113/69    Intake/Output last 3 shifts:  I/O last 3 completed shifts:  In: 2911.3 [P.O.:650; I.V.:2261.3]  Out: 1550 [Urine:1550]  Intake/Output this shift:  I/O this shift:  In: 325 [P.O.:250; I.V.:75]  Out: -      Laboratory        Lab Results   Component Value Date     INR 0.9 07/15/2019             Lab Results   Component Value Date     WBC 5.24 07/15/2019     HGB 14.6 07/15/2019     HCT 44.3 07/15/2019     MCV 93 07/15/2019      07/15/2019            Lab Results   Component Value Date     GLU 96 07/15/2019      07/15/2019     K 4.1 07/15/2019      07/15/2019     CO2 26 07/15/2019     BUN 8 07/15/2019     CREATININE 0.8 07/15/2019     CALCIUM 10.1 07/15/2019     ALT 23 07/15/2019     AST 21 07/15/2019     ALBUMIN 3.9 07/15/2019     BILITOT 0.5 07/15/2019     BILIDIR 0.1 05/28/2014      ASSESSMENT/PLAN:        Neuro  Headache  Assessment & Plan  - continue prn tylenol   - avoid ibuprofen / triptants     * Anterior communicating artery aneurysm  Assessment & Plan  S/p ACOMM aneurysm elective stent assisted coiling - 7/7/19  On DAPT     NCC course   -- +  headaches, with response to tylenol / left temporal to occipital radiation - similar to headaches in the past.   -- B/l neck discomfort - post extubation.   -- Otherwise negative concerns for worsening NIHSS.     Plans for discharge on DAPT and follow-up in IR clinic.     Psychiatric  Anxiety  Assessment & Plan  - continue home meds     GI  GERD (gastroesophageal reflux disease)  Assessment & Plan  continue home meds     Kathie Teague MD  PGY IV, Neurology   Page: [phone]

## 2019-08-07 NOTE — DISCHARGE SUMMARY
[facility]  Vascular Neurology  Comprehensive Stroke Center  Discharge Summary     Summary:     Admit Date: 8/6/2019  6:46 AM    Discharge Date and Time:  08/07/2019 10:15 AM    Attending Physician: Dixon Fisher MD     Discharge Provider: Kathie Teague MD    History of Present Illness: Mrs. Andrews is a 45 yo female w/ PMH significant for anxiety, and blindness (per pt, congenital cone problem) who presented for elective stent-assisted coiling of L ACOM aneurysm on 8/6/19.       Mrs. Andrews reports aneurysm was found after she was evaluated memory loss and dizziness.  No focal deficits.  No history of prior stroke/dvt/clot/MI/PE.  Reports that she has a bitemporal headache at home frequently, for which she takes tylenol PRN.  Reports taking occasional ativan (~[id number]) as needed for anxiety.  She is on aspirin/plavix at home. Reports drinking a glass of wine ~2x/week.  Denies cigarette, recreational drug use.    Hospital Course (synopsis of major diagnoses, care, treatment, and services provided during the course of the hospital stay): S/p ACOMM aneurysm elective stent assisted coiling - 7/7/19  On DAPT      NCC course   -- + headaches, with response to tylenol / left temporal to occipital radiation - similar to headaches in the past.   -- B/l neck discomfort - post extubation.   -- Otherwise negative concerns for worsening NIHSS.       Assessment/Plan:     Diagnostic Results:      Brain Imaging:   NA    Vessel Imaging:  IR:   4 x 3 mm anterior communicating artery aneurysm with a wide neck filled from the left internal carotid injections.  This was treated successfully using stent assisted coiling with satisfactory result.  Patient tolerated the procedure and awoke neurological intact.  There is approximately 30% packing density.    Cardiac Evaluation:   NA    Interventions: Coiling    Complications: None    Disposition: Home or Self Care    Final Active Diagnoses:    Diagnosis  Date Noted POA    PRINCIPAL PROBLEM:  Anterior communicating artery aneurysm [I67.1] 08/06/2019 Yes    Headache [R51] 08/06/2019 Yes    Anxiety [F41.9] 08/06/2019 Yes    GERD (gastroesophageal reflux disease) [K21.9] 08/06/2019 Yes    Severe obesity (BMI 35.0-35.9 with comorbidity) [E66.01, Z68.35] 10/25/2018 Not Applicable    Cone dystrophy [H35.53] 07/08/2015 Yes      Problems Resolved During this Admission:     * Anterior communicating artery aneurysm  S/p ACOMM aneurysm elective stent assisted coiling - 7/7/19  On DAPT      NCC course   -- + headaches, with response to tylenol / left temporal to occipital radiation - similar to headaches in the past.   -- B/l neck discomfort - post extubation.   -- Otherwise negative concerns for worsening NIHSS.      Plans for discharge on DAPT and follow-up in IR clinic.     Headache  - continue prn tylenol   - avoid ibuprofen / triptants     Cone dystrophy  - baseline         Recommendations:     Post-discharge complication risks: None    Stroke Education given to: patient and family    Follow-up in IR Clinic in 4 weeks     Discharge Plan:  Antithrombotics: Aspirin 81mg, Clopidogrel 75mg    Follow Up:  Follow-up Information     [facility] - Interventional Rad In 4 weeks.    Specialty:  Interventional Radiology  Contact information:  [address]  [facility] [address] [phone]  Additional information:  [facility] [place], [address]           Desean Crump MD.    Specialty:  Family Medicine  Why:  As needed, If symptoms worsen  Contact information:  [address] [facility] [address] [phone]                   Patient Instructions:   No discharge procedures on file.    Medications:  Reconciled Home Medications:      Medication List      CONTINUE taking these medications    acetaminophen 500 MG tablet  Commonly known as:  TYLENOL  Take 500 mg by mouth every 6 (six) hours as needed for Pain.     aspirin 81 MG EC tablet  Commonly known as:   ECOTRIN  Take 1 tablet (81 mg total) by mouth once daily.     b complex vitamins tablet  Take 1 tablet by mouth once daily.     clopidogrel 75 mg tablet  Commonly known as:  PLAVIX  Take 1 tablet (75 mg total) by mouth once daily.     escitalopram oxalate 10 MG tablet  Commonly known as:  LEXAPRO  Take 1 tablet (10 mg total) by mouth once daily.     gabapentin 300 MG capsule  Commonly known as:  NEURONTIN  Take 300 mg by mouth 2 (two) times daily.     pantoprazole 20 MG tablet  Commonly known as:  PROTONIX  Take 1 tablet (20 mg total) by mouth once daily.     VITAMIN D3 1,000 unit capsule  Generic drug:  cholecalciferol (vitamin D3)  Take 1,000 Units by mouth once daily.            Kathie Teague MD  [facility]

## 2019-08-07 NOTE — HOSPITAL COURSE
S/p ACOMM aneurysm elective stent assisted coiling - 7/7/19  On DAPT      NCC course   -- + headaches, with response to tylenol / left temporal to occipital radiation - similar to headaches in the past.   -- B/l neck discomfort - post extubation.   -- Otherwise negative concerns for worsening NIHSS.

## 2019-08-07 NOTE — SUBJECTIVE & OBJECTIVE
Subjective:     Interval History:     NAEON. No worsening NIHSS / + headache, B/L neck discomfort     Current Neurological Medications:       Current Facility-Administered Medications   Medication Dose Route Frequency Provider Last Rate Last Dose    acetaminophen tablet 650 mg  650 mg Oral Q6H PRN Nicolas Pool MD   650 mg at 08/07/19 0808    clopidogrel tablet 75 mg  75 mg Oral Daily Nicolas Pool MD   75 mg at 08/07/19 0808    escitalopram oxalate tablet 10 mg  10 mg Oral Daily Nicolas Pool MD   10 mg at 08/07/19 0808    gabapentin capsule 600 mg  600 mg Oral BID Nicolas Pool MD   600 mg at 08/07/19 0808    labetalol 20 mg/4 mL (5 mg/mL) IV syring  10 mg Intravenous Q15 Min PRN Nicolas Pool MD        pantoprazole EC tablet 40 mg  40 mg Oral Daily Nicolas Pool MD   40 mg at 08/07/19 0808    sodium chloride 0.9% flush 10 mL  10 mL Intravenous PRN Nicolas Pool MD           Review of Systems   HENT: Negative for hearing loss.    Eyes: Positive for visual disturbance. Negative for photophobia and pain.   Respiratory: Negative for shortness of breath.    Cardiovascular: Negative for chest pain.   Gastrointestinal: Negative for abdominal pain.   Genitourinary: Negative for dysuria.   Neurological: Positive for headaches. Negative for dizziness, tremors, seizures, syncope, facial asymmetry, speech difficulty, light-headedness and numbness.   Psychiatric/Behavioral: Negative for agitation.     Objective:     Vital Signs (Most Recent):  Temp: 98.5 °F (36.9 °C) (08/07/19 0700)  Pulse: 70 (08/07/19 0900)  Resp: 16 (08/07/19 0900)  BP: 113/69 (08/07/19 0900)  SpO2: 96 % (08/07/19 0900) Vital Signs (24h Range):  Temp:  [97.2 °F (36.2 °C)-98.5 °F (36.9 °C)] 98.5 °F (36.9 °C)  Pulse:  [50-93] 70  Resp:  [11-33] 16  SpO2:  [95 %-100 %] 96 %  BP: ()/(60-90) 113/69     Weight: 110 kg (242 lb 8.1 oz)  Body mass index is 37.98 kg/m².    Physical Exam   Constitutional: She is  oriented to person, place, and time. No distress.   HENT:   Head: Normocephalic and atraumatic.   Neck: Normal range of motion.   Cardiovascular:   No murmur heard.  Pulmonary/Chest: No respiratory distress.   Abdominal: Soft.   Musculoskeletal: She exhibits no deformity.   Neurological: She is oriented to person, place, and time. She has normal strength.   Reflex Scores:       Tricep reflexes are 2+ on the right side and 2+ on the left side.       Bicep reflexes are 2+ on the right side and 2+ on the left side.       Brachioradialis reflexes are 2+ on the right side and 2+ on the left side.       Patellar reflexes are 2+ on the right side and 2+ on the left side.       Achilles reflexes are 2+ on the right side and 2+ on the left side.      NEUROLOGICAL EXAMINATION:     MENTAL STATUS   Oriented to person, place, and time.   Level of consciousness: alert    CRANIAL NERVES     CN V   Facial sensation intact.     CN VII   Facial expression full, symmetric.     CN VIII   CN VIII normal.     CN IX, X   CN IX normal.   CN X normal.     CN XI   CN XI normal.     CN XII   CN XII normal.     MOTOR EXAM     Strength   Strength 5/5 throughout.     REFLEXES     Reflexes   Right brachioradialis: 2+  Left brachioradialis: 2+  Right biceps: 2+  Left biceps: 2+  Right triceps: 2+  Left triceps: 2+  Right patellar: 2+  Left patellar: 2+  Right achilles: 2+  Left achilles: 2+  Right plantar: normal  Left plantar: normal    SENSORY EXAM   Light touch normal.       Significant Labs:   CBC:   Recent Labs   Lab 08/06/19  1456 08/07/19  0208   WBC 6.24 3.84*   HGB 13.3 9.2*   HCT 39.8 27.8*    144*     CMP:   Recent Labs   Lab 08/06/19  1456 08/07/19  0101    91    137   K 4.1 4.6    110   CO2 25 19*   BUN 8 7   CREATININE 0.8 0.7   CALCIUM 8.6* 8.2*   MG 2.3 2.3   PROT 6.8 6.2   ALBUMIN 3.5 3.1*   BILITOT 0.6 0.4   ALKPHOS 64 60   AST 23 29   ALT 25 24   ANIONGAP 6* 8   EGFRNONAA >60.0 >60.0     Inflammatory  Markers: No results for input(s): SEDRATE, CRP, PROCAL in the last 48 hours.  Urine Culture: No results for input(s): LABURIN in the last 48 hours.  Urine Studies: No results for input(s): COLORU, APPEARANCEUA, PHUR, SPECGRAV, PROTEINUA, GLUCUA, KETONESU, BILIRUBINUA, OCCULTUA, NITRITE, UROBILINOGEN, LEUKOCYTESUR, RBCUA, WBCUA, BACTERIA, SQUAMEPITHEL, HYALINECASTS in the last 48 hours.    Invalid input(s): DARBYR  All pertinent lab results from the past 24 hours have been reviewed.    Significant Imaging: I have reviewed and interpreted all pertinent imaging results/findings within the past 24 hours.

## 2019-08-07 NOTE — PROGRESS NOTES
[facility]  Neurocritical Care  Progress Note    Admit Date: 8/6/2019  Service Date: 08/07/2019  Length of Stay: 1    Subjective:     Chief Complaint: Anterior communicating artery aneurysm    History of Present Illness: Mrs. Andrews is a 43 yo female w/ PMH significant for anxiety, and blindness (per pt, congenital cone problem) who presented for elective stent-assisted coiling of L ACOM aneurysm on 8/6/19.  Subsequently admitted to neuro ICU for monitoring.    Mrs. Andrews reports aneurysm was found after she was evaluated memory loss and dizziness.  No focal deficits.  No history of prior stroke/dvt/clot/MI/PE.      Reports that she has a bitemporal headache at home frequently, for which she takes tylenol PRN.  Reports taking occasional ativan (~G9olvil) as needed for anxiety.  She is on aspirin/plavix at home.    Reports drinking a glass of wine ~2x/week.  Denies cigarette, recreational drug use.    Hospital Course: 08/06/2019: Stent assisted coiling L ACOM aneurysm, admitted to neuro ICU.  08/07/2019: OLEGARIO, plan for discharge        Review of Systems  Constitutional: Denies fevers, weight loss, chills, or weakness.  Eyes: Denies changes in vision.  ENT: Denies dysphagia, nasal discharge, ear pain or discharge.  Cardiovascular: Denies chest pain, palpitations, orthopnea, or claudication.  Respiratory: Denies shortness of breath, cough, hemoptysis, or wheezing.  GI: Denies nausea/vomitting, hematochezia, melena, abd pain, or changes in appetite.  : Denies dysuria, incontinence, or hematuria.  Musculoskeletal: Denies joint pain or myalgias.  Skin/breast: Denies rashes, lumps, lesions, or discharge.  Neurologic: Denies headache, dizziness, vertigo, or paresthesias.  Psychiatric: Denies changes in mood or hallucinations.  Endocrine: Denies polyuria, polydipsia, heat/cold intolerance.  Hematologic/Lymph: Denies lymphadenopathy, easy bruising or easy bleeding.  Allergic/Immunologic: Denies rash,  rhinitis.   Objective:     Vitals:  Temp: 98.7 °F (37.1 °C)  Pulse: 64  Rhythm: normal sinus rhythm  BP: 122/78  MAP (mmHg): 95  Resp: 20  SpO2: 97 %  O2 Device (Oxygen Therapy): room air    Temp  Min: 97.2 °F (36.2 °C)  Max: 98.7 °F (37.1 °C)  Pulse  Min: 50  Max: 93  BP  Min: 97/64  Max: 149/71  MAP (mmHg)  Min: 77  Max: 109  Resp  Min: 11  Max: 33  SpO2  Min: 95 %  Max: 100 %    08/06 0701 - 08/07 0700  In: 2911.3 [P.O.:650; I.V.:2261.3]  Out: 1550 [Urine:1550]   Unmeasured Output  Urine Occurrence: 1  Stool Occurrence: 0  Pad Count: 1       Physical Exam  GA: Alert, comfortable, no acute distress.   HEENT: No scleral icterus or JVD.   Pulmonary: Clear to auscultation A/L.   Cardiac: RRR S1 & S2 w/o rubs/murmurs/gallops.   Abdominal: Bowel sounds present x 4. No appreciable hepatosplenomegaly.  Skin: No jaundice, rashes, or visible lesions.  Neuro:  --GCS: E3 V4 M6  --Mental Status:  AAOX4, follows all commands, clear speech  --Pupils 4->3mm, PERRL.   --Corneal reflex, gag, cough intact.  --STEVENS spont    Medications:  Continuous Scheduled  clopidogrel 75 mg Daily   escitalopram oxalate 10 mg Daily   gabapentin 600 mg BID   pantoprazole 40 mg Daily   PRN  acetaminophen 650 mg Q6H PRN   labetalol 10 mg Q15 Min PRN   sodium chloride 0.9% 10 mL PRN     Today I personally reviewed pertinent medications, lines/drains/airways, imaging, laboratory results,     Diet  Diet Adult Regular (IDDSI Level 7)        Assessment/Plan:     Neuro  * Anterior communicating artery aneurysm  Mrs. Andrews is a 43 yo female w/ PMH significant for anxiety, and blindness (per pt, congenital cone problem) who underwent successful elective stent-assisted coiling of L ACOM aneurysm on 8/6/19.  Subsequently admitted to neuro ICU for monitoring.    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  -No acute intervention    Endocrine  Severe obesity (BMI 35.0-35.9 with comorbidity)  Body mass index is 37.98 kg/m².  -Encourage weight loss    GI  GERD (gastroesophageal reflux disease)  Continue home protonix           The patient is being Prophylaxed for:  Venous Thromboembolism with: Mechanical  Stress Ulcer with: None  Ventilator Pneumonia with: not applicable    Activity Orders          Diet Adult Regular (IDDSI Level 7): Regular starting at 08/06 1523    Commode at bedside starting at 08/06 1443        Full Code    Edith Funez NP  Neurocritical Care  [facility]

## 2019-08-07 NOTE — PLAN OF CARE
Problem: Adult Inpatient Plan of Care  Goal: Plan of Care Review  Outcome: Ongoing (interventions implemented as appropriate)  POC reviewed with pt at 1100. Pt will be discharged to home today. LDAs removed. NIHHS 24 hr completed. AVS confirmed. Discharge instructions reviewed.  Questions and concerns addressed. Patient progressing towards goals of care.

## 2019-08-07 NOTE — HPI
Mrs. Andrews is a 45 yo female w/ PMH significant for anxiety, and blindness (per pt, congenital cone problem) who presented for elective stent-assisted coiling of L ACOM aneurysm on 8/6/19.       Mrs. Andrews reports aneurysm was found after she was evaluated memory loss and dizziness.  No focal deficits.  No history of prior stroke/dvt/clot/MI/PE.  Reports that she has a bitemporal headache at home frequently, for which she takes tylenol PRN.  Reports taking occasional ativan (~[id number]) as needed for anxiety.  She is on aspirin/plavix at home. Reports drinking a glass of wine ~2x/week.  Denies cigarette, recreational drug use.

## 2019-08-07 NOTE — PLAN OF CARE
08/07/19 1109   Final Note   Assessment Type Final Discharge Note   Anticipated Discharge Disposition Home   What phone number can be called within the next 1-3 days to see how you are doing after discharge? [phone]   Hospital Follow Up  Appt(s) scheduled? No  (PCP will call to selma)   Discharge plans and expectations educations in teach back method with documentation complete? Yes   Right Care Referral Info   Post Acute Recommendation No Care       Leslye Duarte RN, CCRN-K, [facility]  Neuro-Critical Care   [phone]

## 2019-08-07 NOTE — SUBJECTIVE & OBJECTIVE
Interval history:    NAEON. + headaches / B/L neck discomfort - negative concerns for worsening NIHSS     Review of Systems   Constitutional: Negative for chills and fever.   HENT: Negative for hearing loss.    Eyes: Positive for visual disturbance.   Respiratory: Negative for shortness of breath.    Cardiovascular: Negative for chest pain.   Gastrointestinal: Negative for abdominal pain, blood in stool and vomiting.   Genitourinary: Negative for hematuria.   Skin: Negative for rash.   Neurological: Negative for seizures, weakness and numbness.   Psychiatric/Behavioral: Negative for hallucinations.      Objective:      Vitals:     Temp: 97.2 °F (36.2 °C)  Pulse: 62  Rhythm: normal sinus rhythm  BP: (!) 138/90  MAP (mmHg): 109  Resp: 14  SpO2: 98 %  O2 Device (Oxygen Therapy): room air     Temp  Min: 97.2 °F (36.2 °C)  Max: 98 °F (36.7 °C)  Pulse  Min: 57  Max: 82  BP  Min: 111/75  Max: 138/90  MAP (mmHg)  Min: 87  Max: 109  Resp  Min: 14  Max: 22  SpO2  Min: 95 %  Max: 99 %     No intake/output data recorded.             Physical Exam   Constitutional: She is oriented to person, place, and time. She appears well-developed and well-nourished. No distress.   HENT:   Head: Normocephalic.   Eyes: Conjunctivae are normal.   Cardiovascular: Normal rate, regular rhythm and normal heart sounds. Exam reveals no friction rub.   No murmur heard.  Pulmonary/Chest: Effort normal and breath sounds normal. No respiratory distress. She has no wheezes.   Abdominal: Soft. Bowel sounds are normal. She exhibits no distension. There is no tenderness.   Musculoskeletal: She exhibits no edema.   Neurological: She is alert and oriented to person, place, and time.   Skin: Skin is warm and dry.   Psychiatric: She has a normal mood and affect.         E4V5M6     alert, follows simple commands.  Oriented to month/year/day.     PERRL  EOMI  VFI grossly intact (reports light/movement in all quadrants)  Face symmetric  Facial sensation to light  touch intact  Hearing symmetric  SCM, trapezius symmetric  Tongue protrudes midline, no lacerations     5/5 in BUE and BLE     2+ b/l biceps, brachioradialis, 1+ patellar     No clonus b/l.       Sensation to light touch intact throughout     Finger/nose intact b/l     Today I personally reviewed pertinent medications, lines/drains/airways, imaging, cardiology results, laboratory results, microbiology results, notably:

## 2019-08-07 NOTE — PT/OT/SLP EVAL
"Occupational Therapy   Evaluation and Discharge Note    Name: Carolina Andrews  MRN: [id number]  Admitting Diagnosis:  Anterior communicating artery aneurysm 1 Day Post-Op    Recommendations:     Discharge Recommendations: home  Discharge Equipment Recommendations:  none  Barriers to discharge:  None    Assessment:     Carolina Andrews is a 44 y.o. female with a medical diagnosis of Anterior communicating artery aneurysm. At this time, patient is functioning at their prior level of function and does not require further acute OT services.     Plan:     During this hospitalization, patient does not require further acute OT services.  Please re-consult if situation changes.    · Plan of Care Reviewed with: patient    Subjective     Patient: "I was having problems with memory and balance for about a year; and headaches.  They found vitamin deficiencies and that improved once treated.    Occupational Profile:  Patient resides in [place] with  and 15 year old son in 3 story home with bedroom on the 2nd level; 19 steps to enter, with rail on the left.  PTA patient independent with ADLs, not driving.  Currently owns no DME.  Patient is left handed.  Unemployed.  Hobbies:  Gardening, reading.  Roles/Responsibilities:  Wife, mother, aunt, sister.     Pain/Comfort:  · Pain Rating 1: 4/10  · Location 1: (headache)  · Pain Addressed 1: Nurse notified, Reposition  · Pain Rating Post-Intervention 1: 4/10    Patients cultural, spiritual, Confucianism conflicts given the current situation: no    Objective:     Communicated with: Nurse prior to session.  Patient found supine with blood pressure cuff, telemetry, peripheral IV, pulse ox (continuous), PureWick upon OT entry to room.    General Precautions: Standard, fall   Orthopedic Precautions:N/A   Braces: N/A     Occupational Performance:    Bed Mobility:    · Patient completed Rolling/Turning to Left with  independence  · Patient completed Rolling/Turning to Right with " independence  · Patient completed Scooting/Bridging with independence  · Patient completed Supine to Sit with independence  · Patient completed Sit to Supine with independence    Functional Mobility/Transfers:  · Patient completed Sit <> Stand Transfer with independence  with  no assistive device   · Patient completed Bed <> Chair Transfer using Stand Pivot technique with independence with no assistive device    Activities of Daily Living:  · Grooming: independence    · Upper Body Dressing: independence    · Lower Body Dressing: independence    · Toileting: independence      Cognitive/Visual Perceptual:  Cognitive/Psychosocial Skills:     -       Oriented to: Person, Place, Time and Situation   -       Follows Commands/attention:Follows two-step commands  -       Communication: clear/fluent  -       Safety awareness/insight to disability: intact   -       Mood/Affect/Coping skills/emotional control: Appropriate to situation and Cooperative  Visual/Perceptual:      -blind      Physical Exam:  Postural examination/scapula alignment:    -       Rounded shoulders  Skin integrity: Visible skin intact  Edema:  None noted  Sensation:    -       Intact  Upper Extremity Range of Motion:     -       Right Upper Extremity: WNL  -       Left Upper Extremity: WNL  Upper Extremity Strength:    -       Right Upper Extremity: WNL  -       Left Upper Extremity: WNL    AMPAC 6 Click ADL:  AMPAC Total Score: 24  Education:    Patient left supine with all lines intact, call button in reach and bed alarm on    GOALS:   Multidisciplinary Problems     Occupational Therapy Goals     Not on file          Multidisciplinary Problems (Resolved)        Problem: Occupational Therapy Goal    Goal Priority Disciplines Outcome Interventions   Occupational Therapy Goal   (Resolved)     OT, PT/OT Outcome(s) achieved    Description:  Goals not set 2* no further OT needed.                    History:     Past Medical History:   Diagnosis Date     Arthritis     Blindness     Cholelithiasis     Liver hemangioma     Mental disorder     Periodic antidepressant use since childhood, not currently taking or endorsing depressive ideation    Pinched nerve        Past Surgical History:   Procedure Laterality Date    ANGIOGRAM-CEREBRAL N/A 7/15/2019    Performed by [facility] Provider [facility]    ANKLE FRACTURE SURGERY  2009    COLONOSCOPY N/A 2/4/2019    Performed by [facility] ENDO    EGD (ESOPHAGOGASTRODUODENOSCOPY) N/A 11/19/2018    Performed by [facility] at [facility] ENDO    UPPER GASTROINTESTINAL ENDOSCOPY  ~1998    normal findings per patient report       Time Tracking:     OT Date of Treatment: 08/07/19  OT Start Time: 0517  OT Stop Time: 0530  OT Total Time (min): 13 min    Billable Minutes:Evaluation 13    ANTONELLA Bahena  8/7/2019

## 2019-08-07 NOTE — ASSESSMENT & PLAN NOTE
Mrs. Andrews is a 45 yo female w/ PMH significant for anxiety, and blindness (per pt, congenital cone problem) who underwent successful elective stent-assisted coiling of L ACOM aneurysm on 8/6/19.  Subsequently admitted to neuro ICU for monitoring.    Plan  -Admit to neuro ICU  -Continue ASA/plavix   -SBP <160  -f/u any further IR recommendations  -Check baseline CBC, coags, electrolytes  -PT/OT/ST  -NAEON, discharge today

## 2019-08-07 NOTE — SUBJECTIVE & OBJECTIVE
Neurologic Chief Complaint: Acomm stent assited coiling     Subjective:     Interval History:     No worsening NIHSS / + headache, B/L neck discomfort     HPI, Past Medical, Family, and Social History remains the same as documented in the initial encounter.     Review of Systems  Scheduled Meds:   clopidogrel  75 mg Oral Daily    escitalopram oxalate  10 mg Oral Daily    gabapentin  600 mg Oral BID    pantoprazole  40 mg Oral Daily     Continuous Infusions:  PRN Meds:acetaminophen, labetalol, sodium chloride 0.9%    Objective:     Vital Signs (Most Recent):  Temp: 98.5 °F (36.9 °C) (08/07/19 0700)  Pulse: 70 (08/07/19 0900)  Resp: 16 (08/07/19 0900)  BP: 113/69 (08/07/19 0900)  SpO2: 96 % (08/07/19 0900)  BP Location: Left arm    Vital Signs Range (Last 24H):  Temp:  [97.2 °F (36.2 °C)-98.5 °F (36.9 °C)]   Pulse:  [50-93]   Resp:  [11-33]   BP: ()/(60-90)   SpO2:  [95 %-100 %]   BP Location: Left arm    Physical Exam    Neurological Exam:   LOC: alert  Attention Span: Good   Language: No aphasia  Articulation: No dysarthria  Orientation: Person, Place, Time   Visual Fields: Baseline visual deficits   EOM (CN III, IV, VI): Full/intact  Pupils (CN II, III): PERRL  Facial Sensation (CN V): Normal  Facial Movement (CN VII): Symmetric facial expression    Gag Reflex: present  Reflexes: 2+ throughout  Motor: Arm left  Normal 5/5  Leg left  Normal 5/5  Arm right  Normal 5/5  Leg right Normal 5/5  Cebellar: No evidence of appendicular or axial ataxia  Sensation: Intact to light touch, temperature and vibration  Tone: Normal tone throughout    Laboratory:  CMP:   Recent Labs   Lab 08/07/19  0101   CALCIUM 8.2*   ALBUMIN 3.1*   PROT 6.2      K 4.6   CO2 19*      BUN 7   CREATININE 0.7   ALKPHOS 60   ALT 24   AST 29   BILITOT 0.4     BMP:   Recent Labs   Lab 08/07/19  0101      K 4.6      CO2 19*   BUN 7   CREATININE 0.7   CALCIUM 8.2*     CBC:   Recent Labs   Lab 08/07/19  0208   WBC 3.84*    RBC 3.01*   HGB 9.2*   HCT 27.8*   *   MCV 92   MCH 30.6   MCHC 33.1     Lipid Panel: No results for input(s): CHOL, LDLCALC, HDL, TRIG in the last 168 hours.  Coagulation:   Recent Labs   Lab 08/07/19  0208   INR 1.1   APTT 26.3     Platelet Aggregation Study: No results for input(s): PLTAGG, PLTAGINTERP, PLTAGREGLACO, ADPPLTAGGREG in the last 168 hours.  Hgb A1C:   Recent Labs   Lab 08/06/19  1456   HGBA1C 5.2     TSH: No results for input(s): TSH in the last 168 hours.    Diagnostic Results     Brain Imaging   NA     Vessel Imaging   4 x 3 mm anterior communicating artery aneurysm with a wide neck filled from the left internal carotid injections.  This was treated successfully using stent assisted coiling with satisfactory result.  Patient tolerated the procedure and awoke neurological intact.     Cardiac Imaging   NA

## 2019-08-07 NOTE — PLAN OF CARE
Problem: SLP Goal  Goal: SLP Goal  Pt appearing at or near speech language an swallow baseline no further skilled speech services warranted.     Lucille Joyce MS, CCC-SLP  Speech Language Pathologist  Pager: [phone]  Date 8/7/2019

## 2019-08-09 ENCOUNTER — PATIENT OUTREACH (OUTPATIENT)
Dept: ADMINISTRATIVE | Facility: CLINIC | Age: 44
End: 2019-08-09

## 2019-08-09 NOTE — PATIENT INSTRUCTIONS
Surgery for a Brain Aneurysm  Surgery for an aneurysm is done as soon as possible if it just bled. It is usually not urgent if the aneurysm has not bled. One of 2 types of surgery is generally used if the aneurysm has not yet burst (ruptured). In open surgery, a part of your skull is removed. In an endovascular procedure, your surgeon goes through the blood vessel leading toward your aneurysm. Treatment may not reverse any damage already done if the aneurysm has ruptured. The goal is to prevent the aneurysm from bleeding.    Open surgery  Your surgeon reaches your brain through your skull. You will receive sleep medicine (anesthesia) during the surgery. Your surgeon makes a scalp incision, then small holes are made in your skull. The bone between the holes is cut and lifted away. The membrane (dura) is peeled back. Trapped blood and cerebrospinal fluid may be removed. Your surgeon closes off (clips) your aneurysm. Or the artery leading to the aneurysm is sealed off (occluded). The dura and the piece of skull are put back in place. A device that measures the pressure inside your skull or that drains your spinal fluid may be left in one of the small holes.  Clipping the aneurysm  Your surgeon may put a clip on your aneurysm where it bulges from the artery. This keeps blood from going into the aneurysm. As a result, future bleeding is stopped and nearby brain tissue is protected from more damage. Your surgeon makes sure that the clip is secure before finishing the surgery. This method is done through open surgery.  Occlusion and bypass  In some cases, it may be best to stop blood flow through the artery leading to your aneurysm. This is called occlusion. In most cases, it is done as open surgery. Sometimes occlusion is combined with a bypass. A bypass sends blood around the occlusion. It brings the blood to the part of your brain that had been fed by the damaged artery. A small blood vessel is used for the  bypass.  Endovascular procedure  For some aneurysms, an endovascular procedure may be best. This is done in an X-ray lab by a specially trained doctor such as an interventional neuroradiologist,a neurosurgeon, or a neurologist. Anesthesia is given to block pain. Then a catheter is guided through the arteries from your groin to your brain. Coils are threaded into your aneurysm. The coils cause a blood clot to form in your aneurysm. This seals it off. This is a very effective and less risky way to treat aneurysms than open surgery. But there is a slightly greater risk that the aneurysm will re-form when compared to clipping. Not all aneurysms can be treated by an endovascular approach. Newer methods are being developed. These include injecting special fluids next to the aneurysm to block it off internally.   Risks and complications  Risks and complications include:   Blood clots in the brain, or stroke   Brain swelling or bleeding   The surgery can fail to occlude the aneurysm   Weakness, paralysis, or loss of vision   Confusion, loss of speech, loss of memory   Infection   Spasm in a blood vessel that limits blood flow which can cause you to have a stroke   Jerking or abnormal movements, loss of consciousness (seizures)   Swelling of the brain (hydrocephalus)   Coma   Death  © 6691-5278 [employer]. [address] [place] [address]. All rights reserved. This information is not intended as a substitute for professional medical care. Always follow y Surgery for a Brain Aneurysm: Recovery at Home  An aneurysm may have affected your loved ones body functions, skills, and emotions. He or she may move on to a skilled nursing facility, a rehabilitation center, or go home and return for therapy as an outpatient. You, too, can help him or her recover.    Regaining strength and skills  Depending on the site of an aneurysm, certain abilities may have been affected. This is because each part of  the brain controls certain functions. With special help, many people regain strength and some skills. A physical therapist helps strengthen muscles. An occupational therapist teaches daily skills, such as getting dressed and bathing. A speech therapist helps improve swallowing, as well as speech. Recovery of these functions is often not complete, and recovery occurs at unpredictable rates that vary from days to months. The goal of therapy is to maximize the speed and ultimate potential of the recovery.  Changes in emotions  The aneurysm may have affected your loved ones emotions. He or she may be depressed, worried, or mentally tired. These feelings should go away with time. Some people also have trouble controlling their emotions. They have sudden mood shifts. The shifts may be out of context with what is going on. This is called lability. For instance, your loved one may cry after telling a joke. In many cases, lability lessens over time.  How you can help  You can help your loved one in many ways. Make sure he or she returns for all follow-up visits as requested. Talk with the healthcare provider if any sadness or emotional ability does not improve on a weekly basis. If you don't see any improvement in the first weeks, talk with a neuropsychologist, psychologist, or psychiatrist. And check that your loved one takes all medicines as directed. Be patient with mood swings, pain, or extreme tiredness (fatigue) that your loved one may feel. Also, social work services in the medical center can help coordinate resources to help your loved one and your family with recovery.  When to call the surgeon  Call the surgeon right away if you notice any of the following in your loved one:    Involuntary movements or a seizure   A severe headache   Any loss of function   A high or long-lasting fever   Drainage, redness, or pain at the incision site   Fainting or falling. Either of these may signal changes in brain  function.   Pain in leg or swelling   Ongoing nausea or vomiting   Burning during urination   Before stopping any of your medicine  Your loved one may have to take a few medicines to control pain, blood pressure, and other problems. Its very important that all medicines be taken as directed. Medicines work best if they are taken on time.      © 3691-0338 [employer]. [address] [place] [address], [place] [address]. All rights reserved. This information is not intended as a substitute for professional medical care. Always follow your healthcare professional's instructions. our healthcare professional's instructions.

## 2019-08-09 NOTE — ANESTHESIA POSTPROCEDURE EVALUATION
Anesthesia Post Evaluation    Patient: Carolina Andrews    Procedure(s) Performed: Procedure(s) (LRB):  ANGIOGRAM/EMBOLIZATION (N/A)    Final Anesthesia Type: general  Patient location during evaluation: PACU  Patient participation: Yes- Able to Participate  Level of consciousness: awake and alert and oriented  Post-procedure vital signs: reviewed and stable  Pain management: adequate  Airway patency: patent  PONV status at discharge: No PONV  Anesthetic complications: no      Cardiovascular status: hemodynamically stable  Respiratory status: unassisted, spontaneous ventilation and room air  Hydration status: euvolemic  Follow-up not needed.          Vitals Value Taken Time   /78 8/7/2019 11:02 AM   Temp 37.1 °C (98.7 °F) 8/7/2019 11:00 AM   Pulse 73 8/7/2019 11:27 AM   Resp 23 8/7/2019 11:26 AM   SpO2 94 % 8/7/2019 11:27 AM   Vitals shown include unvalidated device data.      Event Time     Out of Recovery 16:12:21          Pain/Radha Score: No data recorded

## 2019-08-09 NOTE — TELEPHONE ENCOUNTER
C3 nurse attempted to contact patient. No answer. The following message was left for the patient to return the call:  Good morning, my name is Radha and I am a registered nurse calling on behalf of [facility] from the [facility].  This is a Transitional Care call for Carolina Andrews.  When you have a moment please contact us at [phone] between 8 and 4, Monday through Friday. If you have questions or issues, a nurse is available 24 hours every day at our ON CALL # thats [phone]. On behalf of [facility], thank you, and have a nice day.  The patient has a scheduled HOSFU appointment on 8/12/2019 @ 1500 hrs. Message sent to Physician staff.

## 2019-08-12 ENCOUNTER — OFFICE VISIT (OUTPATIENT)
Dept: FAMILY MEDICINE | Facility: CLINIC | Age: 44
End: 2019-08-12
Payer: MEDICARE

## 2019-08-12 VITALS
HEIGHT: 67 IN | WEIGHT: 231 LBS | TEMPERATURE: 98 F | BODY MASS INDEX: 36.26 KG/M2 | SYSTOLIC BLOOD PRESSURE: 98 MMHG | HEART RATE: 54 BPM | DIASTOLIC BLOOD PRESSURE: 70 MMHG

## 2019-08-12 DIAGNOSIS — F41.8 DEPRESSION WITH ANXIETY: ICD-10-CM

## 2019-08-12 DIAGNOSIS — Z95.9 STATUS POST ARTERIAL STENT: ICD-10-CM

## 2019-08-12 DIAGNOSIS — Z09 HOSPITAL DISCHARGE FOLLOW-UP: Primary | ICD-10-CM

## 2019-08-12 DIAGNOSIS — H35.53 CONE DYSTROPHY: ICD-10-CM

## 2019-08-12 DIAGNOSIS — H54.8 LEGALLY BLIND: ICD-10-CM

## 2019-08-12 DIAGNOSIS — I67.1 ANTERIOR COMMUNICATING ARTERY ANEURYSM: ICD-10-CM

## 2019-08-12 DIAGNOSIS — E66.01 SEVERE OBESITY: ICD-10-CM

## 2019-08-12 DIAGNOSIS — G44.89 OTHER HEADACHE SYNDROME: ICD-10-CM

## 2019-08-12 DIAGNOSIS — K21.9 GASTROESOPHAGEAL REFLUX DISEASE WITHOUT ESOPHAGITIS: ICD-10-CM

## 2019-08-12 DIAGNOSIS — D69.6 THROMBOCYTOPENIA: ICD-10-CM

## 2019-08-12 PROBLEM — R10.9 ABDOMINAL PAIN: Status: RESOLVED | Noted: 2019-02-04 | Resolved: 2019-08-12

## 2019-08-12 PROCEDURE — 99999 PR PBB SHADOW E&M-EST. PATIENT-LVL III: ICD-10-PCS | Mod: PBBFAC,,, | Performed by: NURSE PRACTITIONER

## 2019-08-12 PROCEDURE — 99496 TRANSJ CARE MGMT HIGH F2F 7D: CPT | Mod: S$GLB,,, | Performed by: NURSE PRACTITIONER

## 2019-08-12 PROCEDURE — 99499 UNLISTED E&M SERVICE: CPT | Mod: S$GLB,,, | Performed by: NURSE PRACTITIONER

## 2019-08-12 PROCEDURE — 99999 PR PBB SHADOW E&M-EST. PATIENT-LVL III: CPT | Mod: PBBFAC,,, | Performed by: NURSE PRACTITIONER

## 2019-08-12 PROCEDURE — 99496 TRANSITIONAL CARE MANAGE SERVICE 7 DAY DISCHARGE: ICD-10-PCS | Mod: S$GLB,,, | Performed by: NURSE PRACTITIONER

## 2019-08-12 PROCEDURE — 99499 RISK ADDL DX/OHS AUDIT: ICD-10-PCS | Mod: S$GLB,,, | Performed by: NURSE PRACTITIONER

## 2019-08-12 RX ORDER — ESCITALOPRAM OXALATE 10 MG/1
10 TABLET ORAL DAILY
Qty: 90 TABLET | Refills: 1 | Status: SHIPPED | OUTPATIENT
Start: 2019-08-12 | End: 2020-02-10 | Stop reason: SDUPTHER

## 2019-08-12 NOTE — PROGRESS NOTES
Subjective:       Patient ID: Carolina Andrews is a 44 y.o. female.    Chief Complaint: Hospital Follow Up    Chief Complaint  Chief Complaint   Patient presents with    Hospital Follow Up     Transitional Care Note    Family and/or Caretaker present at visit?  No.  Diagnostic tests reviewed/disposition: No diagnosic tests pending after this hospitalization.  Disease/illness education:  Anterior communicating artery aneurysm with stent assisted coiling, dual antiplatelet therapy  Home health/community services discussion/referrals: Patient does not have home health established from hospital visit.  They do not need home health.  If needed, we will set up home health for the patient.   Establishment or re-establishment of referral orders for community resources: No other necessary community resources.   Discussion with other health care providers: No discussion with other health care providers necessary.     HPI  Carolina Andrews is a 44 y.o. female with medical diagnoses as listed in the medical history and problem list that presents for hospital follow-up.  Patient was recently hospitalized on 8/6/2019 with a 4cm x 3cm anterior communicating artery aneurysm that was treated electively with stent assisted coiling.  Stent was placed through the right groin. Patient was discharged to home after procedure on 8/7/2019.  She is currently on dual antiplatelet therapy with aspirin and Plavix. Patient states has been having headaches since waking from procedure, states the headache has been similar to her usual headache, over the temples, bilateral and moving from side to side, has been taking tylenol with some relief, headache is much improved in past 2 days.  Patient is regularly followed for headaches, anxiety, and GERD. Patient is legally blind to both eyes, and is disabled.  Blood pressure today is 98/70.  BMI is 36.18 and the patient's weight is unchanged at 231 lb since her last visit.  Established patient with last  clinic appointment 6/14/2019.  The patient is due for a Pneumovax vaccine and a tetanus vaccine.  The tetanus must be received at the pharmacy.      PAST MEDICAL HISTORY:  Past Medical History:   Diagnosis Date    Arthritis     Blindness     Cholelithiasis     Liver hemangioma     Mental disorder     Periodic antidepressant use since childhood, not currently taking or endorsing depressive ideation    Pinched nerve        PAST SURGICAL HISTORY:  Past Surgical History:   Procedure Laterality Date    ANGIOGRAM-CEREBRAL N/A 7/15/2019    Performed by [facility] at [facility]    ANGIOGRAM/EMBOLIZATION N/A 8/6/2019    Performed by Andressa Surgeon at [facility]    ANKLE FRACTURE SURGERY  2009    COLONOSCOPY N/A 2/4/2019    Performed by [facility] at [facility]    EGD (ESOPHAGOGASTRODUODENOSCOPY) N/A 11/19/2018    Performed by [facility] at [facility] END[facility]    UPPER GASTROINTESTINAL ENDOSCOPY  ~1998    normal findings per patient report       SOCIAL HISTORY:  Social History     Socioeconomic History    Marital status:      Spouse name: Not on file    Number of children: Not on file    Years of education: Not on file    Highest education level: Not on file   Occupational History     Employer: [employer]   Social Needs    Financial resource strain: Not hard at all    Food insecurity:     Worry: Never true     Inability: Never true    Transportation needs:     Medical: No     Non-medical: No   Tobacco Use    Smoking status: Never Smoker    Smokeless tobacco: Never Used   Substance and Sexual Activity    Alcohol use: Yes     Alcohol/week: 3.6 oz     Types: 6 Glasses of wine per week     Frequency: 2-3 times a week     Drinks per session: 1 or 2     Binge frequency: Less than monthly     Comment: 2-3/ week wine or beer    Drug use: No    Sexual activity: Yes     Partners: Male     Birth control/protection: None   Lifestyle    Physical activity:     Days per week: 0  days     Minutes per session: Not on file    Stress: To some extent   Relationships    Social connections:     Talks on phone: More than three times a week     Gets together: Three times a week     Attends Holiness service: Not on file     Active member of club or organization: No     Attends meetings of clubs or organizations: Never     Relationship status:    Other Topics Concern    Not on file   Social History Narrative    Not on file       FAMILY HISTORY:  Family History   Problem Relation Age of Onset    Hypertension Father     Lung disease Father         calapse x 3     Stroke Father         in eye     Retinal detachment Father     Cancer Sister 23        polyps found incidentally    Vision loss Sister     Lung disease Sister         spontanious lung collapse    GI problems Sister     Retinal detachment Mother     Cancer Paternal Grandfather     Stroke Maternal Grandmother     Colon cancer Paternal Aunt     Colon polyps Maternal Uncle     No Known Problems Paternal Uncle     Parkinsonism Maternal Grandfather     Cancer Maternal Grandfather         prostate    Colon polyps Maternal Grandfather     Cancer Paternal Grandmother         skin cancer in colon     Alzheimer's disease Paternal Grandmother     Heart disease Paternal Aunt     Breast cancer Neg Hx     Diabetes Neg Hx     Miscarriages / Stillbirths Neg Hx     Crohn's disease Neg Hx     Ulcerative colitis Neg Hx     Glaucoma Neg Hx        ALLERGIES AND MEDICATIONS: updated and reviewed.  Review of patient's allergies indicates:   Allergen Reactions    Pcn [penicillins] Rash     rash     Current Outpatient Medications   Medication Sig Dispense Refill    acetaminophen (TYLENOL) 500 MG tablet Take 500 mg by mouth every 6 (six) hours as needed for Pain.      aspirin (ECOTRIN) 81 MG EC tablet Take 1 tablet (81 mg total) by mouth once daily. 30 tablet 11    b complex vitamins tablet Take 1 tablet by mouth once daily.       cholecalciferol, vitamin D3, (VITAMIN D3) 1,000 unit capsule Take 1,000 Units by mouth once daily.      clopidogrel (PLAVIX) 75 mg tablet Take 1 tablet (75 mg total) by mouth once daily. 30 tablet 5    escitalopram oxalate (LEXAPRO) 10 MG tablet Take 1 tablet (10 mg total) by mouth once daily. 90 tablet 1    gabapentin (NEURONTIN) 300 MG capsule Take 300 mg by mouth 2 (two) times daily.      pantoprazole (PROTONIX) 20 MG tablet Take 1 tablet (20 mg total) by mouth once daily. 90 tablet 3     No current facility-administered medications for this visit.        I have reviewed the patient's medical, family, and social history in detail and updated the computerized patient record.    Review of Systems   Constitutional: Positive for fatigue. Negative for activity change, appetite change, chills and fever.        Was very fatigued right after procedure. Appetite is good.    HENT: Positive for congestion, postnasal drip, rhinorrhea and tinnitus. Negative for ear pain, sinus pressure, sinus pain and sore throat.         Patient has sinus allergies with some congestion, runny nose, and post nasal drip. Chronic ringing to the ears and has a referral to ENT.   Eyes: Positive for visual disturbance.        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 Does have 2 separate sets of glasses.   Respiratory: Positive for cough. Negative for shortness of breath.         Has had some cough due to irritation from ET tube.    Cardiovascular: Negative for chest pain, palpitations and leg swelling.   Gastrointestinal: Negative for abdominal pain, constipation, diarrhea, nausea and vomiting.   Genitourinary: Negative for difficulty urinating, dysuria and menstrual problem.        Periods are regular. LMP 8/7/19   Musculoskeletal: Negative for arthralgias and myalgias.   Skin: Positive for color change.        Bruises to bilateral arm/hand from IV in hospital.    Neurological: Positive for dizziness and headaches.  Negative for numbness.        One episode of dizziness yesterday, headaches, see HPI   Hematological: Bruises/bleeds easily.        Increased bruising due to aspirin and plavix.    Psychiatric/Behavioral: Negative for dysphoric mood and sleep disturbance. The patient is not nervous/anxious.         Memory issues have been improving, feels that lexapro is helping with depression and anxiety, sleeping better than usual recently.      Patient Health Questionnaire Depression Scale (PHQ-9):    Over the last 2 weeks, how often have you been bothered by any of the following problems?  (Not at all: 0; several days: 1, more than half the days: 2, nearly every day: 3)    1. Little interest or pleasure in doing things: 0  2. Feeling down, depressed, or hopeless: 1  3. Trouble falling or staying asleep, or sleeping too much: 1  4. Feeling tired or having little energy: 1  5. Poor appetite or overeatin  6. Feeling bad about yourself, or that you are a failure, or have let yourself or your family down: 0  7. Trouble concentrating on things, such as reading the newspaper or watching television: 1  8. Moving or speaking so slowly that other people could have noticed. Or the opposite- being so fidgety or restless that you have been moving       around a lot more than usual: 1  9. Thoughts that you would be better off dead, or of hurting yourself in some way: 0    Total Score: 5    (Score >15 - major depression; Score >20 - severe major depression)    АНДРЕЙ-7 Anxiety Screening Tool    Over the last 2 weeks, how often have you been bothered by the following problems?   (Not at all = 0, Several Days = 1, More than half the days = 2, Nearly Every Day = 3)     1.  Feeling nervous, anxious, or on edge. 1   2.  Not being able to sleep or control worrying. 0   3.  Worrying too much about different things. 0   4.  Trouble relaxing. 0   5.  Being so restless that it is hard to sit still. 0   6.  Becoming easily annoyed or irritable. 1   7.   "Feeling afraid as if something awful might happen. 0    Total score: 2      If you checked any problems, how difficult have they made it for you to do your work, take care of things at home, or get along with other people?    Not difficult at all  Somewhat difficult  Very Difficult  Extremely difficult    Scoring АНДРЕЙ-7  0-4: Minimal anxiety  5-9: Mild anxiety  10-14: Moderate anxiety  15-21: Severe anxiety      Objective:      Vitals:    08/12/19 1500   BP: 98/70   BP Location: Right arm   Patient Position: Sitting   BP Method: X-Large (Manual)   Pulse: (!) 54   Temp: 98.3 °F (36.8 °C)   TempSrc: Oral   Weight: 104.8 kg (231 lb)   Height: 5' 7" (1.702 m)     Physical Exam   Constitutional: She is oriented to person, place, and time. Vital signs are normal. She appears well-developed. No distress.   Blood pressure 98/70   HENT:   Head: Normocephalic and atraumatic.   Right Ear: Hearing, tympanic membrane, external ear and ear canal normal.   Left Ear: Hearing, tympanic membrane, external ear and ear canal normal.   Nose: Nose normal. No mucosal edema.   Mouth/Throat: Uvula is midline, oropharynx is clear and moist and mucous membranes are normal.   Eyes: Pupils are equal, round, and reactive to light. Conjunctivae, EOM and lids are normal. Right eye exhibits no discharge. Left eye exhibits no discharge. Right conjunctiva is not injected. Left conjunctiva is not injected.   Neck: Normal range of motion. Neck supple. Normal carotid pulses present. Carotid bruit is not present.   Cardiovascular: Normal rate, regular rhythm, S1 normal, S2 normal, normal heart sounds, intact distal pulses and normal pulses.   No murmur heard.  Pulses:       Carotid pulses are 2+ on the right side, and 2+ on the left side.       Radial pulses are 2+ on the right side, and 2+ on the left side.        Posterior tibial pulses are 2+ on the right side, and 2+ on the left side.   No edema   Pulmonary/Chest: Effort normal and breath sounds " normal. No respiratory distress. She has no decreased breath sounds. She has no wheezes. She has no rhonchi. She has no rales.   Musculoskeletal: Normal range of motion.   Lymphadenopathy:        Head (right side): No submental, no submandibular and no tonsillar adenopathy present.        Head (left side): No submental, no submandibular and no tonsillar adenopathy present.     She has no cervical adenopathy.        Right: No supraclavicular adenopathy present.        Left: No supraclavicular adenopathy present.   Neurological: She is alert and oriented to person, place, and time. She has normal strength. Gait normal.   Skin: Skin is warm, dry and intact. Rash noted. No lesion noted. Rash is macular. She is not diaphoretic. No pallor.   Mild erythematous rash noted to posterior neck and to back, appears to be tinea versicolor. Right groin inspected, no evidence of angiogram site.   Psychiatric: She has a normal mood and affect. Her speech is normal and behavior is normal. Judgment and thought content normal. Her mood appears not anxious. Cognition and memory are normal. She does not exhibit a depressed mood.   Nursing note and vitals reviewed.        Assessment:       1. Hospital discharge follow-up    2. Anterior communicating artery aneurysm    3. Status post arterial stent    4. Depression with anxiety    5. Gastroesophageal reflux disease without esophagitis    6. Other headache syndrome    7. Thrombocytopenia    8. Legally blind    9. Cone dystrophy    10. Severe obesity    11. BMI 36.0-36.9,adult          Plan:       Carolina was seen today for hospital follow up.    Diagnoses and all orders for this visit:    Hospital discharge follow-up   Hospital records, discharge summary,  test results, blood work results reviewed, medication list reconciled.  Anterior communicating artery aneurysm   Stable, stent assisted with coiling 8/6/19  Status post arterial stent   Stable, has appointment to follow up with  interventional radiology, Dr. Salgado in September  Depression with anxiety  -     escitalopram oxalate (LEXAPRO) 10 MG tablet; Take 1 tablet (10 mg total) by mouth once daily.  - PHQ-9 score 5 and АНДРЕЙ-7 score is 2 today, doing well on lexapro 10 mg daily, to continue as is    Gastroesophageal reflux disease without esophagitis   Patient is asymptomatic on Protonix 20 mg daily, to continue as is    Other headache syndrome   Patient states headaches have been improving since stent was placed.  Will continue to monitor.    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     Follow up in about 6 months (around 2/12/2020) for Dr. Crump, 20 minutes.      Patient readiness: acceptance and barriers:readiness    During the course of the visit the patient was educated and counseled about the following:     Obesity:   General weight loss/lifestyle modification strategies discussed (elicit support from others; identify saboteurs; non-food rewards, etc).  Diet interventions: moderate (500 kCal/d) deficit diet and qualitative changes (increase low-fat,  high-fiber foods).  Informal exercise measures discussed, e.g. taking stairs instead of elevator.  Regular aerobic exercise program discussed.    Goals: Obesity: Reduce calorie intake and BMI    Did patient meet goals/outcomes: No    The following self management tools provided: declined    Patient  Instructions (the written plan) was given to the patient/family.     Time spent with patient: 45 minutes    Barriers to medications present (no )    Adverse reactions to current medications (no)    Over the counter medications reviewed (Yes)

## 2019-08-13 PROBLEM — D69.6 THROMBOCYTOPENIA: Status: ACTIVE | Noted: 2019-08-13

## 2019-08-13 NOTE — PROGRESS NOTES
Patient, Carolina Andrews (MRN #[id number]), presented with a recent Platelet count less than 150 K/uL consistent with the definition of thrombocytopenia (ICD10 - D69.6).    Platelets   Date Value Ref Range Status   08/07/2019 144 (L) 150 - 350 K/uL Final     The patient's thrombocytopenia was monitored, evaluated, addressed and/or treated. This addendum to the medical record is made on 08/13/2019.

## 2019-09-09 ENCOUNTER — OFFICE VISIT (OUTPATIENT)
Dept: INTERVENTIONAL RADIOLOGY/VASCULAR | Facility: CLINIC | Age: 44
End: 2019-09-09
Payer: MEDICARE

## 2019-09-09 VITALS
WEIGHT: 229.75 LBS | SYSTOLIC BLOOD PRESSURE: 114 MMHG | HEART RATE: 75 BPM | DIASTOLIC BLOOD PRESSURE: 80 MMHG | BODY MASS INDEX: 36.06 KG/M2 | HEIGHT: 67 IN

## 2019-09-09 DIAGNOSIS — I67.1 ANTERIOR COMMUNICATING ARTERY ANEURYSM: Primary | ICD-10-CM

## 2019-09-09 PROCEDURE — 99999 PR PBB SHADOW E&M-EST. PATIENT-LVL III: CPT | Mod: PBBFAC,,,

## 2019-09-09 PROCEDURE — 99213 PR OFFICE/OUTPT VISIT, EST, LEVL III, 20-29 MIN: ICD-10-PCS | Mod: S$GLB,,, | Performed by: RADIOLOGY

## 2019-09-09 PROCEDURE — 99999 PR PBB SHADOW E&M-EST. PATIENT-LVL III: ICD-10-PCS | Mod: PBBFAC,,,

## 2019-09-09 PROCEDURE — 3008F PR BODY MASS INDEX (BMI) DOCUMENTED: ICD-10-PCS | Mod: S$GLB,,, | Performed by: RADIOLOGY

## 2019-09-09 PROCEDURE — 3008F BODY MASS INDEX DOCD: CPT | Mod: S$GLB,,, | Performed by: RADIOLOGY

## 2019-09-09 PROCEDURE — 99213 OFFICE O/P EST LOW 20 MIN: CPT | Mod: S$GLB,,, | Performed by: RADIOLOGY

## 2019-09-09 NOTE — PROGRESS NOTES
Neurointerventional Clinic Note      Reason for Visit: Follow-up ACom aneurysm sten-assisted coiling     SUBJECTIVE:     Chief Complaint: Follow-up      History of Present Illness: Carolina Andrews is a 44 y.o. female who presents following stent assdisted coiling for her right ACom aneurysm performed on 8/6/19.  She had headaches for 1 week after the procedure, but they have resolved.  They felt like her usual migraines, only lasted longer.  She has no residual symptoms from the procedure, and overall she feels great.  Her anxiety symptoms have improved since starting Lexapro about a month ago.  We reviewed her images from the angiogram interventional coiling procedure.    Past Medical History:   Diagnosis Date    Arthritis     Blindness     Cholelithiasis     Liver hemangioma     Mental disorder     Periodic antidepressant use since childhood, not currently taking or endorsing depressive ideation    Pinched nerve      Past Surgical History:   Procedure Laterality Date    ANGIOGRAM-CEREBRAL N/A 7/15/2019    Performed by [facility] OR 2ND FLR    ANGIOGRAM/EMBOLIZATION N/A 8/6/2019    Performed by Andressa Surgeon at [facility] ANDRES[facility]    ANKLE FRACTURE SURGERY  2009    COLONOSCOPY N/A 2/4/2019    [facility] ENDO    EGD (ESOPHAGOGASTRODUODENOSCOPY) N/A 11/19/2018    Performed by [facility] at [facility] ENDO    UPPER GASTROINTESTINAL ENDOSCOPY  ~1998    normal findings per patient report     Family History   Problem Relation Age of Onset    Hypertension Father     Lung disease Father         calapse x 3     Stroke Father         in eye     Retinal detachment Father     Cancer Sister 23        polyps found incidentally    Vision loss Sister     Lung disease Sister         spontanious lung collapse    GI problems Sister     Retinal detachment Mother     Cancer Paternal Grandfather     Stroke Maternal Grandmother     Colon cancer Paternal Aunt     Colon  "polyps Maternal Uncle     No Known Problems Paternal Uncle     Parkinsonism Maternal Grandfather     Cancer Maternal Grandfather         prostate    Colon polyps Maternal Grandfather     Cancer Paternal Grandmother         skin cancer in colon     Alzheimer's disease Paternal Grandmother     Heart disease Paternal Aunt     Breast cancer Neg Hx     Diabetes Neg Hx     Miscarriages / Stillbirths Neg Hx     Crohn's disease Neg Hx     Ulcerative colitis Neg Hx     Glaucoma Neg Hx      Social History     Tobacco Use    Smoking status: Never Smoker    Smokeless tobacco: Never Used   Substance Use Topics    Alcohol use: Yes     Alcohol/week: 3.6 oz     Types: 6 Glasses of wine per week     Frequency: 2-3 times a week     Drinks per session: 1 or 2     Binge frequency: Less than monthly     Comment: 2-3/ week wine or beer    Drug use: No       Review of patient's allergies indicates:   Allergen Reactions    Pcn [penicillins] Rash     rash        Review of Systems:  Review of Systems   Constitutional: Negative.    HENT: Negative.    Eyes: Positive for visual disturbance.   Respiratory: Negative.    Cardiovascular: Negative.    Gastrointestinal: Negative.    Endocrine: Negative.    Musculoskeletal: Positive for neck stiffness.        Twitching of her pinky fingers right more than left   Skin: Negative.    Neurological: Negative.    Psychiatric/Behavioral: Negative.         OBJECTIVE:     Vital Signs Range:  /80   Pulse 75   Ht 5' 7" (1.702 m)   Wt 104.2 kg (229 lb 11.5 oz)   BMI 35.98 kg/m²     Physical Exam:  Physical Exam   Constitutional: She is oriented to person, place, and time. She appears well-developed and well-nourished.   HENT:   Head: Normocephalic.   Eyes: Conjunctivae and EOM are normal. Right eye exhibits no discharge. Left eye exhibits no discharge. No scleral icterus.   Neck: Normal range of motion. Neck supple.   Cardiovascular: Normal rate and regular rhythm.   Pulmonary/Chest: " Effort normal and breath sounds normal. No stridor. No respiratory distress. She has no wheezes.   Musculoskeletal: Normal range of motion. She exhibits no tenderness or deformity.   Neurological: She is alert and oriented to person, place, and time. No cranial nerve deficit or sensory deficit. She exhibits normal muscle tone.   Skin: Skin is warm and dry. She is not diaphoretic.   Psychiatric: She has a normal mood and affect. Her behavior is normal. Judgment and thought content normal.       Laboratory:  Results for orders placed or performed during the hospital encounter of 08/06/19   Protime-INR   Result Value Ref Range    Prothrombin Time 11.5 9.0 - 12.5 sec    INR 1.1 0.8 - 1.2   Protime-INR   Result Value Ref Range    Prothrombin Time 10.0 9.0 - 12.5 sec    INR 1.0 0.8 - 1.2     Results for orders placed or performed during the hospital encounter of 08/06/19   CBC auto differential   Result Value Ref Range    WBC 3.84 (L) 3.90 - 12.70 K/uL    RBC 3.01 (L) 4.00 - 5.40 M/uL    Hemoglobin 9.2 (L) 12.0 - 16.0 g/dL    Hematocrit 27.8 (L) 37.0 - 48.5 %    Mean Corpuscular Volume 92 82 - 98 fL    Mean Corpuscular Hemoglobin 30.6 27.0 - 31.0 pg    Mean Corpuscular Hemoglobin Conc 33.1 32.0 - 36.0 g/dL    RDW 13.1 11.5 - 14.5 %    Platelets 144 (L) 150 - 350 K/uL    MPV 11.3 9.2 - 12.9 fL    Immature Granulocytes 0.8 (H) 0.0 - 0.5 %    Gran # (ANC) 2.2 1.8 - 7.7 K/uL    Immature Grans (Abs) 0.03 0.00 - 0.04 K/uL    Lymph # 1.2 1.0 - 4.8 K/uL    Mono # 0.3 0.3 - 1.0 K/uL    Eos # 0.1 0.0 - 0.5 K/uL    Baso # 0.02 0.00 - 0.20 K/uL    nRBC 0 0 /100 WBC    Gran% 57.4 38.0 - 73.0 %    Lymph% 32.0 18.0 - 48.0 %    Mono% 7.0 4.0 - 15.0 %    Eosinophil% 2.3 0.0 - 8.0 %    Basophil% 0.5 0.0 - 1.9 %    Differential Method Automated    CBC auto differential   Result Value Ref Range    WBC 6.24 3.90 - 12.70 K/uL    RBC 4.34 4.00 - 5.40 M/uL    Hemoglobin 13.3 12.0 - 16.0 g/dL    Hematocrit 39.8 37.0 - 48.5 %    Mean Corpuscular  Volume 92 82 - 98 fL    Mean Corpuscular Hemoglobin 30.6 27.0 - 31.0 pg    Mean Corpuscular Hemoglobin Conc 33.4 32.0 - 36.0 g/dL    RDW 12.9 11.5 - 14.5 %    Platelets 201 150 - 350 K/uL    MPV 11.4 9.2 - 12.9 fL    Immature Granulocytes 0.6 (H) 0.0 - 0.5 %    Gran # (ANC) 4.2 1.8 - 7.7 K/uL    Immature Grans (Abs) 0.04 0.00 - 0.04 K/uL    Lymph # 1.6 1.0 - 4.8 K/uL    Mono # 0.3 0.3 - 1.0 K/uL    Eos # 0.1 0.0 - 0.5 K/uL    Baso # 0.03 0.00 - 0.20 K/uL    nRBC 0 0 /100 WBC    Gran% 66.7 38.0 - 73.0 %    Lymph% 26.1 18.0 - 48.0 %    Mono% 5.1 4.0 - 15.0 %    Eosinophil% 1.0 0.0 - 8.0 %    Basophil% 0.5 0.0 - 1.9 %    Differential Method Automated      Results for orders placed or performed in visit on 05/28/14   Basic metabolic panel   Result Value Ref Range    Sodium 139 136 - 145 mmol/L    Potassium 4.2 3.5 - 5.1 mmol/L    Chloride 106 95 - 110 mmol/L    CO2 21 (L) 23 - 29 mmol/L    Glucose 87 70 - 110 mg/dL    BUN, Bld 10 6 - 20 mg/dL    Creatinine 0.8 0.5 - 1.4 mg/dL    Calcium 9.5 8.7 - 10.5 mg/dL    Anion Gap 12 8 - 16 mmol/L    eGFR if African American >60.0 >60 mL/min/1.73 m^2    eGFR if non African American >60.0 >60 mL/min/1.73 m^2     No results found for this or any previous visit.  Results for orders placed or performed during the hospital encounter of 08/06/19   Platelet Response Plavix   Result Value Ref Range    Platelet Response Plavix 160 (L) 194 - 418 PRU     Results for orders placed or performed during the hospital encounter of 08/06/19   Platelet Response Aspirin   Result Value Ref Range    Platelet Response Aspirin 570 (L) 620 - 672 ARU         Diagnostic Results:  Angiogram reviewed from 8/6/19      ASSESSMENT/PLAN:     Pt. is s/p angio and stent-assisted coiling 8/6, doing very well without complications.  She should have a follow up angiogram of the LICA at 6 months.      There are no diagnoses linked to this encounter.     Vladimir Salgado MD  Interventional Neuroradiology  [facility]

## 2019-09-27 DIAGNOSIS — Z12.39 BREAST CANCER SCREENING: ICD-10-CM

## 2019-11-14 ENCOUNTER — PES CALL (OUTPATIENT)
Dept: ADMINISTRATIVE | Facility: CLINIC | Age: 44
End: 2019-11-14

## 2019-11-27 ENCOUNTER — OFFICE VISIT (OUTPATIENT)
Dept: FAMILY MEDICINE | Facility: CLINIC | Age: 44
End: 2019-11-27
Payer: MEDICARE

## 2019-11-27 VITALS
DIASTOLIC BLOOD PRESSURE: 64 MMHG | TEMPERATURE: 98 F | BODY MASS INDEX: 36.27 KG/M2 | WEIGHT: 231.06 LBS | SYSTOLIC BLOOD PRESSURE: 128 MMHG | HEART RATE: 69 BPM | RESPIRATION RATE: 16 BRPM | OXYGEN SATURATION: 97 % | HEIGHT: 67 IN

## 2019-11-27 DIAGNOSIS — J02.9 PHARYNGITIS, UNSPECIFIED ETIOLOGY: Primary | ICD-10-CM

## 2019-11-27 DIAGNOSIS — R09.82 POST-NASAL DRIP: ICD-10-CM

## 2019-11-27 DIAGNOSIS — I72.9 ANEURYSM: ICD-10-CM

## 2019-11-27 PROCEDURE — 99999 PR PBB SHADOW E&M-EST. PATIENT-LVL V: CPT | Mod: PBBFAC,,, | Performed by: NURSE PRACTITIONER

## 2019-11-27 PROCEDURE — 99214 PR OFFICE/OUTPT VISIT, EST, LEVL IV, 30-39 MIN: ICD-10-PCS | Mod: S$GLB,,, | Performed by: NURSE PRACTITIONER

## 2019-11-27 PROCEDURE — 87081 CULTURE SCREEN ONLY: CPT

## 2019-11-27 PROCEDURE — 3008F PR BODY MASS INDEX (BMI) DOCUMENTED: ICD-10-PCS | Mod: S$GLB,,, | Performed by: NURSE PRACTITIONER

## 2019-11-27 PROCEDURE — 99999 PR PBB SHADOW E&M-EST. PATIENT-LVL V: ICD-10-PCS | Mod: PBBFAC,,, | Performed by: NURSE PRACTITIONER

## 2019-11-27 PROCEDURE — 3008F BODY MASS INDEX DOCD: CPT | Mod: S$GLB,,, | Performed by: NURSE PRACTITIONER

## 2019-11-27 PROCEDURE — 99214 OFFICE O/P EST MOD 30 MIN: CPT | Mod: S$GLB,,, | Performed by: NURSE PRACTITIONER

## 2019-11-27 RX ORDER — FLUTICASONE PROPIONATE 50 MCG
1 SPRAY, SUSPENSION (ML) NASAL DAILY
Qty: 1 BOTTLE | Refills: 0 | Status: SHIPPED | OUTPATIENT
Start: 2019-11-27 | End: 2020-02-10

## 2019-11-27 NOTE — PROGRESS NOTES
Subjective:       Patient ID: Carolina Andrews is a 44 y.o. female.    Chief Complaint: Sore Throat; Headache; Otalgia; and Cough    Patient who is new to me presents for sore throat. Patient does not feel like she has a post nasal drip. She is concerned she may have strep throat.     Sore Throat    This is a new problem. The current episode started yesterday. The problem has been gradually worsening. The pain is worse on the left side. There has been no fever. The fever has been present for less than 1 day. The pain is at a severity of 4/10. The pain is moderate. Associated symptoms include coughing, neck pain, swollen glands and trouble swallowing. Pertinent negatives include no abdominal pain, congestion, diarrhea, drooling, headaches, hoarse voice, plugged ear sensation, shortness of breath, stridor or vomiting. She has had no exposure to strep or mono. She has tried acetaminophen for the symptoms. The treatment provided no relief.     Review of Systems   Constitutional: Positive for fatigue. Negative for chills and fever.   HENT: Positive for sore throat and trouble swallowing. Negative for congestion, drooling, hoarse voice, sinus pressure, sinus pain and sneezing.    Respiratory: Positive for cough. Negative for chest tightness, shortness of breath, wheezing and stridor.    Cardiovascular: Negative for chest pain, palpitations and leg swelling.   Gastrointestinal: Negative for abdominal distention, abdominal pain, constipation, diarrhea, nausea and vomiting.   Genitourinary: Negative for decreased urine volume, difficulty urinating, dysuria, frequency and urgency.   Musculoskeletal: Positive for neck pain. Negative for arthralgias, gait problem, joint swelling and myalgias.   Skin: Negative for rash and wound.   Neurological: Negative for dizziness, light-headedness, numbness and headaches.       Objective:      Physical Exam   Constitutional: She is oriented to person, place, and time. She appears  well-developed and well-nourished.   HENT:   Head: Normocephalic and atraumatic.   Right Ear: Hearing, tympanic membrane, external ear and ear canal normal.   Left Ear: Hearing, tympanic membrane, external ear and ear canal normal.   Nose: Mucosal edema present. Right sinus exhibits no maxillary sinus tenderness and no frontal sinus tenderness. Left sinus exhibits no maxillary sinus tenderness and no frontal sinus tenderness.   Mouth/Throat: Posterior oropharyngeal erythema present.   Eyes: Pupils are equal, round, and reactive to light.   Neck: Normal range of motion.   Cardiovascular: Normal rate, regular rhythm, normal heart sounds and intact distal pulses.   Pulmonary/Chest: Effort normal and breath sounds normal.   Abdominal: Soft. Bowel sounds are normal.   Musculoskeletal: Normal range of motion.   Lymphadenopathy:        Head (right side): Tonsillar adenopathy present.        Head (left side): Tonsillar adenopathy present.   Neurological: She is alert and oriented to person, place, and time.   Skin: Skin is warm and dry.   Nursing note and vitals reviewed.      Assessment:       1. Pharyngitis, unspecified etiology    2. Post-nasal drip    3. Aneurysm        Plan:       Carolina was seen today for sore throat, headache, otalgia and cough.    Diagnoses and all orders for this visit:    Pharyngitis, unspecified etiology  -     POCT Rapid Strep A  -     Strep A culture, throat  -     fluticasone propionate (FLONASE) 50 mcg/actuation nasal spray; 1 spray (50 mcg total) by Each Nostril route once daily.    Post-nasal drip  -     fluticasone propionate (FLONASE) 50 mcg/actuation nasal spray; 1 spray (50 mcg total) by Each Nostril route once daily.    Aneurysm  Patient followed by pcp. Stable on plavix.     Rapid strep negative. Likely viral. Will follow up on throat culture. Discussed OTCs.

## 2019-11-27 NOTE — PATIENT INSTRUCTIONS

## 2019-11-30 LAB — BACTERIA THROAT CULT: NORMAL

## 2020-01-14 ENCOUNTER — OFFICE VISIT (OUTPATIENT)
Dept: OPTOMETRY | Facility: CLINIC | Age: 45
End: 2020-01-14
Payer: MEDICARE

## 2020-01-14 DIAGNOSIS — H35.53 CONE DYSTROPHY: Primary | ICD-10-CM

## 2020-01-14 DIAGNOSIS — D31.31 NEVUS OF CHOROID OF RIGHT EYE: ICD-10-CM

## 2020-01-14 DIAGNOSIS — H52.7 REFRACTIVE ERROR: ICD-10-CM

## 2020-01-14 PROCEDURE — 92014 COMPRE OPH EXAM EST PT 1/>: CPT | Mod: S$GLB,,, | Performed by: OPTOMETRIST

## 2020-01-14 PROCEDURE — 99999 PR PBB SHADOW E&M-EST. PATIENT-LVL II: CPT | Mod: PBBFAC,,, | Performed by: OPTOMETRIST

## 2020-01-14 PROCEDURE — 99999 PR PBB SHADOW E&M-EST. PATIENT-LVL II: ICD-10-PCS | Mod: PBBFAC,,, | Performed by: OPTOMETRIST

## 2020-01-14 PROCEDURE — 92014 PR EYE EXAM, EST PATIENT,COMPREHESV: ICD-10-PCS | Mod: S$GLB,,, | Performed by: OPTOMETRIST

## 2020-01-14 NOTE — PROGRESS NOTES
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 (History)            Assessment /Plan     For exam results, see Encounter Report.    Cone dystrophy    Refractive error    Nevus of choroid of right eye      Cone dystrophy, stable from previous. Stable BCVA. Pt doing well with current specs while on computer, denies refraction at this time. Monitor yearly, sooner prn.    Choroidal nevus OD superior temporal posterior pole. Stable from previous, no overlying pigment. Distinct borders. Monitor yearly.       RTC in  1 year for comprehensive eye exam, or sooner prn.

## 2020-01-29 DIAGNOSIS — I67.1 ANTERIOR COMMUNICATING ARTERY ANEURYSM: Primary | ICD-10-CM

## 2020-01-31 ENCOUNTER — DOCUMENTATION ONLY (OUTPATIENT)
Dept: FAMILY MEDICINE | Facility: CLINIC | Age: 45
End: 2020-01-31

## 2020-01-31 NOTE — PROGRESS NOTES
Pre-Visit Chart Review  For Appointment Scheduled on 2-10-20    Health Maintenance Due   Topic Date Due    TETANUS VACCINE  06/09/1994    Pneumococcal Vaccine (Medium Risk) (1 of 1 - PPSV23) 06/09/1995    Mammogram  09/05/2019

## 2020-02-03 DIAGNOSIS — I67.1 ANTERIOR COMMUNICATING ARTERY ANEURYSM: Primary | ICD-10-CM

## 2020-02-04 ENCOUNTER — HOSPITAL ENCOUNTER (OUTPATIENT)
Facility: HOSPITAL | Age: 45
Discharge: HOME | End: 2020-02-04
Attending: RADIOLOGY | Admitting: RADIOLOGY
Payer: MEDICARE

## 2020-02-04 VITALS
DIASTOLIC BLOOD PRESSURE: 73 MMHG | SYSTOLIC BLOOD PRESSURE: 109 MMHG | RESPIRATION RATE: 13 BRPM | HEIGHT: 67 IN | TEMPERATURE: 98 F | BODY MASS INDEX: 35.31 KG/M2 | WEIGHT: 225 LBS | HEART RATE: 62 BPM | OXYGEN SATURATION: 98 %

## 2020-02-04 DIAGNOSIS — I67.1 ANTERIOR COMMUNICATING ARTERY ANEURYSM: ICD-10-CM

## 2020-02-04 LAB
B-HCG UR QL: NEGATIVE
CTP QC/QA: YES

## 2020-02-04 PROCEDURE — 63600175 PHARM REV CODE 636 W HCPCS: Performed by: RADIOLOGY

## 2020-02-04 PROCEDURE — 25000003 PHARM REV CODE 250: Performed by: RADIOLOGY

## 2020-02-04 PROCEDURE — 25500020 PHARM REV CODE 255: Performed by: RADIOLOGY

## 2020-02-04 RX ORDER — FENTANYL CITRATE 50 UG/ML
50 INJECTION, SOLUTION INTRAMUSCULAR; INTRAVENOUS
Status: DISCONTINUED | OUTPATIENT
Start: 2020-02-04 | End: 2020-02-04 | Stop reason: HOSPADM

## 2020-02-04 RX ORDER — HEPARIN SODIUM 1000 [USP'U]/ML
INJECTION, SOLUTION INTRAVENOUS; SUBCUTANEOUS CODE/TRAUMA/SEDATION MEDICATION
Status: COMPLETED | OUTPATIENT
Start: 2020-02-04 | End: 2020-02-04

## 2020-02-04 RX ORDER — SODIUM CHLORIDE 9 MG/ML
INJECTION, SOLUTION INTRAVENOUS CONTINUOUS
Status: DISCONTINUED | OUTPATIENT
Start: 2020-02-04 | End: 2020-02-04 | Stop reason: HOSPADM

## 2020-02-04 RX ORDER — ONDANSETRON 8 MG/1
8 TABLET, ORALLY DISINTEGRATING ORAL ONCE
Status: COMPLETED | OUTPATIENT
Start: 2020-02-04 | End: 2020-02-04

## 2020-02-04 RX ORDER — MIDAZOLAM HYDROCHLORIDE 1 MG/ML
INJECTION INTRAMUSCULAR; INTRAVENOUS CODE/TRAUMA/SEDATION MEDICATION
Status: COMPLETED | OUTPATIENT
Start: 2020-02-04 | End: 2020-02-04

## 2020-02-04 RX ORDER — LORAZEPAM 1 MG/1
1 TABLET ORAL ONCE
COMMUNITY
End: 2020-02-10 | Stop reason: SDUPTHER

## 2020-02-04 RX ORDER — LIDOCAINE HYDROCHLORIDE 10 MG/ML
INJECTION INFILTRATION; PERINEURAL CODE/TRAUMA/SEDATION MEDICATION
Status: COMPLETED | OUTPATIENT
Start: 2020-02-04 | End: 2020-02-04

## 2020-02-04 RX ORDER — MIDAZOLAM HYDROCHLORIDE 1 MG/ML
1 INJECTION INTRAMUSCULAR; INTRAVENOUS
Status: DISCONTINUED | OUTPATIENT
Start: 2020-02-04 | End: 2020-02-04 | Stop reason: HOSPADM

## 2020-02-04 RX ORDER — FENTANYL CITRATE 50 UG/ML
INJECTION, SOLUTION INTRAMUSCULAR; INTRAVENOUS CODE/TRAUMA/SEDATION MEDICATION
Status: COMPLETED | OUTPATIENT
Start: 2020-02-04 | End: 2020-02-04

## 2020-02-04 RX ORDER — SODIUM CHLORIDE 0.9 % (FLUSH) 0.9 %
10 SYRINGE (ML) INJECTION
Status: DISCONTINUED | OUTPATIENT
Start: 2020-02-04 | End: 2020-02-04 | Stop reason: HOSPADM

## 2020-02-04 RX ADMIN — HEPARIN SODIUM 3000 UNITS: 1000 INJECTION, SOLUTION INTRAVENOUS; SUBCUTANEOUS at 11:02

## 2020-02-04 RX ADMIN — LIDOCAINE HYDROCHLORIDE 5 ML: 10 INJECTION, SOLUTION INFILTRATION; PERINEURAL at 11:02

## 2020-02-04 RX ADMIN — MIDAZOLAM HYDROCHLORIDE 1 MG: 1 INJECTION, SOLUTION INTRAMUSCULAR; INTRAVENOUS at 11:02

## 2020-02-04 RX ADMIN — FENTANYL CITRATE 50 MCG: 50 INJECTION, SOLUTION INTRAMUSCULAR; INTRAVENOUS at 11:02

## 2020-02-04 RX ADMIN — IOHEXOL 45 ML: 300 INJECTION, SOLUTION INTRAVENOUS at 11:02

## 2020-02-04 RX ADMIN — FENTANYL CITRATE 50 MCG: 50 INJECTION, SOLUTION INTRAMUSCULAR; INTRAVENOUS at 10:02

## 2020-02-04 RX ADMIN — MIDAZOLAM HYDROCHLORIDE 1 MG: 1 INJECTION, SOLUTION INTRAMUSCULAR; INTRAVENOUS at 10:02

## 2020-02-04 RX ADMIN — ONDANSETRON 8 MG: 8 TABLET, ORALLY DISINTEGRATING ORAL at 01:02

## 2020-02-04 RX ADMIN — HEPARIN SODIUM 1000 UNITS: 1000 INJECTION, SOLUTION INTRAVENOUS; SUBCUTANEOUS at 11:02

## 2020-02-04 NOTE — DISCHARGE SUMMARY
Radiology Discharge Summary      Hospital Course: No complications    Admit Date: 2/4/2020  Discharge Date: 02/04/2020     Instructions Given to Patient: Yes  Diet: Resume prior diet  Activity: activity as tolerated    Description of Condition on Discharge: Stable  Vital Signs (Most Recent): Temp: 98.1 °F (36.7 °C) (02/04/20 0950)  Pulse: (!) 51 (02/04/20 1120)  Resp: 16 (02/04/20 1120)  BP: 115/71 (02/04/20 1120)  SpO2: 97 % (02/04/20 1120)    Discharge Disposition: Home    Discharge Diagnosis: Acomm aneurysm s/p stent assisted coiling     Follow-up: with NeuroIR clinic in 2 - 4 weeks.     MD Isabelle Abrams IR fellow. .

## 2020-02-04 NOTE — PROGRESS NOTES
Pt arrived to ROCU bed 3 for 2 hour post diagnostic cerebral angiogram recovery. Report received from RAFAT Renee. Pt denies pain/discomfort. Dressing CDI. VSS. No acute events.  to bedside. See flow sheets for post procedure monitoring.

## 2020-02-04 NOTE — NURSING
Angioseal deployed @ 1120 to right groin, dressing CDI, no bleeding, no hematoma noted, pt is to remain flat until 1320, pt to recover in ROCU, will give report to ROCU RN at bs

## 2020-02-04 NOTE — PROCEDURES
Neurointerventional Radiology Post-Procedure Note    Pre Op Diagnosis: Unruptured Acomm aneurysm s/p stent assisted coiling    Post Op Diagnosis: Same as above     Procedure: Cerebral angiogram    Procedure performed by: Damian MCMAHON, Vladimir Khan MD.    Written Informed Consent Obtained: Yes    Specimen Removed: NO    Estimated Blood Loss: less than 50     Procedure report:     A 6F sheath was placed into the right femoral artery and a 5F Fredy catheter was advanced into the aortic arch.  The Left ICA arteries were subselected and angiography of the brain was performed after injection into each of these vessels.    Preliminary interpretation: Small recurrence of neck of the aneurysm with most of the aneurysm covered with stent assisted coils. Please see Imaging report for full details.    A right femoral artery angiogram was performed, the sheath removed and hemostasis achieved using Angioseal.  No hematoma was present at the time of hemostasis.    The patient tolerated the procedure well.     Henry Khan MD  NeuroInterventional Radiology Fellow

## 2020-02-04 NOTE — H&P
Radiology History & Physical      SUBJECTIVE:     Chief Complaint: follow up for Acoom aneurysm coiling    History of Present Illness:  Carolina Andrews is a 44 y.o. female who presents for diagnostic cerebral angiogram in patient with Acomm aneurysm that was stent assisted coiling on 8/6/19.       Past Medical History:   Diagnosis Date    Arthritis     Blindness     Cholelithiasis     Liver hemangioma     Mental disorder     Periodic antidepressant use since childhood, not currently taking or endorsing depressive ideation    Pinched nerve      Past Surgical History:   Procedure Laterality Date    ANGIOGRAPHY N/A 8/6/2019    Procedure: ANGIOGRAM/EMBOLIZATION;  Surgeon: Andressa Surgeon;  Location: [facility];  Service: Anesthesiology;  Laterality: N/A;  /[place]    ANKLE FRACTURE SURGERY  2009    CEREBRAL ANGIOGRAM N/A 7/15/2019    Procedure: ANGIOGRAM-CEREBRAL;  Surgeon: Leon Diagnostic Provider;  Location: [facility] OR [address] [place] [address];  Service: Radiology;  Laterality: N/A;  Rm 190/[place]    COLONOSCOPY N/A 2/4/2019    Procedure: COLONOSCOPY;  Surgeon: Dale Childers MD;  Location: [facility];  Service: Endoscopy;  Laterality: N/A;    ESOPHAGOGASTRODUODENOSCOPY N/A 11/19/2018    Procedure: EGD (ESOPHAGOGASTRODUODENOSCOPY);  Surgeon: Dale Childers MD;  Location: [facility];  Service: Endoscopy;  Laterality: N/A;    UPPER GASTROINTESTINAL ENDOSCOPY  ~1998    normal findings per patient report       Home Meds:   Prior to Admission medications    Medication Sig Start Date End Date Taking? Authorizing Provider   acetaminophen (TYLENOL) 500 MG tablet Take 500 mg by mouth every 6 (six) hours as needed for Pain.    Historical Provider, MD   aspirin (ECOTRIN) 81 MG EC tablet Take 1 tablet (81 mg total) by mouth once daily. 7/17/19 7/16/20  Maria R Noe, PATRICK   b complex vitamins tablet Take 1 tablet by mouth once daily.    Historical Provider, MD   cholecalciferol, vitamin D3, (VITAMIN D3) 1,000 unit capsule Take  1,000 Units by mouth once daily.    Historical Provider, MD   clopidogrel (PLAVIX) 75 mg tablet Take 1 tablet (75 mg total) by mouth once daily. 7/17/19 7/16/20  Maria R Noe DNP   escitalopram oxalate (LEXAPRO) 10 MG tablet Take 1 tablet (10 mg total) by mouth once daily. 8/12/19   Mercedes Ferrara, FILIBERTO   fluticasone propionate (FLONASE) 50 mcg/actuation nasal spray 1 spray (50 mcg total) by Each Nostril route once daily. 11/27/19   Annie Felix NP   gabapentin (NEURONTIN) 300 MG capsule Take 300 mg by mouth 2 (two) times daily.    Historical Provider, MD   pantoprazole (PROTONIX) 20 MG tablet Take 1 tablet (20 mg total) by mouth once daily. 5/6/19 5/5/20  Desean Crump MD     Anticoagulants/Antiplatelets: no anticoagulation and aspirin    Allergies:   Review of patient's allergies indicates:   Allergen Reactions    Pcn [penicillins] Rash     rash     Sedation History:  no adverse reactions    Review of Systems:   Hematological: no known coagulopathies  Respiratory: no shortness of breath  Cardiovascular: no chest pain  Gastrointestinal: no abdominal pain  Genito-Urinary: no dysuria  Musculoskeletal: negative  Neurological: no TIA or stroke symptoms         OBJECTIVE:     Vital Signs (Most Recent)       Physical Exam:  ASA: 2  M[place]ampati: 3    General: no acute distress  Mental Status: alert and oriented to person, place and time  HEENT: normocephalic, atraumatic  Chest: unlabored breathing  Heart: regular heart rate  Abdomen: nondistended  Extremity: moves all extremities    Laboratory  Lab Results   Component Value Date    INR 0.9 02/04/2020       Lab Results   Component Value Date    WBC 5.54 02/04/2020    HGB 14.8 02/04/2020    HCT 45.7 02/04/2020    MCV 94 02/04/2020     02/04/2020      Lab Results   Component Value Date    GLU 99 02/04/2020     02/04/2020    K 4.4 02/04/2020     02/04/2020    CO2 26 02/04/2020    BUN 8 02/04/2020    CREATININE 0.8 02/04/2020    CALCIUM 9.8  02/04/2020    MG 2.3 08/07/2019    ALT 20 02/04/2020    AST 20 02/04/2020    ALBUMIN 3.9 02/04/2020    BILITOT 0.5 02/04/2020    BILIDIR 0.1 05/28/2014       ASSESSMENT/PLAN:     Sedation Plan: Moderate sedation   Patient will undergo Follow up angiogram.    Henry Khan MD  NeuroIR fellow.

## 2020-02-04 NOTE — NURSING
Pt arrived to IR Room 190 for diagnostic cerebral angiogram . Pt oriented to unit and staff. Plan of care reviewed with patient, patient verbalizes understanding. Comfort measures utilized. Pt safely transferred from stretcher to procedural table. Fall risk reviewed with patient, fall risk interventions maintained. Safety strap applied, positioner pillows utilized to minimize pressure points. Blankets applied. Pt prepped and draped utilizing standard sterile technique. Patient placed on continuous monitoring, as required by sedation policy. Timeouts completed utilizing standard universal time-out, per department and facility policy. RN to remain at bedside, continuous monitoring maintained. Pt resting comfortably. Denies pain/discomfort. Will continue to monitor. See flow sheets for monitoring, medication administration, and updates.

## 2020-02-04 NOTE — DISCHARGE INSTRUCTIONS
Please call with any questions or concerns.      Monday thru Friday 8:00 am - 4:30 pm    Interventional Radiology   [phone]    After Hours    Ask for the Radiology Intern on call  [phone]

## 2020-02-10 ENCOUNTER — HOSPITAL ENCOUNTER (OUTPATIENT)
Dept: RADIOLOGY | Facility: CLINIC | Age: 45
Discharge: HOME | End: 2020-02-10
Attending: FAMILY MEDICINE
Payer: MEDICARE

## 2020-02-10 ENCOUNTER — OFFICE VISIT (OUTPATIENT)
Dept: FAMILY MEDICINE | Facility: CLINIC | Age: 45
End: 2020-02-10
Attending: FAMILY MEDICINE
Payer: MEDICARE

## 2020-02-10 VITALS
HEIGHT: 67 IN | TEMPERATURE: 98 F | BODY MASS INDEX: 36.47 KG/M2 | HEART RATE: 89 BPM | OXYGEN SATURATION: 97 % | DIASTOLIC BLOOD PRESSURE: 70 MMHG | WEIGHT: 232.38 LBS | RESPIRATION RATE: 16 BRPM | SYSTOLIC BLOOD PRESSURE: 106 MMHG

## 2020-02-10 DIAGNOSIS — F41.9 ANXIETY: ICD-10-CM

## 2020-02-10 DIAGNOSIS — D69.6 THROMBOCYTOPENIA: ICD-10-CM

## 2020-02-10 DIAGNOSIS — Z12.39 BREAST CANCER SCREENING: ICD-10-CM

## 2020-02-10 DIAGNOSIS — F41.8 DEPRESSION WITH ANXIETY: Primary | ICD-10-CM

## 2020-02-10 DIAGNOSIS — I72.9 ANEURYSM: ICD-10-CM

## 2020-02-10 DIAGNOSIS — E66.01 SEVERE OBESITY: ICD-10-CM

## 2020-02-10 PROCEDURE — 99999 PR PBB SHADOW E&M-EST. PATIENT-LVL III: ICD-10-PCS | Mod: PBBFAC,,, | Performed by: FAMILY MEDICINE

## 2020-02-10 PROCEDURE — 3008F BODY MASS INDEX DOCD: CPT | Mod: S$GLB,,, | Performed by: FAMILY MEDICINE

## 2020-02-10 PROCEDURE — 77063 BREAST TOMOSYNTHESIS BI: CPT | Mod: 26,,, | Performed by: RADIOLOGY

## 2020-02-10 PROCEDURE — 99999 PR PBB SHADOW E&M-EST. PATIENT-LVL III: CPT | Mod: PBBFAC,,, | Performed by: FAMILY MEDICINE

## 2020-02-10 PROCEDURE — 77067 SCR MAMMO BI INCL CAD: CPT | Mod: TC,PO

## 2020-02-10 PROCEDURE — 77063 MAMMO DIGITAL SCREENING BILAT WITH TOMOSYNTHESIS_CAD: ICD-10-PCS | Mod: 26,,, | Performed by: RADIOLOGY

## 2020-02-10 PROCEDURE — 77067 SCR MAMMO BI INCL CAD: CPT | Mod: 26,,, | Performed by: RADIOLOGY

## 2020-02-10 PROCEDURE — 99213 OFFICE O/P EST LOW 20 MIN: CPT | Mod: S$GLB,,, | Performed by: FAMILY MEDICINE

## 2020-02-10 PROCEDURE — 77067 MAMMO DIGITAL SCREENING BILAT WITH TOMOSYNTHESIS_CAD: ICD-10-PCS | Mod: 26,,, | Performed by: RADIOLOGY

## 2020-02-10 PROCEDURE — 3008F PR BODY MASS INDEX (BMI) DOCUMENTED: ICD-10-PCS | Mod: S$GLB,,, | Performed by: FAMILY MEDICINE

## 2020-02-10 PROCEDURE — 99213 PR OFFICE/OUTPT VISIT, EST, LEVL III, 20-29 MIN: ICD-10-PCS | Mod: S$GLB,,, | Performed by: FAMILY MEDICINE

## 2020-02-10 RX ORDER — ESCITALOPRAM OXALATE 10 MG/1
10 TABLET ORAL DAILY
Qty: 90 TABLET | Refills: 1 | Status: SHIPPED | OUTPATIENT
Start: 2020-02-10 | End: 2020-08-19 | Stop reason: SDUPTHER

## 2020-02-10 RX ORDER — LORAZEPAM 1 MG/1
1 TABLET ORAL ONCE
Qty: 1 TABLET | Refills: 0 | Status: SHIPPED | OUTPATIENT
Start: 2020-02-10 | End: 2020-03-17 | Stop reason: SDUPTHER

## 2020-02-10 NOTE — PATIENT INSTRUCTIONS

## 2020-02-10 NOTE — PROGRESS NOTES
Subjective:       Patient ID: Carolina Andrews is a 44 y.o. female.    Chief Complaint: Follow-up    44-year-old female coming in to follow-up on anxiety and depression needing refills of lorazepam which she uses very sparingly, having used 17 since June 2019.  She also needs a refill on her Lexapro.  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  She has now been taken off of her Plavix that remains on low-dose aspirin.  She is to go back in six months for a follow-up and re-examination by angiography.  History includes depression, arthritis, legal blindness, gallstones, high pad a come angioma and the aneurysm.  She is asymptomatic without complaints at this time.  She does occasionally get some congestion and some discomfort described as an itching in her left ear.    Past Medical History:  No date: Arthritis  No date: Blindness  No date: Cholelithiasis  No date: Liver hemangioma  No date: Mental disorder      Comment:  Periodic antidepressant use since childhood, not                currently taking or endorsing depressive ideation  No date: Pinched nerve    Past Surgical History:  8/6/2019: ANGIOGRAPHY; N/A      Comment:  Procedure: ANGIOGRAM/EMBOLIZATION;  Surgeon: Andressa                Surgeon;  Location: [facility];  Service: Anesthesiology;                Laterality: N/A;  Formerly [facility]/[place]  2009: ANKLE FRACTURE SURGERY  7/15/2019: CEREBRAL ANGIOGRAM; N/A      Comment:  Procedure: ANGIOGRAM-CEREBRAL;  Surgeon: [facility]               Provider;  Location: [address];  Service:                Radiology;  Laterality: N/A;  [facility]/[place]  2/4/2020: CEREBRAL ANGIOGRAM; N/A      Comment:  Procedure: ANGIOGRAM-CEREBRAL;  Surgeon: [facility]               Provider;  Location: [address];  Service:                Radiology;  Laterality: N/A;   [address]/[place]  2/4/2019: COLONOSCOPY; N/A      Comment:  Procedure: COLONOSCOPY;   Surgeon: Dale Childers MD;                Location: [facility] ENDO;  Service: Endoscopy;  Laterality:                N/A;  11/19/2018: ESOPHAGOGASTRODUODENOSCOPY; N/A      Comment:  Procedure: EGD (ESOPHAGOGASTRODUODENOSCOPY);  Surgeon:                Dale Childers MD;  Location: [facility] ENDO;  Service:                Endoscopy;  Laterality: N/A;  No date: FRACTURE SURGERY  ~1998: UPPER GASTROINTESTINAL ENDOSCOPY      Comment:  normal findings per patient report    Current Outpatient Medications on File Prior to Visit:  acetaminophen (TYLENOL) 500 MG tablet, Take 500 mg by mouth every 6 (six) hours as needed for Pain., Disp: , Rfl:   aspirin (ECOTRIN) 81 MG EC tablet, Take 1 tablet (81 mg total) by mouth once daily., Disp: 30 tablet, Rfl: 11  b complex vitamins tablet, Take 1 tablet by mouth once daily., Disp: , Rfl:   cholecalciferol, vitamin D3, (VITAMIN D3) 1,000 unit capsule, Take 1,000 Units by mouth once daily., Disp: , Rfl:   fexofenadine HCl (ALLEGRA ORAL), Take 1 tablet by mouth as needed., Disp: , Rfl:   gabapentin (NEURONTIN) 300 MG capsule, Take 600 mg by mouth every evening. , Disp: , Rfl:   pantoprazole (PROTONIX) 20 MG tablet, Take 1 tablet (20 mg total) by mouth once daily., Disp: 90 tablet, Rfl: 3  (DISCONTINUED) escitalopram oxalate (LEXAPRO) 10 MG tablet, Take 1 tablet (10 mg total) by mouth once daily., Disp: 90 tablet, Rfl: 1  (DISCONTINUED) LORazepam (ATIVAN) 1 MG tablet, Take 1 mg by mouth once., Disp: , Rfl:   (DISCONTINUED) clopidogrel (PLAVIX) 75 mg tablet, Take 1 tablet (75 mg total) by mouth once daily., Disp: 30 tablet, Rfl: 5  (DISCONTINUED) fluticasone propionate (FLONASE) 50 mcg/actuation nasal spray, 1 spray (50 mcg total) by Each Nostril route once daily., Disp: 1 Bottle, Rfl: 0    No current facility-administered medications on file prior to visit.         Review of Systems   Constitutional: Negative for activity change and unexpected weight change.   HENT: Negative for hearing  loss, rhinorrhea and trouble swallowing.    Eyes: Negative for discharge and visual disturbance.   Respiratory: Negative for chest tightness and wheezing.    Cardiovascular: Negative for chest pain and palpitations.   Gastrointestinal: Negative for blood in stool, constipation, diarrhea and vomiting.   Endocrine: Negative for polydipsia and polyuria.   Genitourinary: Negative for difficulty urinating, dysuria, hematuria and menstrual problem.   Musculoskeletal: Negative for arthralgias, joint swelling and neck pain.   Neurological: Positive for headaches. Negative for weakness.   Psychiatric/Behavioral: Negative for confusion and dysphoric mood.       Objective:      Physical Exam   Constitutional: She appears well-developed. No distress.   Good blood pressure control  Severe obesity with a BMI of 36.4 she is up 1.3 lb from her June 14, 2019 visit   HENT:   Head: Normocephalic and atraumatic.   Right Ear: External ear normal.   Left Ear: External ear normal.   Mouth/Throat: Oropharynx is clear and moist. No oropharyngeal exudate.   Moderately severe nasal turbinate swelling with some erythema and some exudate present.  Diminished tympanic membrane movement left ear   Eyes: EOM are normal. No scleral icterus.   Neck: Normal range of motion. Neck supple. No JVD present. No tracheal deviation present. No thyromegaly present.   Cardiovascular: Normal rate, regular rhythm and normal heart sounds. Exam reveals no gallop and no friction rub.   No murmur heard.  Pulmonary/Chest: Effort normal and breath sounds normal. No stridor. No respiratory distress. She has no wheezes. She has no rales. She exhibits no tenderness.   Lymphadenopathy:     She has no cervical adenopathy.   Skin: She is not diaphoretic.   Nursing note and vitals reviewed.      Assessment:       1. Depression with anxiety    2. Anxiety    3. Aneurysm    4. Thrombocytopenia    5. Severe obesity        Plan:       1. Depression with anxiety  - escitalopram  oxalate (LEXAPRO) 10 MG tablet; Take 1 tablet (10 mg total) by mouth once daily.  Dispense: 90 tablet; Refill: 1    2. Anxiety  The  (Prescription Monitoring Program) website was checked with no inappropriate activity found.  - LORazepam (ATIVAN) 1 MG tablet; Take 1 tablet (1 mg total) by mouth once. for 1 dose  Dispense: 1 tablet; Refill: 0    3. Aneurysm  Status post embolization with follow-up in six months    4. Thrombocytopenia  Taken off of Plavix    5. Severe obesity

## 2020-02-24 ENCOUNTER — PATIENT MESSAGE (OUTPATIENT)
Dept: FAMILY MEDICINE | Facility: CLINIC | Age: 45
End: 2020-02-24

## 2020-02-24 RX ORDER — OSELTAMIVIR PHOSPHATE 75 MG/1
75 CAPSULE ORAL 2 TIMES DAILY
Qty: 10 CAPSULE | Refills: 0 | Status: SHIPPED | OUTPATIENT
Start: 2020-02-24 | End: 2020-02-29

## 2020-03-17 ENCOUNTER — PATIENT MESSAGE (OUTPATIENT)
Dept: FAMILY MEDICINE | Facility: CLINIC | Age: 45
End: 2020-03-17

## 2020-03-17 DIAGNOSIS — F41.9 ANXIETY: ICD-10-CM

## 2020-03-17 RX ORDER — LORAZEPAM 1 MG/1
1 TABLET ORAL EVERY 8 HOURS PRN
Qty: 21 TABLET | Refills: 0 | Status: SHIPPED | OUTPATIENT
Start: 2020-03-17

## 2020-03-17 NOTE — TELEPHONE ENCOUNTER
It looks like the computer picked up an old prescription that was probably intended for preoperative medication.  I sent a new prescription to [facility] for 21 as previously ordered

## 2020-06-03 ENCOUNTER — OFFICE VISIT (OUTPATIENT)
Dept: DERMATOLOGY | Facility: CLINIC | Age: 45
End: 2020-06-03
Payer: MEDICARE

## 2020-06-03 ENCOUNTER — PATIENT MESSAGE (OUTPATIENT)
Dept: DERMATOLOGY | Facility: CLINIC | Age: 45
End: 2020-06-03

## 2020-06-03 ENCOUNTER — PATIENT OUTREACH (OUTPATIENT)
Dept: ADMINISTRATIVE | Facility: OTHER | Age: 45
End: 2020-06-03

## 2020-06-03 VITALS — WEIGHT: 232.38 LBS | BODY MASS INDEX: 36.47 KG/M2 | HEIGHT: 67 IN

## 2020-06-03 DIAGNOSIS — B36.0 TINEA VERSICOLOR: ICD-10-CM

## 2020-06-03 DIAGNOSIS — L73.9 FOLLICULITIS: Primary | ICD-10-CM

## 2020-06-03 DIAGNOSIS — L72.9 CYST OF SKIN: ICD-10-CM

## 2020-06-03 PROCEDURE — 3008F BODY MASS INDEX DOCD: CPT | Mod: S$GLB,,, | Performed by: DERMATOLOGY

## 2020-06-03 PROCEDURE — 99999 PR PBB SHADOW E&M-EST. PATIENT-LVL II: CPT | Mod: PBBFAC,,, | Performed by: DERMATOLOGY

## 2020-06-03 PROCEDURE — 99999 PR PBB SHADOW E&M-EST. PATIENT-LVL II: ICD-10-PCS | Mod: PBBFAC,,, | Performed by: DERMATOLOGY

## 2020-06-03 PROCEDURE — 3008F PR BODY MASS INDEX (BMI) DOCUMENTED: ICD-10-PCS | Mod: S$GLB,,, | Performed by: DERMATOLOGY

## 2020-06-03 PROCEDURE — 99203 OFFICE O/P NEW LOW 30 MIN: CPT | Mod: S$GLB,,, | Performed by: DERMATOLOGY

## 2020-06-03 PROCEDURE — 99203 PR OFFICE/OUTPT VISIT, NEW, LEVL III, 30-44 MIN: ICD-10-PCS | Mod: S$GLB,,, | Performed by: DERMATOLOGY

## 2020-06-03 RX ORDER — CLINDAMYCIN PHOSPHATE 10 UG/ML
LOTION TOPICAL
Qty: 60 ML | Refills: 3 | Status: SHIPPED | OUTPATIENT
Start: 2020-06-03 | End: 2023-06-05 | Stop reason: ALTCHOICE

## 2020-06-03 RX ORDER — FLUCONAZOLE 200 MG/1
TABLET ORAL
Qty: 4 TABLET | Refills: 0 | Status: SHIPPED | OUTPATIENT
Start: 2020-06-03 | End: 2020-08-19

## 2020-06-03 NOTE — Clinical Note
Pt with extensive T Versicolor. Was going to do biw diflucan 200mg x 2 weeks (total of 4 pills). Are you comfortable with that in light of possible interaction with her lexapro (QT prolongation)?

## 2020-06-03 NOTE — PROGRESS NOTES
Subjective:       Patient ID:  Carolina Andrews is a 44 y.o. female who presents for   Chief Complaint   Patient presents with    Spot     chin, bilateral legs    Rash     under breast     New patient  Previous derm Dr Frank    patient here today with c/o spots on chin and bilateral legs for several years, starts as bump, red, opens up and gets a hard knot out then goes away, itchy and painful,  Also c/o a rash under breast off and on, red, occ itches, no treatment   Psoriasis scalp and ears, uses Clobetasol when flared.   Feels like her skin burns    Denies PHX NMSC  Denies FHX MM        Review of Systems   Skin: Positive for itching, rash, activity-related sunscreen use and wears hat.   Hematologic/Lymphatic: Does not bruise/bleed easily.        Objective:    Physical Exam   Constitutional: She appears well-developed and well-nourished. No distress.   Neurological: She is alert and oriented to person, place, and time. She is not disoriented.   Psychiatric: She has a normal mood and affect.   Skin:   Areas Examined (abnormalities noted in diagram):   Head / Face Inspection Performed  Neck Inspection Performed  Chest / Axilla Inspection Performed  Abdomen Inspection Performed  Genitals / Buttocks / Groin Inspection Performed  Back Inspection Performed  RUE Inspected  LUE Inspection Performed  RLE Inspected  LLE Inspection Performed  Nails and Digits Inspection Performed                   Diagram Legend     Erythematous scaling macule/papule c/w actinic keratosis       Vascular papule c/w angioma      Pigmented verrucoid papule/plaque c/w seborrheic keratosis      Yellow umbilicated papule c/w sebaceous hyperplasia      Irregularly shaped tan macule c/w lentigo     1-2 mm smooth white papules consistent with Milia      Movable subcutaneous cyst with punctum c/w epidermal inclusion cyst      Subcutaneous movable cyst c/w pilar cyst      Firm pink to brown papule c/w dermatofibroma      Pedunculated fleshy  papule(s) c/w skin tag(s)      Evenly pigmented macule c/w junctional nevus     Mildly variegated pigmented, slightly irregular-bordered macule c/w mildly atypical nevus      Flesh colored to evenly pigmented papule c/w intradermal nevus       Pink pearly papule/plaque c/w basal cell carcinoma      Erythematous hyperkeratotic cursted plaque c/w SCC      Surgical scar with no sign of skin cancer recurrence      Open and closed comedones      Inflammatory papules and pustules      Verrucoid papule consistent consistent with wart     Erythematous eczematous patches and plaques     Dystrophic onycholytic nail with subungual debris c/w onychomycosis     Umbilicated papule    Erythematous-base heme-crusted tan verrucoid plaque consistent with inflamed seborrheic keratosis     Erythematous Silvery Scaling Plaque c/w Psoriasis     See annotation      Assessment / Plan:        Folliculitis  Bleach Baths    An eczema bleach bath- a bath with a small amount of bleach added to the water- may help lessen the symptoms of chronic eczema (atopic dermatitis) , an itchy skin condition.     A bacterial infection often accompanies eczema, worsening symptoms. An eczema bleach bath is though to kill bacteria on the skin, reducing itching, redness and scaling. This is most effective when combined with other eczema treatments, such as medications and moisturizers.     If properly diluted and used as directed, a bleach bath is safe for both children and adults. For best results:    - add 1/2 cup (118 milliliters) of bleach to a 40-gallon (151- liter) bathtub filled with warm water (measures are for a [place]standard-sized tub filled to the overflow drainage holes).     -soak the limbs and torso or just the affected areas of skin for five to 10 minutes. Do not submerge the head.    - dry skin thoroughly, and generously apply moisturizer.    -take a bleach bath no more than twice a week.    A bleach bath can cause skin dryness if too much  bleach is used or if the bath is done too often. If your skin is cracked or extremely dry, any bath - including a bleach bath - may be painful. Talk to your doctor before trying an eczema bleach bath.     -     clindamycin (CLEOCIN T) 1 % lotion; aaa bid  Dispense: 60 mL; Refill: 3    Cyst of skin, left  Reassurance given to patient. No treatment is necessary.         Tinea versicolor, trunk  Diflucan 200mg biw x 2 weeks  as pt on low dose lexapro and normal  EKG ok with PCP Dr. Crump to do low dose short duration diflucan             Follow up if symptoms worsen or fail to improve.

## 2020-06-22 ENCOUNTER — OFFICE VISIT (OUTPATIENT)
Dept: FAMILY MEDICINE | Facility: CLINIC | Age: 45
End: 2020-06-22
Payer: MEDICARE

## 2020-06-22 ENCOUNTER — HOSPITAL ENCOUNTER (OUTPATIENT)
Dept: RADIOLOGY | Facility: CLINIC | Age: 45
Discharge: HOME | End: 2020-06-22
Attending: PHYSICIAN ASSISTANT
Payer: MEDICARE

## 2020-06-22 VITALS
DIASTOLIC BLOOD PRESSURE: 72 MMHG | SYSTOLIC BLOOD PRESSURE: 112 MMHG | BODY MASS INDEX: 36.75 KG/M2 | WEIGHT: 234.13 LBS | HEART RATE: 86 BPM | HEIGHT: 67 IN | TEMPERATURE: 98 F | OXYGEN SATURATION: 98 %

## 2020-06-22 DIAGNOSIS — M54.9 DORSALGIA, UNSPECIFIED: ICD-10-CM

## 2020-06-22 DIAGNOSIS — M54.50 ACUTE MIDLINE LOW BACK PAIN WITHOUT SCIATICA: Primary | ICD-10-CM

## 2020-06-22 PROCEDURE — 3008F BODY MASS INDEX DOCD: CPT | Mod: S$GLB,,, | Performed by: PHYSICIAN ASSISTANT

## 2020-06-22 PROCEDURE — 99213 PR OFFICE/OUTPT VISIT, EST, LEVL III, 20-29 MIN: ICD-10-PCS | Mod: S$GLB,,, | Performed by: PHYSICIAN ASSISTANT

## 2020-06-22 PROCEDURE — 99999 PR PBB SHADOW E&M-EST. PATIENT-LVL V: CPT | Mod: PBBFAC,,, | Performed by: PHYSICIAN ASSISTANT

## 2020-06-22 PROCEDURE — 72110 XR LUMBAR SPINE COMPLETE 5 VIEW: ICD-10-PCS | Mod: 26,,, | Performed by: RADIOLOGY

## 2020-06-22 PROCEDURE — 99213 OFFICE O/P EST LOW 20 MIN: CPT | Mod: S$GLB,,, | Performed by: PHYSICIAN ASSISTANT

## 2020-06-22 PROCEDURE — 99999 PR PBB SHADOW E&M-EST. PATIENT-LVL V: ICD-10-PCS | Mod: PBBFAC,,, | Performed by: PHYSICIAN ASSISTANT

## 2020-06-22 PROCEDURE — 72110 X-RAY EXAM L-2 SPINE 4/>VWS: CPT | Mod: 26,,, | Performed by: RADIOLOGY

## 2020-06-22 PROCEDURE — 72110 X-RAY EXAM L-2 SPINE 4/>VWS: CPT | Mod: TC,FY,PO

## 2020-06-22 PROCEDURE — 3008F PR BODY MASS INDEX (BMI) DOCUMENTED: ICD-10-PCS | Mod: S$GLB,,, | Performed by: PHYSICIAN ASSISTANT

## 2020-06-22 RX ORDER — TIZANIDINE 4 MG/1
4 TABLET ORAL EVERY 6 HOURS PRN
Qty: 30 TABLET | Refills: 1 | Status: SHIPPED | OUTPATIENT
Start: 2020-06-22 | End: 2020-07-02

## 2020-06-22 NOTE — PROGRESS NOTES
Subjective:       Patient ID: Carolina Andrews is a 45 y.o. female.    Chief Complaint: Back Pain    HPI   Injured lower back lifting 2 days ago  Hurts to move  No previous back problems  Using tylenol  Review of Systems   Constitutional: Positive for activity change. Negative for appetite change, chills, diaphoresis, fatigue, fever and unexpected weight change.   HENT: Negative.    Eyes: Negative.    Respiratory: Negative.    Cardiovascular: Negative.  Negative for chest pain and leg swelling.   Gastrointestinal: Negative for abdominal pain.   Endocrine: Negative.    Genitourinary: Negative.  Negative for bladder incontinence, dysuria, hematuria and pelvic pain.   Musculoskeletal: Positive for back pain. Negative for leg pain.   Integumentary:  Negative.   Neurological: Negative.  Negative for weakness, numbness and headaches.         Objective:      Physical Exam  Vitals signs reviewed.   Constitutional:       General: She is not in acute distress.     Appearance: Normal appearance. She is obese. She is not ill-appearing, toxic-appearing or diaphoretic.   HENT:      Head: Normocephalic and atraumatic.      Mouth/Throat:      Mouth: Mucous membranes are moist.      Pharynx: Oropharynx is clear. No oropharyngeal exudate.   Eyes:      General: No scleral icterus.     Conjunctiva/sclera: Conjunctivae normal.   Neck:      Musculoskeletal: Normal range of motion and neck supple. No neck rigidity or muscular tenderness.   Musculoskeletal:         General: Tenderness and signs of injury present. No swelling or deformity.      Right lower leg: No edema.      Left lower leg: No edema.      Comments: Mild tight non tender lumbar paravertebral muscles  No sciatic tenderness  Vertebral tenderness L 1 and 2 level without radiation of pain  SLR's 90 degrees bilat without back discomfort   Lymphadenopathy:      Cervical: No cervical adenopathy.   Skin:     General: Skin is warm and dry.      Findings: No rash.   Neurological:       General: No focal deficit present.      Mental Status: She is alert and oriented to person, place, and time.      Sensory: No sensory deficit.         Assessment:       1. Acute midline low back pain without sciatica    2. Dorsalgia, unspecified        Plan:   Carolina was seen today for back pain.    Diagnoses and all orders for this visit:    Acute midline low back pain without sciatica  -     tiZANidine (ZANAFLEX) 4 MG tablet; Take 1 tablet (4 mg total) by mouth every 6 (six) hours as needed.    Dorsalgia, unspecified  -     X-Ray Lumbar Spine Complete 5 View; Future    discussed otc's  Discussed home PT    Answers for HPI/ROS submitted by the patient on 6/22/2020   Back pain  genital pain: No

## 2020-08-19 ENCOUNTER — LAB VISIT (OUTPATIENT)
Dept: LAB | Facility: HOSPITAL | Age: 45
End: 2020-08-19
Attending: FAMILY MEDICINE
Payer: MEDICARE

## 2020-08-19 ENCOUNTER — OFFICE VISIT (OUTPATIENT)
Dept: FAMILY MEDICINE | Facility: CLINIC | Age: 45
End: 2020-08-19
Attending: FAMILY MEDICINE
Payer: MEDICARE

## 2020-08-19 VITALS
SYSTOLIC BLOOD PRESSURE: 108 MMHG | WEIGHT: 234.38 LBS | TEMPERATURE: 98 F | DIASTOLIC BLOOD PRESSURE: 76 MMHG | HEIGHT: 67 IN | OXYGEN SATURATION: 97 % | HEART RATE: 82 BPM | BODY MASS INDEX: 36.79 KG/M2

## 2020-08-19 DIAGNOSIS — K21.9 GASTROESOPHAGEAL REFLUX DISEASE WITHOUT ESOPHAGITIS: ICD-10-CM

## 2020-08-19 DIAGNOSIS — F41.8 DEPRESSION WITH ANXIETY: ICD-10-CM

## 2020-08-19 DIAGNOSIS — D69.6 THROMBOCYTOPENIA: ICD-10-CM

## 2020-08-19 DIAGNOSIS — Z00.00 PREVENTATIVE HEALTH CARE: ICD-10-CM

## 2020-08-19 DIAGNOSIS — H54.8 LEGALLY BLIND: ICD-10-CM

## 2020-08-19 DIAGNOSIS — H35.53 CONE DYSTROPHY: ICD-10-CM

## 2020-08-19 DIAGNOSIS — F41.9 ANXIETY: ICD-10-CM

## 2020-08-19 DIAGNOSIS — Z11.59 NEED FOR HEPATITIS C SCREENING TEST: ICD-10-CM

## 2020-08-19 DIAGNOSIS — N76.1 CHRONIC VAGINITIS: ICD-10-CM

## 2020-08-19 DIAGNOSIS — E66.01 SEVERE OBESITY (BMI 35.0-39.9) WITH COMORBIDITY: ICD-10-CM

## 2020-08-19 DIAGNOSIS — Z12.4 PAP SMEAR FOR CERVICAL CANCER SCREENING: ICD-10-CM

## 2020-08-19 DIAGNOSIS — I67.1 ANTERIOR COMMUNICATING ARTERY ANEURYSM: Chronic | ICD-10-CM

## 2020-08-19 DIAGNOSIS — Z00.00 PREVENTATIVE HEALTH CARE: Primary | ICD-10-CM

## 2020-08-19 LAB
BASOPHILS # BLD AUTO: 0.05 K/UL (ref 0–0.2)
BASOPHILS NFR BLD: 0.8 % (ref 0–1.9)
DIFFERENTIAL METHOD: ABNORMAL
EOSINOPHIL # BLD AUTO: 0.1 K/UL (ref 0–0.5)
EOSINOPHIL NFR BLD: 1.5 % (ref 0–8)
ERYTHROCYTE [DISTWIDTH] IN BLOOD BY AUTOMATED COUNT: 13.3 % (ref 11.5–14.5)
HCT VFR BLD AUTO: 44.8 % (ref 37–48.5)
HGB BLD-MCNC: 14.4 G/DL (ref 12–16)
IMM GRANULOCYTES # BLD AUTO: 0.05 K/UL (ref 0–0.04)
IMM GRANULOCYTES NFR BLD AUTO: 0.8 % (ref 0–0.5)
LYMPHOCYTES # BLD AUTO: 2.7 K/UL (ref 1–4.8)
LYMPHOCYTES NFR BLD: 41 % (ref 18–48)
MCH RBC QN AUTO: 30.1 PG (ref 27–31)
MCHC RBC AUTO-ENTMCNC: 32.1 G/DL (ref 32–36)
MCV RBC AUTO: 94 FL (ref 82–98)
MONOCYTES # BLD AUTO: 0.4 K/UL (ref 0.3–1)
MONOCYTES NFR BLD: 6.5 % (ref 4–15)
NEUTROPHILS # BLD AUTO: 3.3 K/UL (ref 1.8–7.7)
NEUTROPHILS NFR BLD: 49.4 % (ref 38–73)
NRBC BLD-RTO: 0 /100 WBC
PLATELET # BLD AUTO: 269 K/UL (ref 150–350)
PMV BLD AUTO: 11.8 FL (ref 9.2–12.9)
RBC # BLD AUTO: 4.79 M/UL (ref 4–5.4)
WBC # BLD AUTO: 6.64 K/UL (ref 3.9–12.7)

## 2020-08-19 PROCEDURE — 87591 N.GONORRHOEAE DNA AMP PROB: CPT

## 2020-08-19 PROCEDURE — 80053 COMPREHEN METABOLIC PANEL: CPT

## 2020-08-19 PROCEDURE — 3008F PR BODY MASS INDEX (BMI) DOCUMENTED: ICD-10-PCS | Mod: S$GLB,,, | Performed by: FAMILY MEDICINE

## 2020-08-19 PROCEDURE — 3008F BODY MASS INDEX DOCD: CPT | Mod: S$GLB,,, | Performed by: FAMILY MEDICINE

## 2020-08-19 PROCEDURE — 88175 CYTOPATH C/V AUTO FLUID REDO: CPT

## 2020-08-19 PROCEDURE — 99214 PR OFFICE/OUTPT VISIT, EST, LEVL IV, 30-39 MIN: ICD-10-PCS | Mod: S$GLB,,, | Performed by: FAMILY MEDICINE

## 2020-08-19 PROCEDURE — 99214 OFFICE O/P EST MOD 30 MIN: CPT | Mod: S$GLB,,, | Performed by: FAMILY MEDICINE

## 2020-08-19 PROCEDURE — 99999 PR PBB SHADOW E&M-EST. PATIENT-LVL III: CPT | Mod: PBBFAC,,, | Performed by: FAMILY MEDICINE

## 2020-08-19 PROCEDURE — 36415 COLL VENOUS BLD VENIPUNCTURE: CPT | Mod: PO

## 2020-08-19 PROCEDURE — 85025 COMPLETE CBC W/AUTO DIFF WBC: CPT

## 2020-08-19 PROCEDURE — 86803 HEPATITIS C AB TEST: CPT

## 2020-08-19 PROCEDURE — 99999 PR PBB SHADOW E&M-EST. PATIENT-LVL III: ICD-10-PCS | Mod: PBBFAC,,, | Performed by: FAMILY MEDICINE

## 2020-08-19 PROCEDURE — 80061 LIPID PANEL: CPT

## 2020-08-19 PROCEDURE — 87624 HPV HI-RISK TYP POOLED RSLT: CPT

## 2020-08-19 RX ORDER — CETIRIZINE HYDROCHLORIDE 10 MG/1
10 TABLET ORAL DAILY
COMMUNITY
End: 2022-04-20

## 2020-08-19 RX ORDER — PANTOPRAZOLE SODIUM 20 MG/1
20 TABLET, DELAYED RELEASE ORAL DAILY
COMMUNITY
End: 2020-08-19 | Stop reason: SDUPTHER

## 2020-08-19 RX ORDER — PANTOPRAZOLE SODIUM 20 MG/1
20 TABLET, DELAYED RELEASE ORAL DAILY
Qty: 30 TABLET | Refills: 11 | Status: CANCELLED | OUTPATIENT
Start: 2020-08-19

## 2020-08-19 RX ORDER — LORAZEPAM 1 MG/1
1 TABLET ORAL EVERY 8 HOURS PRN
Qty: 21 TABLET | Refills: 0 | Status: CANCELLED | OUTPATIENT
Start: 2020-08-19

## 2020-08-19 RX ORDER — PANTOPRAZOLE SODIUM 20 MG/1
20 TABLET, DELAYED RELEASE ORAL DAILY
Qty: 90 TABLET | Refills: 3 | Status: SHIPPED | OUTPATIENT
Start: 2020-08-19 | End: 2021-12-07 | Stop reason: SDUPTHER

## 2020-08-19 RX ORDER — ESCITALOPRAM OXALATE 10 MG/1
10 TABLET ORAL DAILY
Qty: 90 TABLET | Refills: 1 | Status: SHIPPED | OUTPATIENT
Start: 2020-08-19 | End: 2021-03-02 | Stop reason: SDUPTHER

## 2020-08-19 NOTE — PROGRESS NOTES
Subjective:       Patient ID: Carolina Andrews is a 45 y.o. female.    Chief Complaint: Annual Exam    45-year-old female coming in for physical exam and follow-up of multiple medical problems.  She has been having some vaginal irritation and was treated using diclofenac by another provider that did not give her any relief.  She suspects it may be due to an allergy and has tried changing to hyper allergenic laundry powders and does not use fabric softeners.  We discussed some common allergies.  She has a history of depression and anxiety, she has an anterior communicating artery aneurysm that has been embolized twice in July of 2019 in February of this year, she is due for another angiogram to follow-up on that.  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  She needs refills on her Lexapro and pantoprazole.  Her mammogram has been done.    Past Medical History:  No date: Arthritis  No date: Blindness  No date: Cholelithiasis  No date: Liver hemangioma  No date: Mental disorder      Comment:  Periodic antidepressant use since childhood, not                currently taking or endorsing depressive ideation  No date: Pinched nerve    Past Surgical History:  8/6/2019: ANGIOGRAPHY; N/A      Comment:  Procedure: ANGIOGRAM/EMBOLIZATION;  Surgeon: Andressa                Surgeon;  Location: [facility];  Service: Anesthesiology;                Laterality: N/A;   190/[place]  2009: ANKLE FRACTURE SURGERY  7/15/2019: CEREBRAL ANGIOGRAM; N/A      Comment:  Procedure: ANGIOGRAM-CEREBRAL;  Surgeon: [facility]               Provider;  Location: [address];  Service:                Radiology;  Laterality: N/A;   190/[place]  2/4/2020: CEREBRAL ANGIOGRAM; N/A      Comment:  Procedure: ANGIOGRAM-CEREBRAL;  Surgeon: [facility]               Provider;  Location: [facility] OR [address] [place] [address];  Service:                Radiology;  Laterality: N/A;   190/[place]  2/4/2019:  COLONOSCOPY; N/A      Comment:  Procedure: COLONOSCOPY;  Surgeon: Dale Childers MD;                Location: [facility] ENDO;  Service: Endoscopy;  Laterality:                N/A;  11/19/2018: ESOPHAGOGASTRODUODENOSCOPY; N/A      Comment:  Procedure: EGD (ESOPHAGOGASTRODUODENOSCOPY);  Surgeon:                Dale Childers MD;  Location: [facility] ENDO;  Service:                Endoscopy;  Laterality: N/A;  No date: FRACTURE SURGERY  ~1998: UPPER GASTROINTESTINAL ENDOSCOPY      Comment:  normal findings per patient report    Review of patient's family history indicates:  Problem: Hypertension      Relation: Father          Age of Onset: (Not Specified)  Problem: Lung disease      Relation: Father          Age of Onset: (Not Specified)          Comment: calapse x 3   Problem: Stroke      Relation: Father          Age of Onset: (Not Specified)          Comment: in eye   Problem: Retinal detachment      Relation: Father          Age of Onset: (Not Specified)  Problem: Cancer      Relation: Sister          Age of Onset: 23          Comment: polyps found incidentally  Problem: Vision loss      Relation: Sister          Age of Onset: (Not Specified)  Problem: Lung disease      Relation: Sister          Age of Onset: (Not Specified)          Comment: spontanious lung collapse  Problem: GI problems      Relation: Sister          Age of Onset: (Not Specified)  Problem: Psoriasis      Relation: Sister          Age of Onset: (Not Specified)  Problem: Retinal detachment      Relation: Mother          Age of Onset: (Not Specified)  Problem: Cancer      Relation: Paternal Grandfather          Age of Onset: (Not Specified)  Problem: Stroke      Relation: Maternal Grandmother          Age of Onset: (Not Specified)  Problem: Colon cancer      Relation: Paternal Aunt          Age of Onset: (Not Specified)  Problem: Colon polyps      Relation: Maternal Uncle          Age of Onset: (Not Specified)  Problem: Psoriasis      Relation:  Maternal Uncle          Age of Onset: (Not Specified)  Problem: No Known Problems      Relation: Paternal Uncle          Age of Onset: (Not Specified)  Problem: Parkinsonism      Relation: Maternal Grandfather          Age of Onset: (Not Specified)  Problem: Cancer      Relation: Maternal Grandfather          Age of Onset: (Not Specified)          Comment: prostate  Problem: Colon polyps      Relation: Maternal Grandfather          Age of Onset: (Not Specified)  Problem: Cancer      Relation: Paternal Grandmother          Age of Onset: (Not Specified)          Comment: skin cancer in colon   Problem: Alzheimer's disease      Relation: Paternal Grandmother          Age of Onset: (Not Specified)  Problem: Heart disease      Relation: Paternal Aunt          Age of Onset: (Not Specified)  Problem: Breast cancer      Relation: Neg Hx          Age of Onset: (Not Specified)  Problem: Diabetes      Relation: Neg Hx          Age of Onset: (Not Specified)  Problem: Miscarriages / Stillbirths      Relation: Neg Hx          Age of Onset: (Not Specified)  Problem: Crohn's disease      Relation: Neg Hx          Age of Onset: (Not Specified)  Problem: Ulcerative colitis      Relation: Neg Hx          Age of Onset: (Not Specified)  Problem: Glaucoma      Relation: Neg Hx          Age of Onset: (Not Specified)  Problem: Eczema      Relation: Neg Hx          Age of Onset: (Not Specified)  Problem: Lupus      Relation: Neg Hx          Age of Onset: (Not Specified)  Problem: Melanoma      Relation: Neg Hx          Age of Onset: (Not Specified)    Social History    Tobacco Use      Smoking status: Never Smoker      Smokeless tobacco: Never Used    Alcohol use: Yes      Alcohol/week: 6.0 standard drinks      Types: 6 Glasses of wine per week      Frequency: 2-4 times a month      Drinks per session: 3 or 4      Binge frequency: Never      Comment: 2-3/ week wine or beer    Drug use: No    Current Outpatient Medications on File Prior to  Visit:  acetaminophen (TYLENOL) 500 MG tablet, Take 500 mg by mouth every 6 (six) hours as needed for Pain., Disp: , Rfl:   b complex vitamins tablet, Take 1 tablet by mouth once daily., Disp: , Rfl:   cetirizine (ALLER-OSORIO) 10 MG tablet, Take 10 mg by mouth once daily., Disp: , Rfl:   cholecalciferol, vitamin D3, (VITAMIN D3) 1,000 unit capsule, Take 1,000 Units by mouth once daily., Disp: , Rfl:   clindamycin (CLEOCIN T) 1 % lotion, aaa bid, Disp: 60 mL, Rfl: 3  fexofenadine HCl (ALLEGRA ORAL), Take 1 tablet by mouth as needed., Disp: , Rfl:   gabapentin (NEURONTIN) 300 MG capsule, Take 600 mg by mouth every evening. , Disp: , Rfl:   LORazepam (ATIVAN) 1 MG tablet, Take 1 tablet (1 mg total) by mouth every 8 (eight) hours as needed for Anxiety., Disp: 21 tablet, Rfl: 0  (DISCONTINUED) escitalopram oxalate (LEXAPRO) 10 MG tablet, Take 1 tablet (10 mg total) by mouth once daily., Disp: 90 tablet, Rfl: 1  (DISCONTINUED) fluconazole (DIFLUCAN) 200 MG Tab, 1 po biw x 2 weeks, Disp: 4 tablet, Rfl: 0  (DISCONTINUED) pantoprazole (PROTONIX) 20 MG tablet, Take 20 mg by mouth once daily., Disp: , Rfl:   aspirin (ECOTRIN) 81 MG EC tablet, Take 1 tablet (81 mg total) by mouth once daily., Disp: 30 tablet, Rfl: 11    No current facility-administered medications on file prior to visit.     Hepatitis C Screening due on 1976  HIV Screening due on 06/09/1991  TETANUS VACCINE due on 06/09/1994  Pneumococcal Vaccine (Medium Risk)(1 of 1 - PPSV23) due on 06/09/1995  Cervical Cancer Screening due on 08/31/2020      Review of Systems   Constitutional: Negative for chills, diaphoresis, fatigue, fever and unexpected weight change.   HENT: Negative for congestion, ear pain, hearing loss, postnasal drip, sinus pressure, sneezing, sore throat, tinnitus and trouble swallowing.    Eyes: Positive for visual disturbance. Negative for itching.   Respiratory: Negative for cough, chest tightness, shortness of breath and wheezing.     Cardiovascular: Negative for chest pain, palpitations and leg swelling.   Gastrointestinal: Negative for abdominal pain, blood in stool, constipation, diarrhea, nausea and vomiting.   Genitourinary: Positive for vaginal discharge. Negative for dysuria, frequency, hematuria, menstrual problem, pelvic pain and vaginal bleeding.   Musculoskeletal: Negative for arthralgias, back pain, joint swelling and myalgias.   Skin: Negative for color change and rash.   Neurological: Negative for dizziness and headaches.   Hematological: Negative for adenopathy.   Psychiatric/Behavioral: Negative for sleep disturbance. The patient is not nervous/anxious.        Objective:      Physical Exam  Vitals signs and nursing note reviewed. Exam conducted with a chaperone present.   Constitutional:       General: She is not in acute distress.     Appearance: Normal appearance. She is well-developed. She is obese. She is not ill-appearing, toxic-appearing or diaphoretic.      Comments: Good blood pressure control  Severe obesity with a BMI of 36.7 she is up 2 lb from her February 10, 2020 visit   HENT:      Head: Normocephalic and atraumatic.      Right Ear: Tympanic membrane, ear canal and external ear normal. There is no impacted cerumen.      Left Ear: Tympanic membrane, ear canal and external ear normal. There is no impacted cerumen.      Nose: Nose normal. No congestion or rhinorrhea.      Mouth/Throat:      Mouth: Mucous membranes are moist.      Pharynx: Oropharynx is clear. No oropharyngeal exudate or posterior oropharyngeal erythema.   Eyes:      General: No scleral icterus.        Right eye: No discharge.         Left eye: No discharge.      Extraocular Movements: Extraocular movements intact.      Conjunctiva/sclera: Conjunctivae normal.      Pupils: Pupils are equal, round, and reactive to light.   Neck:      Musculoskeletal: Normal range of motion and neck supple. No neck rigidity or muscular tenderness.      Thyroid: No  thyromegaly.      Vascular: No carotid bruit or JVD.   Cardiovascular:      Rate and Rhythm: Normal rate and regular rhythm.      Heart sounds: Normal heart sounds. No murmur. No friction rub. No gallop.    Pulmonary:      Effort: Pulmonary effort is normal. No respiratory distress.      Breath sounds: Normal breath sounds. No stridor. No wheezing, rhonchi or rales.   Chest:      Chest wall: No tenderness.      Breasts: Breasts are symmetrical.         Right: Normal. No swelling, bleeding, inverted nipple, mass, nipple discharge, skin change or tenderness.         Left: Normal. No swelling, bleeding, inverted nipple, mass, nipple discharge, skin change or tenderness.   Abdominal:      General: Bowel sounds are normal. There is no distension.      Palpations: Abdomen is soft. There is no mass.      Tenderness: There is no abdominal tenderness. There is no guarding or rebound.      Hernia: No hernia is present. There is no hernia in the left inguinal area or right inguinal area.   Genitourinary:     General: Normal vulva.      Exam position: Supine.      Pubic Area: No rash or pubic lice.       Labia:         Right: No rash, tenderness, lesion or injury.         Left: No rash, tenderness, lesion or injury.       Urethra: No prolapse, urethral pain, urethral swelling or urethral lesion.      Vagina: No signs of injury and foreign body. Vaginal discharge present. No erythema, tenderness, bleeding, lesions or prolapsed vaginal walls.      Cervix: Normal.      Uterus: Normal.       Adnexa: Right adnexa normal and left adnexa normal.   Musculoskeletal: Normal range of motion.         General: No swelling or tenderness.      Right lower leg: No edema.      Left lower leg: No edema.   Lymphadenopathy:      Cervical: No cervical adenopathy.      Upper Body:      Right upper body: No supraclavicular, axillary or pectoral adenopathy.      Left upper body: No supraclavicular, axillary or pectoral adenopathy.      Lower Body: No  right inguinal adenopathy. No left inguinal adenopathy.   Skin:     General: Skin is warm and dry.      Coloration: Skin is not jaundiced or pale.      Findings: No bruising, erythema, lesion or rash.   Neurological:      General: No focal deficit present.      Mental Status: She is alert and oriented to person, place, and time. Mental status is at baseline.      Cranial Nerves: No cranial nerve deficit.      Sensory: No sensory deficit.      Motor: No weakness.      Coordination: Coordination normal.      Gait: Gait normal.      Deep Tendon Reflexes: Reflexes are normal and symmetric. Reflexes normal.   Psychiatric:         Mood and Affect: Mood normal.         Behavior: Behavior normal.         Thought Content: Thought content normal.         Judgment: Judgment normal.         Assessment:       1. Preventative health care    2. Anxiety    3. Anterior communicating artery aneurysm    4. Depression with anxiety    5. Cone dystrophy    6. Legally blind    7. Thrombocytopenia    8. Gastroesophageal reflux disease without esophagitis    9. Pap smear for cervical cancer screening    10. Chronic vaginitis    11. Need for hepatitis C screening test    12. Severe obesity (BMI 35.0-39.9) with comorbidity        Plan:       1. Preventative health care  - Comprehensive metabolic panel; Future  - Lipid Panel; Future  - CBC auto differential; Future    2. Anxiety  Refills Lexapro    3. Anterior communicating artery aneurysm  Status post coiling and embolization x2 and due for follow-up    4. Depression with anxiety  - escitalopram oxalate (LEXAPRO) 10 MG tablet; Take 1 tablet (10 mg total) by mouth once daily.  Dispense: 90 tablet; Refill: 1    5. Cone dystrophy  Legally blind    6. Legally blind    7. Thrombocytopenia  Await lab results  - CBC auto differential; Future    8. Gastroesophageal reflux disease without esophagitis  Refilled Protonix  - pantoprazole (PROTONIX) 20 MG tablet; Take 1 tablet (20 mg total) by mouth once  daily.  Dispense: 90 tablet; Refill: 3    9. Pap smear for cervical cancer screening  - Liquid-Based Pap Smear, Screening  - HPV High Risk Genotypes, PCR    10. Chronic vaginitis  Possibly chronic irritant but check for infectious cause  - C. trachomatis/N. gonorrhoeae by AMP DNA [facility]; Cervix    11. Need for hepatitis C screening test  Await results  - Hepatitis C Antibody; Future    12. Severe obesity (BMI 35.0-39.9) with comorbidity  - Comprehensive metabolic panel; Future  - Lipid Panel; Future  - CBC auto differential; Future

## 2020-08-20 LAB
ALBUMIN SERPL BCP-MCNC: 4 G/DL (ref 3.5–5.2)
ALP SERPL-CCNC: 85 U/L (ref 55–135)
ALT SERPL W/O P-5'-P-CCNC: 26 U/L (ref 10–44)
ANION GAP SERPL CALC-SCNC: 9 MMOL/L (ref 8–16)
AST SERPL-CCNC: 29 U/L (ref 10–40)
BILIRUB SERPL-MCNC: 0.5 MG/DL (ref 0.1–1)
BUN SERPL-MCNC: 10 MG/DL (ref 6–20)
CALCIUM SERPL-MCNC: 9.9 MG/DL (ref 8.7–10.5)
CHLORIDE SERPL-SCNC: 109 MMOL/L (ref 95–110)
CHOLEST SERPL-MCNC: 199 MG/DL (ref 120–199)
CHOLEST/HDLC SERPL: 3.6 {RATIO} (ref 2–5)
CO2 SERPL-SCNC: 22 MMOL/L (ref 23–29)
CREAT SERPL-MCNC: 0.8 MG/DL (ref 0.5–1.4)
EST. GFR  (AFRICAN AMERICAN): >60 ML/MIN/1.73 M^2
EST. GFR  (NON AFRICAN AMERICAN): >60 ML/MIN/1.73 M^2
GLUCOSE SERPL-MCNC: 81 MG/DL (ref 70–110)
HCV AB SERPL QL IA: NEGATIVE
HDLC SERPL-MCNC: 55 MG/DL (ref 40–75)
HDLC SERPL: 27.6 % (ref 20–50)
LDLC SERPL CALC-MCNC: 109.6 MG/DL (ref 63–159)
NONHDLC SERPL-MCNC: 144 MG/DL
POTASSIUM SERPL-SCNC: 4.3 MMOL/L (ref 3.5–5.1)
PROT SERPL-MCNC: 7.7 G/DL (ref 6–8.4)
SODIUM SERPL-SCNC: 140 MMOL/L (ref 136–145)
TRIGL SERPL-MCNC: 172 MG/DL (ref 30–150)

## 2020-08-22 LAB
C TRACH RRNA SPEC QL NAA+PROBE: NEGATIVE
N GONORRHOEA RRNA SPEC QL NAA+PROBE: NEGATIVE

## 2020-08-28 LAB
HPV HR 12 DNA SPEC QL NAA+PROBE: NEGATIVE
HPV16 AG SPEC QL: NEGATIVE
HPV18 DNA SPEC QL NAA+PROBE: NEGATIVE

## 2020-09-02 LAB
FINAL PATHOLOGIC DIAGNOSIS: NORMAL
Lab: NORMAL

## 2020-09-22 ENCOUNTER — PATIENT MESSAGE (OUTPATIENT)
Dept: FAMILY MEDICINE | Facility: CLINIC | Age: 45
End: 2020-09-22

## 2020-09-22 NOTE — TELEPHONE ENCOUNTER
Her Pap smear came in while I was out ill, it is normal with no cancer cells seen.  The papilloma virus test was also done with no sign of any of the high risk viral types that would cause cervical cancer.  Bacterial test for gonorrhea and chlamydia were also done and were negative.  If she continues to have irritation I would suggest a gyn follow-up.    Patient notified by e-mail

## 2021-03-02 ENCOUNTER — LAB VISIT (OUTPATIENT)
Dept: LAB | Facility: HOSPITAL | Age: 46
End: 2021-03-02
Attending: FAMILY MEDICINE
Payer: MEDICARE

## 2021-03-02 ENCOUNTER — OFFICE VISIT (OUTPATIENT)
Dept: FAMILY MEDICINE | Facility: CLINIC | Age: 46
End: 2021-03-02
Attending: FAMILY MEDICINE
Payer: MEDICARE

## 2021-03-02 VITALS
SYSTOLIC BLOOD PRESSURE: 106 MMHG | OXYGEN SATURATION: 98 % | HEIGHT: 67 IN | BODY MASS INDEX: 37.4 KG/M2 | HEART RATE: 79 BPM | TEMPERATURE: 99 F | DIASTOLIC BLOOD PRESSURE: 72 MMHG | WEIGHT: 238.31 LBS

## 2021-03-02 DIAGNOSIS — R51.9 TEMPORAL HEADACHE: Primary | ICD-10-CM

## 2021-03-02 DIAGNOSIS — Z95.9 STATUS POST ARTERIAL STENT: ICD-10-CM

## 2021-03-02 DIAGNOSIS — F41.8 DEPRESSION WITH ANXIETY: ICD-10-CM

## 2021-03-02 DIAGNOSIS — I67.1 ANTERIOR COMMUNICATING ARTERY ANEURYSM: Chronic | ICD-10-CM

## 2021-03-02 DIAGNOSIS — R51.9 TEMPORAL HEADACHE: ICD-10-CM

## 2021-03-02 DIAGNOSIS — H54.8 LEGALLY BLIND: ICD-10-CM

## 2021-03-02 DIAGNOSIS — G44.209 TENSION HEADACHE: ICD-10-CM

## 2021-03-02 DIAGNOSIS — H35.53 CONE DYSTROPHY: ICD-10-CM

## 2021-03-02 LAB — ERYTHROCYTE [SEDIMENTATION RATE] IN BLOOD BY WESTERGREN METHOD: 17 MM/HR (ref 0–20)

## 2021-03-02 PROCEDURE — 1125F AMNT PAIN NOTED PAIN PRSNT: CPT | Mod: S$GLB,,, | Performed by: FAMILY MEDICINE

## 2021-03-02 PROCEDURE — 36415 COLL VENOUS BLD VENIPUNCTURE: CPT | Mod: PO

## 2021-03-02 PROCEDURE — 3008F BODY MASS INDEX DOCD: CPT | Mod: S$GLB,,, | Performed by: FAMILY MEDICINE

## 2021-03-02 PROCEDURE — 1125F PR PAIN SEVERITY QUANTIFIED, PAIN PRESENT: ICD-10-PCS | Mod: S$GLB,,, | Performed by: FAMILY MEDICINE

## 2021-03-02 PROCEDURE — 99213 OFFICE O/P EST LOW 20 MIN: CPT | Mod: S$GLB,,, | Performed by: FAMILY MEDICINE

## 2021-03-02 PROCEDURE — 85651 RBC SED RATE NONAUTOMATED: CPT | Mod: PO

## 2021-03-02 PROCEDURE — 99213 PR OFFICE/OUTPT VISIT, EST, LEVL III, 20-29 MIN: ICD-10-PCS | Mod: S$GLB,,, | Performed by: FAMILY MEDICINE

## 2021-03-02 PROCEDURE — 3008F PR BODY MASS INDEX (BMI) DOCUMENTED: ICD-10-PCS | Mod: S$GLB,,, | Performed by: FAMILY MEDICINE

## 2021-03-02 PROCEDURE — 99999 PR PBB SHADOW E&M-EST. PATIENT-LVL IV: ICD-10-PCS | Mod: PBBFAC,,, | Performed by: FAMILY MEDICINE

## 2021-03-02 PROCEDURE — 99999 PR PBB SHADOW E&M-EST. PATIENT-LVL IV: CPT | Mod: PBBFAC,,, | Performed by: FAMILY MEDICINE

## 2021-03-02 RX ORDER — ESCITALOPRAM OXALATE 10 MG/1
10 TABLET ORAL DAILY
Qty: 90 TABLET | Refills: 1 | Status: SHIPPED | OUTPATIENT
Start: 2021-03-02 | End: 2023-11-20 | Stop reason: SDUPTHER

## 2021-03-02 RX ORDER — GABAPENTIN 300 MG/1
600 CAPSULE ORAL NIGHTLY
Qty: 360 CAPSULE | Refills: 1 | Status: SHIPPED | OUTPATIENT
Start: 2021-03-02 | End: 2022-05-11 | Stop reason: SDUPTHER

## 2021-03-02 RX ORDER — NORTRIPTYLINE HYDROCHLORIDE 25 MG/1
25 CAPSULE ORAL NIGHTLY
Qty: 30 CAPSULE | Refills: 5 | Status: SHIPPED | OUTPATIENT
Start: 2021-03-02 | End: 2022-03-30 | Stop reason: SDUPTHER

## 2021-03-03 DIAGNOSIS — I67.1 ANTERIOR COMMUNICATING ARTERY ANEURYSM: Primary | ICD-10-CM

## 2021-03-22 RX ORDER — MIDAZOLAM HYDROCHLORIDE 1 MG/ML
1 INJECTION INTRAMUSCULAR; INTRAVENOUS
Status: CANCELLED | OUTPATIENT
Start: 2021-03-22

## 2021-03-22 RX ORDER — HEPARIN SODIUM 1000 [USP'U]/ML
5000 INJECTION, SOLUTION INTRAVENOUS; SUBCUTANEOUS ONCE
Status: CANCELLED | OUTPATIENT
Start: 2021-03-22 | End: 2021-03-22

## 2021-03-22 RX ORDER — FENTANYL CITRATE 50 UG/ML
50 INJECTION, SOLUTION INTRAMUSCULAR; INTRAVENOUS
Status: CANCELLED | OUTPATIENT
Start: 2021-03-22

## 2021-03-23 ENCOUNTER — HOSPITAL ENCOUNTER (OUTPATIENT)
Dept: INTERVENTIONAL RADIOLOGY/VASCULAR | Facility: HOSPITAL | Age: 46
Discharge: HOME | End: 2021-03-23
Attending: PHYSICIAN ASSISTANT
Payer: MEDICARE

## 2021-03-23 VITALS
RESPIRATION RATE: 16 BRPM | WEIGHT: 230 LBS | HEART RATE: 85 BPM | TEMPERATURE: 98 F | BODY MASS INDEX: 36.1 KG/M2 | SYSTOLIC BLOOD PRESSURE: 150 MMHG | HEIGHT: 67 IN | DIASTOLIC BLOOD PRESSURE: 78 MMHG | OXYGEN SATURATION: 99 %

## 2021-03-23 DIAGNOSIS — I67.1 ANTERIOR COMMUNICATING ARTERY ANEURYSM: ICD-10-CM

## 2021-03-23 DIAGNOSIS — I67.89 OTHER CEREBROVASCULAR DISEASE: ICD-10-CM

## 2021-03-23 LAB
B-HCG UR QL: NEGATIVE
CTP QC/QA: YES

## 2021-03-23 PROCEDURE — 36224 PLACE CATH CAROTD ART: CPT | Performed by: RADIOLOGY

## 2021-03-23 PROCEDURE — 36224 IR ANGIOGRAM CAROTID INTERNAL INC ARCH AND CEREBRAL BILAT: ICD-10-PCS | Mod: LT,,, | Performed by: RADIOLOGY

## 2021-03-23 PROCEDURE — 25500020 PHARM REV CODE 255: Performed by: RADIOLOGY

## 2021-03-23 PROCEDURE — 25000003 PHARM REV CODE 250: Performed by: FAMILY MEDICINE

## 2021-03-23 PROCEDURE — 25000003 PHARM REV CODE 250: Performed by: RADIOLOGY

## 2021-03-23 PROCEDURE — 99152 PR MOD CONSCIOUS SEDATION, SAME PHYS, 5+ YRS, FIRST 15 MIN: ICD-10-PCS | Mod: ,,, | Performed by: RADIOLOGY

## 2021-03-23 PROCEDURE — 81025 URINE PREGNANCY TEST: CPT | Performed by: FAMILY MEDICINE

## 2021-03-23 PROCEDURE — G0269 OCCLUSIVE DEVICE IN VEIN ART: HCPCS | Performed by: RADIOLOGY

## 2021-03-23 PROCEDURE — A4215 STERILE NEEDLE: HCPCS

## 2021-03-23 PROCEDURE — 99153 MOD SED SAME PHYS/QHP EA: CPT | Performed by: RADIOLOGY

## 2021-03-23 PROCEDURE — 99152 MOD SED SAME PHYS/QHP 5/>YRS: CPT | Mod: ,,, | Performed by: RADIOLOGY

## 2021-03-23 PROCEDURE — 25000003 PHARM REV CODE 250: Performed by: STUDENT IN AN ORGANIZED HEALTH CARE EDUCATION/TRAINING PROGRAM

## 2021-03-23 PROCEDURE — 63600175 PHARM REV CODE 636 W HCPCS: Performed by: RADIOLOGY

## 2021-03-23 PROCEDURE — 99152 MOD SED SAME PHYS/QHP 5/>YRS: CPT | Performed by: RADIOLOGY

## 2021-03-23 RX ORDER — SODIUM CHLORIDE 9 MG/ML
INJECTION, SOLUTION INTRAVENOUS CONTINUOUS
Status: DISCONTINUED | OUTPATIENT
Start: 2021-03-23 | End: 2021-03-24 | Stop reason: HOSPADM

## 2021-03-23 RX ORDER — LIDOCAINE AND PRILOCAINE 25; 25 MG/G; MG/G
CREAM TOPICAL ONCE
Status: COMPLETED | OUTPATIENT
Start: 2021-03-23 | End: 2021-03-23

## 2021-03-23 RX ORDER — SODIUM CHLORIDE 0.9 % (FLUSH) 0.9 %
10 SYRINGE (ML) INJECTION
Status: DISCONTINUED | OUTPATIENT
Start: 2021-03-23 | End: 2021-03-24 | Stop reason: HOSPADM

## 2021-03-23 RX ORDER — FENTANYL CITRATE 50 UG/ML
INJECTION, SOLUTION INTRAMUSCULAR; INTRAVENOUS CODE/TRAUMA/SEDATION MEDICATION
Status: COMPLETED | OUTPATIENT
Start: 2021-03-23 | End: 2021-03-23

## 2021-03-23 RX ORDER — VERAPAMIL HYDROCHLORIDE 2.5 MG/ML
INJECTION, SOLUTION INTRAVENOUS CODE/TRAUMA/SEDATION MEDICATION
Status: COMPLETED | OUTPATIENT
Start: 2021-03-23 | End: 2021-03-23

## 2021-03-23 RX ORDER — LIDOCAINE HYDROCHLORIDE 10 MG/ML
INJECTION INFILTRATION; PERINEURAL CODE/TRAUMA/SEDATION MEDICATION
Status: COMPLETED | OUTPATIENT
Start: 2021-03-23 | End: 2021-03-23

## 2021-03-23 RX ORDER — MIDAZOLAM HYDROCHLORIDE 1 MG/ML
INJECTION INTRAMUSCULAR; INTRAVENOUS CODE/TRAUMA/SEDATION MEDICATION
Status: COMPLETED | OUTPATIENT
Start: 2021-03-23 | End: 2021-03-23

## 2021-03-23 RX ORDER — IODIXANOL 320 MG/ML
150 INJECTION, SOLUTION INTRAVASCULAR
Status: COMPLETED | OUTPATIENT
Start: 2021-03-23 | End: 2021-03-23

## 2021-03-23 RX ORDER — ACETAMINOPHEN 325 MG/1
650 TABLET ORAL ONCE
Status: COMPLETED | OUTPATIENT
Start: 2021-03-23 | End: 2021-03-23

## 2021-03-23 RX ADMIN — SODIUM CHLORIDE: 0.9 INJECTION, SOLUTION INTRAVENOUS at 10:03

## 2021-03-23 RX ADMIN — MIDAZOLAM HYDROCHLORIDE 1 MG: 1 INJECTION, SOLUTION INTRAMUSCULAR; INTRAVENOUS at 12:03

## 2021-03-23 RX ADMIN — FENTANYL CITRATE 50 MCG: 50 INJECTION, SOLUTION INTRAMUSCULAR; INTRAVENOUS at 12:03

## 2021-03-23 RX ADMIN — VERAPAMIL HYDROCHLORIDE 10 MG: 2.5 INJECTION, SOLUTION INTRAVENOUS at 12:03

## 2021-03-23 RX ADMIN — IODIXANOL 40 ML: 320 INJECTION, SOLUTION INTRAVASCULAR at 01:03

## 2021-03-23 RX ADMIN — ACETAMINOPHEN 650 MG: 325 TABLET ORAL at 01:03

## 2021-03-23 RX ADMIN — LIDOCAINE HYDROCHLORIDE 5 ML: 10 INJECTION, SOLUTION INFILTRATION; PERINEURAL at 12:03

## 2021-03-23 RX ADMIN — LIDOCAINE AND PRILOCAINE: 25; 25 CREAM TOPICAL at 10:03

## 2021-04-06 ENCOUNTER — OFFICE VISIT (OUTPATIENT)
Dept: FAMILY MEDICINE | Facility: CLINIC | Age: 46
End: 2021-04-06
Payer: MEDICARE

## 2021-04-06 VITALS
WEIGHT: 237.44 LBS | HEIGHT: 67 IN | RESPIRATION RATE: 18 BRPM | HEART RATE: 108 BPM | TEMPERATURE: 97 F | OXYGEN SATURATION: 98 % | DIASTOLIC BLOOD PRESSURE: 80 MMHG | BODY MASS INDEX: 37.27 KG/M2 | SYSTOLIC BLOOD PRESSURE: 120 MMHG

## 2021-04-06 DIAGNOSIS — H60.501 ACUTE OTITIS EXTERNA OF RIGHT EAR, UNSPECIFIED TYPE: Primary | ICD-10-CM

## 2021-04-06 DIAGNOSIS — E66.9 OBESITY, CLASS II, BMI 35-39.9: ICD-10-CM

## 2021-04-06 DIAGNOSIS — B36.0 TINEA VERSICOLOR: ICD-10-CM

## 2021-04-06 PROCEDURE — 99999 PR PBB SHADOW E&M-EST. PATIENT-LVL V: CPT | Mod: PBBFAC,,, | Performed by: FAMILY MEDICINE

## 2021-04-06 PROCEDURE — 3008F PR BODY MASS INDEX (BMI) DOCUMENTED: ICD-10-PCS | Mod: S$GLB,,, | Performed by: FAMILY MEDICINE

## 2021-04-06 PROCEDURE — 3008F BODY MASS INDEX DOCD: CPT | Mod: S$GLB,,, | Performed by: FAMILY MEDICINE

## 2021-04-06 PROCEDURE — 1125F PR PAIN SEVERITY QUANTIFIED, PAIN PRESENT: ICD-10-PCS | Mod: S$GLB,,, | Performed by: FAMILY MEDICINE

## 2021-04-06 PROCEDURE — 99214 OFFICE O/P EST MOD 30 MIN: CPT | Mod: S$GLB,,, | Performed by: FAMILY MEDICINE

## 2021-04-06 PROCEDURE — 1125F AMNT PAIN NOTED PAIN PRSNT: CPT | Mod: S$GLB,,, | Performed by: FAMILY MEDICINE

## 2021-04-06 PROCEDURE — 99214 PR OFFICE/OUTPT VISIT, EST, LEVL IV, 30-39 MIN: ICD-10-PCS | Mod: S$GLB,,, | Performed by: FAMILY MEDICINE

## 2021-04-06 PROCEDURE — 99999 PR PBB SHADOW E&M-EST. PATIENT-LVL V: ICD-10-PCS | Mod: PBBFAC,,, | Performed by: FAMILY MEDICINE

## 2021-04-06 RX ORDER — FLUCONAZOLE 150 MG/1
150 TABLET ORAL DAILY
Qty: 1 TABLET | Refills: 0 | Status: SHIPPED | OUTPATIENT
Start: 2021-04-06 | End: 2021-04-07

## 2021-04-06 RX ORDER — OFLOXACIN 3 MG/ML
5 SOLUTION AURICULAR (OTIC) 2 TIMES DAILY
Qty: 5 ML | Refills: 0 | Status: SHIPPED | OUTPATIENT
Start: 2021-04-06 | End: 2021-04-13

## 2021-04-26 ENCOUNTER — CLINICAL SUPPORT (OUTPATIENT)
Dept: INTERVENTIONAL RADIOLOGY/VASCULAR | Facility: CLINIC | Age: 46
End: 2021-04-26
Payer: MEDICARE

## 2021-04-26 DIAGNOSIS — I67.1 ANTERIOR COMMUNICATING ARTERY ANEURYSM: Primary | ICD-10-CM

## 2021-04-26 DIAGNOSIS — I72.5 ANEURYSM OF OTHER PRECEREBRAL ARTERIES: ICD-10-CM

## 2021-04-26 PROCEDURE — 99214 PR OFFICE/OUTPT VISIT, EST, LEVL IV, 30-39 MIN: ICD-10-PCS | Mod: 95,,, | Performed by: RADIOLOGY

## 2021-04-26 PROCEDURE — 99214 OFFICE O/P EST MOD 30 MIN: CPT | Mod: 95,,, | Performed by: RADIOLOGY

## 2021-04-29 ENCOUNTER — PATIENT MESSAGE (OUTPATIENT)
Dept: RESEARCH | Facility: HOSPITAL | Age: 46
End: 2021-04-29

## 2021-04-30 ENCOUNTER — TELEPHONE (OUTPATIENT)
Dept: ADMINISTRATIVE | Facility: CLINIC | Age: 46
End: 2021-04-30

## 2021-05-03 ENCOUNTER — OFFICE VISIT (OUTPATIENT)
Dept: FAMILY MEDICINE | Facility: CLINIC | Age: 46
End: 2021-05-03
Payer: MEDICARE

## 2021-05-03 VITALS
WEIGHT: 235.88 LBS | SYSTOLIC BLOOD PRESSURE: 132 MMHG | HEART RATE: 86 BPM | OXYGEN SATURATION: 98 % | HEIGHT: 67 IN | DIASTOLIC BLOOD PRESSURE: 72 MMHG | BODY MASS INDEX: 37.02 KG/M2 | TEMPERATURE: 97 F

## 2021-05-03 DIAGNOSIS — F41.8 DEPRESSION WITH ANXIETY: ICD-10-CM

## 2021-05-03 DIAGNOSIS — Z00.00 ENCOUNTER FOR PREVENTIVE HEALTH EXAMINATION: Primary | ICD-10-CM

## 2021-05-03 DIAGNOSIS — E66.01 SEVERE OBESITY (BMI 35.0-39.9) WITH COMORBIDITY: ICD-10-CM

## 2021-05-03 DIAGNOSIS — I72.9 ANEURYSM: ICD-10-CM

## 2021-05-03 DIAGNOSIS — H54.8 LEGALLY BLIND: ICD-10-CM

## 2021-05-03 PROBLEM — D69.6 THROMBOCYTOPENIA: Status: RESOLVED | Noted: 2019-08-13 | Resolved: 2021-05-03

## 2021-05-03 PROCEDURE — 3008F PR BODY MASS INDEX (BMI) DOCUMENTED: ICD-10-PCS | Mod: S$GLB,,, | Performed by: NURSE PRACTITIONER

## 2021-05-03 PROCEDURE — 3008F BODY MASS INDEX DOCD: CPT | Mod: S$GLB,,, | Performed by: NURSE PRACTITIONER

## 2021-05-03 PROCEDURE — 1125F PR PAIN SEVERITY QUANTIFIED, PAIN PRESENT: ICD-10-PCS | Mod: S$GLB,,, | Performed by: NURSE PRACTITIONER

## 2021-05-03 PROCEDURE — 1125F AMNT PAIN NOTED PAIN PRSNT: CPT | Mod: S$GLB,,, | Performed by: NURSE PRACTITIONER

## 2021-05-03 PROCEDURE — G0439 PR MEDICARE ANNUAL WELLNESS SUBSEQUENT VISIT: ICD-10-PCS | Mod: S$GLB,,, | Performed by: NURSE PRACTITIONER

## 2021-05-03 PROCEDURE — G0439 PPPS, SUBSEQ VISIT: HCPCS | Mod: S$GLB,,, | Performed by: NURSE PRACTITIONER

## 2021-07-12 ENCOUNTER — IMMUNIZATION (OUTPATIENT)
Dept: PRIMARY CARE CLINIC | Facility: CLINIC | Age: 46
End: 2021-07-12
Payer: MEDICARE

## 2021-07-12 DIAGNOSIS — Z23 NEED FOR VACCINATION: Primary | ICD-10-CM

## 2021-07-12 PROCEDURE — 91300 COVID-19, MRNA, LNP-S, PF, 30 MCG/0.3 ML DOSE VACCINE: CPT | Mod: S$GLB,,, | Performed by: FAMILY MEDICINE

## 2021-07-12 PROCEDURE — 91300 COVID-19, MRNA, LNP-S, PF, 30 MCG/0.3 ML DOSE VACCINE: ICD-10-PCS | Mod: S$GLB,,, | Performed by: FAMILY MEDICINE

## 2021-07-12 PROCEDURE — 0001A COVID-19, MRNA, LNP-S, PF, 30 MCG/0.3 ML DOSE VACCINE: CPT | Mod: CV19,S$GLB,, | Performed by: FAMILY MEDICINE

## 2021-07-12 PROCEDURE — 0001A COVID-19, MRNA, LNP-S, PF, 30 MCG/0.3 ML DOSE VACCINE: ICD-10-PCS | Mod: CV19,S$GLB,, | Performed by: FAMILY MEDICINE

## 2021-07-26 ENCOUNTER — OFFICE VISIT (OUTPATIENT)
Dept: FAMILY MEDICINE | Facility: CLINIC | Age: 46
End: 2021-07-26
Payer: MEDICARE

## 2021-07-26 VITALS
HEART RATE: 84 BPM | WEIGHT: 232.81 LBS | DIASTOLIC BLOOD PRESSURE: 86 MMHG | RESPIRATION RATE: 18 BRPM | OXYGEN SATURATION: 97 % | BODY MASS INDEX: 36.54 KG/M2 | HEIGHT: 67 IN | SYSTOLIC BLOOD PRESSURE: 118 MMHG

## 2021-07-26 DIAGNOSIS — R31.0 GROSS HEMATURIA: Primary | ICD-10-CM

## 2021-07-26 LAB
B-HCG UR QL: NEGATIVE
BILIRUB SERPL-MCNC: NEGATIVE MG/DL
BLOOD URINE, POC: ABNORMAL
CLARITY, POC UA: CLEAR
COLOR, POC UA: YELLOW
CTP QC/QA: YES
GLUCOSE UR QL STRIP: NORMAL
KETONES UR QL STRIP: NEGATIVE
LEUKOCYTE ESTERASE URINE, POC: ABNORMAL
NITRITE, POC UA: NEGATIVE
PH, POC UA: 5
PROTEIN, POC: ABNORMAL
SPECIFIC GRAVITY, POC UA: 1.02
UROBILINOGEN, POC UA: 1

## 2021-07-26 PROCEDURE — 81002 URINALYSIS NONAUTO W/O SCOPE: CPT | Mod: S$GLB,,, | Performed by: STUDENT IN AN ORGANIZED HEALTH CARE EDUCATION/TRAINING PROGRAM

## 2021-07-26 PROCEDURE — 81001 URINALYSIS AUTO W/SCOPE: CPT | Performed by: STUDENT IN AN ORGANIZED HEALTH CARE EDUCATION/TRAINING PROGRAM

## 2021-07-26 PROCEDURE — 99999 PR PBB SHADOW E&M-EST. PATIENT-LVL IV: CPT | Mod: PBBFAC,,, | Performed by: STUDENT IN AN ORGANIZED HEALTH CARE EDUCATION/TRAINING PROGRAM

## 2021-07-26 PROCEDURE — 87077 CULTURE AEROBIC IDENTIFY: CPT | Performed by: STUDENT IN AN ORGANIZED HEALTH CARE EDUCATION/TRAINING PROGRAM

## 2021-07-26 PROCEDURE — 81025 URINE PREGNANCY TEST: CPT | Mod: S$GLB,,, | Performed by: STUDENT IN AN ORGANIZED HEALTH CARE EDUCATION/TRAINING PROGRAM

## 2021-07-26 PROCEDURE — 1160F PR REVIEW ALL MEDS BY PRESCRIBER/CLIN PHARMACIST DOCUMENTED: ICD-10-PCS | Mod: S$GLB,,, | Performed by: STUDENT IN AN ORGANIZED HEALTH CARE EDUCATION/TRAINING PROGRAM

## 2021-07-26 PROCEDURE — 87086 URINE CULTURE/COLONY COUNT: CPT | Performed by: STUDENT IN AN ORGANIZED HEALTH CARE EDUCATION/TRAINING PROGRAM

## 2021-07-26 PROCEDURE — 81025 POCT URINE PREGNANCY: ICD-10-PCS | Mod: S$GLB,,, | Performed by: STUDENT IN AN ORGANIZED HEALTH CARE EDUCATION/TRAINING PROGRAM

## 2021-07-26 PROCEDURE — 81002 POCT URINE DIPSTICK WITHOUT MICROSCOPE: ICD-10-PCS | Mod: S$GLB,,, | Performed by: STUDENT IN AN ORGANIZED HEALTH CARE EDUCATION/TRAINING PROGRAM

## 2021-07-26 PROCEDURE — 1159F MED LIST DOCD IN RCRD: CPT | Mod: S$GLB,,, | Performed by: STUDENT IN AN ORGANIZED HEALTH CARE EDUCATION/TRAINING PROGRAM

## 2021-07-26 PROCEDURE — 99213 PR OFFICE/OUTPT VISIT, EST, LEVL III, 20-29 MIN: ICD-10-PCS | Mod: 25,S$GLB,, | Performed by: STUDENT IN AN ORGANIZED HEALTH CARE EDUCATION/TRAINING PROGRAM

## 2021-07-26 PROCEDURE — 1126F PR PAIN SEVERITY QUANTIFIED, NO PAIN PRESENT: ICD-10-PCS | Mod: S$GLB,,, | Performed by: STUDENT IN AN ORGANIZED HEALTH CARE EDUCATION/TRAINING PROGRAM

## 2021-07-26 PROCEDURE — 3008F PR BODY MASS INDEX (BMI) DOCUMENTED: ICD-10-PCS | Mod: S$GLB,,, | Performed by: STUDENT IN AN ORGANIZED HEALTH CARE EDUCATION/TRAINING PROGRAM

## 2021-07-26 PROCEDURE — 99213 OFFICE O/P EST LOW 20 MIN: CPT | Mod: 25,S$GLB,, | Performed by: STUDENT IN AN ORGANIZED HEALTH CARE EDUCATION/TRAINING PROGRAM

## 2021-07-26 PROCEDURE — 3008F BODY MASS INDEX DOCD: CPT | Mod: S$GLB,,, | Performed by: STUDENT IN AN ORGANIZED HEALTH CARE EDUCATION/TRAINING PROGRAM

## 2021-07-26 PROCEDURE — 1159F PR MEDICATION LIST DOCUMENTED IN MEDICAL RECORD: ICD-10-PCS | Mod: S$GLB,,, | Performed by: STUDENT IN AN ORGANIZED HEALTH CARE EDUCATION/TRAINING PROGRAM

## 2021-07-26 PROCEDURE — 1126F AMNT PAIN NOTED NONE PRSNT: CPT | Mod: S$GLB,,, | Performed by: STUDENT IN AN ORGANIZED HEALTH CARE EDUCATION/TRAINING PROGRAM

## 2021-07-26 PROCEDURE — 99999 PR PBB SHADOW E&M-EST. PATIENT-LVL IV: ICD-10-PCS | Mod: PBBFAC,,, | Performed by: STUDENT IN AN ORGANIZED HEALTH CARE EDUCATION/TRAINING PROGRAM

## 2021-07-26 PROCEDURE — 87088 URINE BACTERIA CULTURE: CPT | Performed by: STUDENT IN AN ORGANIZED HEALTH CARE EDUCATION/TRAINING PROGRAM

## 2021-07-26 PROCEDURE — 1160F RVW MEDS BY RX/DR IN RCRD: CPT | Mod: S$GLB,,, | Performed by: STUDENT IN AN ORGANIZED HEALTH CARE EDUCATION/TRAINING PROGRAM

## 2021-07-26 PROCEDURE — 87186 SC STD MICRODIL/AGAR DIL: CPT | Performed by: STUDENT IN AN ORGANIZED HEALTH CARE EDUCATION/TRAINING PROGRAM

## 2021-07-26 RX ORDER — NITROFURANTOIN 25; 75 MG/1; MG/1
100 CAPSULE ORAL 2 TIMES DAILY
Qty: 10 CAPSULE | Refills: 0 | Status: SHIPPED | OUTPATIENT
Start: 2021-07-26 | End: 2021-07-29 | Stop reason: DRUGHIGH

## 2021-07-26 RX ORDER — FLUCONAZOLE 150 MG/1
150 TABLET ORAL DAILY
Qty: 1 TABLET | Refills: 0 | Status: SHIPPED | OUTPATIENT
Start: 2021-07-26 | End: 2021-07-27

## 2021-07-27 LAB
AMORPH CRY UR QL COMP ASSIST: ABNORMAL
BACTERIA #/AREA URNS AUTO: ABNORMAL /HPF
BILIRUB UR QL STRIP: NEGATIVE
CLARITY UR REFRACT.AUTO: ABNORMAL
COLOR UR AUTO: YELLOW
GLUCOSE UR QL STRIP: NEGATIVE
HGB UR QL STRIP: ABNORMAL
KETONES UR QL STRIP: NEGATIVE
LEUKOCYTE ESTERASE UR QL STRIP: ABNORMAL
MICROSCOPIC COMMENT: ABNORMAL
NITRITE UR QL STRIP: NEGATIVE
PH UR STRIP: 5 [PH] (ref 5–8)
PROT UR QL STRIP: NEGATIVE
RBC #/AREA URNS AUTO: 10 /HPF (ref 0–4)
SP GR UR STRIP: 1.02 (ref 1–1.03)
URN SPEC COLLECT METH UR: ABNORMAL
WBC #/AREA URNS AUTO: 3 /HPF (ref 0–5)

## 2021-07-28 ENCOUNTER — TELEPHONE (OUTPATIENT)
Dept: FAMILY MEDICINE | Facility: CLINIC | Age: 46
End: 2021-07-28

## 2021-07-28 DIAGNOSIS — R31.0 GROSS HEMATURIA: Primary | ICD-10-CM

## 2021-07-29 ENCOUNTER — PATIENT MESSAGE (OUTPATIENT)
Dept: FAMILY MEDICINE | Facility: CLINIC | Age: 46
End: 2021-07-29

## 2021-07-29 DIAGNOSIS — N30.01 ACUTE CYSTITIS WITH HEMATURIA: Primary | ICD-10-CM

## 2021-07-29 LAB — BACTERIA UR CULT: ABNORMAL

## 2021-07-29 RX ORDER — SULFAMETHOXAZOLE AND TRIMETHOPRIM 800; 160 MG/1; MG/1
1 TABLET ORAL 2 TIMES DAILY
Qty: 14 TABLET | Refills: 0 | Status: SHIPPED | OUTPATIENT
Start: 2021-07-29 | End: 2021-08-05

## 2021-07-30 ENCOUNTER — LAB VISIT (OUTPATIENT)
Dept: LAB | Facility: HOSPITAL | Age: 46
End: 2021-07-30
Attending: STUDENT IN AN ORGANIZED HEALTH CARE EDUCATION/TRAINING PROGRAM
Payer: MEDICARE

## 2021-07-30 DIAGNOSIS — R31.0 GROSS HEMATURIA: ICD-10-CM

## 2021-07-30 LAB
BASOPHILS # BLD AUTO: 0.03 K/UL (ref 0–0.2)
BASOPHILS NFR BLD: 0.5 % (ref 0–1.9)
DIFFERENTIAL METHOD: ABNORMAL
EOSINOPHIL # BLD AUTO: 0.1 K/UL (ref 0–0.5)
EOSINOPHIL NFR BLD: 1.4 % (ref 0–8)
ERYTHROCYTE [DISTWIDTH] IN BLOOD BY AUTOMATED COUNT: 13.1 % (ref 11.5–14.5)
HCT VFR BLD AUTO: 44.5 % (ref 37–48.5)
HGB BLD-MCNC: 15 G/DL (ref 12–16)
IMM GRANULOCYTES # BLD AUTO: 0.03 K/UL (ref 0–0.04)
IMM GRANULOCYTES NFR BLD AUTO: 0.5 % (ref 0–0.5)
LYMPHOCYTES # BLD AUTO: 1.4 K/UL (ref 1–4.8)
LYMPHOCYTES NFR BLD: 25 % (ref 18–48)
MCH RBC QN AUTO: 30.8 PG (ref 27–31)
MCHC RBC AUTO-ENTMCNC: 33.7 G/DL (ref 32–36)
MCV RBC AUTO: 91 FL (ref 82–98)
MONOCYTES # BLD AUTO: 0.2 K/UL (ref 0.3–1)
MONOCYTES NFR BLD: 3.6 % (ref 4–15)
NEUTROPHILS # BLD AUTO: 3.9 K/UL (ref 1.8–7.7)
NEUTROPHILS NFR BLD: 69 % (ref 38–73)
NRBC BLD-RTO: 0 /100 WBC
PLATELET # BLD AUTO: 234 K/UL (ref 150–450)
PMV BLD AUTO: 12.2 FL (ref 9.2–12.9)
RBC # BLD AUTO: 4.87 M/UL (ref 4–5.4)
WBC # BLD AUTO: 5.59 K/UL (ref 3.9–12.7)

## 2021-07-30 PROCEDURE — 85025 COMPLETE CBC W/AUTO DIFF WBC: CPT | Performed by: STUDENT IN AN ORGANIZED HEALTH CARE EDUCATION/TRAINING PROGRAM

## 2021-07-30 PROCEDURE — 36415 COLL VENOUS BLD VENIPUNCTURE: CPT | Mod: PO | Performed by: STUDENT IN AN ORGANIZED HEALTH CARE EDUCATION/TRAINING PROGRAM

## 2021-07-30 PROCEDURE — 80053 COMPREHEN METABOLIC PANEL: CPT | Performed by: STUDENT IN AN ORGANIZED HEALTH CARE EDUCATION/TRAINING PROGRAM

## 2021-07-30 PROCEDURE — 87040 BLOOD CULTURE FOR BACTERIA: CPT | Mod: 59 | Performed by: STUDENT IN AN ORGANIZED HEALTH CARE EDUCATION/TRAINING PROGRAM

## 2021-07-31 LAB
ALBUMIN SERPL BCP-MCNC: 3.8 G/DL (ref 3.5–5.2)
ALP SERPL-CCNC: 85 U/L (ref 55–135)
ALT SERPL W/O P-5'-P-CCNC: 28 U/L (ref 10–44)
ANION GAP SERPL CALC-SCNC: 14 MMOL/L (ref 8–16)
AST SERPL-CCNC: 31 U/L (ref 10–40)
BILIRUB SERPL-MCNC: 0.6 MG/DL (ref 0.1–1)
BUN SERPL-MCNC: 10 MG/DL (ref 6–20)
CALCIUM SERPL-MCNC: 9.9 MG/DL (ref 8.7–10.5)
CHLORIDE SERPL-SCNC: 107 MMOL/L (ref 95–110)
CO2 SERPL-SCNC: 14 MMOL/L (ref 23–29)
CREAT SERPL-MCNC: 0.9 MG/DL (ref 0.5–1.4)
EST. GFR  (AFRICAN AMERICAN): >60 ML/MIN/1.73 M^2
EST. GFR  (NON AFRICAN AMERICAN): >60 ML/MIN/1.73 M^2
GLUCOSE SERPL-MCNC: 162 MG/DL (ref 70–110)
POTASSIUM SERPL-SCNC: 4.1 MMOL/L (ref 3.5–5.1)
PROT SERPL-MCNC: 7.9 G/DL (ref 6–8.4)
SODIUM SERPL-SCNC: 135 MMOL/L (ref 136–145)

## 2021-08-05 LAB
BACTERIA BLD CULT: NORMAL
BACTERIA BLD CULT: NORMAL

## 2021-08-09 ENCOUNTER — IMMUNIZATION (OUTPATIENT)
Dept: PRIMARY CARE CLINIC | Facility: CLINIC | Age: 46
End: 2021-08-09
Payer: MEDICARE

## 2021-08-09 DIAGNOSIS — Z23 NEED FOR VACCINATION: Primary | ICD-10-CM

## 2021-08-09 PROCEDURE — 0002A COVID-19, MRNA, LNP-S, PF, 30 MCG/0.3 ML DOSE VACCINE: CPT | Mod: CV19,S$GLB,, | Performed by: FAMILY MEDICINE

## 2021-08-09 PROCEDURE — 91300 COVID-19, MRNA, LNP-S, PF, 30 MCG/0.3 ML DOSE VACCINE: ICD-10-PCS | Mod: S$GLB,,, | Performed by: FAMILY MEDICINE

## 2021-08-09 PROCEDURE — 0002A COVID-19, MRNA, LNP-S, PF, 30 MCG/0.3 ML DOSE VACCINE: ICD-10-PCS | Mod: CV19,S$GLB,, | Performed by: FAMILY MEDICINE

## 2021-08-09 PROCEDURE — 91300 COVID-19, MRNA, LNP-S, PF, 30 MCG/0.3 ML DOSE VACCINE: CPT | Mod: S$GLB,,, | Performed by: FAMILY MEDICINE

## 2021-12-01 DIAGNOSIS — Z12.31 OTHER SCREENING MAMMOGRAM: ICD-10-CM

## 2022-03-29 ENCOUNTER — TELEPHONE (OUTPATIENT)
Dept: FAMILY MEDICINE | Facility: CLINIC | Age: 47
End: 2022-03-29
Payer: MEDICARE

## 2022-03-29 NOTE — TELEPHONE ENCOUNTER
I called the patient to confirm her appointment that is scheduled for 03/30/2022 at 1:40pm no answer left voicemail to return our call. I will send a patient portal message as well.

## 2022-03-29 NOTE — TELEPHONE ENCOUNTER
----- Message from Osito Sow sent at 3/29/2022  4:49 PM CDT -----  Type:  Patient Returning Call    Who Called:patient     Who Left Message for Patient:nurse     Does the patient know what this is regarding?:yes to confirm appointment     Would the patient rather a call back or a response via [facility]? Call back     Best Call Back Number:[phone] ([place])     Additional Information: Yes patient did confirm appointment on 3/30/22

## 2022-03-30 ENCOUNTER — LAB VISIT (OUTPATIENT)
Dept: LAB | Facility: HOSPITAL | Age: 47
End: 2022-03-30
Attending: FAMILY MEDICINE
Payer: MEDICARE

## 2022-03-30 ENCOUNTER — OFFICE VISIT (OUTPATIENT)
Dept: FAMILY MEDICINE | Facility: CLINIC | Age: 47
End: 2022-03-30
Attending: FAMILY MEDICINE
Payer: MEDICARE

## 2022-03-30 VITALS
HEART RATE: 88 BPM | DIASTOLIC BLOOD PRESSURE: 86 MMHG | BODY MASS INDEX: 37.56 KG/M2 | TEMPERATURE: 98 F | HEIGHT: 67 IN | RESPIRATION RATE: 18 BRPM | OXYGEN SATURATION: 98 % | SYSTOLIC BLOOD PRESSURE: 132 MMHG | WEIGHT: 239.31 LBS

## 2022-03-30 DIAGNOSIS — I67.1 ANTERIOR COMMUNICATING ARTERY ANEURYSM: Chronic | ICD-10-CM

## 2022-03-30 DIAGNOSIS — K80.00 CALCULUS OF GALLBLADDER WITH ACUTE CHOLECYSTITIS WITHOUT OBSTRUCTION: ICD-10-CM

## 2022-03-30 DIAGNOSIS — L50.9 URTICARIA: ICD-10-CM

## 2022-03-30 DIAGNOSIS — Z00.00 ENCOUNTER FOR PREVENTIVE HEALTH EXAMINATION: Primary | ICD-10-CM

## 2022-03-30 DIAGNOSIS — Z95.9 STATUS POST ARTERIAL STENT: ICD-10-CM

## 2022-03-30 DIAGNOSIS — R10.11 RUQ PAIN: ICD-10-CM

## 2022-03-30 DIAGNOSIS — I72.9 ANEURYSM: ICD-10-CM

## 2022-03-30 DIAGNOSIS — Z00.00 ENCOUNTER FOR PREVENTIVE HEALTH EXAMINATION: ICD-10-CM

## 2022-03-30 DIAGNOSIS — Z13.6 ENCOUNTER FOR LIPID SCREENING FOR CARDIOVASCULAR DISEASE: ICD-10-CM

## 2022-03-30 DIAGNOSIS — Z13.220 ENCOUNTER FOR LIPID SCREENING FOR CARDIOVASCULAR DISEASE: ICD-10-CM

## 2022-03-30 DIAGNOSIS — H35.53 CONE DYSTROPHY: ICD-10-CM

## 2022-03-30 DIAGNOSIS — Z12.31 SCREENING MAMMOGRAM FOR BREAST CANCER: ICD-10-CM

## 2022-03-30 DIAGNOSIS — F41.8 DEPRESSION WITH ANXIETY: ICD-10-CM

## 2022-03-30 DIAGNOSIS — G44.209 TENSION HEADACHE: ICD-10-CM

## 2022-03-30 DIAGNOSIS — E66.01 SEVERE OBESITY (BMI 35.0-39.9) WITH COMORBIDITY: ICD-10-CM

## 2022-03-30 DIAGNOSIS — R51.9 TEMPORAL HEADACHE: ICD-10-CM

## 2022-03-30 LAB
ALBUMIN SERPL BCP-MCNC: 3.9 G/DL (ref 3.5–5.2)
ALP SERPL-CCNC: 85 U/L (ref 55–135)
ALT SERPL W/O P-5'-P-CCNC: 22 U/L (ref 10–44)
ANION GAP SERPL CALC-SCNC: 9 MMOL/L (ref 8–16)
AST SERPL-CCNC: 24 U/L (ref 10–40)
BASOPHILS # BLD AUTO: 0.07 K/UL (ref 0–0.2)
BASOPHILS NFR BLD: 0.9 % (ref 0–1.9)
BILIRUB SERPL-MCNC: 0.6 MG/DL (ref 0.1–1)
BUN SERPL-MCNC: 10 MG/DL (ref 6–20)
CALCIUM SERPL-MCNC: 10 MG/DL (ref 8.7–10.5)
CHLORIDE SERPL-SCNC: 104 MMOL/L (ref 95–110)
CHOLEST SERPL-MCNC: 223 MG/DL (ref 120–199)
CHOLEST/HDLC SERPL: 3.5 {RATIO} (ref 2–5)
CO2 SERPL-SCNC: 26 MMOL/L (ref 23–29)
CREAT SERPL-MCNC: 0.8 MG/DL (ref 0.5–1.4)
DIFFERENTIAL METHOD: ABNORMAL
EOSINOPHIL # BLD AUTO: 0.1 K/UL (ref 0–0.5)
EOSINOPHIL NFR BLD: 1.8 % (ref 0–8)
ERYTHROCYTE [DISTWIDTH] IN BLOOD BY AUTOMATED COUNT: 13.4 % (ref 11.5–14.5)
EST. GFR  (AFRICAN AMERICAN): >60 ML/MIN/1.73 M^2
EST. GFR  (NON AFRICAN AMERICAN): >60 ML/MIN/1.73 M^2
GLUCOSE SERPL-MCNC: 94 MG/DL (ref 70–110)
HCT VFR BLD AUTO: 45.6 % (ref 37–48.5)
HDLC SERPL-MCNC: 63 MG/DL (ref 40–75)
HDLC SERPL: 28.3 % (ref 20–50)
HGB BLD-MCNC: 15.1 G/DL (ref 12–16)
IMM GRANULOCYTES # BLD AUTO: 0.05 K/UL (ref 0–0.04)
IMM GRANULOCYTES NFR BLD AUTO: 0.7 % (ref 0–0.5)
LDLC SERPL CALC-MCNC: 129 MG/DL (ref 63–159)
LYMPHOCYTES # BLD AUTO: 2.8 K/UL (ref 1–4.8)
LYMPHOCYTES NFR BLD: 36.2 % (ref 18–48)
MCH RBC QN AUTO: 30.8 PG (ref 27–31)
MCHC RBC AUTO-ENTMCNC: 33.1 G/DL (ref 32–36)
MCV RBC AUTO: 93 FL (ref 82–98)
MONOCYTES # BLD AUTO: 0.4 K/UL (ref 0.3–1)
MONOCYTES NFR BLD: 4.6 % (ref 4–15)
NEUTROPHILS # BLD AUTO: 4.3 K/UL (ref 1.8–7.7)
NEUTROPHILS NFR BLD: 55.8 % (ref 38–73)
NONHDLC SERPL-MCNC: 160 MG/DL
NRBC BLD-RTO: 0 /100 WBC
PLATELET # BLD AUTO: 284 K/UL (ref 150–450)
PMV BLD AUTO: 11.3 FL (ref 9.2–12.9)
POTASSIUM SERPL-SCNC: 4.5 MMOL/L (ref 3.5–5.1)
PROT SERPL-MCNC: 7.9 G/DL (ref 6–8.4)
RBC # BLD AUTO: 4.9 M/UL (ref 4–5.4)
SODIUM SERPL-SCNC: 139 MMOL/L (ref 136–145)
TRIGL SERPL-MCNC: 155 MG/DL (ref 30–150)
WBC # BLD AUTO: 7.69 K/UL (ref 3.9–12.7)

## 2022-03-30 PROCEDURE — 80061 LIPID PANEL: CPT | Performed by: FAMILY MEDICINE

## 2022-03-30 PROCEDURE — 1159F PR MEDICATION LIST DOCUMENTED IN MEDICAL RECORD: ICD-10-PCS | Mod: CPTII,S$GLB,, | Performed by: FAMILY MEDICINE

## 2022-03-30 PROCEDURE — 99999 PR PBB SHADOW E&M-EST. PATIENT-LVL V: ICD-10-PCS | Mod: PBBFAC,,, | Performed by: FAMILY MEDICINE

## 2022-03-30 PROCEDURE — 3079F DIAST BP 80-89 MM HG: CPT | Mod: CPTII,S$GLB,, | Performed by: FAMILY MEDICINE

## 2022-03-30 PROCEDURE — 3075F SYST BP GE 130 - 139MM HG: CPT | Mod: CPTII,S$GLB,, | Performed by: FAMILY MEDICINE

## 2022-03-30 PROCEDURE — 3008F PR BODY MASS INDEX (BMI) DOCUMENTED: ICD-10-PCS | Mod: CPTII,S$GLB,, | Performed by: FAMILY MEDICINE

## 2022-03-30 PROCEDURE — 3075F PR MOST RECENT SYSTOLIC BLOOD PRESS GE 130-139MM HG: ICD-10-PCS | Mod: CPTII,S$GLB,, | Performed by: FAMILY MEDICINE

## 2022-03-30 PROCEDURE — 99999 PR PBB SHADOW E&M-EST. PATIENT-LVL V: CPT | Mod: PBBFAC,,, | Performed by: FAMILY MEDICINE

## 2022-03-30 PROCEDURE — 36415 COLL VENOUS BLD VENIPUNCTURE: CPT | Performed by: FAMILY MEDICINE

## 2022-03-30 PROCEDURE — 3079F PR MOST RECENT DIASTOLIC BLOOD PRESSURE 80-89 MM HG: ICD-10-PCS | Mod: CPTII,S$GLB,, | Performed by: FAMILY MEDICINE

## 2022-03-30 PROCEDURE — 1159F MED LIST DOCD IN RCRD: CPT | Mod: CPTII,S$GLB,, | Performed by: FAMILY MEDICINE

## 2022-03-30 PROCEDURE — 3008F BODY MASS INDEX DOCD: CPT | Mod: CPTII,S$GLB,, | Performed by: FAMILY MEDICINE

## 2022-03-30 PROCEDURE — 80053 COMPREHEN METABOLIC PANEL: CPT | Performed by: FAMILY MEDICINE

## 2022-03-30 PROCEDURE — 99214 PR OFFICE/OUTPT VISIT, EST, LEVL IV, 30-39 MIN: ICD-10-PCS | Mod: S$GLB,,, | Performed by: FAMILY MEDICINE

## 2022-03-30 PROCEDURE — 85025 COMPLETE CBC W/AUTO DIFF WBC: CPT | Performed by: FAMILY MEDICINE

## 2022-03-30 PROCEDURE — 99214 OFFICE O/P EST MOD 30 MIN: CPT | Mod: S$GLB,,, | Performed by: FAMILY MEDICINE

## 2022-03-30 PROCEDURE — 1160F PR REVIEW ALL MEDS BY PRESCRIBER/CLIN PHARMACIST DOCUMENTED: ICD-10-PCS | Mod: CPTII,S$GLB,, | Performed by: FAMILY MEDICINE

## 2022-03-30 PROCEDURE — 1160F RVW MEDS BY RX/DR IN RCRD: CPT | Mod: CPTII,S$GLB,, | Performed by: FAMILY MEDICINE

## 2022-03-30 RX ORDER — NORTRIPTYLINE HYDROCHLORIDE 25 MG/1
25 CAPSULE ORAL NIGHTLY
Qty: 90 CAPSULE | Refills: 3 | Status: SHIPPED | OUTPATIENT
Start: 2022-03-30 | End: 2023-11-20 | Stop reason: SDUPTHER

## 2022-03-30 NOTE — PROGRESS NOTES
Subjective:       Patient ID: Carolina Andrews is a 46 y.o. female.    Chief Complaint: Annual Exam    46-year-old female coming in for annual exam.  She has a history of an anterior communicating artery aneurysm status post stenting in followed by Neurology.  She has an MRI scheduled in the near future.  She has chronic headaches for which she intermittently uses nortriptyline and appear to be mixed with tension component.  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  The patient is complaining of a rash that is affecting the scalp the neck the face upper shoulders and to some extent other areas of the body.  She does not recall anything new that she is eating her drunk there is no new skin care products no new laundry products no new medications or supplements.  It is very pruritic she is using Zyrtec with some relief and she would like to see an allergist.  Her only suspicion is the water supply comes through [employer] and she thinks they have been putting something in the water but does not know what it is or what it is purposes.  There have been no boil advisories that she is aware of.  Her other problem is an intermittent right upper quadrant pain associated with some bloating and belching and occasionally some diarrhea afterwards.  It will occasionally go through to her back sometimes to the right subscapular area.  On an MRI of the abdomen done for her hemangiomas of the liver a 3.6 cm gallstone was seen with no evidence of cholecystitis.  She has had six or seven liver panels done over the last several years and none of them had any abnormalities but none of them were done during any acute episodes.  She is asymptomatic currently.    Past Medical History:  No date: Arthritis  No date: Blindness  No date: Cholelithiasis  No date: Liver hemangioma  No date: Mental  disorder      Comment:  Periodic antidepressant use since childhood, not                currently taking or endorsing depressive ideation  No date: Pinched nerve    Past Surgical History:  8/6/2019: ANGIOGRAPHY; N/A      Comment:  Procedure: ANGIOGRAM/EMBOLIZATION;  Surgeon: Andressa                Surgeon;  Location: [facility];  Service: Anesthesiology;                Laterality: N/A;  [address]  2009: ANKLE FRACTURE SURGERY  7/15/2019: CEREBRAL ANGIOGRAM; N/A      Comment:  Procedure: ANGIOGRAM-CEREBRAL;  Surgeon: [facility]               Provider;  Location: [facility] OR [address] [place] [address];  Service:                Radiology;  Laterality: N/A;  [address]  2/4/2020: CEREBRAL ANGIOGRAM; N/A      Comment:  Procedure: ANGIOGRAM-CEREBRAL;  Surgeon: [facility]               Provider;  Location: [facility] OR [address] [place] [address];  Service:                Radiology;  Laterality: N/A;  [address]  2/4/2019: COLONOSCOPY; N/A      Comment:  Procedure: COLONOSCOPY;  Surgeon: Dale Childers MD;                Location: [facility] ENDO;  Service: Endoscopy;  Laterality:                N/A;  11/19/2018: ESOPHAGOGASTRODUODENOSCOPY; N/A      Comment:  Procedure: EGD (ESOPHAGOGASTRODUODENOSCOPY);  Surgeon:                Dale Childers MD;  Location: [facility];  Service:                Endoscopy;  Laterality: N/A;  No date: FRACTURE SURGERY  ~1998: UPPER GASTROINTESTINAL ENDOSCOPY      Comment:  normal findings per patient report    Review of patient's family history indicates:  Problem: Hypertension      Relation: Father          Age of Onset: (Not Specified)  Problem: Lung disease      Relation: Father          Age of Onset: (Not Specified)          Comment: calapse x 3   Problem: Stroke      Relation: Father          Age of Onset: (Not Specified)          Comment: in eye   Problem: Retinal detachment      Relation: Father          Age of Onset: (Not Specified)  Problem: Cancer      Relation: Sister          Age of Onset: 23          Comment:  polyps found incidentally  Problem: Vision loss      Relation: Sister          Age of Onset: (Not Specified)  Problem: Lung disease      Relation: Sister          Age of Onset: (Not Specified)          Comment: spontanious lung collapse  Problem: GI problems      Relation: Sister          Age of Onset: (Not Specified)  Problem: Psoriasis      Relation: Sister          Age of Onset: (Not Specified)  Problem: Retinal detachment      Relation: Mother          Age of Onset: (Not Specified)  Problem: Cancer      Relation: Paternal Grandfather          Age of Onset: (Not Specified)  Problem: Stroke      Relation: Maternal Grandmother          Age of Onset: (Not Specified)  Problem: Colon cancer      Relation: Paternal Aunt          Age of Onset: (Not Specified)  Problem: Colon polyps      Relation: Maternal Uncle          Age of Onset: (Not Specified)  Problem: Psoriasis      Relation: Maternal Uncle          Age of Onset: (Not Specified)  Problem: No Known Problems      Relation: Paternal Uncle          Age of Onset: (Not Specified)  Problem: Parkinsonism      Relation: Maternal Grandfather          Age of Onset: (Not Specified)  Problem: Cancer      Relation: Maternal Grandfather          Age of Onset: (Not Specified)          Comment: prostate  Problem: Colon polyps      Relation: Maternal Grandfather          Age of Onset: (Not Specified)  Problem: Cancer      Relation: Paternal Grandmother          Age of Onset: (Not Specified)          Comment: skin cancer in colon   Problem: Alzheimer's disease      Relation: Paternal Grandmother          Age of Onset: (Not Specified)  Problem: Heart disease      Relation: Paternal Aunt          Age of Onset: (Not Specified)  Problem: Breast cancer      Relation: Neg Hx          Age of Onset: (Not Specified)  Problem: Diabetes      Relation: Neg Hx          Age of Onset: (Not Specified)  Problem: Miscarriages / Stillbirths      Relation: Neg Hx          Age of Onset: (Not  Specified)  Problem: Crohn's disease      Relation: Neg Hx          Age of Onset: (Not Specified)  Problem: Ulcerative colitis      Relation: Neg Hx          Age of Onset: (Not Specified)  Problem: Glaucoma      Relation: Neg Hx          Age of Onset: (Not Specified)  Problem: Eczema      Relation: Neg Hx          Age of Onset: (Not Specified)  Problem: Lupus      Relation: Neg Hx          Age of Onset: (Not Specified)  Problem: Melanoma      Relation: Neg Hx          Age of Onset: (Not Specified)    Social History    Tobacco Use      Smoking status: Never Smoker      Smokeless tobacco: Never Used    Alcohol use: Yes      Alcohol/week: 6.0 standard drinks      Types: 6 Glasses of wine per week      Comment: 2-3/ week wine or beer    Drug use: No    Current Outpatient Medications on File Prior to Visit:  acetaminophen (TYLENOL) 500 MG tablet, Take 500 mg by mouth every 6 (six) hours as needed for Pain., Disp: , Rfl:   b complex vitamins tablet, Take 1 tablet by mouth once daily., Disp: , Rfl:   cetirizine (ZYRTEC) 10 MG tablet, Take 10 mg by mouth once daily., Disp: , Rfl:   cholecalciferol, vitamin D3, (VITAMIN D3) 25 mcg (1,000 unit) capsule, Take 1,000 Units by mouth once daily., Disp: , Rfl:   clindamycin (CLEOCIN T) 1 % lotion, aaa bid, Disp: 60 mL, Rfl: 3  EScitalopram oxalate (LEXAPRO) 10 MG tablet, Take 1 tablet (10 mg total) by mouth once daily., Disp: 90 tablet, Rfl: 1  fexofenadine HCl (ALLEGRA ORAL), Take 1 tablet by mouth as needed., Disp: , Rfl:   gabapentin (NEURONTIN) 300 MG capsule, Take 2 capsules (600 mg total) by mouth every evening., Disp: 360 capsule, Rfl: 1  LORazepam (ATIVAN) 1 MG tablet, Take 1 tablet (1 mg total) by mouth every 8 (eight) hours as needed for Anxiety., Disp: 21 tablet, Rfl: 0  pantoprazole (PROTONIX) 20 MG tablet, Take 1 tablet (20 mg total) by mouth once daily., Disp: 90 tablet, Rfl: 0  aspirin (ECOTRIN) 81 MG EC tablet, Take 1 tablet (81 mg total) by mouth once daily., Disp:  30 tablet, Rfl: 11  (DISCONTINUED) nortriptyline (PAMELOR) 25 MG capsule, Take 1 capsule (25 mg total) by mouth every evening., Disp: 30 capsule, Rfl: 5    No current facility-administered medications on file prior to visit.        Review of Systems   Constitutional: Negative for chills, diaphoresis, fatigue, fever and unexpected weight change.   HENT: Negative for congestion, ear pain, hearing loss, postnasal drip and sinus pressure.    Eyes: Negative for itching and visual disturbance.   Respiratory: Negative for cough, chest tightness, shortness of breath and wheezing.    Cardiovascular: Negative for chest pain, palpitations and leg swelling.   Gastrointestinal: Positive for abdominal pain. Negative for blood in stool, constipation, diarrhea, nausea and vomiting.   Genitourinary: Negative for dysuria, frequency and hematuria.   Musculoskeletal: Negative for arthralgias, back pain, joint swelling and myalgias.   Skin: Positive for rash.   Neurological: Negative for dizziness and headaches.   Hematological: Negative for adenopathy.   Psychiatric/Behavioral: Negative for sleep disturbance. The patient is nervous/anxious (She has long term house guests who are repairing their home and stressing her out.).        Objective:      Physical Exam  Vitals and nursing note reviewed.   Constitutional:       General: She is not in acute distress.     Appearance: Normal appearance. She is well-developed. She is obese. She is not ill-appearing, toxic-appearing or diaphoretic.      Comments: Fair blood pressure control  Severe obesity with BMI of 37.5 she is up 5 lb from her last physical August 19, 2020   HENT:      Head: Normocephalic and atraumatic.      Right Ear: Tympanic membrane, ear canal and external ear normal. There is no impacted cerumen.      Left Ear: Tympanic membrane, ear canal and external ear normal. There is no impacted cerumen.      Nose: Nose normal. No congestion or rhinorrhea.      Mouth/Throat:       Mouth: Mucous membranes are moist.      Pharynx: Oropharynx is clear. No oropharyngeal exudate.   Eyes:      General: No scleral icterus.        Right eye: No discharge.         Left eye: No discharge.      Conjunctiva/sclera: Conjunctivae normal.      Pupils: Pupils are equal, round, and reactive to light.   Neck:      Thyroid: No thyromegaly.      Vascular: No carotid bruit or JVD.   Cardiovascular:      Rate and Rhythm: Normal rate and regular rhythm.      Heart sounds: Normal heart sounds. No murmur heard.    No friction rub. No gallop.   Pulmonary:      Effort: Pulmonary effort is normal. No respiratory distress.      Breath sounds: Normal breath sounds. No stridor. No wheezing, rhonchi or rales.      Comments: Negative [place] sign  Chest:      Chest wall: No tenderness.   Abdominal:      General: Bowel sounds are normal. There is no distension.      Palpations: Abdomen is soft. There is no mass.      Tenderness: There is no abdominal tenderness. There is no guarding or rebound.      Hernia: No hernia is present.   Musculoskeletal:         General: No swelling, tenderness, deformity or signs of injury. Normal range of motion.      Cervical back: Normal range of motion and neck supple. No rigidity or tenderness.      Right lower leg: No edema.      Left lower leg: No edema.   Lymphadenopathy:      Cervical: No cervical adenopathy.   Skin:     General: Skin is warm and dry.      Coloration: Skin is not jaundiced.      Findings: Rash ([place] like rash over most of her body particularly the shoulders upper chest and back of the neck) present. No bruising, erythema or lesion.   Neurological:      General: No focal deficit present.      Mental Status: She is alert and oriented to person, place, and time. Mental status is at baseline.      Cranial Nerves: No cranial nerve deficit.      Sensory: No sensory deficit.      Motor: No weakness.      Coordination: Coordination normal.      Gait: Gait normal.      Deep  Tendon Reflexes: Reflexes are normal and symmetric. Reflexes normal.   Psychiatric:         Mood and Affect: Mood normal.         Behavior: Behavior normal.         Thought Content: Thought content normal.         Judgment: Judgment normal.         Assessment:       1. Encounter for preventive health examination    2. Urticaria    3. RUQ pain    4. Calculus of gallbladder with acute cholecystitis without obstruction    5. Anterior communicating artery aneurysm    6. Depression with anxiety    7. Cone dystrophy    8. Status post arterial stent    9. Encounter for lipid screening for cardiovascular disease    10. Screening mammogram for breast cancer    11. Temporal headache    12. Tension headache    13. Aneurysm    14. Severe obesity (BMI 35.0-39.9) with comorbidity        Plan:       1. Encounter for preventive health examination  - Comprehensive Metabolic Panel; Future  - Lipid Panel; Future  - CBC Auto Differential; Future    2. Urticaria  [place] like rash but no sign of strep or febrile reaction.  Consult allergy  - Ambulatory referral/consult to Allergy; Future    3. RUQ pain  Known large gallstone several years ago  - US Abdomen Limited; Future  - Comprehensive Metabolic Panel; Future  - CBC Auto Differential; Future    4. Calculus of gallbladder with acute cholecystitis without obstruction  - US Abdomen Limited; Future  - Comprehensive Metabolic Panel; Future    5. Anterior communicating artery aneurysm  Status post stenting followed by neurology    6. Depression with anxiety  Stable    7. Cone dystrophy  Legally blind    8. Status post arterial stent    9. Encounter for lipid screening for cardiovascular disease  - Lipid Panel; Future    10. Screening mammogram for breast cancer  Schedule mammogram  - Mammo Digital Screening Bilat w/ [place]iki; Future    11. Temporal headache  - nortriptyline (PAMELOR) 25 MG capsule; Take 1 capsule (25 mg total) by mouth every evening.  Dispense: 90 capsule; Refill: 3    12.  Tension headache  - nortriptyline (PAMELOR) 25 MG capsule; Take 1 capsule (25 mg total) by mouth every evening.  Dispense: 90 capsule; Refill: 3    13. Aneurysm    14. Severe obesity (BMI 35.0-39.9) with comorbidity

## 2022-04-07 ENCOUNTER — PATIENT MESSAGE (OUTPATIENT)
Dept: FAMILY MEDICINE | Facility: CLINIC | Age: 47
End: 2022-04-07
Payer: MEDICARE

## 2022-04-07 ENCOUNTER — HOSPITAL ENCOUNTER (OUTPATIENT)
Dept: RADIOLOGY | Facility: CLINIC | Age: 47
Discharge: HOME | End: 2022-04-07
Attending: FAMILY MEDICINE
Payer: MEDICARE

## 2022-04-07 ENCOUNTER — HOSPITAL ENCOUNTER (OUTPATIENT)
Dept: RADIOLOGY | Facility: HOSPITAL | Age: 47
Discharge: HOME | End: 2022-04-07
Attending: FAMILY MEDICINE
Payer: MEDICARE

## 2022-04-07 VITALS — WEIGHT: 239.44 LBS | BODY MASS INDEX: 37.58 KG/M2 | HEIGHT: 67 IN

## 2022-04-07 DIAGNOSIS — K80.00 CALCULUS OF GALLBLADDER WITH ACUTE CHOLECYSTITIS WITHOUT OBSTRUCTION: ICD-10-CM

## 2022-04-07 DIAGNOSIS — Z12.31 SCREENING MAMMOGRAM FOR BREAST CANCER: ICD-10-CM

## 2022-04-07 DIAGNOSIS — R10.11 RUQ PAIN: ICD-10-CM

## 2022-04-07 PROCEDURE — 77067 SCR MAMMO BI INCL CAD: CPT | Mod: TC,PO

## 2022-04-07 PROCEDURE — 76705 ECHO EXAM OF ABDOMEN: CPT | Mod: TC

## 2022-04-07 PROCEDURE — 77067 SCR MAMMO BI INCL CAD: CPT | Mod: 26,,, | Performed by: RADIOLOGY

## 2022-04-07 PROCEDURE — 77063 MAMMO DIGITAL SCREENING BILAT WITH TOMO: ICD-10-PCS | Mod: 26,,, | Performed by: RADIOLOGY

## 2022-04-07 PROCEDURE — 77067 MAMMO DIGITAL SCREENING BILAT WITH TOMO: ICD-10-PCS | Mod: 26,,, | Performed by: RADIOLOGY

## 2022-04-07 PROCEDURE — 76705 US ABDOMEN LIMITED: ICD-10-PCS | Mod: 26,,, | Performed by: RADIOLOGY

## 2022-04-07 PROCEDURE — 76705 ECHO EXAM OF ABDOMEN: CPT | Mod: 26,,, | Performed by: RADIOLOGY

## 2022-04-07 PROCEDURE — 77063 BREAST TOMOSYNTHESIS BI: CPT | Mod: 26,,, | Performed by: RADIOLOGY

## 2022-04-08 ENCOUNTER — TELEPHONE (OUTPATIENT)
Dept: FAMILY MEDICINE | Facility: CLINIC | Age: 47
End: 2022-04-08
Payer: MEDICARE

## 2022-04-08 DIAGNOSIS — K80.20 CALCULUS OF GALLBLADDER WITHOUT CHOLECYSTITIS WITHOUT OBSTRUCTION: Primary | ICD-10-CM

## 2022-04-08 NOTE — TELEPHONE ENCOUNTER
Call placed to patient for notification. Patient verbalized understanding. Patient provided the number to call and schedule appointment with general surgery.

## 2022-04-08 NOTE — TELEPHONE ENCOUNTER
Order in for general surgery consult.  Local general surgeons can handle this-no sign of cancerous changes on scan as of yet but if she lets it go it might require a surgical oncology specialist, we want to take care of it before that happens.  REGINA

## 2022-04-08 NOTE — TELEPHONE ENCOUNTER
----- Message from Jeanne Boeckelman, MA sent at 4/8/2022  7:52 AM CDT -----  Patient has viewed her imaging results in patient portal on 04/07/2022 at 9:47pm, patient's response:   Thank you for the quick results.  If you think a surgery consult is the right thing I'd like to go ahead with that.  If there is anyone here on the [facility] I'd prefer it, but whomever you feel is the best to go with I'd appreciate the referral.  Thank you.  Suhail

## 2022-04-11 ENCOUNTER — OFFICE VISIT (OUTPATIENT)
Dept: SURGERY | Facility: CLINIC | Age: 47
End: 2022-04-11
Attending: FAMILY MEDICINE
Payer: MEDICARE

## 2022-04-11 VITALS
DIASTOLIC BLOOD PRESSURE: 92 MMHG | BODY MASS INDEX: 37.61 KG/M2 | HEIGHT: 67 IN | SYSTOLIC BLOOD PRESSURE: 131 MMHG | HEART RATE: 95 BPM | WEIGHT: 239.63 LBS

## 2022-04-11 DIAGNOSIS — K80.20 CALCULUS OF GALLBLADDER WITHOUT CHOLECYSTITIS WITHOUT OBSTRUCTION: ICD-10-CM

## 2022-04-11 PROCEDURE — 3008F PR BODY MASS INDEX (BMI) DOCUMENTED: ICD-10-PCS | Mod: CPTII,S$GLB,, | Performed by: PHYSICIAN ASSISTANT

## 2022-04-11 PROCEDURE — 3080F PR MOST RECENT DIASTOLIC BLOOD PRESSURE >= 90 MM HG: ICD-10-PCS | Mod: CPTII,S$GLB,, | Performed by: PHYSICIAN ASSISTANT

## 2022-04-11 PROCEDURE — 3080F DIAST BP >= 90 MM HG: CPT | Mod: CPTII,S$GLB,, | Performed by: PHYSICIAN ASSISTANT

## 2022-04-11 PROCEDURE — 3008F BODY MASS INDEX DOCD: CPT | Mod: CPTII,S$GLB,, | Performed by: PHYSICIAN ASSISTANT

## 2022-04-11 PROCEDURE — 99203 OFFICE O/P NEW LOW 30 MIN: CPT | Mod: S$GLB,,, | Performed by: PHYSICIAN ASSISTANT

## 2022-04-11 PROCEDURE — 1159F PR MEDICATION LIST DOCUMENTED IN MEDICAL RECORD: ICD-10-PCS | Mod: CPTII,S$GLB,, | Performed by: PHYSICIAN ASSISTANT

## 2022-04-11 PROCEDURE — 99999 PR PBB SHADOW E&M-EST. PATIENT-LVL IV: ICD-10-PCS | Mod: PBBFAC,,, | Performed by: PHYSICIAN ASSISTANT

## 2022-04-11 PROCEDURE — 3075F SYST BP GE 130 - 139MM HG: CPT | Mod: CPTII,S$GLB,, | Performed by: PHYSICIAN ASSISTANT

## 2022-04-11 PROCEDURE — 99999 PR PBB SHADOW E&M-EST. PATIENT-LVL IV: CPT | Mod: PBBFAC,,, | Performed by: PHYSICIAN ASSISTANT

## 2022-04-11 PROCEDURE — 1159F MED LIST DOCD IN RCRD: CPT | Mod: CPTII,S$GLB,, | Performed by: PHYSICIAN ASSISTANT

## 2022-04-11 PROCEDURE — 3075F PR MOST RECENT SYSTOLIC BLOOD PRESS GE 130-139MM HG: ICD-10-PCS | Mod: CPTII,S$GLB,, | Performed by: PHYSICIAN ASSISTANT

## 2022-04-11 PROCEDURE — 99203 PR OFFICE/OUTPT VISIT, NEW, LEVL III, 30-44 MIN: ICD-10-PCS | Mod: S$GLB,,, | Performed by: PHYSICIAN ASSISTANT

## 2022-04-11 NOTE — H&P (VIEW-ONLY)
History & Physical    SUBJECTIVE:     History of Present Illness:  Patient is a 46 y.o. female presents with a large, symptomatic gallstone. Onset of symptoms was gradual starting several years ago with gradually worsening course since that time. Patient denies fever, chills. Symptoms are aggravated by sometimes by eating. Symptoms improve with nothing in particular. She reports occasional, severe RUQ abdominal pain that wraps around to her back and is associated with nausea. Sx usually last a couple of hours. She also has occasional mild RUQ pain. Her past medical history is significant for liver hemangiomas (stable), history of cerebral angiogram with embolization, blindness.     Chief Complaint   Patient presents with    Gall Bladder Problem       Review of patient's allergies indicates:   Allergen Reactions    Zanaflex [tizanidine] Hallucinations    Pcn [penicillins] Rash     rash       Current Outpatient Medications   Medication Sig Dispense Refill    acetaminophen (TYLENOL) 500 MG tablet Take 500 mg by mouth every 6 (six) hours as needed for Pain.      b complex vitamins tablet Take 1 tablet by mouth once daily.      cetirizine (ZYRTEC) 10 MG tablet Take 10 mg by mouth once daily.      cholecalciferol, vitamin D3, (VITAMIN D3) 25 mcg (1,000 unit) capsule Take 1,000 Units by mouth once daily.      clindamycin (CLEOCIN T) 1 % lotion aaa bid 60 mL 3    EScitalopram oxalate (LEXAPRO) 10 MG tablet Take 1 tablet (10 mg total) by mouth once daily. 90 tablet 1    fexofenadine HCl (ALLEGRA ORAL) Take 1 tablet by mouth as needed.      gabapentin (NEURONTIN) 300 MG capsule Take 2 capsules (600 mg total) by mouth every evening. 360 capsule 1    LORazepam (ATIVAN) 1 MG tablet Take 1 tablet (1 mg total) by mouth every 8 (eight) hours as needed for Anxiety. 21 tablet 0    nortriptyline (PAMELOR) 25 MG capsule Take 1 capsule (25 mg total) by mouth every evening. 90 capsule 3    pantoprazole (PROTONIX) 20 MG  tablet Take 1 tablet (20 mg total) by mouth once daily. 90 tablet 0    aspirin (ECOTRIN) 81 MG EC tablet Take 1 tablet (81 mg total) by mouth once daily. 30 tablet 11     No current facility-administered medications for this visit.       Past Medical History:   Diagnosis Date    Arthritis     Blindness     Cholelithiasis     Liver hemangioma     Mental disorder     Periodic antidepressant use since childhood, not currently taking or endorsing depressive ideation    Pinched nerve      Past Surgical History:   Procedure Laterality Date    ANGIOGRAPHY N/A 8/6/2019    Procedure: ANGIOGRAM/EMBOLIZATION;  Surgeon: Andressa Surgeon;  Location: [facility];  Service: Anesthesiology;  Laterality: N/A;   [address]    ANKLE FRACTURE SURGERY  2009    CEREBRAL ANGIOGRAM N/A 7/15/2019    Procedure: ANGIOGRAM-CEREBRAL;  Surgeon: [facility] Provider;  [facility];  Service: Radiology;  Laterality: N/A;   [address]    CEREBRAL ANGIOGRAM N/A 2/4/2020    Procedure: ANGIOGRAM-CEREBRAL;  Surgeon: [facility] Diagnostic Provider;  [facility];  Service: Radiology;  Laterality: N/A;   [address]    COLONOSCOPY N/A 2/4/2019    Procedure: COLONOSCOPY;  Surgeon: Dale Childers MD;  Location: [facility];  Service: Endoscopy;  Laterality: N/A;    ESOPHAGOGASTRODUODENOSCOPY N/A 11/19/2018    Procedure: EGD (ESOPHAGOGASTRODUODENOSCOPY);  Surgeon: Dale Childers MD;  Location: [facility];  Service: Endoscopy;  Laterality: N/A;    FRACTURE SURGERY      UPPER GASTROINTESTINAL ENDOSCOPY  ~1998    normal findings per patient report     Family History   Problem Relation Age of Onset    Hypertension Father     Lung disease Father         calapse x 3     Stroke Father         in eye     Retinal detachment Father     Cancer Sister 23        polyps found incidentally    Vision loss Sister     Lung disease Sister         spontanious lung collapse    GI problems Sister     Psoriasis Sister      "Retinal detachment Mother     Cancer Paternal Grandfather     Stroke Maternal Grandmother     Colon cancer Paternal Aunt     Colon polyps Maternal Uncle     Psoriasis Maternal Uncle     No Known Problems Paternal Uncle     Parkinsonism Maternal Grandfather     Cancer Maternal Grandfather         prostate    Colon polyps Maternal Grandfather     Cancer Paternal Grandmother         skin cancer in colon     Alzheimer's disease Paternal Grandmother     Heart disease Paternal Aunt     Breast cancer Neg Hx     Diabetes Neg Hx     Miscarriages / Stillbirths Neg Hx     Crohn's disease Neg Hx     Ulcerative colitis Neg Hx     Glaucoma Neg Hx     Eczema Neg Hx     Lupus Neg Hx     Melanoma Neg Hx      Social History     Tobacco Use    Smoking status: Never Smoker    Smokeless tobacco: Never Used   Substance Use Topics    Alcohol use: Yes     Alcohol/week: 6.0 standard drinks     Types: 6 Glasses of wine per week     Comment: 2-3/ week wine or beer    Drug use: No        Review of Systems:  Review of Systems   Constitutional: Negative for appetite change and unexpected weight change.   Eyes: Positive for visual disturbance.   Gastrointestinal: Positive for abdominal pain.   Musculoskeletal: Negative for arthralgias.   Neurological: Negative for dizziness and weakness.       OBJECTIVE:     Vital Signs (Most Recent)  Pulse: 95 (04/11/22 0908)  BP: (!) 131/92 (04/11/22 0908)  5' 7" (1.702 m)  108.7 kg (239 lb 10.2 oz)     Physical Exam:  Physical Exam  Constitutional:       General: She is not in acute distress.  Eyes:      Extraocular Movements: Extraocular movements intact.   Cardiovascular:      Rate and Rhythm: Normal rate.   Pulmonary:      Effort: Pulmonary effort is normal. No respiratory distress.   Abdominal:      Palpations: There is no mass.      Tenderness: There is abdominal tenderness (mild RUQ ttp).   Musculoskeletal:      Cervical back: Normal range of motion.   Skin:     General: Skin is " warm and dry.   Neurological:      Mental Status: She is alert and oriented to person, place, and time.         Laboratory  CBC: Reviewed  CMP: Reviewed    Diagnostic Results:  US: Reviewed  MRI: Reviewed    ASSESSMENT/PLAN:     Symptomatic cholelithiasis   The risks of laparoscopic cholecystectomy including bleeding, infection, common bile duct injury, bile leak, injury to abdominal organs, failure to alleviate symptoms, pulmonary embolus, deep vein thrombosis, cardiac event, and possibility of conversion to an open operation were explained to the patient.   The nature of the patient's condition, probability of success, risks of refusing treatment, and alternatives and risks of the alternatives were also explained.  The patient verbalized understanding.

## 2022-04-11 NOTE — PROGRESS NOTES
History & Physical    SUBJECTIVE:     History of Present Illness:  Patient is a 46 y.o. female presents with a large, symptomatic gallstone. Onset of symptoms was gradual starting several years ago with gradually worsening course since that time. Patient denies fever, chills. Symptoms are aggravated by sometimes by eating. Symptoms improve with nothing in particular. She reports occasional, severe RUQ abdominal pain that wraps around to her back and is associated with nausea. Sx usually last a couple of hours. She also has occasional mild RUQ pain. Her past medical history is significant for liver hemangiomas (stable), history of cerebral angiogram with embolization, blindness.     Chief Complaint   Patient presents with    Gall Bladder Problem       Review of patient's allergies indicates:   Allergen Reactions    Zanaflex [tizanidine] Hallucinations    Pcn [penicillins] Rash     rash       Current Outpatient Medications   Medication Sig Dispense Refill    acetaminophen (TYLENOL) 500 MG tablet Take 500 mg by mouth every 6 (six) hours as needed for Pain.      b complex vitamins tablet Take 1 tablet by mouth once daily.      cetirizine (ZYRTEC) 10 MG tablet Take 10 mg by mouth once daily.      cholecalciferol, vitamin D3, (VITAMIN D3) 25 mcg (1,000 unit) capsule Take 1,000 Units by mouth once daily.      clindamycin (CLEOCIN T) 1 % lotion aaa bid 60 mL 3    EScitalopram oxalate (LEXAPRO) 10 MG tablet Take 1 tablet (10 mg total) by mouth once daily. 90 tablet 1    fexofenadine HCl (ALLEGRA ORAL) Take 1 tablet by mouth as needed.      gabapentin (NEURONTIN) 300 MG capsule Take 2 capsules (600 mg total) by mouth every evening. 360 capsule 1    LORazepam (ATIVAN) 1 MG tablet Take 1 tablet (1 mg total) by mouth every 8 (eight) hours as needed for Anxiety. 21 tablet 0    nortriptyline (PAMELOR) 25 MG capsule Take 1 capsule (25 mg total) by mouth every evening. 90 capsule 3    pantoprazole (PROTONIX) 20 MG  tablet Take 1 tablet (20 mg total) by mouth once daily. 90 tablet 0    aspirin (ECOTRIN) 81 MG EC tablet Take 1 tablet (81 mg total) by mouth once daily. 30 tablet 11     No current facility-administered medications for this visit.       Past Medical History:   Diagnosis Date    Arthritis     Blindness     Cholelithiasis     Liver hemangioma     Mental disorder     Periodic antidepressant use since childhood, not currently taking or endorsing depressive ideation    Pinched nerve      Past Surgical History:   Procedure Laterality Date    ANGIOGRAPHY N/A 8/6/2019    Procedure: ANGIOGRAM/EMBOLIZATION;  Surgeon: Andressa Surgeon;  Location: [facility];  Service: Anesthesiology;  Laterality: N/A;   [address]/[place]    ANKLE FRACTURE SURGERY  2009    CEREBRAL ANGIOGRAM N/A 7/15/2019    Procedure: ANGIOGRAM-CEREBRAL;  Surgeon: [facility] Diagnostic Provider;  [facility];  Service: Radiology;  Laterality: N/A;   190/[place]    CEREBRAL ANGIOGRAM N/A 2/4/2020    Procedure: ANGIOGRAM-CEREBRAL;  Surgeon: [facility] Diagnostic Provider;  [facility];  Service: Radiology;  Laterality: N/A;   [address]/[place]    COLONOSCOPY N/A 2/4/2019    Procedure: COLONOSCOPY;  Surgeon: Dale Childers MD;  Location: [facility];  Service: Endoscopy;  Laterality: N/A;    ESOPHAGOGASTRODUODENOSCOPY N/A 11/19/2018    Procedure: EGD (ESOPHAGOGASTRODUODENOSCOPY);  Surgeon: Dale Childers MD;  Location: [facility];  Service: Endoscopy;  Laterality: N/A;    FRACTURE SURGERY      UPPER GASTROINTESTINAL ENDOSCOPY  ~1998    normal findings per patient report     Family History   Problem Relation Age of Onset    Hypertension Father     Lung disease Father         calapse x 3     Stroke Father         in eye     Retinal detachment Father     Cancer Sister 23        polyps found incidentally    Vision loss Sister     Lung disease Sister         spontanious lung collapse    GI problems Sister     Psoriasis Sister      "Retinal detachment Mother     Cancer Paternal Grandfather     Stroke Maternal Grandmother     Colon cancer Paternal Aunt     Colon polyps Maternal Uncle     Psoriasis Maternal Uncle     No Known Problems Paternal Uncle     Parkinsonism Maternal Grandfather     Cancer Maternal Grandfather         prostate    Colon polyps Maternal Grandfather     Cancer Paternal Grandmother         skin cancer in colon     Alzheimer's disease Paternal Grandmother     Heart disease Paternal Aunt     Breast cancer Neg Hx     Diabetes Neg Hx     Miscarriages / Stillbirths Neg Hx     Crohn's disease Neg Hx     Ulcerative colitis Neg Hx     Glaucoma Neg Hx     Eczema Neg Hx     Lupus Neg Hx     Melanoma Neg Hx      Social History     Tobacco Use    Smoking status: Never Smoker    Smokeless tobacco: Never Used   Substance Use Topics    Alcohol use: Yes     Alcohol/week: 6.0 standard drinks     Types: 6 Glasses of wine per week     Comment: 2-3/ week wine or beer    Drug use: No        Review of Systems:  Review of Systems   Constitutional: Negative for appetite change and unexpected weight change.   Eyes: Positive for visual disturbance.   Gastrointestinal: Positive for abdominal pain.   Musculoskeletal: Negative for arthralgias.   Neurological: Negative for dizziness and weakness.       OBJECTIVE:     Vital Signs (Most Recent)  Pulse: 95 (04/11/22 0908)  BP: (!) 131/92 (04/11/22 0908)  5' 7" (1.702 m)  108.7 kg (239 lb 10.2 oz)     Physical Exam:  Physical Exam  Constitutional:       General: She is not in acute distress.  Eyes:      Extraocular Movements: Extraocular movements intact.   Cardiovascular:      Rate and Rhythm: Normal rate.   Pulmonary:      Effort: Pulmonary effort is normal. No respiratory distress.   Abdominal:      Palpations: There is no mass.      Tenderness: There is abdominal tenderness (mild RUQ ttp).   Musculoskeletal:      Cervical back: Normal range of motion.   Skin:     General: Skin is " warm and dry.   Neurological:      Mental Status: She is alert and oriented to person, place, and time.         Laboratory  CBC: Reviewed  CMP: Reviewed    Diagnostic Results:  US: Reviewed  MRI: Reviewed    ASSESSMENT/PLAN:     Symptomatic cholelithiasis   The risks of laparoscopic cholecystectomy including bleeding, infection, common bile duct injury, bile leak, injury to abdominal organs, failure to alleviate symptoms, pulmonary embolus, deep vein thrombosis, cardiac event, and possibility of conversion to an open operation were explained to the patient.   The nature of the patient's condition, probability of success, risks of refusing treatment, and alternatives and risks of the alternatives were also explained.  The patient verbalized understanding.

## 2022-04-12 ENCOUNTER — PATIENT MESSAGE (OUTPATIENT)
Dept: SURGERY | Facility: CLINIC | Age: 47
End: 2022-04-12
Payer: MEDICARE

## 2022-04-12 RX ORDER — SODIUM CHLORIDE 9 MG/ML
INJECTION, SOLUTION INTRAVENOUS CONTINUOUS
Status: CANCELLED | OUTPATIENT
Start: 2022-04-22

## 2022-04-20 ENCOUNTER — HOSPITAL ENCOUNTER (OUTPATIENT)
Dept: PREADMISSION TESTING | Facility: HOSPITAL | Age: 47
Discharge: HOME | End: 2022-04-20
Payer: MEDICARE

## 2022-04-21 ENCOUNTER — ANESTHESIA EVENT (OUTPATIENT)
Dept: SURGERY | Facility: HOSPITAL | Age: 47
End: 2022-04-21
Payer: MEDICARE

## 2022-04-21 RX ORDER — SODIUM CHLORIDE 0.9 % (FLUSH) 0.9 %
3 SYRINGE (ML) INJECTION EVERY 8 HOURS
Status: CANCELLED | OUTPATIENT
Start: 2022-04-21

## 2022-04-21 RX ORDER — SODIUM CHLORIDE, SODIUM LACTATE, POTASSIUM CHLORIDE, CALCIUM CHLORIDE 600; 310; 30; 20 MG/100ML; MG/100ML; MG/100ML; MG/100ML
INJECTION, SOLUTION INTRAVENOUS CONTINUOUS
Status: CANCELLED | OUTPATIENT
Start: 2022-04-21

## 2022-04-21 RX ORDER — SODIUM CHLORIDE 0.9 % (FLUSH) 0.9 %
3 SYRINGE (ML) INJECTION
Status: CANCELLED | OUTPATIENT
Start: 2022-04-21

## 2022-04-22 ENCOUNTER — HOSPITAL ENCOUNTER (OUTPATIENT)
Facility: HOSPITAL | Age: 47
Discharge: HOME | End: 2022-04-22
Attending: STUDENT IN AN ORGANIZED HEALTH CARE EDUCATION/TRAINING PROGRAM | Admitting: STUDENT IN AN ORGANIZED HEALTH CARE EDUCATION/TRAINING PROGRAM
Payer: MEDICARE

## 2022-04-22 ENCOUNTER — ANESTHESIA (OUTPATIENT)
Dept: SURGERY | Facility: HOSPITAL | Age: 47
End: 2022-04-22
Payer: MEDICARE

## 2022-04-22 DIAGNOSIS — K80.20 CALCULUS OF GALLBLADDER WITHOUT CHOLECYSTITIS WITHOUT OBSTRUCTION: Primary | ICD-10-CM

## 2022-04-22 LAB
B-HCG UR QL: NEGATIVE
CTP QC/QA: YES

## 2022-04-22 PROCEDURE — 99900103 DSU ONLY-NO CHARGE-INITIAL HR (STAT)

## 2022-04-22 PROCEDURE — 63600175 PHARM REV CODE 636 W HCPCS: Performed by: ANESTHESIOLOGY

## 2022-04-22 PROCEDURE — 71000015 HC POSTOP RECOV 1ST HR: Performed by: STUDENT IN AN ORGANIZED HEALTH CARE EDUCATION/TRAINING PROGRAM

## 2022-04-22 PROCEDURE — 88304 TISSUE EXAM BY PATHOLOGIST: CPT | Mod: 26,,, | Performed by: PATHOLOGY

## 2022-04-22 PROCEDURE — 25000003 PHARM REV CODE 250: Performed by: PHYSICIAN ASSISTANT

## 2022-04-22 PROCEDURE — D9220A PRA ANESTHESIA: ICD-10-PCS | Mod: CRNA,,, | Performed by: NURSE ANESTHETIST, CERTIFIED REGISTERED

## 2022-04-22 PROCEDURE — D9220A PRA ANESTHESIA: Mod: ANES,,, | Performed by: ANESTHESIOLOGY

## 2022-04-22 PROCEDURE — 71000039 HC RECOVERY, EACH ADD'L HOUR: Performed by: STUDENT IN AN ORGANIZED HEALTH CARE EDUCATION/TRAINING PROGRAM

## 2022-04-22 PROCEDURE — 36000709 HC OR TIME LEV III EA ADD 15 MIN: Performed by: STUDENT IN AN ORGANIZED HEALTH CARE EDUCATION/TRAINING PROGRAM

## 2022-04-22 PROCEDURE — 88304 PR  SURG PATH,LEVEL III: ICD-10-PCS | Mod: 26,,, | Performed by: PATHOLOGY

## 2022-04-22 PROCEDURE — 25000003 PHARM REV CODE 250: Performed by: ANESTHESIOLOGY

## 2022-04-22 PROCEDURE — 71000016 HC POSTOP RECOV ADDL HR: Performed by: STUDENT IN AN ORGANIZED HEALTH CARE EDUCATION/TRAINING PROGRAM

## 2022-04-22 PROCEDURE — 99900103 DSU ONLY-NO CHARGE-INITIAL HR (STAT): Performed by: STUDENT IN AN ORGANIZED HEALTH CARE EDUCATION/TRAINING PROGRAM

## 2022-04-22 PROCEDURE — 25000003 PHARM REV CODE 250: Performed by: NURSE ANESTHETIST, CERTIFIED REGISTERED

## 2022-04-22 PROCEDURE — 27201423 OPTIME MED/SURG SUP & DEVICES STERILE SUPPLY: Performed by: STUDENT IN AN ORGANIZED HEALTH CARE EDUCATION/TRAINING PROGRAM

## 2022-04-22 PROCEDURE — 47562 LAPAROSCOPIC CHOLECYSTECTOMY: CPT | Mod: ,,, | Performed by: STUDENT IN AN ORGANIZED HEALTH CARE EDUCATION/TRAINING PROGRAM

## 2022-04-22 PROCEDURE — D9220A PRA ANESTHESIA: Mod: CRNA,,, | Performed by: NURSE ANESTHETIST, CERTIFIED REGISTERED

## 2022-04-22 PROCEDURE — 36000708 HC OR TIME LEV III 1ST 15 MIN: Performed by: STUDENT IN AN ORGANIZED HEALTH CARE EDUCATION/TRAINING PROGRAM

## 2022-04-22 PROCEDURE — 47562 PR LAP,CHOLECYSTECTOMY: ICD-10-PCS | Mod: ,,, | Performed by: STUDENT IN AN ORGANIZED HEALTH CARE EDUCATION/TRAINING PROGRAM

## 2022-04-22 PROCEDURE — 88304 TISSUE EXAM BY PATHOLOGIST: CPT | Performed by: PATHOLOGY

## 2022-04-22 PROCEDURE — 81025 URINE PREGNANCY TEST: CPT | Performed by: ANESTHESIOLOGY

## 2022-04-22 PROCEDURE — 37000009 HC ANESTHESIA EA ADD 15 MINS: Performed by: STUDENT IN AN ORGANIZED HEALTH CARE EDUCATION/TRAINING PROGRAM

## 2022-04-22 PROCEDURE — 25000003 PHARM REV CODE 250: Performed by: STUDENT IN AN ORGANIZED HEALTH CARE EDUCATION/TRAINING PROGRAM

## 2022-04-22 PROCEDURE — 99900104 DSU ONLY-NO CHARGE-EA ADD'L HR (STAT): Performed by: STUDENT IN AN ORGANIZED HEALTH CARE EDUCATION/TRAINING PROGRAM

## 2022-04-22 PROCEDURE — 63600175 PHARM REV CODE 636 W HCPCS: Performed by: NURSE ANESTHETIST, CERTIFIED REGISTERED

## 2022-04-22 PROCEDURE — 37000008 HC ANESTHESIA 1ST 15 MINUTES: Performed by: STUDENT IN AN ORGANIZED HEALTH CARE EDUCATION/TRAINING PROGRAM

## 2022-04-22 PROCEDURE — 71000033 HC RECOVERY, INTIAL HOUR: Performed by: STUDENT IN AN ORGANIZED HEALTH CARE EDUCATION/TRAINING PROGRAM

## 2022-04-22 PROCEDURE — D9220A PRA ANESTHESIA: ICD-10-PCS | Mod: ANES,,, | Performed by: ANESTHESIOLOGY

## 2022-04-22 RX ORDER — NEOSTIGMINE METHYLSULFATE 1 MG/ML
INJECTION, SOLUTION INTRAVENOUS
Status: DISCONTINUED | OUTPATIENT
Start: 2022-04-22 | End: 2022-04-22

## 2022-04-22 RX ORDER — CLINDAMYCIN PHOSPHATE 900 MG/50ML
900 INJECTION, SOLUTION INTRAVENOUS
Status: COMPLETED | OUTPATIENT
Start: 2022-04-22 | End: 2022-04-22

## 2022-04-22 RX ORDER — ACETAMINOPHEN 10 MG/ML
INJECTION, SOLUTION INTRAVENOUS
Status: DISCONTINUED | OUTPATIENT
Start: 2022-04-22 | End: 2022-04-22

## 2022-04-22 RX ORDER — SUCCINYLCHOLINE CHLORIDE 20 MG/ML
INJECTION INTRAMUSCULAR; INTRAVENOUS
Status: DISCONTINUED | OUTPATIENT
Start: 2022-04-22 | End: 2022-04-22

## 2022-04-22 RX ORDER — OXYCODONE AND ACETAMINOPHEN 5; 325 MG/1; MG/1
1 TABLET ORAL EVERY 4 HOURS PRN
Qty: 21 TABLET | Refills: 0 | Status: SHIPPED | OUTPATIENT
Start: 2022-04-22 | End: 2022-06-20

## 2022-04-22 RX ORDER — OXYCODONE HYDROCHLORIDE 5 MG/1
5 TABLET ORAL
Status: DISCONTINUED | OUTPATIENT
Start: 2022-04-22 | End: 2022-04-22 | Stop reason: HOSPADM

## 2022-04-22 RX ORDER — BUPIVACAINE HYDROCHLORIDE AND EPINEPHRINE 2.5; 5 MG/ML; UG/ML
INJECTION, SOLUTION EPIDURAL; INFILTRATION; INTRACAUDAL; PERINEURAL
Status: DISCONTINUED | OUTPATIENT
Start: 2022-04-22 | End: 2022-04-22 | Stop reason: HOSPADM

## 2022-04-22 RX ORDER — KETOROLAC TROMETHAMINE 30 MG/ML
INJECTION, SOLUTION INTRAMUSCULAR; INTRAVENOUS
Status: DISCONTINUED | OUTPATIENT
Start: 2022-04-22 | End: 2022-04-22

## 2022-04-22 RX ORDER — ONDANSETRON 2 MG/ML
INJECTION INTRAMUSCULAR; INTRAVENOUS
Status: DISCONTINUED | OUTPATIENT
Start: 2022-04-22 | End: 2022-04-22

## 2022-04-22 RX ORDER — HYDROMORPHONE HYDROCHLORIDE 2 MG/ML
0.2 INJECTION, SOLUTION INTRAMUSCULAR; INTRAVENOUS; SUBCUTANEOUS EVERY 5 MIN PRN
Status: DISCONTINUED | OUTPATIENT
Start: 2022-04-22 | End: 2022-04-22 | Stop reason: HOSPADM

## 2022-04-22 RX ORDER — MIDAZOLAM HYDROCHLORIDE 1 MG/ML
INJECTION INTRAMUSCULAR; INTRAVENOUS
Status: DISCONTINUED | OUTPATIENT
Start: 2022-04-22 | End: 2022-04-22

## 2022-04-22 RX ORDER — ROCURONIUM BROMIDE 10 MG/ML
INJECTION, SOLUTION INTRAVENOUS
Status: DISCONTINUED | OUTPATIENT
Start: 2022-04-22 | End: 2022-04-22

## 2022-04-22 RX ORDER — FENTANYL CITRATE 50 UG/ML
INJECTION, SOLUTION INTRAMUSCULAR; INTRAVENOUS
Status: DISCONTINUED | OUTPATIENT
Start: 2022-04-22 | End: 2022-04-22

## 2022-04-22 RX ORDER — FENTANYL CITRATE 50 UG/ML
25 INJECTION, SOLUTION INTRAMUSCULAR; INTRAVENOUS EVERY 5 MIN PRN
Status: COMPLETED | OUTPATIENT
Start: 2022-04-22 | End: 2022-04-22

## 2022-04-22 RX ORDER — LIDOCAINE HYDROCHLORIDE 10 MG/ML
0.5 INJECTION, SOLUTION EPIDURAL; INFILTRATION; INTRACAUDAL; PERINEURAL ONCE
Status: COMPLETED | OUTPATIENT
Start: 2022-04-22 | End: 2022-04-22

## 2022-04-22 RX ORDER — PROPOFOL 10 MG/ML
VIAL (ML) INTRAVENOUS
Status: DISCONTINUED | OUTPATIENT
Start: 2022-04-22 | End: 2022-04-22

## 2022-04-22 RX ORDER — LIDOCAINE HCL/PF 100 MG/5ML
SYRINGE (ML) INTRAVENOUS
Status: DISCONTINUED | OUTPATIENT
Start: 2022-04-22 | End: 2022-04-22

## 2022-04-22 RX ORDER — MEPERIDINE HYDROCHLORIDE 50 MG/ML
12.5 INJECTION INTRAMUSCULAR; INTRAVENOUS; SUBCUTANEOUS ONCE AS NEEDED
Status: DISCONTINUED | OUTPATIENT
Start: 2022-04-22 | End: 2022-04-22 | Stop reason: HOSPADM

## 2022-04-22 RX ORDER — ONDANSETRON 2 MG/ML
4 INJECTION INTRAMUSCULAR; INTRAVENOUS DAILY PRN
Status: DISCONTINUED | OUTPATIENT
Start: 2022-04-22 | End: 2022-04-22 | Stop reason: HOSPADM

## 2022-04-22 RX ORDER — DEXAMETHASONE SODIUM PHOSPHATE 4 MG/ML
INJECTION, SOLUTION INTRA-ARTICULAR; INTRALESIONAL; INTRAMUSCULAR; INTRAVENOUS; SOFT TISSUE
Status: DISCONTINUED | OUTPATIENT
Start: 2022-04-22 | End: 2022-04-22

## 2022-04-22 RX ORDER — DIPHENHYDRAMINE HYDROCHLORIDE 50 MG/ML
25 INJECTION INTRAMUSCULAR; INTRAVENOUS EVERY 6 HOURS PRN
Status: DISCONTINUED | OUTPATIENT
Start: 2022-04-22 | End: 2022-04-22 | Stop reason: HOSPADM

## 2022-04-22 RX ADMIN — FENTANYL CITRATE 25 MCG: 50 INJECTION INTRAMUSCULAR; INTRAVENOUS at 11:04

## 2022-04-22 RX ADMIN — PROPOFOL 200 MG: 10 INJECTION, EMULSION INTRAVENOUS at 09:04

## 2022-04-22 RX ADMIN — FENTANYL CITRATE 100 MCG: 50 INJECTION, SOLUTION INTRAMUSCULAR; INTRAVENOUS at 09:04

## 2022-04-22 RX ADMIN — MIDAZOLAM HYDROCHLORIDE 2 MG: 1 INJECTION, SOLUTION INTRAMUSCULAR; INTRAVENOUS at 09:04

## 2022-04-22 RX ADMIN — ROCURONIUM BROMIDE 30 MG: 10 INJECTION, SOLUTION INTRAVENOUS at 09:04

## 2022-04-22 RX ADMIN — KETOROLAC TROMETHAMINE 30 MG: 30 INJECTION, SOLUTION INTRAMUSCULAR; INTRAVENOUS at 10:04

## 2022-04-22 RX ADMIN — CLINDAMYCIN PHOSPHATE 900 MG: 18 INJECTION, SOLUTION INTRAVENOUS at 09:04

## 2022-04-22 RX ADMIN — ONDANSETRON 4 MG: 2 INJECTION INTRAMUSCULAR; INTRAVENOUS at 09:04

## 2022-04-22 RX ADMIN — NEOSTIGMINE METHYLSULFATE 3 MG: 1 INJECTION INTRAVENOUS at 10:04

## 2022-04-22 RX ADMIN — GLYCOPYRROLATE 0.4 MG: 0.2 INJECTION, SOLUTION INTRAMUSCULAR; INTRAVENOUS at 10:04

## 2022-04-22 RX ADMIN — OXYCODONE 5 MG: 5 TABLET ORAL at 11:04

## 2022-04-22 RX ADMIN — FENTANYL CITRATE 50 MCG: 50 INJECTION, SOLUTION INTRAMUSCULAR; INTRAVENOUS at 10:04

## 2022-04-22 RX ADMIN — ROCURONIUM BROMIDE 10 MG: 10 INJECTION, SOLUTION INTRAVENOUS at 09:04

## 2022-04-22 RX ADMIN — ACETAMINOPHEN 1000 MG: 10 INJECTION, SOLUTION INTRAVENOUS at 09:04

## 2022-04-22 RX ADMIN — LIDOCAINE HYDROCHLORIDE 0.1 MG: 10 INJECTION, SOLUTION EPIDURAL; INFILTRATION; INTRACAUDAL; PERINEURAL at 08:04

## 2022-04-22 RX ADMIN — LIDOCAINE HYDROCHLORIDE 75 MG: 20 INJECTION INTRAVENOUS at 09:04

## 2022-04-22 RX ADMIN — SUCCINYLCHOLINE CHLORIDE 160 MG: 20 INJECTION, SOLUTION INTRAMUSCULAR; INTRAVENOUS; PARENTERAL at 09:04

## 2022-04-22 RX ADMIN — SODIUM CHLORIDE, SODIUM GLUCONATE, SODIUM ACETATE, POTASSIUM CHLORIDE, MAGNESIUM CHLORIDE, SODIUM PHOSPHATE, DIBASIC, AND POTASSIUM PHOSPHATE: .53; .5; .37; .037; .03; .012; .00082 INJECTION, SOLUTION INTRAVENOUS at 10:04

## 2022-04-22 RX ADMIN — SODIUM CHLORIDE, SODIUM GLUCONATE, SODIUM ACETATE, POTASSIUM CHLORIDE, MAGNESIUM CHLORIDE, SODIUM PHOSPHATE, DIBASIC, AND POTASSIUM PHOSPHATE: .53; .5; .37; .037; .03; .012; .00082 INJECTION, SOLUTION INTRAVENOUS at 08:04

## 2022-04-22 RX ADMIN — DEXAMETHASONE SODIUM PHOSPHATE 4 MG: 4 INJECTION, SOLUTION INTRA-ARTICULAR; INTRALESIONAL; INTRAMUSCULAR; INTRAVENOUS; SOFT TISSUE at 09:04

## 2022-04-22 NOTE — PLAN OF CARE
Report to Elva. Patient states pain 2-3, tolerable, no nausea. Dressing to abdomen dry intact no drainage, vs stable resting comfortably,  in attendance

## 2022-04-22 NOTE — ANESTHESIA PROCEDURE NOTES
Intubation    Date/Time: 4/22/2022 9:33 AM  Performed by: Kirill Pool CRNA  Authorized by: Israel Larosn MD     Intubation:     Induction:  Intravenous    Intubated:  Postinduction    Mask Ventilation:  Easy mask    Attempts:  1    Attempted By:  CRNA    Method of Intubation:  Direct    Blade:  Tesfaye 2    Laryngeal View Grade: Grade I - full view of cords      Difficult Airway Encountered?: No      Complications:  None    Airway Device:  Oral endotracheal tube    Airway Device Size:  7.5    Style/Cuff Inflation:  Cuffed (inflated to minimal occlusive pressure)    Inflation Amount (mL):  5    Tube secured:  21    Secured at:  The lips    Placement Verified By:  Capnometry    Complicating Factors:  None    Findings Post-Intubation:  BS equal bilateral

## 2022-04-22 NOTE — PLAN OF CARE
discharge instructions given to the pt and her     All questions answered and concerns addressed  Pt was given a paper copy of instructions to refer to at home  Pt denies nausea and pain level is tolerable  Pt able to ambulate to the bathroom with standby assistance

## 2022-04-22 NOTE — ANESTHESIA POSTPROCEDURE EVALUATION
Anesthesia Post Evaluation    Patient: Carolina Andrews    Procedure(s) Performed: Procedure(s) (LRB):  CHOLECYSTECTOMY, LAPAROSCOPIC (N/A)    Final Anesthesia Type: general      Patient location during evaluation: PACU  Patient participation: Yes- Able to Participate  Level of consciousness: awake and alert  Post-procedure vital signs: reviewed and stable  Pain management: adequate  Airway patency: patent    PONV status at discharge: No PONV  Anesthetic complications: no      Cardiovascular status: hemodynamically stable  Respiratory status: unassisted and room air  Hydration status: euvolemic  Follow-up not needed.          Vitals Value Taken Time   /70 04/22/22 1211   Temp 36.7 °C (98 °F) 04/22/22 1211   Pulse 74 04/22/22 1211   Resp 16 04/22/22 1211   SpO2 98 % 04/22/22 1211         Event Time   Out of Recovery 12:08:00         Pain/Radha Score: Pain Rating Prior to Med Admin: 4 (4/22/2022 11:55 AM)  Pain Rating Post Med Admin: 4 (4/22/2022 11:30 AM)  Radha Score: 10 (4/22/2022 11:55 AM)

## 2022-04-22 NOTE — TRANSFER OF CARE
"Anesthesia Transfer of Care Note    Patient: Carolina Andrews    Procedure(s) Performed: Procedure(s) (LRB):  CHOLECYSTECTOMY, LAPAROSCOPIC (N/A)    Patient location: PACU    Anesthesia Type: general    Transport from OR: Transported from OR on 2-3 L/min O2 by NC with adequate spontaneous ventilation    Post pain: adequate analgesia    Post assessment: no apparent anesthetic complications    Post vital signs: stable    Level of consciousness: awake    Nausea/Vomiting: no nausea/vomiting    Complications: none    Transfer of care protocol was followed      Last vitals:   Visit Vitals  /87 (BP Location: Left arm, Patient Position: Lying)   Pulse 94   Temp 36.8 °C (98.2 °F) (Temporal)   Resp 18   Ht 5' 7" (1.702 m)   Wt 108.7 kg (239 lb 10.2 oz)   LMP 04/20/2022   SpO2 96%   Breastfeeding No   BMI 37.53 kg/m²     "

## 2022-04-22 NOTE — DISCHARGE SUMMARY
[facility]  Brief Operative Note    Surgery Date: 4/22/2022     Surgeon(s) and Role:     * Salomón Milian MD - Primary    Assisting Surgeon: None    Pre-op Diagnosis:  Calculus of gallbladder without cholecystitis without obstruction [K80.20]    Post-op Diagnosis:  Post-Op Diagnosis Codes:     * Calculus of gallbladder without cholecystitis without obstruction [K80.20]    Procedure(s) (LRB):  CHOLECYSTECTOMY, LAPAROSCOPIC (N/A)    Anesthesia: General    Operative Findings:  Cholelithiasis and chronic cholecystitis    Estimated Blood Loss: 25 mL         Specimens:   Specimen (24h ago, onward)             Start     Ordered    04/22/22 1036  Specimen to Pathology, Surgery General Surgery  Once        Comments: Pre-op Diagnosis: Calculus of gallbladder without cholecystitis without obstruction [K80.20]Procedure(s):CHOLECYSTECTOMY, LAPAROSCOPIC Number of specimens: 1Name of specimens: Gallbladder     References:    Click here for ordering Quick Tip   Question Answer Comment   Procedure Type: General Surgery    Specimen Class: Routine/Screening    Release to patient Immediate        04/22/22 1036                  Discharge Note    OUTCOME: Patient tolerated treatment/procedure well without complication and is now ready for discharge.    DISPOSITION: Home or Self Care    FINAL DIAGNOSIS:  Calculus of gallbladder without cholecystitis without obstruction    FOLLOWUP: In clinic    DISCHARGE INSTRUCTIONS:    Discharge Procedure Orders   Diet Adult Regular     Lifting restrictions   Order Comments: No more than 10 lbs     No driving until:   Order Comments: Off narcotics     Notify your health care provider if you experience any of the following:  increased confusion or weakness     Notify your health care provider if you experience any of the following:  persistent dizziness, light-headedness, or visual disturbances     Notify your health care provider if you experience any of the following:  worsening  rash     Notify your health care provider if you experience any of the following:  severe persistent headache     Notify your health care provider if you experience any of the following:  difficulty breathing or increased cough     Notify your health care provider if you experience any of the following:  redness, tenderness, or signs of infection (pain, swelling, redness, odor or green/yellow discharge around incision site)     Notify your health care provider if you experience any of the following:  severe uncontrolled pain     Notify your health care provider if you experience any of the following:  persistent nausea and vomiting or diarrhea     Notify your health care provider if you experience any of the following:  temperature >100.4     No dressing needed   Order Comments: Okay to shower.  No swimming or soaking for 2 weeks.

## 2022-04-22 NOTE — OP NOTE
[facility]  Surgery Department  Operative Note    SUMMARY     Date of Procedure: 4/22/2022     Procedure: Procedure(s) (LRB):  CHOLECYSTECTOMY, LAPAROSCOPIC (N/A)     Surgeon(s) and Role:     * Salomón Milian MD - Primary    Assisting Surgeon: KENAN De Guzman MD, res    Pre-Operative Diagnosis: Calculus of gallbladder without cholecystitis without obstruction [K80.20]    Post-Operative Diagnosis: Post-Op Diagnosis Codes:     * Calculus of gallbladder without cholecystitis without obstruction [K80.20]    Anesthesia: General    Operative Findings (including complications, if any):  Chronic cholecystitis and gallstones    Description of Technical Procedures:  This is a 46-year-old female who presented with symptomatic cholelithiasis.  She did consent operative intervention.  She was taken to the OR, placed supine, general endotracheal anesthesia was induced, the abdomen was prepped and draped in the usual fashion, time-out was completed.  Access to the abdomen was achieved using open Carroll technique at the umbilicus.  The abdomen was insufflated to 15 mm of mercury a scope was inserted.  There was no apparent injury during entry.  Three 5 mm trocars were placed in the right upper quadrant under direct visualization.  The gallbladder was identified, the dome was grasped, it was retracted cephalad, peritoneum was stripped off the base exposing the cystic duct and artery, these were triply clipped and then divided.  The gallbladder was removed off of the fossa using electrocautery, placed in an Endo-Catch bag, and removed from the navel.  The right upper quadrant was irrigated until all effluent was clear and hemostasis was confirmed.  There was chronic changes of cholecystitis present.  5 mm trocars removed under direct visualization and insufflation was allowed to escape.  Fascia at the navel was closed with 0 Vicryl sutures.  Skin was closed with 4-0 Monocryl.  Dermabond was applied as a dressing.   Patient tolerated the entire procedure without apparent complication, was extubated in the OR, brought to recovery in stable condition.  All counts were correct.    Significant Surgical Tasks Conducted by the Assistant(s), if Applicable: Assist    Estimated Blood Loss (EBL): 25 mL           Implants: * No implants in log *    Specimens:   Specimen (24h ago, onward)             Start     Ordered    04/22/22 1036  Specimen to Pathology, Surgery General Surgery  Once        Comments: Pre-op Diagnosis: Calculus of gallbladder without cholecystitis without obstruction [K80.20]Procedure(s):CHOLECYSTECTOMY, LAPAROSCOPIC Number of specimens: 1Name of specimens: Gallbladder     References:    Click here for ordering Quick Tip   Question Answer Comment   Procedure Type: General Surgery    Specimen Class: Routine/Screening    Release to patient Immediate        04/22/22 1036                        Condition: Good    Disposition: PACU - hemodynamically stable.    Attestation: I was present and scrubbed for the entire procedure.

## 2022-04-22 NOTE — DISCHARGE INSTRUCTIONS
General Information:    1.  Do not drink alcoholic beverages including beer for 24 hours or as long as you are on pain medication..  2.  Do not drive a motor vehicle, operate machinery or power tools, or signs legal papers for 24 hours or as long as you are on pain medication.   3.  You may experience light-headedness, dizziness, and sleepiness following surgery. Please do not stay alone. A responsible adult should be with you for this 24 hour period.  4.  Go home and rest.  5. Progress slowly to a normal diet unless instructed.  Otherwise, begin with liquids such as soft drinks, then soup and crackers working up to solid foods. Drink plenty of nonalcoholic fluids.  6.  Certain anesthetics and pain medications produce nausea and vomiting in certain individuals. If nausea becomes a problem at home, call you doctor.  7. A nurse will be calling you sometime after surgery. Do not be alarmed. This is our way of finding out how you are doing.  8. Several times every hour while you are awake, take 2-3 deep breaths and cough. If you had stomach surgery hold a pillow or rolled towel firmly against your stomach before you cough. This will help with any pain the cough might cause.  9. Several times every hour while you are awake, pump and flex your feet 5-6 times and do foot circles. This will help prevent blood clots.  10.Call your doctor for severe pain, bleeding, fever, or signs or symptoms of infection (pain, swelling, redness, foul odor, drainage).     Post op instructions for prevention of DVT  What is deep vein thrombosis?  Deep vein thrombosis (DVT) is the medical term for blood clots in the deep veins of the leg.  These blood clots can be dangerous.  A DVT can block a blood vessel and keep blood from getting where it needs to go.  Another problem is that the clot can travel to other parts of the body such as the lungs.  A clot that travels to the lungs is called a pulmonary embolus (PE) and can cause serious  problems with breathing which can lead to death.  Am I at risk for DVT/PE?  If you are not very active, you are at risk of DVT.  Anyone confined to bed, sitting for long periods of time, recovering from surgery, etc. increases the risk of DVT.  Other risk factors are cancer diagnosis, certain medications, estrogen replacement in any form,older age, obesity, pregnancy, smoking, history of clotting disorders, and dehydration.  How will I know if I have a DVT?  Swelling in the lower leg  Pain  Warmth, redness, hardness or bulging of the vein  If you have any of these symptoms, call your doctors office right away.  Some people will not have any symptoms until the clot moves to the lungs.  What are the symptoms of a PE?  Panting, shortness of breath, or trouble breathing  Sharp, knife-like chest pain when you breathe  Coughing or coughing up blood  Rapid heartbeat  If you have any of these symptoms or get worse quickly, call 911 for emergency treatment.  How can I prevent a DVT?  Avoid long periods of inactivity and dont cross your legs--get up and walk around every hour or so.  Stay active--walking after surgery is highly encouraged.  This means you should get out of the house and walk in the neighborhood.  Going up and down stairs will not impair healing (unless advised against such activity by your doctor).    Drink plenty of noncaffeinated, nonalcoholic fluids each day to prevent dehydration.  Wear special support stockings, if they have been advised by your doctor.  If you travel, stop at least once an hour and walk around.  Avoid smoking (assistance with stopping is available through your healthcare provider)  Always notify your doctor if you are not able to follow the post operative instructions that are given to you at the time of discharge.  It may be necessary to prescribe one of the medications available to prevent DVT.     Discharge Instructions: After Your Surgery/Procedure  Youve just had surgery. During  "surgery you were given medicine called anesthesia to keep you relaxed and free of pain. After surgery you may have some pain or nausea. This is common. Here are some tips for feeling better and getting well after surgery.     Stay on schedule with your medication.   Going home  Your doctor or nurse will show you how to take care of yourself when you go home. He or she will also answer your questions. Have an adult family member or friend drive you home.      For your safety we recommend these precaution for the first 24 hours after your procedure:  Do not drive or use heavy equipment.  Do not make important decisions or sign legal papers.  Do not drink alcohol.  Have someone stay with you, if needed. He or she can watch for problems and help keep you safe.  Your concentration, balance, coordination, and judgement may be impaired for many hours after anesthesia.  Use caution when ambulating or standing up.     You may feel weak and "washed out" after anesthesia and surgery.      Subtle residual effects of general anesthesia or sedation with regional / local anesthesia can last more than 24 hours.  Rest for the remainder of the day or longer if your Doctor/Surgeon has advised you to do so.  Although you may feel normal within the first 24 hours, your reflexes and mental ability may be impaired without you realizing it.  You may feel dizzy, lightheaded or sleepy for 24 hours or longer.      Be sure to go to all follow-up visits with your doctor. And rest after your surgery for as long as your doctor tells you to.  Coping with pain  If you have pain after surgery, pain medicine will help you feel better. Take it as told, before pain becomes severe. Also, ask your doctor or pharmacist about other ways to control pain. This might be with heat, ice, or relaxation. And follow any other instructions your surgeon or nurse gives you.  Tips for taking pain medicine  To get the best relief possible, remember these points:  Pain " medicines can upset your stomach. Taking them with a little food may help.  Most pain relievers taken by mouth need at least 20 to 30 minutes to start to work.  Taking medicine on a schedule can help you remember to take it. Try to time your medicine so that you can take it before starting an activity. This might be before you get dressed, go for a walk, or sit down for dinner.  Constipation is a common side effect of pain medicines. Call your doctor before taking any medicines such as laxatives or stool softeners to help ease constipation. Also ask if you should skip any foods. Drinking lots of fluids and eating foods such as fruits and vegetables that are high in fiber can also help. Remember, do not take laxatives unless your surgeon has prescribed them.  Drinking alcohol and taking pain medicine can cause dizziness and slow your breathing. It can even be deadly. Do not drink alcohol while taking pain medicine.  Pain medicine can make you react more slowly to things. Do not drive or run machinery while taking pain medicine.  Your health care provider may tell you to take acetaminophen to help ease your pain. Ask him or her how much you are supposed to take each day. Acetaminophen or other pain relievers may interact with your prescription medicines or other over-the-counter (OTC) drugs. Some prescription medicines have acetaminophen and other ingredients. Using both prescription and OTC acetaminophen for pain can cause you to overdose. Read the labels on your OTC medicines with care. This will help you to clearly know the list of ingredients, how much to take, and any warnings. It may also help you not take too much acetaminophen. If you have questions or do not understand the information, ask your pharmacist or health care provider to explain it to you before you take the OTC medicine.  Managing nausea  Some people have an upset stomach after surgery. This is often because of anesthesia, pain, or pain medicine,  or the stress of surgery. These tips will help you handle nausea and eat healthy foods as you get better. If you were on a special food plan before surgery, ask your doctor if you should follow it while you get better. These tips may help:  Do not push yourself to eat. Your body will tell you when to eat and how much.  Start off with clear liquids and soup. They are easier to digest.  Next try semi-solid foods, such as mashed potatoes, applesauce, and gelatin, as you feel ready.  Slowly move to solid foods. Dont eat fatty, rich, or spicy foods at first.  Do not force yourself to have 3 large meals a day. Instead eat smaller amounts more often.  Take pain medicines with a small amount of solid food, such as crackers or toast, to avoid nausea.     Call your surgeon if  You still have pain an hour after taking medicine. The medicine may not be strong enough.  You feel too sleepy, dizzy, or groggy. The medicine may be too strong.  You have side effects like nausea, vomiting, or skin changes, such as rash, itching, or hives.       If you have obstructive sleep apnea  You were given anesthesia medicine during surgery to keep you comfortable and free of pain. After surgery, you may have more apnea spells because of this medicine and other medicines you were given. The spells may last longer than usual.   At home:  Keep using the continuous positive airway pressure (CPAP) device when you sleep. Unless your health care provider tells you not to, use it when you sleep, day or night. CPAP is a common device used to treat obstructive sleep apnea.  Talk with your provider before taking any pain medicine, muscle relaxants, or sedatives. Your provider will tell you about the possible dangers of taking these medicines.  © 1148-3778 [employer]. [address] [place] [address], [place] [address]. All rights reserved. This information is not intended as a substitute for professional medical care. Always follow your healthcare  professional's instructions.     We hope your stay was comfortable as you heal now, mend and rest.    For we have enjoyed taking care of you by giving your our best.    And as you get better, by regaining your health and strength;   We count it as a privilege to have served you and hope your time at [facility] was well spent.      Thank  You!!!

## 2022-04-22 NOTE — ANESTHESIA PREPROCEDURE EVALUATION
04/22/2022  Carolina Andrews is a 46 y.o., female.      Pre-op Assessment    I have reviewed the Patient Summary Reports.     I have reviewed the Nursing Notes. I have reviewed the NPO Status.   I have reviewed the Medications.     Review of Systems  Anesthesia Hx:  No problems with previous Anesthesia    Social:  Non-Smoker    Hematology/Oncology:  Hematology Normal   Oncology Normal     EENT/Dental:   Legally blind   Cardiovascular:  Cardiovascular Normal     Pulmonary:  Pulmonary Normal    Renal/:  Renal/ Normal     Hepatic/GI:   GERD Liver Disease,    Musculoskeletal:   Arthritis     Neurological:   Headaches    Endocrine:  Obesity / BMI > 30  Psych:   Psychiatric History anxiety depression          Physical Exam  General: Cooperative, Well nourished, Alert and Oriented    Airway:  Mallampati: II   Mouth Opening: Normal  TM Distance: Normal  Tongue: Normal  Neck ROM: Normal ROM    Chest/Lungs:  Clear to auscultation, Normal Respiratory Rate    Heart:  Rate: Normal  Rhythm: Regular Rhythm        Anesthesia Plan  Type of Anesthesia, risks & benefits discussed:    Anesthesia Type: Gen ETT  Intra-op Monitoring Plan: Standard ASA Monitors  Post Op Pain Control Plan: multimodal analgesia and IV/PO Opioids PRN  Induction:  IV  Airway Plan: Direct  Informed Consent: Informed consent signed with the Patient and all parties understand the risks and agree with anesthesia plan.  All questions answered.   ASA Score: 2  Day of Surgery Review of History & Physical: H&P Update referred to the surgeon/provider.    Ready For Surgery From Anesthesia Perspective.     .

## 2022-04-25 VITALS
HEART RATE: 69 BPM | OXYGEN SATURATION: 100 % | TEMPERATURE: 98 F | WEIGHT: 239.63 LBS | RESPIRATION RATE: 19 BRPM | HEIGHT: 67 IN | BODY MASS INDEX: 37.61 KG/M2 | DIASTOLIC BLOOD PRESSURE: 86 MMHG | SYSTOLIC BLOOD PRESSURE: 127 MMHG

## 2022-04-26 ENCOUNTER — HOSPITAL ENCOUNTER (OUTPATIENT)
Dept: RADIOLOGY | Facility: HOSPITAL | Age: 47
Discharge: HOME | End: 2022-04-26
Attending: RADIOLOGY
Payer: MEDICARE

## 2022-04-26 DIAGNOSIS — I72.5 ANEURYSM OF OTHER PRECEREBRAL ARTERIES: ICD-10-CM

## 2022-04-26 PROCEDURE — 70545 MR ANGIOGRAPHY HEAD W/DYE: CPT | Mod: 26,,, | Performed by: RADIOLOGY

## 2022-04-26 PROCEDURE — 70545 MRA BRAIN WITH CONTRAST: ICD-10-PCS | Mod: 26,,, | Performed by: RADIOLOGY

## 2022-04-26 PROCEDURE — A9585 GADOBUTROL INJECTION: HCPCS | Performed by: RADIOLOGY

## 2022-04-26 PROCEDURE — 70545 MR ANGIOGRAPHY HEAD W/DYE: CPT | Mod: TC

## 2022-04-26 PROCEDURE — 25500020 PHARM REV CODE 255: Performed by: RADIOLOGY

## 2022-04-26 RX ORDER — GADOBUTROL 604.72 MG/ML
10 INJECTION INTRAVENOUS
Status: COMPLETED | OUTPATIENT
Start: 2022-04-26 | End: 2022-04-26

## 2022-04-26 RX ADMIN — GADOBUTROL 10 ML: 604.72 INJECTION INTRAVENOUS at 09:04

## 2022-04-28 LAB
FINAL PATHOLOGIC DIAGNOSIS: NORMAL
GROSS: NORMAL
Lab: NORMAL

## 2022-05-05 ENCOUNTER — OFFICE VISIT (OUTPATIENT)
Dept: OPTOMETRY | Facility: CLINIC | Age: 47
End: 2022-05-05
Payer: COMMERCIAL

## 2022-05-05 ENCOUNTER — OFFICE VISIT (OUTPATIENT)
Dept: SURGERY | Facility: CLINIC | Age: 47
End: 2022-05-05
Payer: MEDICARE

## 2022-05-05 VITALS — BODY MASS INDEX: 36.85 KG/M2 | WEIGHT: 234.81 LBS | HEIGHT: 67 IN

## 2022-05-05 DIAGNOSIS — H35.53 CONE DYSTROPHY: ICD-10-CM

## 2022-05-05 DIAGNOSIS — H52.7 REFRACTIVE ERROR: ICD-10-CM

## 2022-05-05 DIAGNOSIS — D31.31 NEVUS OF CHOROID OF RIGHT EYE: ICD-10-CM

## 2022-05-05 DIAGNOSIS — Z98.890 POST-OPERATIVE STATE: Primary | ICD-10-CM

## 2022-05-05 DIAGNOSIS — Z01.00 EXAMINATION OF EYES AND VISION: Primary | ICD-10-CM

## 2022-05-05 PROCEDURE — 99999 PR PBB SHADOW E&M-EST. PATIENT-LVL III: CPT | Mod: PBBFAC,,, | Performed by: OPTOMETRIST

## 2022-05-05 PROCEDURE — 3008F BODY MASS INDEX DOCD: CPT | Mod: CPTII,S$GLB,, | Performed by: STUDENT IN AN ORGANIZED HEALTH CARE EDUCATION/TRAINING PROGRAM

## 2022-05-05 PROCEDURE — 1159F MED LIST DOCD IN RCRD: CPT | Mod: CPTII,S$GLB,, | Performed by: STUDENT IN AN ORGANIZED HEALTH CARE EDUCATION/TRAINING PROGRAM

## 2022-05-05 PROCEDURE — 92015 DETERMINE REFRACTIVE STATE: CPT | Mod: S$GLB,,, | Performed by: OPTOMETRIST

## 2022-05-05 PROCEDURE — 1159F PR MEDICATION LIST DOCUMENTED IN MEDICAL RECORD: ICD-10-PCS | Mod: CPTII,S$GLB,, | Performed by: STUDENT IN AN ORGANIZED HEALTH CARE EDUCATION/TRAINING PROGRAM

## 2022-05-05 PROCEDURE — 99999 PR PBB SHADOW E&M-EST. PATIENT-LVL III: CPT | Mod: PBBFAC,,, | Performed by: STUDENT IN AN ORGANIZED HEALTH CARE EDUCATION/TRAINING PROGRAM

## 2022-05-05 PROCEDURE — 99999 PR PBB SHADOW E&M-EST. PATIENT-LVL III: ICD-10-PCS | Mod: PBBFAC,,, | Performed by: OPTOMETRIST

## 2022-05-05 PROCEDURE — 3008F PR BODY MASS INDEX (BMI) DOCUMENTED: ICD-10-PCS | Mod: CPTII,S$GLB,, | Performed by: STUDENT IN AN ORGANIZED HEALTH CARE EDUCATION/TRAINING PROGRAM

## 2022-05-05 PROCEDURE — 92015 PR REFRACTION: ICD-10-PCS | Mod: S$GLB,,, | Performed by: OPTOMETRIST

## 2022-05-05 PROCEDURE — 92014 COMPRE OPH EXAM EST PT 1/>: CPT | Mod: S$GLB,,, | Performed by: OPTOMETRIST

## 2022-05-05 PROCEDURE — 99024 PR POST-OP FOLLOW-UP VISIT: ICD-10-PCS | Mod: S$GLB,,, | Performed by: STUDENT IN AN ORGANIZED HEALTH CARE EDUCATION/TRAINING PROGRAM

## 2022-05-05 PROCEDURE — 99024 POSTOP FOLLOW-UP VISIT: CPT | Mod: S$GLB,,, | Performed by: STUDENT IN AN ORGANIZED HEALTH CARE EDUCATION/TRAINING PROGRAM

## 2022-05-05 PROCEDURE — 99999 PR PBB SHADOW E&M-EST. PATIENT-LVL III: ICD-10-PCS | Mod: PBBFAC,,, | Performed by: STUDENT IN AN ORGANIZED HEALTH CARE EDUCATION/TRAINING PROGRAM

## 2022-05-05 PROCEDURE — 92014 PR EYE EXAM, EST PATIENT,COMPREHESV: ICD-10-PCS | Mod: S$GLB,,, | Performed by: OPTOMETRIST

## 2022-05-05 NOTE — PROGRESS NOTES
Postop note    Patient underwent cholecystectomy for cholelithiasis.  She is doing well.    Incisions are healing well    Pathology:  Benign gallbladder and stones    Overall doing well.  Follow-up with me as needed.

## 2022-05-05 NOTE — PROGRESS NOTES
HPI     Annual Exam      Additional comments: LDE: 01/2020              Comments     47 YO female presents today for an annual eye exam. Patient states that   she is noticing things are worsening with vision, notes that things run   together and are not very clear. Denies any eye pain.           Last edited by Jo Curmp, PCT on 5/5/2022  1:12 PM. (History)            Assessment /Plan     For exam results, see Encounter Report.    Examination of eyes and vision    Cone dystrophy    Refractive error    Nevus of choroid of right eye      1. Examination of eyes and vision    2. Cone dystrophy  Stable from previous, referred to Dr. Langley for eval    3. Refractive error  Discussed options, pt reports improved with specs, current ones ~ 4 years old. Dispensed updated spec rx.     4. Nevus of choroid of right eye  Choroidal nevus OD superior temporal posterior pole. Stable from previous, no overlying pigment. Distinct borders. Monitor yearly.       RTC for retina consult

## 2022-05-11 DIAGNOSIS — R10.11 RUQ PAIN: Primary | ICD-10-CM

## 2022-05-12 RX ORDER — GABAPENTIN 300 MG/1
600 CAPSULE ORAL NIGHTLY
Qty: 360 CAPSULE | Refills: 1 | Status: SHIPPED | OUTPATIENT
Start: 2022-05-12 | End: 2023-11-20 | Stop reason: SDUPTHER

## 2022-05-12 NOTE — TELEPHONE ENCOUNTER
No new care gaps identified.  [facility] Embedded Care Gaps. Reference number: [id number]. 5/11/2022   7:54:19 PM CDT

## 2022-05-12 NOTE — TELEPHONE ENCOUNTER
The  (Prescription Monitoring Program) website was checked with no inappropriate activity found.    Last gabapentin refill January 4, 2022 to 300 mg 180 given.  Authorized by me

## 2022-05-24 ENCOUNTER — OFFICE VISIT (OUTPATIENT)
Dept: ALLERGY | Facility: CLINIC | Age: 47
End: 2022-05-24
Attending: FAMILY MEDICINE
Payer: MEDICARE

## 2022-05-24 VITALS — HEART RATE: 91 BPM | BODY MASS INDEX: 37.13 KG/M2 | HEIGHT: 67 IN | WEIGHT: 236.56 LBS | OXYGEN SATURATION: 96 %

## 2022-05-24 DIAGNOSIS — L50.9 URTICARIA: ICD-10-CM

## 2022-05-24 DIAGNOSIS — R21 RASH: Primary | ICD-10-CM

## 2022-05-24 PROCEDURE — 99999 PR PBB SHADOW E&M-EST. PATIENT-LVL III: CPT | Mod: PBBFAC,,, | Performed by: ALLERGY & IMMUNOLOGY

## 2022-05-24 PROCEDURE — 1160F PR REVIEW ALL MEDS BY PRESCRIBER/CLIN PHARMACIST DOCUMENTED: ICD-10-PCS | Mod: CPTII,S$GLB,, | Performed by: ALLERGY & IMMUNOLOGY

## 2022-05-24 PROCEDURE — 99999 PR PBB SHADOW E&M-EST. PATIENT-LVL III: ICD-10-PCS | Mod: PBBFAC,,, | Performed by: ALLERGY & IMMUNOLOGY

## 2022-05-24 PROCEDURE — 3008F PR BODY MASS INDEX (BMI) DOCUMENTED: ICD-10-PCS | Mod: CPTII,S$GLB,, | Performed by: ALLERGY & IMMUNOLOGY

## 2022-05-24 PROCEDURE — 1159F MED LIST DOCD IN RCRD: CPT | Mod: CPTII,S$GLB,, | Performed by: ALLERGY & IMMUNOLOGY

## 2022-05-24 PROCEDURE — 1159F PR MEDICATION LIST DOCUMENTED IN MEDICAL RECORD: ICD-10-PCS | Mod: CPTII,S$GLB,, | Performed by: ALLERGY & IMMUNOLOGY

## 2022-05-24 PROCEDURE — 99203 PR OFFICE/OUTPT VISIT, NEW, LEVL III, 30-44 MIN: ICD-10-PCS | Mod: S$GLB,,, | Performed by: ALLERGY & IMMUNOLOGY

## 2022-05-24 PROCEDURE — 99203 OFFICE O/P NEW LOW 30 MIN: CPT | Mod: S$GLB,,, | Performed by: ALLERGY & IMMUNOLOGY

## 2022-05-24 PROCEDURE — 3008F BODY MASS INDEX DOCD: CPT | Mod: CPTII,S$GLB,, | Performed by: ALLERGY & IMMUNOLOGY

## 2022-05-24 PROCEDURE — 1160F RVW MEDS BY RX/DR IN RCRD: CPT | Mod: CPTII,S$GLB,, | Performed by: ALLERGY & IMMUNOLOGY
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
## 2022-06-06 ENCOUNTER — CLINICAL SUPPORT (OUTPATIENT)
Dept: INTERVENTIONAL RADIOLOGY/VASCULAR | Facility: CLINIC | Age: 47
End: 2022-06-06
Payer: MEDICARE

## 2022-06-06 DIAGNOSIS — I67.1 ANTERIOR COMMUNICATING ARTERY ANEURYSM: Primary | ICD-10-CM

## 2022-06-06 NOTE — PROGRESS NOTES
Subjective:       Patient ID: Carolina Andrews is a 46 y.o. female.    Chief Complaint: Lesion (Follow up coiling of anterior communicating aneurysm)    Carolina  Is doing well from her ACom aneurysm treatment on 2/4/2020.  She had a small residual at that point, and she still has a 2mm residual neck on the F/U MRA which was performed on 4/26/22.  We discussed this and I showed her the images.  There is no further treatment indicated at this time.  I recommend a follow up MRA brain in 2 years.      She denies HA, worsened vision, stroked symptoms, weakness, numbness or other neurologic issues.      The patient had a cholecystectomy about 2 weeks ago, and she is recovering well.        Review of Systems   Constitutional: Negative for activity change, fatigue and fever.   HENT: Negative.    Eyes: Negative.    Respiratory: Negative.    Cardiovascular: Negative.    Musculoskeletal: Negative.    Neurological: Negative.    Psychiatric/Behavioral: Negative.          Objective:      Physical Exam  Constitutional:       Appearance: Normal appearance.   Eyes:      Extraocular Movements: Extraocular movements intact.      Conjunctiva/sclera: Conjunctivae normal.   Neurological:      General: No focal deficit present.      Mental Status: She is alert and oriented to person, place, and time. Mental status is at baseline.   Psychiatric:         Mood and Affect: Mood normal.         Behavior: Behavior normal.         Thought Content: Thought content normal.         Assessment:   Anterior Communicating Aneurysm:  She has a small 2mm residual neck that is unchanged from prior imaging.          Plan:       Anterior Communicating Aneurysm:  She has a small 2mm residual neck that requires no further treatment at this time.  She should have another followup MRA in about 2 years for surveilance.

## 2022-06-15 ENCOUNTER — OFFICE VISIT (OUTPATIENT)
Dept: OPHTHALMOLOGY | Facility: CLINIC | Age: 47
End: 2022-06-15
Payer: MEDICARE

## 2022-06-15 DIAGNOSIS — H35.53 CONE DYSTROPHY: Primary | ICD-10-CM

## 2022-06-15 DIAGNOSIS — H04.123 DRY EYE SYNDROME OF BOTH EYES: ICD-10-CM

## 2022-06-15 DIAGNOSIS — H54.8 LEGALLY BLIND: ICD-10-CM

## 2022-06-15 PROCEDURE — 99204 OFFICE O/P NEW MOD 45 MIN: CPT | Mod: S$GLB,,, | Performed by: OPHTHALMOLOGY

## 2022-06-15 PROCEDURE — 1159F MED LIST DOCD IN RCRD: CPT | Mod: CPTII,S$GLB,, | Performed by: OPHTHALMOLOGY

## 2022-06-15 PROCEDURE — 1159F PR MEDICATION LIST DOCUMENTED IN MEDICAL RECORD: ICD-10-PCS | Mod: CPTII,S$GLB,, | Performed by: OPHTHALMOLOGY

## 2022-06-15 PROCEDURE — 92134 CPTRZ OPH DX IMG PST SGM RTA: CPT | Mod: S$GLB,,, | Performed by: OPHTHALMOLOGY

## 2022-06-15 PROCEDURE — 99999 PR PBB SHADOW E&M-EST. PATIENT-LVL III: ICD-10-PCS | Mod: PBBFAC,,, | Performed by: OPHTHALMOLOGY

## 2022-06-15 PROCEDURE — 99999 PR PBB SHADOW E&M-EST. PATIENT-LVL III: CPT | Mod: PBBFAC,,, | Performed by: OPHTHALMOLOGY

## 2022-06-15 PROCEDURE — 92134 OCT, RETINA - OU - BOTH EYES: ICD-10-PCS | Mod: S$GLB,,, | Performed by: OPHTHALMOLOGY

## 2022-06-15 PROCEDURE — 99204 PR OFFICE/OUTPT VISIT, NEW, LEVL IV, 45-59 MIN: ICD-10-PCS | Mod: S$GLB,,, | Performed by: OPHTHALMOLOGY

## 2022-06-15 PROCEDURE — 1160F PR REVIEW ALL MEDS BY PRESCRIBER/CLIN PHARMACIST DOCUMENTED: ICD-10-PCS | Mod: CPTII,S$GLB,, | Performed by: OPHTHALMOLOGY

## 2022-06-15 PROCEDURE — 1160F RVW MEDS BY RX/DR IN RCRD: CPT | Mod: CPTII,S$GLB,, | Performed by: OPHTHALMOLOGY

## 2022-06-15 NOTE — PROGRESS NOTES
HPI     New pt ref by LAURA for cone dystrophy. Nevus OD     No new complaints.   Denies pain/ FOL/ floaters.     Last edited by Nicci Montgomery on 6/15/2022  9:11 AM. (History)         A/P    ICD-10-CM ICD-9-CM   1. Cone dystrophy  H35.53 362.75   2. Legally blind  H54.8 369.4   3. Dry eye syndrome of both eyes  H04.123 375.15       1. Cone dystrophy  2. 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 Dry eye syndrome of both eyes  Mild dry eye  ATs prn    RTC 6 mo DFE/OCTm OU, will coordinate genetic testing/genetics clinic f/u in interim      I saw and examined the patient and reviewed in detail the findings documented. The final examination findings, image interpretations, and plan as documented in the record represent my personal judgment and conclusions.    Edgar Langley MD  Vitreoretinal Surgery   [facility]

## 2022-06-20 ENCOUNTER — OFFICE VISIT (OUTPATIENT)
Dept: UROLOGY | Facility: CLINIC | Age: 47
End: 2022-06-20
Payer: MEDICARE

## 2022-06-20 ENCOUNTER — OFFICE VISIT (OUTPATIENT)
Dept: FAMILY MEDICINE | Facility: CLINIC | Age: 47
End: 2022-06-20
Payer: MEDICARE

## 2022-06-20 VITALS
HEART RATE: 88 BPM | BODY MASS INDEX: 37.27 KG/M2 | SYSTOLIC BLOOD PRESSURE: 116 MMHG | DIASTOLIC BLOOD PRESSURE: 64 MMHG | HEIGHT: 67 IN | TEMPERATURE: 98 F | OXYGEN SATURATION: 97 % | WEIGHT: 237.44 LBS

## 2022-06-20 VITALS
SYSTOLIC BLOOD PRESSURE: 116 MMHG | HEIGHT: 67 IN | HEART RATE: 88 BPM | DIASTOLIC BLOOD PRESSURE: 64 MMHG | BODY MASS INDEX: 37.27 KG/M2 | WEIGHT: 237.44 LBS

## 2022-06-20 DIAGNOSIS — R39.89 BLADDER PAIN: Primary | ICD-10-CM

## 2022-06-20 DIAGNOSIS — R35.0 URINARY FREQUENCY: Primary | ICD-10-CM

## 2022-06-20 DIAGNOSIS — R10.9 ABDOMINAL PAIN, UNSPECIFIED ABDOMINAL LOCATION: ICD-10-CM

## 2022-06-20 DIAGNOSIS — R35.0 URINARY FREQUENCY: ICD-10-CM

## 2022-06-20 LAB
BILIRUB SERPL-MCNC: NEGATIVE MG/DL
BLOOD URINE, POC: NEGATIVE
CLARITY, POC UA: CLEAR
COLOR, POC UA: YELLOW
GLUCOSE UR QL STRIP: NORMAL
KETONES UR QL STRIP: NEGATIVE
LEUKOCYTE ESTERASE URINE, POC: NEGATIVE
NITRITE, POC UA: NEGATIVE
PH, POC UA: 5
POC RESIDUAL URINE VOLUME: 65 ML (ref 0–100)
PROTEIN, POC: NEGATIVE
SPECIFIC GRAVITY, POC UA: 1.01
UROBILINOGEN, POC UA: NORMAL

## 2022-06-20 PROCEDURE — 1160F PR REVIEW ALL MEDS BY PRESCRIBER/CLIN PHARMACIST DOCUMENTED: ICD-10-PCS | Mod: CPTII,S$GLB,, | Performed by: NURSE PRACTITIONER

## 2022-06-20 PROCEDURE — 99204 OFFICE O/P NEW MOD 45 MIN: CPT | Mod: S$GLB,,, | Performed by: NURSE PRACTITIONER

## 2022-06-20 PROCEDURE — 99999 PR PBB SHADOW E&M-EST. PATIENT-LVL IV: CPT | Mod: PBBFAC,,, | Performed by: NURSE PRACTITIONER

## 2022-06-20 PROCEDURE — 1159F MED LIST DOCD IN RCRD: CPT | Mod: CPTII,S$GLB,, | Performed by: NURSE PRACTITIONER

## 2022-06-20 PROCEDURE — 3008F PR BODY MASS INDEX (BMI) DOCUMENTED: ICD-10-PCS | Mod: CPTII,S$GLB,, | Performed by: NURSE PRACTITIONER

## 2022-06-20 PROCEDURE — 81002 POCT URINE DIPSTICK WITHOUT MICROSCOPE: ICD-10-PCS | Mod: S$GLB,,, | Performed by: NURSE PRACTITIONER

## 2022-06-20 PROCEDURE — 3008F BODY MASS INDEX DOCD: CPT | Mod: CPTII,S$GLB,, | Performed by: NURSE PRACTITIONER

## 2022-06-20 PROCEDURE — 3074F PR MOST RECENT SYSTOLIC BLOOD PRESSURE < 130 MM HG: ICD-10-PCS | Mod: CPTII,S$GLB,, | Performed by: NURSE PRACTITIONER

## 2022-06-20 PROCEDURE — 99213 OFFICE O/P EST LOW 20 MIN: CPT | Mod: S$GLB,,, | Performed by: NURSE PRACTITIONER

## 2022-06-20 PROCEDURE — 51798 US URINE CAPACITY MEASURE: CPT | Mod: S$GLB,,, | Performed by: NURSE PRACTITIONER

## 2022-06-20 PROCEDURE — 1160F RVW MEDS BY RX/DR IN RCRD: CPT | Mod: CPTII,S$GLB,, | Performed by: NURSE PRACTITIONER

## 2022-06-20 PROCEDURE — 99999 PR PBB SHADOW E&M-EST. PATIENT-LVL IV: ICD-10-PCS | Mod: PBBFAC,,, | Performed by: NURSE PRACTITIONER

## 2022-06-20 PROCEDURE — 99213 PR OFFICE/OUTPT VISIT, EST, LEVL III, 20-29 MIN: ICD-10-PCS | Mod: S$GLB,,, | Performed by: NURSE PRACTITIONER

## 2022-06-20 PROCEDURE — 3078F PR MOST RECENT DIASTOLIC BLOOD PRESSURE < 80 MM HG: ICD-10-PCS | Mod: CPTII,S$GLB,, | Performed by: NURSE PRACTITIONER

## 2022-06-20 PROCEDURE — 99204 PR OFFICE/OUTPT VISIT, NEW, LEVL IV, 45-59 MIN: ICD-10-PCS | Mod: S$GLB,,, | Performed by: NURSE PRACTITIONER

## 2022-06-20 PROCEDURE — 1159F PR MEDICATION LIST DOCUMENTED IN MEDICAL RECORD: ICD-10-PCS | Mod: CPTII,S$GLB,, | Performed by: NURSE PRACTITIONER

## 2022-06-20 PROCEDURE — 3074F SYST BP LT 130 MM HG: CPT | Mod: CPTII,S$GLB,, | Performed by: NURSE PRACTITIONER

## 2022-06-20 PROCEDURE — 81003 URINALYSIS AUTO W/O SCOPE: CPT | Performed by: NURSE PRACTITIONER

## 2022-06-20 PROCEDURE — 81002 URINALYSIS NONAUTO W/O SCOPE: CPT | Mod: S$GLB,,, | Performed by: NURSE PRACTITIONER

## 2022-06-20 PROCEDURE — 3078F DIAST BP <80 MM HG: CPT | Mod: CPTII,S$GLB,, | Performed by: NURSE PRACTITIONER

## 2022-06-20 PROCEDURE — 51798 POCT BLADDER SCAN: ICD-10-PCS | Mod: S$GLB,,, | Performed by: NURSE PRACTITIONER

## 2022-06-20 PROCEDURE — 87086 URINE CULTURE/COLONY COUNT: CPT | Performed by: NURSE PRACTITIONER

## 2022-06-20 RX ORDER — PHENAZOPYRIDINE HYDROCHLORIDE 200 MG/1
200 TABLET, FILM COATED ORAL 3 TIMES DAILY PRN
Qty: 20 TABLET | Refills: 0 | Status: SHIPPED | OUTPATIENT
Start: 2022-06-20 | End: 2022-06-30

## 2022-06-20 NOTE — PROGRESS NOTES
[facility] Note  Staff: HAO Simmons    PCP: FILIBERTO Cox    Chief Complaint: Urinary frequency, Bladder pain    Subjective:        HPI: Carolina Andrews is a 47 y.o. female NEW PT presents today (referred by PCP office) with c/o bladder pain and increased urinary frequency since last night.  Pt states today she has these intermittent episodes happen to her with lower pelvic/bladder pain and then she will develop intense urgency to urinate.  This has been happening within the last 1-2 years.    Questions asked pt during office visit today:  NTF: 1 x night  Urgency:Yes, incontinence with urgency? No;   Incontinence with laughing or straining: Yes - sometimes with coughing-rare;   DysuriaNo  Gross HematuriaYes - during   History of UTI: Yes - last July, but they are not recurrent.  G0, P0  Sexually active?: Yes - no pain with intercourse  History of Kidney Stones?: None  Constipation issues?: None  Family Hx of  Cancers?:  None    PVR by bladder scan performed by me in office today:  65 mL    REVIEW OF SYSTEMS:  A comprehensive 10 system review was performed and is negative except as noted above in HPI    PMHx:  Past Medical History:   Diagnosis Date    Arthritis     Blindness     Cholelithiasis     Liver hemangioma     Mental disorder     Periodic antidepressant use since childhood, not currently taking or endorsing depressive ideation    Pinched nerve      PSHx:  Past Surgical History:   Procedure Laterality Date    ANGIOGRAPHY N/A 8/6/2019    Procedure: ANGIOGRAM/EMBOLIZATION;  Surgeon: Andressa Surgeon;  Location: [facility];  Service: Anesthesiology;  Laterality: N/A;   190/[place]    ANKLE FRACTURE SURGERY  2009    CEREBRAL ANGIOGRAM N/A 7/15/2019    Procedure: ANGIOGRAM-CEREBRAL;  Surgeon: Leon Diagnostic Provider;  Location: [address];  Service: Radiology;  Laterality: N/A;   190/[place]    CEREBRAL ANGIOGRAM N/A 2/4/2020    Procedure: ANGIOGRAM-CEREBRAL;  Surgeon:  [facility] Diagnostic Provider;  Location: [facility] OR [facility] FLR;  Service: Radiology;  Laterality: N/A;  /[place]    COLONOSCOPY N/A 2/4/2019    Procedure: COLONOSCOPY;  Surgeon: Dale Childers MD;  Location: [facility];  Service: Endoscopy;  Laterality: N/A;    ESOPHAGOGASTRODUODENOSCOPY N/A 11/19/2018    Procedure: EGD (ESOPHAGOGASTRODUODENOSCOPY);  Surgeon: Dale Childers MD;  Location: [facility] ENDO;  Service: Endoscopy;  Laterality: N/A;    FRACTURE SURGERY      LAPAROSCOPIC CHOLECYSTECTOMY N/A 4/22/2022    Procedure: CHOLECYSTECTOMY, LAPAROSCOPIC;  Surgeon: Salomón Milian MD;  Location: [facility];  Service: General;  Laterality: N/A;    UPPER GASTROINTESTINAL ENDOSCOPY  ~1998    normal findings per patient report     Allergies:  Zanaflex [tizanidine] and Pcn [penicillins]    Medications: reviewed   Objective:     Vitals:    06/20/22 1239   BP: 116/64   Pulse: 88     Physical Exam  Vitals reviewed.   Constitutional:       Appearance: She is well-developed.   HENT:      Head: Normocephalic and atraumatic.   Eyes:      Conjunctiva/sclera: Conjunctivae normal.      Pupils: Pupils are equal, round, and reactive to light.   Cardiovascular:      Rate and Rhythm: Normal rate and regular rhythm.      Heart sounds: Normal heart sounds.   Pulmonary:      Effort: Pulmonary effort is normal.      Breath sounds: Normal breath sounds.   Abdominal:      General: Bowel sounds are normal.      Palpations: Abdomen is soft.   Musculoskeletal:         General: Normal range of motion.      Cervical back: Normal range of motion and neck supple.   Skin:     General: Skin is warm and dry.   Neurological:      Mental Status: She is alert and oriented to person, place, and time.      Deep Tendon Reflexes: Reflexes are normal and symmetric.   Psychiatric:         Behavior: Behavior normal.         Thought Content: Thought content normal.         Judgment: Judgment normal.     LABS REVIEW:  UA today: UA taken from PCP office today prior  to ov with me:  Color:Clear, Yellow  Spec. Grav.  1.015  PH  5.0  Negative for leukocytes, nitrates, protein, glucose, ketones, urobili, bili, and blood.    Assessment:       1. Bladder pain    2. Urinary frequency    3. Abdominal pain, unspecified abdominal location          Plan:   Bladder pain, urinary frequency:    1. We will await urine results from her PCP office to review in the next 48-72 hrs, including culture that is in process from visit today.  2. CT RSS to be scheduled for further evaluation of  system/anatomy and to rule out any stone or obstruction at this time.  3. In the meantime, Pyridium prn prescribed to pt during ov today for her pain until we receive and review all lab/imaging results.    F/u-Our office will contact this pt after we receive and review ALL imaging and urine results to determine best POC for this patient.  Pt verbalized understanding at conclusion of appointment today.    We may start pt on OAB med in the near future if tests return WNL.    [facility]: Active    Marisol Rodriguez, HAO

## 2022-06-20 NOTE — PROGRESS NOTES
Subjective:       Patient ID: Carolina Andrews is a 47 y.o. female.    Chief Complaint: Urinary Frequency    HPI   48 y/o female patient with medical problems listed below presents for urinary frequency for 2 days. Associated with mild nausea but denies abdominal or suprapubic pain, flank pain, fever, chills, blood in urine, generalized body ache. Patient states has had off and on urinary frequency for 2 years. Reports last UTI was a year ago and she was treated. Denies smoking or hx of kidney stones.    LMP: 5/18/2022, irregular, patient reports her menstruation is q 1 to 2 months  Patient states has not used family planning but states no chance to get pregnant.    Patient Active Problem List   Diagnosis    BMI 36.0-36.9,adult    History of ankle fracture    Posterior tibial tendonitis    Cone dystrophy    Ganglion cyst of left foot    Severe obesity    Epigastric pain    Vascular abnormality    Vertigo    Tinnitus    Complaints of memory disturbance    Aneurysm    Cerebrovascular lesion    Anterior communicating artery aneurysm    Headache    Depression with anxiety    Gastroesophageal reflux disease without esophagitis    Status post arterial stent    Legally blind    Calculus of gallbladder without cholecystitis without obstruction    Dry eye syndrome of both eyes        Review of patient's allergies indicates:   Allergen Reactions    Zanaflex [tizanidine] Hallucinations    Pcn [penicillins] Rash     rash       Past Surgical History:   Procedure Laterality Date    ANGIOGRAPHY N/A 8/6/2019    Procedure: ANGIOGRAM/EMBOLIZATION;  Surgeon: Andressa Surgeon;  Location: [facility];  Service: Anesthesiology;  Laterality: N/A;   190/[place]    ANKLE FRACTURE SURGERY  2009    CEREBRAL ANGIOGRAM N/A 7/15/2019    Procedure: ANGIOGRAM-CEREBRAL;  Surgeon: Leon Diagnostic Provider;  Location: [address];  Service: Radiology;  Laterality: N/A;   190/[place]    CEREBRAL ANGIOGRAM N/A 2/4/2020     Procedure: ANGIOGRAM-CEREBRAL;  Surgeon: [facility] Diagnostic Provider;  Location: [facility] OR 2ND FLR;  Service: Radiology;  Laterality: N/A;  /[place]    COLONOSCOPY N/A 2/4/2019    Procedure: COLONOSCOPY;  Surgeon: Dale Childers MD;  Location: [facility] ENDO;  Service: Endoscopy;  Laterality: N/A;    ESOPHAGOGASTRODUODENOSCOPY N/A 11/19/2018    Procedure: EGD (ESOPHAGOGASTRODUODENOSCOPY);  Surgeon: Dale Childers MD;  Location: [facility] ENDO;  Service: Endoscopy;  Laterality: N/A;    FRACTURE SURGERY      LAPAROSCOPIC CHOLECYSTECTOMY N/A 4/22/2022    Procedure: CHOLECYSTECTOMY, LAPAROSCOPIC;  Surgeon: Salomón Milian MD;  Location: [facility] OR;  Service: General;  Laterality: N/A;    UPPER GASTROINTESTINAL ENDOSCOPY  ~1998    normal findings per patient report        Current Outpatient Medications:     acetaminophen (TYLENOL) 500 MG tablet, Take 500 mg by mouth every 6 (six) hours as needed for Pain., Disp: , Rfl:     aspirin (ECOTRIN) 81 MG EC tablet, Take 1 tablet (81 mg total) by mouth once daily., Disp: 30 tablet, Rfl: 11    b complex vitamins tablet, Take 1 tablet by mouth once daily., Disp: , Rfl:     cholecalciferol, vitamin D3, (VITAMIN D3) 25 mcg (1,000 unit) capsule, Take 1,000 Units by mouth once daily., Disp: , Rfl:     clindamycin (CLEOCIN T) 1 % lotion, aaa bid, Disp: 60 mL, Rfl: 3    EScitalopram oxalate (LEXAPRO) 10 MG tablet, Take 1 tablet (10 mg total) by mouth once daily., Disp: 90 tablet, Rfl: 1    fexofenadine HCl (ALLEGRA ORAL), Take 1 tablet by mouth as needed., Disp: , Rfl:     gabapentin (NEURONTIN) 300 MG capsule, Take 2 capsules (600 mg total) by mouth every evening., Disp: 360 capsule, Rfl: 1    LORazepam (ATIVAN) 1 MG tablet, Take 1 tablet (1 mg total) by mouth every 8 (eight) hours as needed for Anxiety., Disp: 21 tablet, Rfl: 0    nortriptyline (PAMELOR) 25 MG capsule, Take 1 capsule (25 mg total) by mouth every evening., Disp: 90 capsule, Rfl: 3    pantoprazole (PROTONIX) 20  "MG tablet, Take 1 tablet (20 mg total) by mouth once daily., Disp: 90 tablet, Rfl: 0    oxyCODONE-acetaminophen (PERCOCET) 5-325 mg per tablet, Take 1 tablet by mouth every 4 (four) hours as needed. (Patient not taking: Reported on 6/20/2022), Disp: 21 tablet, Rfl: 0    Lab Results   Component Value Date    WBC 7.69 03/30/2022    HGB 15.1 03/30/2022    HCT 45.6 03/30/2022     03/30/2022    CHOL 223 (H) 03/30/2022    TRIG 155 (H) 03/30/2022    HDL 63 03/30/2022    ALT 22 03/30/2022    AST 24 03/30/2022     03/30/2022    K 4.5 03/30/2022     03/30/2022    CREATININE 0.8 03/30/2022    BUN 10 03/30/2022    CO2 26 03/30/2022    TSH 1.712 05/06/2019    INR 0.9 03/23/2021    HGBA1C 5.2 08/06/2019     Review of Systems   Constitutional: Negative for chills, diaphoresis and fever.   Respiratory: Negative for cough, chest tightness and shortness of breath.    Cardiovascular: Negative for chest pain and palpitations.   Gastrointestinal: Positive for nausea. Negative for abdominal pain.   Genitourinary: Positive for frequency. Negative for dysuria, flank pain, hematuria and urgency.   Neurological: Negative for dizziness and headaches.       Objective:   /64 (BP Location: Right arm, Patient Position: Sitting, BP Method: Large (Manual))   Pulse 88   Temp 98.4 °F (36.9 °C) (Oral)   Ht 5' 7" (1.702 m)   Wt 107.7 kg (237 lb 7 oz)   SpO2 97%   BMI 37.19 kg/m²       Physical Exam  Constitutional:       General: She is not in acute distress.     Appearance: Normal appearance.   HENT:      Head: Atraumatic.      Mouth/Throat:      Mouth: Mucous membranes are moist.   Cardiovascular:      Rate and Rhythm: Normal rate and regular rhythm.      Pulses: Normal pulses.      Heart sounds: Normal heart sounds.   Pulmonary:      Effort: Pulmonary effort is normal.      Breath sounds: Normal breath sounds.   Abdominal:      General: Abdomen is flat. Bowel sounds are normal.      Palpations: Abdomen is soft.      " Tenderness: There is no abdominal tenderness. There is no right CVA tenderness or left CVA tenderness.   Skin:     General: Skin is warm and dry.   Neurological:      General: No focal deficit present.      Mental Status: She is alert and oriented to person, place, and time.   Psychiatric:         Mood and Affect: Mood normal.         Assessment:       1. Urinary frequency        Plan:       1. Urinary frequency  - Negative for Leuk and for Nit in urine dip  - Last LMP: 5/18, offered urine pregnancy but patient refused  - Will send urine culture  - Urine culture  - Urinalysis  - POCT urine dipstick without microscope  - Will refer to urology for chronic symptoms of urinary frequency for 2 years  - Ambulatory referral/consult to Urology; Future      Patient with be reevaluated in as scheduled or sooner sri Vang NP

## 2022-06-21 LAB
BILIRUB UR QL STRIP: NEGATIVE
CLARITY UR REFRACT.AUTO: ABNORMAL
COLOR UR AUTO: YELLOW
GLUCOSE UR QL STRIP: NEGATIVE
HGB UR QL STRIP: NEGATIVE
KETONES UR QL STRIP: NEGATIVE
LEUKOCYTE ESTERASE UR QL STRIP: NEGATIVE
NITRITE UR QL STRIP: NEGATIVE
PH UR STRIP: 5 [PH] (ref 5–8)
PROT UR QL STRIP: NEGATIVE
SP GR UR STRIP: 1.01 (ref 1–1.03)
URN SPEC COLLECT METH UR: ABNORMAL

## 2022-06-22 LAB — BACTERIA UR CULT: NO GROWTH

## 2022-06-23 DIAGNOSIS — K21.9 GASTROESOPHAGEAL REFLUX DISEASE WITHOUT ESOPHAGITIS: ICD-10-CM

## 2022-06-24 RX ORDER — PANTOPRAZOLE SODIUM 20 MG/1
20 TABLET, DELAYED RELEASE ORAL DAILY
Qty: 90 TABLET | Refills: 3 | Status: SHIPPED | OUTPATIENT
Start: 2022-06-24 | End: 2023-11-20

## 2022-06-24 NOTE — TELEPHONE ENCOUNTER
No new care gaps identified.  [facility] Embedded Care Gaps. Reference number: [id number]. 6/23/2022   8:39:31 PM CDT

## 2022-06-30 ENCOUNTER — HOSPITAL ENCOUNTER (OUTPATIENT)
Dept: RADIOLOGY | Facility: HOSPITAL | Age: 47
Discharge: HOME | End: 2022-06-30
Attending: NURSE PRACTITIONER
Payer: MEDICARE

## 2022-06-30 DIAGNOSIS — R10.9 ABDOMINAL PAIN, UNSPECIFIED ABDOMINAL LOCATION: ICD-10-CM

## 2022-06-30 PROCEDURE — 74176 CT ABD & PELVIS W/O CONTRAST: CPT | Mod: TC

## 2022-06-30 PROCEDURE — 74176 CT RENAL STONE STUDY ABD PELVIS WO: ICD-10-PCS | Mod: 26,,, | Performed by: RADIOLOGY

## 2022-06-30 PROCEDURE — 74176 CT ABD & PELVIS W/O CONTRAST: CPT | Mod: 26,,, | Performed by: RADIOLOGY

## 2022-08-04 ENCOUNTER — OFFICE VISIT (OUTPATIENT)
Dept: FAMILY MEDICINE | Facility: CLINIC | Age: 47
End: 2022-08-04
Payer: MEDICARE

## 2022-08-04 VITALS
TEMPERATURE: 98 F | OXYGEN SATURATION: 97 % | SYSTOLIC BLOOD PRESSURE: 118 MMHG | DIASTOLIC BLOOD PRESSURE: 74 MMHG | BODY MASS INDEX: 37.09 KG/M2 | HEART RATE: 85 BPM | HEIGHT: 67 IN | WEIGHT: 236.31 LBS

## 2022-08-04 DIAGNOSIS — S46.812A STRAIN OF LEFT DELTOID MUSCLE, INITIAL ENCOUNTER: Primary | ICD-10-CM

## 2022-08-04 PROBLEM — M25.512 LEFT SHOULDER PAIN: Status: ACTIVE | Noted: 2022-08-04

## 2022-08-04 PROCEDURE — 3078F PR MOST RECENT DIASTOLIC BLOOD PRESSURE < 80 MM HG: ICD-10-PCS | Mod: CPTII,S$GLB,, | Performed by: NURSE PRACTITIONER

## 2022-08-04 PROCEDURE — 99999 PR PBB SHADOW E&M-EST. PATIENT-LVL IV: ICD-10-PCS | Mod: PBBFAC,,, | Performed by: NURSE PRACTITIONER

## 2022-08-04 PROCEDURE — 3074F PR MOST RECENT SYSTOLIC BLOOD PRESSURE < 130 MM HG: ICD-10-PCS | Mod: CPTII,S$GLB,, | Performed by: NURSE PRACTITIONER

## 2022-08-04 PROCEDURE — 1159F PR MEDICATION LIST DOCUMENTED IN MEDICAL RECORD: ICD-10-PCS | Mod: CPTII,S$GLB,, | Performed by: NURSE PRACTITIONER

## 2022-08-04 PROCEDURE — 1159F MED LIST DOCD IN RCRD: CPT | Mod: CPTII,S$GLB,, | Performed by: NURSE PRACTITIONER

## 2022-08-04 PROCEDURE — 3008F PR BODY MASS INDEX (BMI) DOCUMENTED: ICD-10-PCS | Mod: CPTII,S$GLB,, | Performed by: NURSE PRACTITIONER

## 2022-08-04 PROCEDURE — 3074F SYST BP LT 130 MM HG: CPT | Mod: CPTII,S$GLB,, | Performed by: NURSE PRACTITIONER

## 2022-08-04 PROCEDURE — 3008F BODY MASS INDEX DOCD: CPT | Mod: CPTII,S$GLB,, | Performed by: NURSE PRACTITIONER

## 2022-08-04 PROCEDURE — 3078F DIAST BP <80 MM HG: CPT | Mod: CPTII,S$GLB,, | Performed by: NURSE PRACTITIONER

## 2022-08-04 PROCEDURE — 99214 OFFICE O/P EST MOD 30 MIN: CPT | Mod: S$GLB,,, | Performed by: NURSE PRACTITIONER

## 2022-08-04 PROCEDURE — 99999 PR PBB SHADOW E&M-EST. PATIENT-LVL IV: CPT | Mod: PBBFAC,,, | Performed by: NURSE PRACTITIONER

## 2022-08-04 PROCEDURE — 99214 PR OFFICE/OUTPT VISIT, EST, LEVL IV, 30-39 MIN: ICD-10-PCS | Mod: S$GLB,,, | Performed by: NURSE PRACTITIONER

## 2022-08-04 NOTE — PATIENT INSTRUCTIONS
Aleve daily for 2 weeks.       Pedrito Figueroa,     If you are due for any health screening(s) below please notify me so we can arrange them to be ordered and scheduled to maintain your health. Most healthy patients complete it. Don't lose out on improving your health.     Tests to Keep You Healthy    Mammogram: Met  Colon Cancer Screening: Met  Cervical Cancer Screening: Met

## 2022-08-04 NOTE — PROGRESS NOTES
Patient evaluated and plan of care established.  Please sign and return if in agreement.    Thank you,  ANTONELLA Vinson      Occupational Therapy [facility]  Initial Evaluation     Date: 8/5/2022  Name: Carolina Andrews  Clinic Number: [id number]    Therapy Diagnosis: S46.812A (ICD-10-CM) - Strain of left deltoid muscle  Encounter Diagnosis   Name Primary?    Left shoulder pain, unspecified chronicity      Physician: Sal Payne NP    Physician Orders: S46.812A (ICD-10-CM) - Strain of left deltoid muscle; evaluate and treat  Surgical Procedure and Date: Not Applicable   Dominant Side: left  Date of Onset: 2-3 MONTHS  History / Mechanism of Injury: [place]  Did not feel a pop or noted injury at that time.  Patient reports intermittent flare of pain.  Patient notes that she sleeps on her side and this exacerbates the shoulder pain.  Movements exacerbate shoulder pain.  Previous Therapy:  none    Environmental Concerns/Fall Risk: visually impaired  Language: None  Cultural/Spiritual: None  Abuse/Neglect/Nutritional: None  Imaging / Tests: See Epic    Past Medical History/Physical Systems Review:    has a past medical history of Arthritis, Blindness, Cholelithiasis, Liver hemangioma, Mental disorder, and Pinched nerve.     has a past surgical history that includes Ankle fracture surgery (2009); Upper gastrointestinal endoscopy (~1998); Esophagogastroduodenoscopy (N/A, 11/19/2018); Colonoscopy (N/A, 2/4/2019); Cerebral angiogram (N/A, 7/15/2019); Angiography (N/A, 8/6/2019); Fracture surgery; Cerebral angiogram (N/A, 2/4/2020); and Laparoscopic cholecystectomy (N/A, 4/22/2022).    has a current medication list which includes the following prescription(s): acetaminophen, aspirin, b complex vitamins, cholecalciferol (vitamin d3), clindamycin, escitalopram oxalate, fexofenadine hcl, gabapentin, lorazepam, nortriptyline, and pantoprazole.    Review of  "patient's allergies indicates:   Allergen Reactions    Zanaflex [tizanidine] Hallucinations    Pcn [penicillins] Rash     rash        Insurance Authorization Period Expiration: 2022-2023  Plan of Care Certification Period: 2022-2022  Date of Return to MD:  As needed    Occupation:  Not working  Working presently: no  Workers Compensation:  no      Visit # / Visits authorized:   Time In: 1:00  Time Out: 1:40  Total Billable Time: 35 minutes        Precautions:  Patient. Reports, "no known Cancer, no pacemaker, no allergies to latex or adhesives."      Subjective     Patient Reports, "I am having (Left) shoulder pain.  About 2[place]3 months [place] pain. I did not feel or hearing anything pop.  I am having trouble brushing my hair, taking clothes off, bringing my arm out to the side, reaching forward and pulling, pulling and putting pillow case on / off.  I feel like my (Left) shoulder gets really tired easily.  I usually sleep on my (Left) side and it wakes me up."       Pain   At rest: 3/10  With work/ Activity:  5/10  Sleepin/10  Location of Pain: (Left) shoulder    Patient's Goals for Therapy: decrease pain, to have motion without pain    Objective       Active Range of Motion: Shoulder  (measured in standing)                                   (Right)  /  (Left)  Flexion:  160 / 136  Abduction: 161 / 100  Extension: 67 / 63  External Rotation:  81  / 67  Internal Rotation:  83 / 53    Passive Range of Motion: shoulder (measured in supine, except Extension measured in standing)                                     (Left)  Flexion: 140  Abduction: 121  Extension: 70  External Rotation: 71  Internal Rotation: 85        Manual Muscle Test:  Shoulder                                  (Left)  Flexion:  3-/5  Abduction: 3-/5  Extension: 3+/5  External Rotation: 3-/5  Internal Rotation: 3-/5    Special Tests:  Empty Can: " positive  Mclean: positive   Lift Off: positive    FOTO SCORE: 46%      Treatment Included:   Occupational Therapy  evaluation and instruction in written Home Exercise Program including GREEN Theraband for scap retraction x 20 repetitions x 2 x daily;  Internal Rotation towel stretch, wall walks for shoulder Flexion x 15 second holds x 5 repetitions x 3 x daily.    Patient. Educated on modalities for home pain management, activity modifications and precautions.   Patient. Demo understanding of above.     Patient/Family Education: role of Occupational Therapy, goals for Occupational Therapy, scheduling/cancellations - patient verbalized understanding. Discussed insurance limitations with patient.        Assessment:     Problem List :       Decreased Range of motion,  Decreased muscle strength,   Decreased functional abilities,  Increased pain,     During the evaluation, the patient required Minimal modification or assistance.  The following co-morbid conditions/personal factors may affect the plan of care: visually impaired.        Carolina Andrews is a 47 y.o. female referred to outpatient occupational therapy and presents with a medical diagnosis of S46.812A (ICD-10-CM) - Strain of left deltoid muscle , resulting in limited range of motion, strength, functional abilities, increased pain and inflammation and demonstrates limitations as described in the chart above. Following medical record review it is determined that patient will benefit from occupational therapy services in order to maximize pain free and/or functional use of (Left) shoulder. The following goals were discussed with the patient and patient is in agreement with them as to be addressed in the treatment plan. The patient's rehab potential is good.     Anticipated Barriers to Therapy:  Transportation (can not drive, visually impaired),  Scheduling     The following goals were discussed with the patient and patient is in agreement with them as to be  addressed in the treatment plan.     Goals:   Goals:  1)   Patient to be Independent with Home exercise program and modalities for pain management by 1 week.   2)   Patient will report   3/10 pain on average with activity to assist with exercises by 6-8 weeks.  3)   Patient will increase range of Motion by 3-5 degrees to increase functional use for activities of daily living by 6-8 weeks.  4)   Patient will increase manual muscle testing by a grade to assist with lifting items by 6-8 weeks.            Plan:   Patient to be treated by Occupational Therapy    1-2    times per week for     90 days   during the certification period from  08/05/2022  to 11/05/2022     to achieve the established goals.  Treatment to include : manual therapy/joint mobilizations, kinesiotaping, dry needling performed by certified physical therapist,   modalities for pain management, Ultrasound 1.0 mhz, therapeutic exercises/activities,        strengthening,    as well as any other treatments deemed necessary based on the patient's needs or progress.     Will reassess as needed and adjust treatment plan accordingly.              I certify the need for these services furnished under this plan of treatment and while under my care    ____________________________________                         __________________  Physician/Referring Practitioner                                               Date of Signature    Mayela Gustafson OT

## 2022-08-04 NOTE — PROGRESS NOTES
This dictation has been generated using Modal Fluency Dictation some phonetic errors may occur. Please contact author for clarification if needed.     Problem List Items Addressed This Visit    None     Visit Diagnoses     Strain of left deltoid muscle, initial encounter    -  Primary    Relevant Orders    Ambulatory referral/consult to Physical/Occupational Therapy          Orders Placed This Encounter    Ambulatory referral/consult to Physical/Occupational Therapy     Shoulder pain deltoid region without evidence of marked tearing. Refer to PT  Shoulder stable doubt rotator cuff involvement.     Follow up in about 2 months (around 10/4/2022).    ________________________________________________________________  ________________________________________________________________      Chief Complaint   Patient presents with    Arm Pain     History of present illness  This 47 y.o. presents today for complaint of rib pain.  Symptoms present for 2 months.  [place]  Did not feel a pop or noted injury at that time.  No history of injury.  No history of motor vehicle accident.  Patient tried knee even Tylenol with intermittent relief.  Notes intermittent flare of symptoms.  Which she has tension headache she takes gabapentin and nortriptyline which has been helpful with the pain.  Patient notes that she sleeps on her side and this exacerbates the shoulder pain.  Movements exacerbate shoulder pain.  Limited review of systems negative      Past Medical History:   Diagnosis Date    Arthritis     Blindness     Cholelithiasis     Liver hemangioma     Mental disorder     Periodic antidepressant use since childhood, not currently taking or endorsing depressive ideation    Pinched nerve        Past Surgical History:   Procedure Laterality Date    ANGIOGRAPHY N/A 8/6/2019    Procedure: ANGIOGRAM/EMBOLIZATION;  Surgeon: Andressa Surgeon;  Location: [facility];  Service:  Anesthesiology;  Laterality: N/A;  /[place]    ANKLE FRACTURE SURGERY  2009    CEREBRAL ANGIOGRAM N/A 7/15/2019    Procedure: ANGIOGRAM-CEREBRAL;  Surgeon: [facility] Diagnostic Provider;  Location: [facility] OR 2ND [facility];  Service: Radiology;  Laterality: N/A;  Rm 190/[place]    CEREBRAL ANGIOGRAM N/A 2/4/2020    Procedure: ANGIOGRAM-CEREBRAL;  Surgeon: [facility] Diagnostic Provider;  Location: [facility];  Service: Radiology;  Laterality: N/A;  /[place]    COLONOSCOPY N/A 2/4/2019    Procedure: COLONOSCOPY;  Surgeon: Dale Childers MD;  Location: [facility] ENDO;  Service: Endoscopy;  Laterality: N/A;    ESOPHAGOGASTRODUODENOSCOPY N/A 11/19/2018    Procedure: EGD (ESOPHAGOGASTRODUODENOSCOPY);  Surgeon: Dale Childers MD;  Location: [facility] ENDO;  Service: Endoscopy;  Laterality: N/A;    FRACTURE SURGERY      LAPAROSCOPIC CHOLECYSTECTOMY N/A 4/22/2022    Procedure: CHOLECYSTECTOMY, LAPAROSCOPIC;  Surgeon: Salomón Milian MD;  Location: [facility] OR;  Service: General;  Laterality: N/A;    UPPER GASTROINTESTINAL ENDOSCOPY  ~1998    normal findings per patient report       Family History   Problem Relation Age of Onset    Hypertension Father     Lung disease Father         calapse x 3     Stroke Father         in eye     Retinal detachment Father     Cancer Sister 23        polyps found incidentally    Vision loss Sister     Lung disease Sister         spontanious lung collapse    GI problems Sister     Psoriasis Sister     Retinal detachment Mother     Cancer Paternal Grandfather     Stroke Maternal Grandmother     Colon cancer Paternal Aunt     Colon polyps Maternal Uncle     Psoriasis Maternal Uncle     No Known Problems Paternal Uncle     Parkinsonism Maternal Grandfather     Cancer Maternal Grandfather         prostate    Colon polyps Maternal Grandfather     Cancer Paternal Grandmother         skin cancer in colon     Alzheimer's disease Paternal Grandmother     Heart disease Paternal Aunt      Breast cancer Neg Hx     Diabetes Neg Hx     Miscarriages / Stillbirths Neg Hx     Crohn's disease Neg Hx     Ulcerative colitis Neg Hx     Glaucoma Neg Hx     Eczema Neg Hx     Lupus Neg Hx     Melanoma Neg Hx        Social History     Socioeconomic History    Marital status:    Occupational History     Employer: [employer]   Tobacco Use    Smoking status: Never Smoker    Smokeless tobacco: Never Used   Substance and Sexual Activity    Alcohol use: Yes     Alcohol/week: 6.0 standard drinks     Types: 6 Glasses of wine per week     Comment: 2-3/ week wine or beer    Drug use: No    Sexual activity: Yes     Partners: Male     Birth control/protection: None     Social Determinants of Health     Financial Resource Strain: Low Risk     Difficulty of Paying Living Expenses: Not hard at all   Food Insecurity: No Food Insecurity    Worried About Running Out of Food in the Last Year: Never true    Ran Out of Food in the Last Year: Never true   Transportation Needs: No Transportation Needs    Lack of Transportation (Medical): No    Lack of Transportation (Non-Medical): No   Physical Activity: Insufficiently Active    Days of Exercise per Week: 2 days    Minutes of Exercise per Session: 20 min   Stress: No Stress Concern Present    Feeling of Stress : Only a little   Social Connections: Unknown    Frequency of Communication with Friends and Family: Once a week    Frequency of Social Gatherings with Friends and Family: Once a week    Active Member of Clubs or Organizations: No    Attends Club or Organization Meetings: Never    Marital Status:    Housing Stability: Low Risk     Unable to Pay for Housing in the Last Year: No    Number of Places Lived in the Last Year: 1    Unstable Housing in the Last Year: No       Current Outpatient Medications   Medication Sig Dispense Refill    acetaminophen (TYLENOL) 500 MG tablet Take 500 mg by mouth every 6 (six) hours as needed for  Pain.      aspirin (ECOTRIN) 81 MG EC tablet Take 1 tablet (81 mg total) by mouth once daily. 30 tablet 11    b complex vitamins tablet Take 1 tablet by mouth once daily.      cholecalciferol, vitamin D3, (VITAMIN D3) 25 mcg (1,000 unit) capsule Take 1,000 Units by mouth once daily.      clindamycin (CLEOCIN T) 1 % lotion aaa bid 60 mL 3    EScitalopram oxalate (LEXAPRO) 10 MG tablet Take 1 tablet (10 mg total) by mouth once daily. 90 tablet 1    fexofenadine HCl (ALLEGRA ORAL) Take 1 tablet by mouth as needed.      gabapentin (NEURONTIN) 300 MG capsule Take 2 capsules (600 mg total) by mouth every evening. 360 capsule 1    LORazepam (ATIVAN) 1 MG tablet Take 1 tablet (1 mg total) by mouth every 8 (eight) hours as needed for Anxiety. 21 tablet 0    nortriptyline (PAMELOR) 25 MG capsule Take 1 capsule (25 mg total) by mouth every evening. 90 capsule 3    pantoprazole (PROTONIX) 20 MG tablet Take 1 tablet (20 mg total) by mouth once daily. 90 tablet 3     No current facility-administered medications for this visit.       Review of patient's allergies indicates:   Allergen Reactions    Zanaflex [tizanidine] Hallucinations    Pcn [penicillins] Rash     rash       Physical examination  Vitals Reviewed\  Vitals:    08/04/22 0720   BP: 118/74   Pulse: 85   Temp: 98.4 °F (36.9 °C)     Body mass index is 37.02 kg/m². .FLOWAMB[14    Weight: 107.2 kg (236 lb 5.3 oz)    Gen. Well-dressed well-nourished   Skin warm dry and intact.  No rashes noted.  HEENT.  Masked    Neck is supple without adenopathy  Chest.  Respirations are even unlabored.  Lungs are clear to auscultation.  Cardiac regular rate and rhythm.  No chest wall adenopathy noted.  Neuro. Awake alert oriented x4.  Normal judgment and cognition noted.  Extremities no clubbing cyanosis or edema noted.  Full range of motion of the left shoulder.  No evidence of laxity.  No tenderness of the supraspinatus, infraspinatus, or teres minor muscles.  Patient able to  move shoulder against resistance in all planes.  Horn blower is negative.  Some discomfort with internal rotation when placing the hand behind the back however patient has good range of motion and strength.    Call or return to clinic prn if these symptoms worsen or fail to improve as anticipated.

## 2022-08-05 ENCOUNTER — CLINICAL SUPPORT (OUTPATIENT)
Dept: REHABILITATION | Facility: HOSPITAL | Age: 47
End: 2022-08-05
Payer: MEDICARE

## 2022-08-05 DIAGNOSIS — S46.812A STRAIN OF LEFT DELTOID MUSCLE, INITIAL ENCOUNTER: ICD-10-CM

## 2022-08-05 DIAGNOSIS — M25.512 LEFT SHOULDER PAIN, UNSPECIFIED CHRONICITY: ICD-10-CM

## 2022-08-05 PROCEDURE — 97165 OT EVAL LOW COMPLEX 30 MIN: CPT | Mod: PN

## 2022-08-05 PROCEDURE — 97110 THERAPEUTIC EXERCISES: CPT | Mod: PN

## 2022-08-05 NOTE — PLAN OF CARE
Patient evaluated and plan of care established.  Please sign and return if in agreement.    Thank you,  ANTONELLA Vinson      Occupational Therapy [facility]  Initial Evaluation     Date: 8/5/2022  Name: Carolina Andrews  Clinic Number: [id number]    Therapy Diagnosis: S46.812A (ICD-10-CM) - Strain of left deltoid muscle  Encounter Diagnosis   Name Primary?    Left shoulder pain, unspecified chronicity      Physician: Sal Payne NP    Physician Orders: S46.812A (ICD-10-CM) - Strain of left deltoid muscle; evaluate and treat  Surgical Procedure and Date: Not Applicable   Dominant Side: left  Date of Onset: 2-3 MONTHS  History / Mechanism of Injury: [place]  Did not feel a pop or noted injury at that time.  Patient reports intermittent flare of pain.  Patient notes that she sleeps on her side and this exacerbates the shoulder pain.  Movements exacerbate shoulder pain.  Previous Therapy:  none    Environmental Concerns/Fall Risk: visually impaired  Language: None  Cultural/Spiritual: None  Abuse/Neglect/Nutritional: None  Imaging / Tests: See Epic    Past Medical History/Physical Systems Review:    has a past medical history of Arthritis, Blindness, Cholelithiasis, Liver hemangioma, Mental disorder, and Pinched nerve.     has a past surgical history that includes Ankle fracture surgery (2009); Upper gastrointestinal endoscopy (~1998); Esophagogastroduodenoscopy (N/A, 11/19/2018); Colonoscopy (N/A, 2/4/2019); Cerebral angiogram (N/A, 7/15/2019); Angiography (N/A, 8/6/2019); Fracture surgery; Cerebral angiogram (N/A, 2/4/2020); and Laparoscopic cholecystectomy (N/A, 4/22/2022).    has a current medication list which includes the following prescription(s): acetaminophen, aspirin, b complex vitamins, cholecalciferol (vitamin d3), clindamycin, escitalopram oxalate, fexofenadine hcl, gabapentin, lorazepam, nortriptyline, and pantoprazole.    Review of  "patient's allergies indicates:   Allergen Reactions    Zanaflex [tizanidine] Hallucinations    Pcn [penicillins] Rash     rash        Insurance Authorization Period Expiration: 2022-2023  Plan of Care Certification Period: 2022-2022  Date of Return to MD:  As needed    Occupation:  Not working  Working presently: no  Workers Compensation:  no      Visit # / Visits authorized:   Time In: 1:00  Time Out: 1:40  Total Billable Time: 35 minutes        Precautions:  Patient. Reports, "no known Cancer, no pacemaker, no allergies to latex or adhesives."      Subjective     Patient Reports, "I am having (Left) shoulder pain.  About 2[place]3 months [place]. I did not feel or hearing anything pop.  I am having trouble brushing my hair, taking clothes off, bringing my arm out to the side, reaching forward and pulling, pulling and putting pillow case on / off.  I feel like my (Left) shoulder gets really tired easily.  I usually sleep on my (Left) side and it wakes me up."       Pain   At rest: 3/10  With work/ Activity:  5/10  Sleepin/10  Location of Pain: (Left) shoulder    Patient's Goals for Therapy: decrease pain, to have motion without pain    Objective       Active Range of Motion: Shoulder  (measured in standing)                                   (Right)  /  (Left)  Flexion:  160 / 136  Abduction: 161 / 100  Extension: 67 / 63  External Rotation:  81  / 67  Internal Rotation:  83 / 53    Passive Range of Motion: shoulder (measured in supine, except Extension measured in standing)                                     (Left)  Flexion: 140  Abduction: 121  Extension: 70  External Rotation: 71  Internal Rotation: 85        Manual Muscle Test:  Shoulder                                  (Left)  Flexion:  3-/5  Abduction: 3-/5  Extension: 3+/5  External Rotation: 3-/5  Internal Rotation: 3-/5    Special Tests:  Empty Can: " positive  Mclean: positive   Lift Off: positive    FOTO SCORE: 46%      Treatment Included:   Occupational Therapy  evaluation and instruction in written Home Exercise Program including GREEN Theraband for scap retraction x 20 repetitions x 2 x daily;  Internal Rotation towel stretch, wall walks for shoulder Flexion x 15 second holds x 5 repetitions x 3 x daily.    Patient. Educated on modalities for home pain management, activity modifications and precautions.   Patient. Demo understanding of above.     Patient/Family Education: role of Occupational Therapy, goals for Occupational Therapy, scheduling/cancellations - patient verbalized understanding. Discussed insurance limitations with patient.        Assessment:     Problem List :       Decreased Range of motion,  Decreased muscle strength,   Decreased functional abilities,  Increased pain,     During the evaluation, the patient required Minimal modification or assistance.  The following co-morbid conditions/personal factors may affect the plan of care: visually impaired.        Carolina Andrews is a 47 y.o. female referred to outpatient occupational therapy and presents with a medical diagnosis of S46.812A (ICD-10-CM) - Strain of left deltoid muscle , resulting in limited range of motion, strength, functional abilities, increased pain and inflammation and demonstrates limitations as described in the chart above. Following medical record review it is determined that patient will benefit from occupational therapy services in order to maximize pain free and/or functional use of (Left) shoulder. The following goals were discussed with the patient and patient is in agreement with them as to be addressed in the treatment plan. The patient's rehab potential is good.     Anticipated Barriers to Therapy:  Transportation (can not drive, visually impaired),  Scheduling     The following goals were discussed with the patient and patient is in agreement with them as to be  addressed in the treatment plan.     Goals:   Goals:  1)   Patient to be Independent with Home exercise program and modalities for pain management by 1 week.   2)   Patient will report   3/10 pain on average with activity to assist with exercises by 6-8 weeks.  3)   Patient will increase range of Motion by 3-5 degrees to increase functional use for activities of daily living by 6-8 weeks.  4)   Patient will increase manual muscle testing by a grade to assist with lifting items by 6-8 weeks.            Plan:   Patient to be treated by Occupational Therapy    1-2    times per week for     90 days   during the certification period from  08/05/2022  to 11/05/2022     to achieve the established goals.  Treatment to include : manual therapy/joint mobilizations, kinesiotaping, dry needling performed by certified physical therapist,   modalities for pain management, Ultrasound 1.0 mhz, therapeutic exercises/activities,        strengthening,    as well as any other treatments deemed necessary based on the patient's needs or progress.     Will reassess as needed and adjust treatment plan accordingly.              I certify the need for these services furnished under this plan of treatment and while under my care    ____________________________________                         __________________  Physician/Referring Practitioner                                               Date of Signature    Mayela Gustafson OT

## 2022-08-17 ENCOUNTER — TELEPHONE (OUTPATIENT)
Dept: FAMILY MEDICINE | Facility: CLINIC | Age: 47
End: 2022-08-17
Payer: MEDICARE

## 2022-08-17 NOTE — TELEPHONE ENCOUNTER
Called patient in regards to virtual appointment tomorrow at 12:40 for UTI symptoms. Patient advised she will be required to come leave a urine, so I offered her to switch to an in person visit but she stated she could not make it In until Friday. So I advised her she can do the virtual visit and once orders are placed we will call her to schedule a time for her to come leave urine on Friday. Patient verbalized understanding.

## 2022-08-17 NOTE — PROGRESS NOTES
"                                Occupational Therapy Daily Treatment Note       Date: 8/19/2022  Name: Carolina Andrews  Clinic Number: [id number]    Therapy Diagnosis: S46.812A (ICD-10-CM) - Strain of left deltoid muscle  Encounter Diagnosis   Name Primary?    Left shoulder pain, unspecified chronicity Yes     Physician: Sal Payne NP    Physician Orders: S46.812A (ICD-10-CM) - Strain of left deltoid muscle; evaluate and treat  Medical Diagnosis: S46.812A (ICD-10-CM) - Strain of left deltoid muscle; (Left) shoulder pain  Surgical Procedure and Date: Not Applicable    Insurance Authorization Period Expiration: 08/04/2022-12/31/2022  Plan of Care Certification Period: 08/05/2022-11/05/2022  Date of Return to MD: as needed    Evaluation FOTO: 08/05/2022 = 46%    Visit # / Visits authorized: 1 / 20  Time In: 11:55  Time Out: 12:40  Total Billable Time: 40  minutes    Precautions: standard;  Visually impaired       Subjective     Patient reports: "I am feeling a lot better in my shoulder.  I think I have more motion. I do not have a problem reaching up and forward.  I still have pain when I brush my hair."    2/10 pain (Left) shoulder    Objective    Patient seen by Occupational Therapy this session. Tx consisted of:      Moist Heat to (Left) shoulder x 5 minutes to increase tissue elasticity and blood floe for healing        Manual therapy techniques to increase joint mobilization   x   8 minutes:        -Manual Therapy techniques to (Left) shoulder including joint mobilizations, stretching, and Passive Range of Motion to increase joint mobility, range of motion  and for pain management  x  8 minutes           Therapeutic  Exercises to improve functional performance while increasing strength, endurance, range of motion,  and flexibility  x   32 minutes:    - 1# UBE in clockwise motion x 6 minutes to increase endurance and range of motion  - 1# in supine for External Rotation( @ side) x 10 repetitions   - 1# in " "supine for Internal Rotation /External Rotation   (@90* shoulder Abduction) (towel roll) x 10 repetitions  - 2# in supine for SA punches x 20 repetitions   - 1# in side lying for External Rotation ( towel roll) x 20 repetitions    - 0# pulleys for  Internal Rotation x 3 minutes each    - 0# dowel in standing for Extension and Internal Rotation x 10 repetitions   - GREEN Theraband for scapular retraction x 20 repetitions        Initiate in future therapy sessions:    - ### Theraband in standing for Extension x 20 repetitions    - "Sleeper stretch" in side lying (towel roll) x 15 seconds holds x 5 repetitions   - "Corner Stretch" for anterior capsule stretch x 15 second holds x 5 repetitions   - Biceps stretch on wall with Elbow Extension x 15 second holds x 5 repetitions  - RED ( small) exercise ball on wall for rhythmic stabilization x 30 seconds x 2 repetitions       - ### in prone for shoulder Flexion, Abduction, and Extension  x 10 repetitions each  - Ball on wall in Clockwise/Counterclockwise motion for scapular stabilization x 20 repetitions  - 0# in standing for shoulder Flexion and Abduction (in front of mirror, Occupational Therapist cueing to prevent compensation)  x 10 repetitions  - Isometrics with   ####   Theraband for External Rotation and Internal Rotation x 10 repetitions  -  ### Theraband for shoulder Flexion,  External Rotation and Internal Rotation x ###  repetitions   -  ### for scapular stabilization x    ###  seconds x   ### repetitions   - ### Theraband for (Bilateral) Horizontal  Abduction x ## repetitions    Assessment     Patient will continue to benefit from skilled Occupational Therapy intervention to increase functional abilities, range of motion, and strength and pain control.  Patient. demonstrated proper understanding of each exercise.  Patient continues to require verbal and tactile cues for throughout therapy session to maintain position and prevent compensation.   Patient continues " "to be limited in functional and leisurely pursuits. Pain limits patient's participation in activities of daily living.  Patient is not able to carryout necessary vocational tasks.   Patient's spiritual, cultural and educational needs considered and patient agreeable to plan of care and goals.  Patient is making good progress towards established goals.  Patient tolerated exercises within pain tolerance.   Reviewed Home Exercise Program with patient., see EPIC under "patient instructions" for provided exercises, activity modifications and limitations, modalities for home pain management.  Patient. demo understanding of above.    Patient demo fatigue during External Rotation in side lying but able to complete exercise.      New/Revised Goals: Continue Plan Of Care   Goals:  1)   Patient to be Independent with Home exercise program and modalities for pain management by 1 week. (MET)  2)   Patient will report   3/10 pain on average with activity to assist with exercises by 6-8 weeks.  (In Progress)  3)   Patient will increase range of Motion by 3-5 degrees to increase functional use for activities of daily living by 6-8 weeks.  (In Progress)  4)   Patient will increase manual muscle testing by a grade to assist with lifting items by 6-8 weeks.  ( In Progress)    Plan      Continue 2x week during the 90 day certification period    08/05/2022   to 11/05/2022   with established Plan Of Care in pursuit of Occupational Therapy goals.      Mayela Gustafson OT         "

## 2022-08-18 ENCOUNTER — OFFICE VISIT (OUTPATIENT)
Dept: FAMILY MEDICINE | Facility: CLINIC | Age: 47
End: 2022-08-18
Payer: MEDICARE

## 2022-08-18 DIAGNOSIS — R30.0 DYSURIA: Primary | ICD-10-CM

## 2022-08-18 PROCEDURE — 99213 OFFICE O/P EST LOW 20 MIN: CPT | Mod: 95,,, | Performed by: FAMILY MEDICINE

## 2022-08-18 PROCEDURE — 99213 PR OFFICE/OUTPT VISIT, EST, LEVL III, 20-29 MIN: ICD-10-PCS | Mod: 95,,, | Performed by: FAMILY MEDICINE

## 2022-08-18 NOTE — PROGRESS NOTES
The patient location is:  [place]  The chief complaint leading to consultation is: urinary frequency  Visit type: Virtual visit with synchronous audio and video  Total time spent with patient:  Less than 30 minutes  Each patient to whom he or she provides medical services by telemedicine is:  (1) informed of the relationship between the physician and patient and the respective role of any other health care provider with respect to management of the patient; and (2) notified that he or she may decline to receive medical services by telemedicine and may withdraw from such care at any time. Vital signs recorded were provided by the patient.    Notes:  See below    Subjective:   Patient ID: Carolina Andrews is a 47 y.o. female     Chief Complaint: urinary frequency    Pt notes pressure and discomfort with urination few days ago. Has improved with increased fluid intake. Pt also notes dark colored urine that had patient concerned about blood in urine. States she though she may have been on per menstural cycle but was not the case.    Review of Systems   Constitutional: Negative for chills.   Gastrointestinal: Positive for nausea. Negative for constipation and vomiting.   Genitourinary: Positive for dysuria, flank pain, frequency, hematuria and urgency.   Skin: Positive for rash.     Past Medical History:   Diagnosis Date    Arthritis     Blindness     Cholelithiasis     Liver hemangioma     Mental disorder     Periodic antidepressant use since childhood, not currently taking or endorsing depressive ideation    Pinched nerve      Past Surgical History:   Procedure Laterality Date    ANGIOGRAPHY N/A 8/6/2019    Procedure: ANGIOGRAM/EMBOLIZATION;  Surgeon: Andressa Surgeon;  Location: [facility];  Service: Anesthesiology;  Laterality: N/A;  /[place]    ANKLE FRACTURE SURGERY  2009    CEREBRAL ANGIOGRAM N/A 7/15/2019    Procedure: ANGIOGRAM-CEREBRAL;  Surgeon: Leon Diagnostic Provider;  [facility];   Service: Radiology;  Laterality: N/A;  Rm 190/Damian    CEREBRAL ANGIOGRAM N/A 2/4/2020    Procedure: ANGIOGRAM-CEREBRAL;  Surgeon: Leon Diagnostic Provider;  Location: [facility] OR [address] [place] [address];  Service: Radiology;  Laterality: N/A;  /[place]    COLONOSCOPY N/A 2/4/2019    Procedure: COLONOSCOPY;  Surgeon: Dale Childers MD;  Location: [facility] ENDO;  Service: Endoscopy;  Laterality: N/A;    ESOPHAGOGASTRODUODENOSCOPY N/A 11/19/2018    Procedure: EGD (ESOPHAGOGASTRODUODENOSCOPY);  Surgeon: Dale Childers MD;  Location: [facility] ENDO;  Service: Endoscopy;  Laterality: N/A;    FRACTURE SURGERY      LAPAROSCOPIC CHOLECYSTECTOMY N/A 4/22/2022    Procedure: CHOLECYSTECTOMY, LAPAROSCOPIC;  Surgeon: Salomón Milian MD;  Location: [facility];  Service: General;  Laterality: N/A;    UPPER GASTROINTESTINAL ENDOSCOPY  ~1998    normal findings per patient report     Objective:   There were no vitals filed for this visit.  There is no height or weight on file to calculate BMI.  Physical Exam  Vitals and nursing note reviewed.   Constitutional:       Appearance: She is well-developed.   HENT:      Head: Normocephalic and atraumatic.   Eyes:      General: No scleral icterus.     Conjunctiva/sclera: Conjunctivae normal.   Pulmonary:      Effort: Pulmonary effort is normal. No respiratory distress.   Musculoskeletal:         General: No deformity. Normal range of motion.      Cervical back: Normal range of motion and neck supple.   Skin:     Coloration: Skin is not pale.      Findings: No rash.   Neurological:      Mental Status: She is alert and oriented to person, place, and time.   Psychiatric:         Behavior: Behavior normal.         Thought Content: Thought content normal.         Judgment: Judgment normal.       Assessment:     1. Dysuria      Plan:   Dysuria  -     Urinalysis; Future; Expected date: 08/18/2022  Will eval for cause of symptoms. Advised close fu with GYN/PCP if symptoms persistent or worsen.          Total  time spent of Less than 30 minutes minutes on the day of the visit.This includes face to face time and preparing to see the patient, obtaining and reviewing separately obtained history, documenting clinical information in the electronic or other health record, independently interpreting results and communicating results to the patient/family/caregiver, or care coordinator.    Fernando Kidd MD  08/18/2022    Portions of this note have been dictated with NAUN Medel

## 2022-08-19 ENCOUNTER — LAB VISIT (OUTPATIENT)
Dept: LAB | Facility: HOSPITAL | Age: 47
End: 2022-08-19
Attending: FAMILY MEDICINE
Payer: MEDICARE

## 2022-08-19 ENCOUNTER — CLINICAL SUPPORT (OUTPATIENT)
Dept: REHABILITATION | Facility: HOSPITAL | Age: 47
End: 2022-08-19
Payer: MEDICARE

## 2022-08-19 DIAGNOSIS — M25.512 LEFT SHOULDER PAIN, UNSPECIFIED CHRONICITY: Primary | ICD-10-CM

## 2022-08-19 DIAGNOSIS — R30.0 DYSURIA: ICD-10-CM

## 2022-08-19 LAB
BACTERIA #/AREA URNS AUTO: ABNORMAL /HPF
BILIRUB UR QL STRIP: NEGATIVE
CLARITY UR REFRACT.AUTO: ABNORMAL
COLOR UR AUTO: YELLOW
GLUCOSE UR QL STRIP: NEGATIVE
HGB UR QL STRIP: ABNORMAL
KETONES UR QL STRIP: NEGATIVE
LEUKOCYTE ESTERASE UR QL STRIP: ABNORMAL
MICROSCOPIC COMMENT: ABNORMAL
NITRITE UR QL STRIP: POSITIVE
PH UR STRIP: 6 [PH] (ref 5–8)
PROT UR QL STRIP: ABNORMAL
RBC #/AREA URNS AUTO: >100 /HPF (ref 0–4)
SP GR UR STRIP: 1.03 (ref 1–1.03)
SQUAMOUS #/AREA URNS AUTO: 1 /HPF
URN SPEC COLLECT METH UR: ABNORMAL
WBC #/AREA URNS AUTO: 73 /HPF (ref 0–5)

## 2022-08-19 PROCEDURE — 81001 URINALYSIS AUTO W/SCOPE: CPT | Performed by: FAMILY MEDICINE

## 2022-08-19 PROCEDURE — 97140 MANUAL THERAPY 1/> REGIONS: CPT | Mod: PN

## 2022-08-19 PROCEDURE — 97110 THERAPEUTIC EXERCISES: CPT | Mod: PN

## 2022-08-19 NOTE — PROGRESS NOTES
"                                Occupational Therapy Daily Treatment Note       Date: 8/24/2022  Name: Carolina Andrews  Clinic Number: [id number]    Therapy Diagnosis: S46.812A (ICD-10-CM) - Strain of left deltoid muscle  Encounter Diagnosis   Name Primary?    Left shoulder pain, unspecified chronicity Yes     Physician: Sal Payne NP    Physician Orders: S46.812A (ICD-10-CM) - Strain of left deltoid muscle; evaluate and treat  Medical Diagnosis: S46.812A (ICD-10-CM) - Strain of left deltoid muscle; (Left) shoulder pain  Surgical Procedure and Date: Not Applicable    Insurance Authorization Period Expiration: 08/04/2022-12/31/2022  Plan of Care Certification Period: 08/05/2022-11/05/2022  Date of Return to MD: as needed    Evaluation FOTO: 08/05/2022 = 46%    Visit # / Visits authorized: 2 / 20  Time In: 9:30  Time Out: 10:15  Total Billable Time: 40  minutes    Precautions: standard;  Visually impaired       Subjective     Patient reports: "I woke up this morning and I do not know if I was sleeping on my (Left) side or what but the pain in my shoulder woke me up.  I felt a little sore after last therapy session."    3/10 pain (Left) shoulder    Objective    Patient seen by Occupational Therapy this session. Tx consisted of:      Moist Heat to (Left) shoulder x 5 minutes to increase tissue elasticity and blood floe for healing        Manual therapy techniques to increase joint mobilization   x   8 minutes:        -Manual Therapy techniques to (Left) shoulder including joint mobilizations, stretching, and Passive Range of Motion to increase joint mobility, range of motion  and for pain management  x  8 minutes           Therapeutic  Exercises to improve functional performance while increasing strength, endurance, range of motion,  and flexibility  x   32 minutes:    - 1# UBE in clockwise motion x 6 minutes to increase endurance and range of motion  - 1# in supine for External Rotation( @ side) x 10 repetitions " "  - 1# in supine for Internal Rotation /External Rotation   (@90* shoulder Abduction) (towel roll) x 10 repetitions  - 2# in supine for SA punches x 20 repetitions   - 1# in side lying for External Rotation ( towel roll) x 20 repetitions    - 0# pulleys for  Internal Rotation x 3 minutes each    - 0# dowel in standing for Extension and Internal Rotation x 10 repetitions -NP   - GREEN Theraband for scapular retraction x 20 repetitions    - RED Theraband in standing for Extension x 20 repetitions     - Isometrics stepouts with  YELLOW  Theraband for External Rotation and Internal Rotation x 10 repetitions      -NP = Not Performed       Initiate in future therapy sessions:        - "Sleeper stretch" in side lying (towel roll) x 15 seconds holds x 5 repetitions   - "Corner Stretch" for anterior capsule stretch x 15 second holds x 5 repetitions   - Biceps stretch on wall with Elbow Extension x 15 second holds x 5 repetitions       - ### in prone for shoulder Flexion, Abduction, and Extension  x 10 repetitions each  - Ball on wall in Clockwise/Counterclockwise motion for scapular stabilization x 20 repetitions  - 0# in standing for shoulder Flexion and Abduction (in front of mirror, Occupational Therapist cueing to prevent compensation)  x 10 repetitions  -  ### Theraband for shoulder Flexion,  External Rotation and Internal Rotation x ###  repetitions   -  ### for scapular stabilization x    ###  seconds x   ### repetitions   - ### Theraband for (Bilateral) Horizontal  Abduction x ## repetitions    Assessment     Patient will continue to benefit from skilled Occupational Therapy intervention to increase functional abilities, range of motion, and strength and pain control.  Patient. demonstrated proper understanding of each exercise.  Patient continues to require verbal and tactile cues for throughout therapy session to maintain position and prevent compensation.   Patient continues to be limited in functional and " "leisurely pursuits. Pain limits patient's participation in activities of daily living.  Patient is not able to carryout necessary vocational tasks.   Patient's spiritual, cultural and educational needs considered and patient agreeable to plan of care and goals.  Patient is making good progress towards established goals.  Patient tolerated exercises within pain tolerance.   Reviewed Home Exercise Program with patient., see EPIC under "patient instructions" for provided exercises, activity modifications and limitations, modalities for home pain management.  Patient. demo understanding of above.    Patient tolerated addition of Theraband for Extension and isometrics for External Rotation and Internal Rotation  with no reported pain.       New/Revised Goals: Continue Plan Of Care   Goals:  1)   Patient to be Independent with Home exercise program and modalities for pain management by 1 week. (MET)  2)   Patient will report   3/10 pain on average with activity to assist with exercises by 6-8 weeks.  (In Progress)  3)   Patient will increase range of Motion by 3-5 degrees to increase functional use for activities of daily living by 6-8 weeks.  (In Progress)  4)   Patient will increase manual muscle testing by a grade to assist with lifting items by 6-8 weeks.  ( In Progress)    Plan      Continue 2x week during the 90 day certification period    08/05/2022   to 11/05/2022   with established Plan Of Care in pursuit of Occupational Therapy goals.      Mayela Gustafson OT           "

## 2022-08-22 RX ORDER — NITROFURANTOIN 25; 75 MG/1; MG/1
100 CAPSULE ORAL 2 TIMES DAILY
Qty: 10 CAPSULE | Refills: 0 | Status: SHIPPED | OUTPATIENT
Start: 2022-08-22 | End: 2022-08-27

## 2022-08-24 ENCOUNTER — CLINICAL SUPPORT (OUTPATIENT)
Dept: REHABILITATION | Facility: HOSPITAL | Age: 47
End: 2022-08-24
Payer: MEDICARE

## 2022-08-24 DIAGNOSIS — M25.512 LEFT SHOULDER PAIN, UNSPECIFIED CHRONICITY: Primary | ICD-10-CM

## 2022-08-24 PROCEDURE — 97110 THERAPEUTIC EXERCISES: CPT | Mod: PN

## 2022-08-24 PROCEDURE — 97140 MANUAL THERAPY 1/> REGIONS: CPT | Mod: PN

## 2022-08-24 NOTE — PROGRESS NOTES
"                                Occupational Therapy Daily Treatment Note       Date: 9/2/2022  Name: Carolina Andrews  Clinic Number: [id number]    Therapy Diagnosis: S46.812A (ICD-10-CM) - Strain of left deltoid muscle  Encounter Diagnosis   Name Primary?    Left shoulder pain, unspecified chronicity Yes     Physician: Sal Payne NP    Physician Orders: S46.812A (ICD-10-CM) - Strain of left deltoid muscle; evaluate and treat  Medical Diagnosis: S46.812A (ICD-10-CM) - Strain of left deltoid muscle; (Left) shoulder pain  Surgical Procedure and Date: Not Applicable    Insurance Authorization Period Expiration: 08/04/2022-12/31/2022  Plan of Care Certification Period: 08/05/2022-11/05/2022  Date of Return to MD: as needed    Evaluation FOTO: 08/05/2022 = 46%    Visit # / Visits authorized: 3 / 20  Time In: 7:55  Time Out: 8:40  Total Billable Time: 40  minutes    Precautions: standard;  Visually impaired       Subjective     Patient reports: "I am still sleeping on my (Left) shoulder.  I have been trying to not sleep on the shoulder but its difficult.  I am still having trouble reaching over and brushing my hair.  The pain is getting a little better."    2/10 pain (Left) shoulder    Objective    Patient seen by Occupational Therapy this session. Tx consisted of:      Moist Heat to (Left) shoulder x 5 minutes to increase tissue elasticity and blood floe for healing        Manual therapy techniques to increase joint mobilization   x   8 minutes:        -Manual Therapy techniques to (Left) shoulder including joint mobilizations, stretching, and Passive Range of Motion to increase joint mobility, range of motion  and for pain management  x  8 minutes           Therapeutic  Exercises to improve functional performance while increasing strength, endurance, range of motion,  and flexibility  x   32 minutes:    - 1.5# UBE in clockwise motion x 6 minutes to increase endurance and range of motion  - 1# in supine for External " "Rotation( @ side) x 10 repetitions   - 1# in supine for Internal Rotation /External Rotation   (@90* shoulder Abduction) (towel roll) x 10 repetitions  - 2# in supine for SA punches x 20 repetitions   - YELLOW Theraband in supine for Abduction ( scaption) and HOR. Abduction x 20 repetitions   - 1# in side lying for External Rotation ( towel roll) x 20 repetitions    - 0# dowel in standing for Extension and Internal Rotation x 10 repetitions -NP   - GREEN Theraband for scapular retraction x 20 repetitions    - RED Theraband in standing for Extension x 20 repetitions     - Isometrics stepouts with  YELLOW  Theraband for External Rotation and Internal Rotation x 10 repetitions      -NP = Not Performed       Initiate in future therapy sessions:        - "Sleeper stretch" in side lying (towel roll) x 15 seconds holds x 5 repetitions   - "Corner Stretch" for anterior capsule stretch x 15 second holds x 5 repetitions   - Biceps stretch on wall with Elbow Extension x 15 second holds x 5 repetitions       - ### in prone for shoulder Flexion, Abduction, and Extension  x 10 repetitions each  - Ball on wall in Clockwise/Counterclockwise motion for scapular stabilization x 20 repetitions  - 0# in standing for shoulder Flexion and Abduction (in front of mirror, Occupational Therapist cueing to prevent compensation)  x 10 repetitions  -  ### Theraband for shoulder Flexion,  External Rotation and Internal Rotation x ###  repetitions   -  ### for scapular stabilization x    ###  seconds x   ### repetitions   - ### Theraband for (Bilateral) Horizontal  Abduction x ## repetitions    Assessment     Patient will continue to benefit from skilled Occupational Therapy intervention to increase functional abilities, range of motion, and strength and pain control.  Patient. demonstrated proper understanding of each exercise.  Patient continues to require verbal and tactile cues for throughout therapy session to maintain position and prevent " "compensation.   Patient continues to be limited in functional and leisurely pursuits. Pain limits patient's participation in activities of daily living.  Patient is not able to carryout necessary vocational tasks.   Patient's spiritual, cultural and educational needs considered and patient agreeable to plan of care and goals.  Patient is making good progress towards established goals.  Patient tolerated exercises within pain tolerance.   Reviewed Home Exercise Program with patient., see EPIC under "patient instructions" for provided exercises, activity modifications and limitations, modalities for home pain management.  Patient. demo understanding of above.    Patient tolerated addition of Theraband in supine for Abduction ( scaption), HOR. Abduction ; increase in resistance during ergometer with no reported pain.       New/Revised Goals: Continue Plan Of Care   Goals:  1)   Patient to be Independent with Home exercise program and modalities for pain management by 1 week. (MET)  2)   Patient will report   3/10 pain on average with activity to assist with exercises by 6-8 weeks.  (In Progress)  3)   Patient will increase range of Motion by 3-5 degrees to increase functional use for activities of daily living by 6-8 weeks.  (In Progress)  4)   Patient will increase manual muscle testing by a grade to assist with lifting items by 6-8 weeks.  ( In Progress)    Plan      Continue 2x week during the 90 day certification period    08/05/2022   to 11/05/2022   with established Plan Of Care in pursuit of Occupational Therapy goals.      Mayela Gustafson OT             "

## 2022-08-31 ENCOUNTER — PATIENT MESSAGE (OUTPATIENT)
Dept: OPHTHALMOLOGY | Facility: CLINIC | Age: 47
End: 2022-08-31
Payer: MEDICARE

## 2022-09-02 ENCOUNTER — CLINICAL SUPPORT (OUTPATIENT)
Dept: REHABILITATION | Facility: HOSPITAL | Age: 47
End: 2022-09-02
Payer: MEDICARE

## 2022-09-02 DIAGNOSIS — M25.512 LEFT SHOULDER PAIN, UNSPECIFIED CHRONICITY: Primary | ICD-10-CM

## 2022-09-02 PROCEDURE — 97140 MANUAL THERAPY 1/> REGIONS: CPT | Mod: PN

## 2022-09-02 PROCEDURE — 97530 THERAPEUTIC ACTIVITIES: CPT | Mod: PN

## 2022-09-02 NOTE — PROGRESS NOTES
"                                Occupational Therapy Daily Treatment Note       Date: 9/8/2022  Name: Carolina Andrews  Clinic Number: [id number]    Therapy Diagnosis: S46.812A (ICD-10-CM) - Strain of left deltoid muscle  Encounter Diagnosis   Name Primary?    Left shoulder pain, unspecified chronicity Yes     Physician: Sal Payne NP    Physician Orders: S46.812A (ICD-10-CM) - Strain of left deltoid muscle; evaluate and treat  Medical Diagnosis: S46.812A (ICD-10-CM) - Strain of left deltoid muscle; (Left) shoulder pain  Surgical Procedure and Date: Not Applicable    Insurance Authorization Period Expiration: 08/04/2022-12/31/2022  Plan of Care Certification Period: 08/05/2022-11/05/2022  Date of Return to MD: as needed    Evaluation FOTO: 08/05/2022 = 46%    Visit # / Visits authorized: 4 / 20  Time In: 7:40  Time Out: 8:25  Total Billable Time: 40  minutes    Precautions: standard;  Visually impaired       Subjective     Patient reports: "I am feeling ok this morning in my shoulder.  I get some popping in my shoulder on the side, not in the joint.  I am still having difficulty reaching over to the right shoulder and reaching out to the side.  I was able to brush my hair."    2/10 pain (Left) shoulder    Objective    Patient seen by Occupational Therapy this session. Tx consisted of:      Moist Heat to (Left) shoulder x 5 minutes to increase tissue elasticity and blood floe for healing        Manual therapy techniques to increase joint mobilization   x   8 minutes:        -Manual Therapy techniques to (Left) shoulder including joint mobilizations, stretching, and Passive Range of Motion to increase joint mobility, range of motion  and for pain management  x  8 minutes           Therapeutic  Exercises to improve functional performance while increasing strength, endurance, range of motion,  and flexibility  x   32 minutes:    - 1.5# UBE in clockwise motion x 6 minutes to increase endurance and range of motion  - " "2# in supine for External Rotation( @ side) x 20 repetitions   - 1# in supine for Internal Rotation /External Rotation   (@90* shoulder Abduction) (towel roll) x 10 repetitions  - 2# in supine for SA punches x 20 repetitions   - YELLOW Theraband in supine for Abduction ( scaption) x 20 repetitions   - RED Theraband in supine for HOR. Abduction x 20 repetitions   - 1# in side lying for External Rotation ( towel roll) x 20 repetitions    - 0# dowel in standing for Extension and Internal Rotation x 10 repetitions -NP   - 20# Precor machine for scapular retraction x 20 repetitions   - RED Theraband in standing for Extension x 20 repetitions     - Isometrics stepouts with  YELLOW  Theraband for External Rotation and Internal Rotation x 10 repetitions      -NP = Not Performed       Initiate in future therapy sessions:        - "Sleeper stretch" in side lying (towel roll) x 15 seconds holds x 5 repetitions   - "Corner Stretch" for anterior capsule stretch x 15 second holds x 5 repetitions   - Biceps stretch on wall with Elbow Extension x 15 second holds x 5 repetitions       - ### in prone for shoulder Flexion, Abduction, and Extension  x 10 repetitions each  - Ball on wall in Clockwise/Counterclockwise motion for scapular stabilization x 20 repetitions  - 0# in standing for shoulder Flexion and Abduction (in front of mirror, Occupational Therapist cueing to prevent compensation)  x 10 repetitions  -  ### Theraband for shoulder Flexion,  External Rotation and Internal Rotation x ###  repetitions   -  ### for scapular stabilization x    ###  seconds x   ### repetitions   - ### Theraband for (Bilateral) Horizontal  Abduction x ## repetitions    Assessment     Patient will continue to benefit from skilled Occupational Therapy intervention to increase functional abilities, range of motion, and strength and pain control.  Patient. demonstrated proper understanding of each exercise.  Patient continues to require verbal and " "tactile cues for throughout therapy session to maintain position and prevent compensation.   Patient continues to be limited in functional and leisurely pursuits. Pain limits patient's participation in activities of daily living.  Patient is not able to carryout necessary vocational tasks.   Patient's spiritual, cultural and educational needs considered and patient agreeable to plan of care and goals.  Patient is making good progress towards established goals.  Patient tolerated exercises within pain tolerance.   Reviewed Home Exercise Program with patient., see EPIC under "patient instructions" for provided exercises, activity modifications and limitations, modalities for home pain management.  Patient. demo understanding of above.    Patient tolerated increase in resistance for scap retraction; HOR. Abduction in supine; External Rotation in supine @ side with no reported pain.       New/Revised Goals: Continue Plan Of Care   Goals:  1)   Patient to be Independent with Home exercise program and modalities for pain management by 1 week. (MET)  2)   Patient will report   3/10 pain on average with activity to assist with exercises by 6-8 weeks.  (In Progress)  3)   Patient will increase range of Motion by 3-5 degrees to increase functional use for activities of daily living by 6-8 weeks.  (In Progress)  4)   Patient will increase manual muscle testing by a grade to assist with lifting items by 6-8 weeks.  ( In Progress)    Plan      Continue 2x week during the 90 day certification period    08/05/2022   to 11/05/2022   with established Plan Of Care in pursuit of Occupational Therapy goals.      Mayela Gustafson OT               "

## 2022-09-08 ENCOUNTER — CLINICAL SUPPORT (OUTPATIENT)
Dept: REHABILITATION | Facility: HOSPITAL | Age: 47
End: 2022-09-08
Payer: MEDICARE

## 2022-09-08 DIAGNOSIS — M25.512 LEFT SHOULDER PAIN, UNSPECIFIED CHRONICITY: Primary | ICD-10-CM

## 2022-09-08 PROCEDURE — 97140 MANUAL THERAPY 1/> REGIONS: CPT | Mod: PN

## 2022-09-08 PROCEDURE — 97110 THERAPEUTIC EXERCISES: CPT | Mod: PN

## 2022-09-08 NOTE — PROGRESS NOTES
"                                Occupational Therapy Discharge Summary       Date: 9/16/2022  Name: Carolina Andrews  Clinic Number: [id number]    Therapy Diagnosis: S46.812A (ICD-10-CM) - Strain of left deltoid muscle  Encounter Diagnosis   Name Primary?    Left shoulder pain, unspecified chronicity Yes     Physician: Sal Payne NP    Physician Orders: S46.812A (ICD-10-CM) - Strain of left deltoid muscle; evaluate and treat  Medical Diagnosis: S46.812A (ICD-10-CM) - Strain of left deltoid muscle; (Left) shoulder pain  Surgical Procedure and Date: Not Applicable    Insurance Authorization Period Expiration: 08/04/2022-12/31/2022  Plan of Care Certification Period: 08/05/2022-11/05/2022  Date of Return to MD: as needed    Evaluation FOTO: 08/05/2022 = 46%    Visit # / Visits authorized: 5 / 20  Time In: 8:45  Time Out: 9:10  Total Billable Time: 25  minutes    Precautions: standard;  Visually impaired       Subjective     Patient reports: "I have been feeling great in my shoulder.  I have not had any problems for the last 4 days.  I think I can continue my exercises at home."    2/10 pain (Left) shoulder    Objective    Patient seen by Occupational Therapy this session. Tx consisted of:          Therapeutic  Exercises to improve functional performance while increasing strength, endurance, range of motion,  and flexibility  x   25 minutes:       - BLUE Theraband for scapular retraction x 20 repetitions   - GREEN Theraband in standing for Extension x 20 repetitions     - YELLOW  Theraband for External Rotation x 20 repetitions   - RED Theraband for Internal Rotation x 20 repetitions     - Updated Home Exercise Program:  Patient educated on and provided with YELLOW and RED Theraband for Home Exercise Program.  See EMR under "patient instructions". Patient demo understanding of above.     Patient reassessed as follows:      Active Range of Motion: Shoulder  (measured in standing)                                       " "       (Right)  /  (Left)  Flexion:  160 / 162 (+26)  Abduction: 161 / 153 (+53)  Extension: 67 / 68 (+5)  External Rotation:  81  / 80 (+13)  Internal Rotation:  83 / 81 (+23)        Manual Muscle Test:  Shoulder                                   (Left)  Flexion:  5/5  (+4)  Abduction: 5/5  (+4)  Extension: 5/5 (+2)  External Rotation: 5/5 (+4)  Internal Rotation: 5/5  (+4)    Assessment      Patient to be discharged home 2* to reaching max benefit from skilled outpatient Occupational Therapy at this time.  Patient to follow up with doctor as needed.  Patient to continue Home Exercise Program to maintain range of motion, strength, and functional abilities and pain management.   Patient's spiritual, cultural and educational needs considered and patient agreeable to plan of care and goals.    Patient tolerated exercises within pain tolerance.   Reviewed Home Exercise Program with patient., see EPIC under "patient instructions" for provided exercises, activity modifications and limitations, modalities for home pain management.  Patient. demo understanding of above.    Patient demo increased Active range of motion for (Left) shoulder Flexion, Abduction, Extension, External Rotation, Internal Rotation.  Patient reported decreased pain on average with activity.  Patient demo increased Manual Muscle Testing for (Left) shoulder Flexion, Abduction, Extension, External Rotation, and Internal Rotation.  Patient met 4/4 established goals.     Patient tolerated addition of Therband in standing for External Rotation and Internal Rotation;  increase in resistance during Theraband in standing for Extension and scap retraction with no reported pain.       Goals:  1)   Patient to be Independent with Home exercise program and modalities for pain management by 1 week. (MET)  2)   Patient will report   3/10 pain on average with activity to assist with exercises by 6-8 weeks.  (MET)  3)   Patient will increase range of Motion by 3-5 " degrees to increase functional use for activities of daily living by 6-8 weeks.  (MET)  4)   Patient will increase manual muscle testing by a grade to assist with lifting items by 6-8 weeks.  (MET)    Plan      Patient to be discharged home 2* to reaching max benefit from skilled outpatient Occupational Therapy at this time.  Patient to follow up with doctor as needed.       Mayela Gustafson, OT

## 2022-09-16 ENCOUNTER — CLINICAL SUPPORT (OUTPATIENT)
Dept: REHABILITATION | Facility: HOSPITAL | Age: 47
End: 2022-09-16
Payer: MEDICARE

## 2022-09-16 DIAGNOSIS — M25.512 LEFT SHOULDER PAIN, UNSPECIFIED CHRONICITY: Primary | ICD-10-CM

## 2022-09-16 PROCEDURE — 97110 THERAPEUTIC EXERCISES: CPT | Mod: PN

## 2022-09-30 ENCOUNTER — TELEPHONE (OUTPATIENT)
Dept: GENETICS | Facility: CLINIC | Age: 47
End: 2022-09-30
Payer: MEDICARE

## 2022-09-30 NOTE — TELEPHONE ENCOUNTER
----- Message from Ro Walton CGC sent at 9/30/2022  2:21 PM CDT -----  Sure!   Tav, do you mind scheduling with me? Not urgent, thanks!  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 Can your office coordinate to get her seen? I'm having my office upload her invitae results.    Thanks,    Thai

## 2022-10-19 ENCOUNTER — TELEPHONE (OUTPATIENT)
Dept: GENETICS | Facility: CLINIC | Age: 47
End: 2022-10-19
Payer: MEDICARE

## 2022-10-20 ENCOUNTER — OFFICE VISIT (OUTPATIENT)
Dept: GENETICS | Facility: CLINIC | Age: 47
End: 2022-10-20
Payer: MEDICARE

## 2022-10-20 DIAGNOSIS — H35.53 CONE DYSTROPHY: Primary | ICD-10-CM

## 2022-10-20 PROCEDURE — 96040 PR GENETIC COUNSELING, EACH 30 MIN: CPT | Mod: 95,,, | Performed by: GENETIC COUNSELOR, MS

## 2022-10-20 PROCEDURE — 99499 NO LOS: ICD-10-PCS | Mod: 95,,, | Performed by: MEDICAL GENETICS

## 2022-10-20 PROCEDURE — 96040 PR GENETIC COUNSELING, EACH 30 MIN: ICD-10-PCS | Mod: 95,,, | Performed by: GENETIC COUNSELOR, MS

## 2022-10-20 PROCEDURE — 99499 UNLISTED E&M SERVICE: CPT | Mod: 95,,, | Performed by: MEDICAL GENETICS

## 2022-10-20 NOTE — PROGRESS NOTES
Carolina Andrews   DOS: 10/20/2022  : 1975   MRN: [id number]    REFERRING MD: Edgar Langley MD    REASON FOR CONSULT: Our Medical Genetic Service was asked to provide genetic counseling for this 47-year-old female with cone dystrophy and abnormal retinal disorder gene panel.     PRESENT ILLNESS: Ms. 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 She was diagnosed with vision problems as a child in [place]. She feels like her vision has been getting worse. Dr. Langley ordered an [facility] inherited retinal disorders panel that revealed two heterozygous pathogenic variants in KCNV2, a heterozygous pathogenic variant in CLN3, a heterozygous pathogenic variant in CPLANE1, and two variants of uncertain significance (VUS) in ABCA4.  Report is in media.     PAST MEDICAL HISTORY:   Active Problem List with Overview Notes    Diagnosis Date Noted    Left shoulder pain 2022    Dry eye syndrome of both eyes 06/15/2022    Calculus of gallbladder without cholecystitis without obstruction 2022    Status post arterial stent 2019    Legally blind 2019    Anterior communicating artery aneurysm 2019    Headache 2019    Depression with anxiety 2019    Gastroesophageal reflux disease without esophagitis 2019    Cerebrovascular lesion 07/15/2019    Aneurysm 2019    Complaints of memory disturbance     Vascular abnormality 2019    Vertigo 2019    Tinnitus 2019    Epigastric pain 2018    Severe obesity 10/25/2018    Ganglion cyst of left foot 2018    Cone dystrophy 2015    Posterior tibial tendonitis 2014    BMI 36.0-36.9,adult 2014    History of ankle fracture 2014     FAMILY HISTORY: Ms. Andrews does not have any children. Her sister is 42 and legally blind. Her other sister is 32 and has normal vision. Her mother is 73 and wears glasses. Her father is 75 and lost vision in one eye from a stroke. Her  maternal aunt had colon cancer and  in her 50s. Her paternal grandmother had colon cancer in her late 80s. Her paternal uncle had prostate cancer in his 60s.         IMPRESSION: Ms. Andrews is a 47-year-old female with cone dystrophy. 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      Biallelic pathogenic variants in KCNV2 are associated with autosomal recessive retinal code dystrophy (RCD), an eye disorder that causes stable or slowly progressive mildly reduced visual acuity which may not be noted until adulthood, and photophobia, achromatopsia, myopic astigmatism, and difficulty tracking fast moving objects. We discussed that we cannot determine if the variants are in cis or trans. Testing parents or additional informative relatives would be helpful. Unfortunately, there is no treatment for KCNV2-related disorders at this time, but this may change in the future. I encouraged Ms. Andrews to follow research and clinical trials.     Biallelic pathogenic variants in CLN3 are associated with autosomal recessive neuronal ceroid lipofuscinosis type 3 (CLN3) and non-syndrome retinitis pigmentosa (RP). CPLANE1 is associated with autosomal recessive El syndrome and orofaciodigital syndrome, type VI. These are autosomal recessive disorders and unlikely to be contributing to the phenotype. If her partner were to also be a carrier, her offspring could be at risk. Two variants of uncertain significance (VUS) in ABCA4 were identified. ABCA4 is associated with autosomal recessive cone-janak dystrophy (CRD), Stargardt disease (STGD) and retinitis pigmentosa (RP). Until the variants are reclassified as likely pathogenic, pathogenic, or benign, the clinical significance will remain unclear.     We discussed the family history  of cancer and the option to pursue cancer genetic testing. She would like to obtain life insurance before deciding if she'd like to proceed. She can follow-up in the future if desired.     RECOMMENDATIONS:  Parental testing for KCNV2 variant (patient will send message over portal with contact info)  Follow-up once results are returned  Continue to follow with Dr. Langley in ophthalmology   Cancer genetic testing (declined today)     REFERENCES: Eyad BRIONES, Santana MAGALLON, Shahid LAMB, Sariah MAGALLON. KCNV2 retinopathy: clinical features, molecular genetics and directions for future therapy. Ophthalmic [facility]. 2020 Archie;41(3):208-215. doi: 10.1080/[id number]. Epub 2020 May 22. PMID: 84160515; PMCID: DWU3404235.    TIME SPENT: 30 minutes      Ro Walton, MPH, MS, [facility]  Certified Genetic Counselor  [facility]     Manny Sutherland M.D.                                                                            Section Head - Medical Genetics                                                                                       [facility]

## 2022-11-30 ENCOUNTER — OFFICE VISIT (OUTPATIENT)
Dept: OPHTHALMOLOGY | Facility: CLINIC | Age: 47
End: 2022-11-30
Payer: MEDICARE

## 2022-11-30 DIAGNOSIS — H54.8 LEGALLY BLIND: ICD-10-CM

## 2022-11-30 DIAGNOSIS — H35.53 CONE DYSTROPHY: Primary | ICD-10-CM

## 2022-11-30 DIAGNOSIS — H04.123 DRY EYE SYNDROME OF BOTH EYES: ICD-10-CM

## 2022-11-30 PROCEDURE — 99999 PR PBB SHADOW E&M-EST. PATIENT-LVL III: ICD-10-PCS | Mod: PBBFAC,,, | Performed by: OPHTHALMOLOGY

## 2022-11-30 PROCEDURE — 92134 CPTRZ OPH DX IMG PST SGM RTA: CPT | Mod: S$GLB,,, | Performed by: OPHTHALMOLOGY

## 2022-11-30 PROCEDURE — 92134 OCT, RETINA - OU - BOTH EYES: ICD-10-PCS | Mod: S$GLB,,, | Performed by: OPHTHALMOLOGY

## 2022-11-30 PROCEDURE — 99999 PR PBB SHADOW E&M-EST. PATIENT-LVL III: CPT | Mod: PBBFAC,,, | Performed by: OPHTHALMOLOGY

## 2022-11-30 PROCEDURE — 92014 PR EYE EXAM, EST PATIENT,COMPREHESV: ICD-10-PCS | Mod: S$GLB,,, | Performed by: OPHTHALMOLOGY

## 2022-11-30 PROCEDURE — 1159F MED LIST DOCD IN RCRD: CPT | Mod: CPTII,S$GLB,, | Performed by: OPHTHALMOLOGY

## 2022-11-30 PROCEDURE — 92014 COMPRE OPH EXAM EST PT 1/>: CPT | Mod: S$GLB,,, | Performed by: OPHTHALMOLOGY

## 2022-11-30 PROCEDURE — 1160F RVW MEDS BY RX/DR IN RCRD: CPT | Mod: CPTII,S$GLB,, | Performed by: OPHTHALMOLOGY

## 2022-11-30 PROCEDURE — 1160F PR REVIEW ALL MEDS BY PRESCRIBER/CLIN PHARMACIST DOCUMENTED: ICD-10-PCS | Mod: CPTII,S$GLB,, | Performed by: OPHTHALMOLOGY

## 2022-11-30 PROCEDURE — 1159F PR MEDICATION LIST DOCUMENTED IN MEDICAL RECORD: ICD-10-PCS | Mod: CPTII,S$GLB,, | Performed by: OPHTHALMOLOGY

## 2022-11-30 NOTE — PROGRESS NOTES
HPI    DLS: 6/15/2022- 6m cone dystrophy f/u w/ OCTM/ DFE OU     Pt states no new complaints. Denies pain. FOL sometimes OS> OD. Occular   migraines sometimes.         Last edited by Nicci Montgomery on 11/30/2022  8:48 AM.          A/P    ICD-10-CM ICD-9-CM   1. Cone dystrophy  H35.53 362.75   2. Legally blind  H54.8 369.4   3. Dry eye syndrome of both eyes  H04.123 375.15       1. Cone dystrophy  2. 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       Today VA 20/70  OD 20/70 OS, pigmentary mottling with outer retina JF loss/disruption OU    Plan: Observation for now, counseled on nature of disease process and need for monitoring. Will continue to be on look out for relevant clinical/research trials as currently none for KCNV2 related at this time    3. Dry eye syndrome of both eyes  Mild dry eye  ATs prn    RTC 12 mo DFE/OCTm OU       I saw and examined the patient and reviewed in detail the findings documented. The final examination findings, image interpretations, and plan as documented in the record represent my personal judgment and conclusions.    Edgar Langley MD  Vitreoretinal Surgery   [facility]

## 2023-05-23 ENCOUNTER — OFFICE VISIT (OUTPATIENT)
Dept: FAMILY MEDICINE | Facility: CLINIC | Age: 48
End: 2023-05-23
Payer: MEDICARE

## 2023-05-23 VITALS
TEMPERATURE: 98 F | HEART RATE: 84 BPM | SYSTOLIC BLOOD PRESSURE: 122 MMHG | OXYGEN SATURATION: 98 % | WEIGHT: 232.56 LBS | HEIGHT: 67 IN | BODY MASS INDEX: 36.5 KG/M2 | DIASTOLIC BLOOD PRESSURE: 80 MMHG | RESPIRATION RATE: 16 BRPM

## 2023-05-23 DIAGNOSIS — J01.00 ACUTE MAXILLARY SINUSITIS, RECURRENCE NOT SPECIFIED: Primary | ICD-10-CM

## 2023-05-23 DIAGNOSIS — L73.9 FOLLICULITIS: ICD-10-CM

## 2023-05-23 PROCEDURE — 99999 PR PBB SHADOW E&M-EST. PATIENT-LVL IV: ICD-10-PCS | Mod: PBBFAC,,, | Performed by: FAMILY MEDICINE

## 2023-05-23 PROCEDURE — 1159F PR MEDICATION LIST DOCUMENTED IN MEDICAL RECORD: ICD-10-PCS | Mod: CPTII,S$GLB,, | Performed by: FAMILY MEDICINE

## 2023-05-23 PROCEDURE — 3074F SYST BP LT 130 MM HG: CPT | Mod: CPTII,S$GLB,, | Performed by: FAMILY MEDICINE

## 2023-05-23 PROCEDURE — 1160F PR REVIEW ALL MEDS BY PRESCRIBER/CLIN PHARMACIST DOCUMENTED: ICD-10-PCS | Mod: CPTII,S$GLB,, | Performed by: FAMILY MEDICINE

## 2023-05-23 PROCEDURE — 99213 PR OFFICE/OUTPT VISIT, EST, LEVL III, 20-29 MIN: ICD-10-PCS | Mod: S$GLB,,, | Performed by: FAMILY MEDICINE

## 2023-05-23 PROCEDURE — 3079F DIAST BP 80-89 MM HG: CPT | Mod: CPTII,S$GLB,, | Performed by: FAMILY MEDICINE

## 2023-05-23 PROCEDURE — 99214 OFFICE O/P EST MOD 30 MIN: CPT | Mod: PO | Performed by: FAMILY MEDICINE

## 2023-05-23 PROCEDURE — 3074F PR MOST RECENT SYSTOLIC BLOOD PRESSURE < 130 MM HG: ICD-10-PCS | Mod: CPTII,S$GLB,, | Performed by: FAMILY MEDICINE

## 2023-05-23 PROCEDURE — 3079F PR MOST RECENT DIASTOLIC BLOOD PRESSURE 80-89 MM HG: ICD-10-PCS | Mod: CPTII,S$GLB,, | Performed by: FAMILY MEDICINE

## 2023-05-23 PROCEDURE — 3008F BODY MASS INDEX DOCD: CPT | Mod: CPTII,S$GLB,, | Performed by: FAMILY MEDICINE

## 2023-05-23 PROCEDURE — 99213 OFFICE O/P EST LOW 20 MIN: CPT | Mod: S$GLB,,, | Performed by: FAMILY MEDICINE

## 2023-05-23 PROCEDURE — 3008F PR BODY MASS INDEX (BMI) DOCUMENTED: ICD-10-PCS | Mod: CPTII,S$GLB,, | Performed by: FAMILY MEDICINE

## 2023-05-23 PROCEDURE — 99999 PR PBB SHADOW E&M-EST. PATIENT-LVL IV: CPT | Mod: PBBFAC,,, | Performed by: FAMILY MEDICINE

## 2023-05-23 PROCEDURE — 1160F RVW MEDS BY RX/DR IN RCRD: CPT | Mod: CPTII,S$GLB,, | Performed by: FAMILY MEDICINE

## 2023-05-23 PROCEDURE — 1159F MED LIST DOCD IN RCRD: CPT | Mod: CPTII,S$GLB,, | Performed by: FAMILY MEDICINE

## 2023-05-23 RX ORDER — MUPIROCIN 20 MG/G
OINTMENT TOPICAL 3 TIMES DAILY
Qty: 15 G | Refills: 1 | Status: SHIPPED | OUTPATIENT
Start: 2023-05-23

## 2023-05-23 RX ORDER — CLARITHROMYCIN 500 MG/1
500 TABLET, FILM COATED ORAL 2 TIMES DAILY
Qty: 14 TABLET | Refills: 0 | Status: SHIPPED | OUTPATIENT
Start: 2023-05-23 | End: 2023-06-05 | Stop reason: ALTCHOICE

## 2023-05-23 NOTE — PROGRESS NOTES
Subjective:       Patient ID: Carolina Andrews is a 47 y.o. female.    Chief Complaint: No chief complaint on file.    New to me patient here for UC visit.  Onset during night of severe pain in left cheek area and left forehead; felt like sinus popped open and then drained a good bit; some nausea and Vomited once 2T the PND.  Pain much less since.  No fever.  Prior dry cough lingering since Covid about 5 months ago.   Also has had infected hairs - one was draining some last week.    Review of Systems   Constitutional:  Negative for fever.   HENT:  Positive for congestion, postnasal drip, sinus pressure and sinus pain.    Respiratory:  Negative for shortness of breath.    Cardiovascular:  Negative for chest pain.   Gastrointestinal:  Positive for nausea and vomiting. Negative for abdominal pain.   Skin:  Negative for rash.   All other systems reviewed and are negative.    Objective:      Physical Exam  Vitals reviewed.   Constitutional:       General: She is not in acute distress.     Appearance: She is well-developed. She is not ill-appearing.   HENT:      Head:        Right Ear: Tympanic membrane normal. Tympanic membrane is not erythematous.      Left Ear: Tympanic membrane normal. Tympanic membrane is not erythematous.      Nose: Mucosal edema and congestion present.      Right Sinus: No maxillary sinus tenderness.      Left Sinus: Maxillary sinus tenderness and frontal sinus tenderness present.      Mouth/Throat:      Pharynx: Posterior oropharyngeal erythema present.   Cardiovascular:      Rate and Rhythm: Normal rate and regular rhythm.      Heart sounds: No murmur heard.  Pulmonary:      Effort: Pulmonary effort is normal.      Breath sounds: Normal breath sounds.   Musculoskeletal:      Cervical back: Neck supple.   Lymphadenopathy:      Cervical: No cervical adenopathy.   Skin:     General: Skin is warm and dry.   Neurological:      Mental Status: She is alert.       Assessment:       1. Folliculitis    2.  Acute maxillary sinusitis, recurrence not specified        Plan:       Folliculitis  -     mupirocin (BACTROBAN) 2 % ointment; Apply topically 3 (three) times daily.  Dispense: 15 g; Refill: 1    Acute maxillary sinusitis, recurrence not specified  -     clarithromycin (BIAXIN) 500 MG tablet; Take 1 tablet (500 mg total) by mouth 2 (two) times a day. After a meal  Dispense: 14 tablet; Refill: 0      Patient Instructions   Get original, behind the counter Sudafed - 30 mg and use as needed for congestion (ear/nose/sinus) up to three times a day.     Push fluids intake.  Drink plenty of water.     Contact your PCP if any worsening or for any new concerns as we discussed.

## 2023-05-23 NOTE — PATIENT INSTRUCTIONS
Get original, behind the counter Sudafed - 30 mg and use as needed for congestion (ear/nose/sinus) up to three times a day.     Push fluids intake.  Drink plenty of water.     Contact your PCP if any worsening or for any new concerns as we discussed.

## 2023-05-24 ENCOUNTER — PES CALL (OUTPATIENT)
Dept: ADMINISTRATIVE | Facility: CLINIC | Age: 48
End: 2023-05-24
Payer: MEDICARE

## 2023-06-02 ENCOUNTER — TELEPHONE (OUTPATIENT)
Dept: ADMINISTRATIVE | Facility: CLINIC | Age: 48
End: 2023-06-02
Payer: MEDICARE

## 2023-06-02 ENCOUNTER — PATIENT MESSAGE (OUTPATIENT)
Dept: ADMINISTRATIVE | Facility: CLINIC | Age: 48
End: 2023-06-02
Payer: MEDICARE

## 2023-06-05 ENCOUNTER — TELEPHONE (OUTPATIENT)
Dept: ADMINISTRATIVE | Facility: CLINIC | Age: 48
End: 2023-06-05
Payer: MEDICARE

## 2023-06-05 ENCOUNTER — OFFICE VISIT (OUTPATIENT)
Dept: FAMILY MEDICINE | Facility: CLINIC | Age: 48
End: 2023-06-05
Payer: MEDICARE

## 2023-06-05 VITALS — BODY MASS INDEX: 36.41 KG/M2 | HEIGHT: 67 IN | WEIGHT: 232 LBS

## 2023-06-05 DIAGNOSIS — I67.1 ANTERIOR COMMUNICATING ARTERY ANEURYSM: Chronic | ICD-10-CM

## 2023-06-05 DIAGNOSIS — H93.19 TINNITUS, UNSPECIFIED LATERALITY: ICD-10-CM

## 2023-06-05 DIAGNOSIS — E66.01 CLASS 2 SEVERE OBESITY DUE TO EXCESS CALORIES WITH SERIOUS COMORBIDITY AND BODY MASS INDEX (BMI) OF 36.0 TO 36.9 IN ADULT: ICD-10-CM

## 2023-06-05 DIAGNOSIS — R42 VERTIGO: ICD-10-CM

## 2023-06-05 DIAGNOSIS — H04.123 DRY EYE SYNDROME OF BOTH EYES: ICD-10-CM

## 2023-06-05 DIAGNOSIS — Z00.00 ENCOUNTER FOR PREVENTIVE HEALTH EXAMINATION: ICD-10-CM

## 2023-06-05 DIAGNOSIS — I72.9 ANEURYSM: ICD-10-CM

## 2023-06-05 DIAGNOSIS — Z12.31 BREAST CANCER SCREENING BY MAMMOGRAM: ICD-10-CM

## 2023-06-05 DIAGNOSIS — I67.9 CEREBROVASCULAR LESION: ICD-10-CM

## 2023-06-05 DIAGNOSIS — F41.8 DEPRESSION WITH ANXIETY: ICD-10-CM

## 2023-06-05 DIAGNOSIS — H54.8 LEGALLY BLIND: ICD-10-CM

## 2023-06-05 DIAGNOSIS — G44.89 OTHER HEADACHE SYNDROME: ICD-10-CM

## 2023-06-05 DIAGNOSIS — Z00.00 ENCOUNTER FOR MEDICARE ANNUAL WELLNESS EXAM: Primary | ICD-10-CM

## 2023-06-05 PROBLEM — E66.812 CLASS 2 SEVERE OBESITY WITH SERIOUS COMORBIDITY AND BODY MASS INDEX (BMI) OF 36.0 TO 36.9 IN ADULT: Status: ACTIVE | Noted: 2018-10-25

## 2023-06-05 PROCEDURE — G0439 PPPS, SUBSEQ VISIT: HCPCS | Mod: 95,,,

## 2023-06-05 PROCEDURE — 1160F RVW MEDS BY RX/DR IN RCRD: CPT | Mod: CPTII,95,,

## 2023-06-05 PROCEDURE — 1159F PR MEDICATION LIST DOCUMENTED IN MEDICAL RECORD: ICD-10-PCS | Mod: CPTII,95,,

## 2023-06-05 PROCEDURE — G0439 PR MEDICARE ANNUAL WELLNESS SUBSEQUENT VISIT: ICD-10-PCS | Mod: 95,,,

## 2023-06-05 PROCEDURE — 3008F BODY MASS INDEX DOCD: CPT | Mod: CPTII,95,,

## 2023-06-05 PROCEDURE — 1160F PR REVIEW ALL MEDS BY PRESCRIBER/CLIN PHARMACIST DOCUMENTED: ICD-10-PCS | Mod: CPTII,95,,

## 2023-06-05 PROCEDURE — 1159F MED LIST DOCD IN RCRD: CPT | Mod: CPTII,95,,

## 2023-06-05 PROCEDURE — 3008F PR BODY MASS INDEX (BMI) DOCUMENTED: ICD-10-PCS | Mod: CPTII,95,,

## 2023-06-05 NOTE — PATIENT INSTRUCTIONS
Counseling and Referral of Other Preventative  (Italic type indicates deductible and co-insurance are waived)    Patient Name: Carolina Andrews  Today's Date: 6/5/2023    Health Maintenance       Date Due Completion Date    Mammogram 04/07/2023 4/7/2022    Hemoglobin A1c (Diabetic Prevention Screening) 08/31/2023 (Originally 8/6/2022) 8/6/2019    TETANUS VACCINE 06/05/2024 (Originally 6/9/1994) ---    COVID-19 Vaccine (3 - Pfizer series) 06/05/2024 (Originally 10/4/2021) 8/9/2021    HIV Screening 06/05/2029 (Originally 6/9/1991) ---    Influenza Vaccine (Season Ended) 09/01/2023 ---    Cervical Cancer Screening 08/19/2025 8/19/2020    Override on 6/14/2016: Done    Lipid Panel 03/30/2027 3/30/2022    Colorectal Cancer Screening 02/04/2029 2/4/2019        Orders Placed This Encounter   Procedures    Mammo Digital Screening Bilat w/ Chiki     The following information is provided to all patients.  This information is to help you find resources for any of the problems found today that may be affecting your health:                Living healthy guide: www.[facility].[place].gov      Understanding Diabetes: www.diabetes.org      Eating healthy: www.cdc.gov/healthyweight      CDC home safety checklist: www.cdc.gov/steadi/patient.html      Agency on Aging: www.goea.[place].[facility]      Alcoholics anonymous (AA): www.aa.org      Physical Activity: www.gus.nih.gov/vf3nnbl      Tobacco use: www.quitwithusla.org

## 2023-06-05 NOTE — PROGRESS NOTES
"The patient location is: [place]  The chief complaint leading to consultation is: Medicare AWV    Visit type: audiovisual    Face to Face time with patient: 20  60 minutes of total time spent on the encounter, which includes face to face time and non-face to face time preparing to see the patient (eg, review of tests), Obtaining and/or reviewing separately obtained history, Documenting clinical information in the electronic or other health record, Independently interpreting results (not separately reported) and communicating results to the patient/family/caregiver, or Care coordination (not separately reported).         Each patient to whom he or she provides medical services by telemedicine is:  (1) informed of the relationship between the physician and patient and the respective role of any other health care provider with respect to management of the patient; and (2) notified that he or she may decline to receive medical services by telemedicine and may withdraw from such care at any time.    Notes:       Carolina Andrews presented for a  Medicare AWV and comprehensive Health Risk Assessment today. The following components were reviewed and updated:    Medical history  Family History  Social history  Allergies and Current Medications  Health Risk Assessment  Health Maintenance  Care Team         ** See Completed Assessments for Annual Wellness Visit within the encounter summary.**         The following assessments were completed:  Living Situation  CAGE  Depression Screening  Fall Risk Assessment (MACH 10)  Hearing Assessment(HHI)  Cognitive Function Screening  Nutrition Screening  ADL Screening  PAQ Screening      Vitals:    06/05/23 1507   Weight: 105.2 kg (232 lb)   Height: 5' 7" (1.702 m)     Body mass index is 36.34 kg/m².  Physical Exam  Constitutional:       General: She is not in acute distress.     Appearance: Normal appearance. She is well-developed and well-groomed.   Skin:     Coloration: Skin is not " pale.   Neurological:      Mental Status: She is alert and oriented to person, place, and time.   Psychiatric:         Mood and Affect: Mood normal.         Speech: Speech normal.         Behavior: Behavior normal. Behavior is cooperative.         Thought Content: Thought content normal.             Diagnoses and health risks identified today and associated recommendations/orders:    1. Encounter for Medicare annual wellness exam  - Ambulatory Referral/Consult to Enhanced Annual Wellness Visit (eAWV)    2. Encounter for preventive health examination  Screenings performed, as noted above. Personal preventative testing needs reviewed.     3. Class 2 severe obesity due to excess calories with serious comorbidity and body mass index (BMI) of 36.0 to 36.9 in adult  Diet modification and exercise as tolerated. Followed by PCP.     4. Aneurysm  5. Anterior communicating artery aneurysm  6. Other headache syndrome  7. Cerebrovascular lesion  Stable. Followed by Neurology.     8. Depression with anxiety  Chronic; stable on medication. Followed by PCP.     9. Legally blind  10. Dry eye syndrome of both eyes  Followed by Ophthalmology.     11. Vertigo  12. Tinnitus, unspecified laterality  Stable. Followed by PCP.     13. Breast cancer screening by mammogram  - Mammo Digital Screening Bilat w/ Chiki; Future      Review for Opioid Screening: Patient does not have rx for Opioids.    Review for Substance Use Disorders: Patient does not use substance.    Provided Carolina with a 5-10 year written screening schedule and personal prevention plan. Recommendations were developed using the USPSTF age appropriate recommendations. Education, counseling, and referrals were provided as needed. After Visit Summary printed and given to patient which includes a list of additional screenings\tests needed.    Follow up in about 1 year (around 6/5/2024) for your next annual wellness visit.    Es iEd NP    Advance Care Planning     I offered  to discuss advanced care planning, including how to pick a person who would make decisions for you if you were unable to make them for yourself, called a health care power of , and what kind of decisions you might make such as use of life sustaining treatments such as ventilators and tube feeding when faced with a life limiting illness recorded on a living will that they will need to know. (How you want to be cared for as you near the end of your natural life)     X  Patient is unwilling to engage in a discussion regarding advance directives at this time.

## 2023-07-07 ENCOUNTER — NURSE TRIAGE (OUTPATIENT)
Dept: ADMINISTRATIVE | Facility: CLINIC | Age: 48
End: 2023-07-07
Payer: MEDICARE

## 2023-07-07 NOTE — TELEPHONE ENCOUNTER
Patient is calling, crying. States she was stung by 10 wasps about 5 minutes ago. Can count 10 stings. Patient is home alone and legally blind. Triage done- dispo home care. Strict 911 advice given re S/S/ distress. Almost 15 minutes have passed with no reaction. Reassured and will reach out to family member to let them know. Advised I would be send message to provider. Reason for Disposition   Normal local reaction to bee, wasp, or yellow jacket sting    Additional Information   Negative: Passed out (i.e., fainted, collapsed and was not responding)   Negative: Wheezing or difficulty breathing   Negative: Hoarseness, cough, or tightness in the throat or chest   Negative: Swollen tongue or difficulty swallowing   Negative: Life-threatening reaction in past to sting (anaphylaxis) and < 2 hours since sting   Negative: Sounds like a life-threatening emergency to the triager   Negative: Not a bee, wasp, hornet, or yellow jacket sting   Negative: Hives, itching, or swelling elsewhere on body (i.e., not at a site of sting) and started within 2 hours of sting   Negative: Vomiting or abdominal cramps and started within 2 hours of sting   Negative: Gave epinephrine shot and no symptoms now   Negative: Patient sounds very sick or weak to the triager   Negative: Sting inside the mouth   Negative: Sting on eyeball (e.g., cornea)   Negative: More than 50 stings   Negative: Fever and red area   Negative: Fever and area is very tender to touch   Negative: Red streak or red line and length > 2 inches (5 cm)   Negative: Red or very tender (to touch) area, and started over 24 hours after the sting   Negative: Red or very tender (to touch) area, getting larger over 48 hours after the sting   Negative: Swelling is huge (e.g., more than 4 inches or 10 cm, spreads beyond wrist or ankle)   Negative: Patient wants to be seen   Negative: Hives, itching, or swelling elsewhere on body (i.e., not at a site of sting) and started > 2 hours after  sting   Negative: Scab drains pus or increases in size, and not improved after applying antibiotic ointment for 2 days    Protocols used: Bee or Yellow Jacket Sting-A-OH

## 2023-07-07 NOTE — TELEPHONE ENCOUNTER
Agree with ice and ibuprofen.  Wasps do not leave stingers behind in the wound.  Small chance of infection but the area around the sting will usually get warm and swell secondary to the reaction to the venom of the sting.  Call if any concerns.

## 2023-07-07 NOTE — TELEPHONE ENCOUNTER
Spoke with patient.  States got bit by 15 wasp   She didn't need to go to ED   No respiratory problems.   The swelling has decrease still very painful .   She is taking Extra strength Tylenol.   Instructed to try ice on the areas and try Ibuprofen short term for pain.   Please advise

## 2023-07-10 NOTE — TELEPHONE ENCOUNTER
Call placed to patient for notification. Patient verbalized understanding, and reports the swelling and pain has resolved. Patient states symptoms are much improved on today's date.

## 2023-07-12 ENCOUNTER — HOSPITAL ENCOUNTER (OUTPATIENT)
Dept: RADIOLOGY | Facility: CLINIC | Age: 48
Discharge: HOME | End: 2023-07-12
Attending: FAMILY MEDICINE
Payer: MEDICARE

## 2023-07-12 DIAGNOSIS — Z12.31 OTHER SCREENING MAMMOGRAM: ICD-10-CM

## 2023-07-12 PROCEDURE — 77067 MAMMO DIGITAL SCREENING BILAT WITH TOMO: ICD-10-PCS | Mod: 26,,, | Performed by: RADIOLOGY

## 2023-07-12 PROCEDURE — 77067 SCR MAMMO BI INCL CAD: CPT | Mod: TC,PO

## 2023-07-12 PROCEDURE — 77063 MAMMO DIGITAL SCREENING BILAT WITH TOMO: ICD-10-PCS | Mod: 26,,, | Performed by: RADIOLOGY

## 2023-07-12 PROCEDURE — 77067 SCR MAMMO BI INCL CAD: CPT | Mod: 26,,, | Performed by: RADIOLOGY

## 2023-07-12 PROCEDURE — 77063 BREAST TOMOSYNTHESIS BI: CPT | Mod: 26,,, | Performed by: RADIOLOGY

## 2023-07-19 ENCOUNTER — OFFICE VISIT (OUTPATIENT)
Dept: OBSTETRICS AND GYNECOLOGY | Facility: CLINIC | Age: 48
End: 2023-07-19
Payer: MEDICARE

## 2023-07-19 ENCOUNTER — LAB VISIT (OUTPATIENT)
Dept: LAB | Facility: HOSPITAL | Age: 48
End: 2023-07-19
Attending: GENERAL PRACTICE
Payer: MEDICARE

## 2023-07-19 VITALS
DIASTOLIC BLOOD PRESSURE: 86 MMHG | RESPIRATION RATE: 18 BRPM | HEIGHT: 67 IN | SYSTOLIC BLOOD PRESSURE: 128 MMHG | WEIGHT: 228.75 LBS | BODY MASS INDEX: 35.9 KG/M2

## 2023-07-19 DIAGNOSIS — Z01.419 WELL WOMAN EXAM: Primary | ICD-10-CM

## 2023-07-19 DIAGNOSIS — N92.0 MENORRHAGIA WITH REGULAR CYCLE: ICD-10-CM

## 2023-07-19 LAB
BASOPHILS # BLD AUTO: 0.04 K/UL (ref 0–0.2)
BASOPHILS NFR BLD: 0.7 % (ref 0–1.9)
DIFFERENTIAL METHOD: ABNORMAL
EOSINOPHIL # BLD AUTO: 0.1 K/UL (ref 0–0.5)
EOSINOPHIL NFR BLD: 2.1 % (ref 0–8)
ERYTHROCYTE [DISTWIDTH] IN BLOOD BY AUTOMATED COUNT: 13.1 % (ref 11.5–14.5)
HCT VFR BLD AUTO: 49.1 % (ref 37–48.5)
HGB BLD-MCNC: 16 G/DL (ref 12–16)
IMM GRANULOCYTES # BLD AUTO: 0.03 K/UL (ref 0–0.04)
IMM GRANULOCYTES NFR BLD AUTO: 0.5 % (ref 0–0.5)
LYMPHOCYTES # BLD AUTO: 2 K/UL (ref 1–4.8)
LYMPHOCYTES NFR BLD: 33.3 % (ref 18–48)
MCH RBC QN AUTO: 30.7 PG (ref 27–31)
MCHC RBC AUTO-ENTMCNC: 32.6 G/DL (ref 32–36)
MCV RBC AUTO: 94 FL (ref 82–98)
MONOCYTES # BLD AUTO: 0.3 K/UL (ref 0.3–1)
MONOCYTES NFR BLD: 5.1 % (ref 4–15)
NEUTROPHILS # BLD AUTO: 3.6 K/UL (ref 1.8–7.7)
NEUTROPHILS NFR BLD: 58.3 % (ref 38–73)
NRBC BLD-RTO: 0 /100 WBC
PLATELET # BLD AUTO: 265 K/UL (ref 150–450)
PMV BLD AUTO: 11.6 FL (ref 9.2–12.9)
RBC # BLD AUTO: 5.22 M/UL (ref 4–5.4)
TSH SERPL DL<=0.005 MIU/L-ACNC: 1.98 UIU/ML (ref 0.4–4)
WBC # BLD AUTO: 6.09 K/UL (ref 3.9–12.7)

## 2023-07-19 PROCEDURE — 1159F PR MEDICATION LIST DOCUMENTED IN MEDICAL RECORD: ICD-10-PCS | Mod: CPTII,S$GLB,, | Performed by: GENERAL PRACTICE

## 2023-07-19 PROCEDURE — 3074F PR MOST RECENT SYSTOLIC BLOOD PRESSURE < 130 MM HG: ICD-10-PCS | Mod: CPTII,S$GLB,, | Performed by: GENERAL PRACTICE

## 2023-07-19 PROCEDURE — G0101 CA SCREEN;PELVIC/BREAST EXAM: HCPCS | Mod: GZ,S$GLB,, | Performed by: GENERAL PRACTICE

## 2023-07-19 PROCEDURE — 99999 PR PBB SHADOW E&M-EST. PATIENT-LVL III: ICD-10-PCS | Mod: PBBFAC,,, | Performed by: GENERAL PRACTICE

## 2023-07-19 PROCEDURE — 99999 PR PBB SHADOW E&M-EST. PATIENT-LVL III: CPT | Mod: PBBFAC,,, | Performed by: GENERAL PRACTICE

## 2023-07-19 PROCEDURE — 3008F BODY MASS INDEX DOCD: CPT | Mod: CPTII,S$GLB,, | Performed by: GENERAL PRACTICE

## 2023-07-19 PROCEDURE — 3079F PR MOST RECENT DIASTOLIC BLOOD PRESSURE 80-89 MM HG: ICD-10-PCS | Mod: CPTII,S$GLB,, | Performed by: GENERAL PRACTICE

## 2023-07-19 PROCEDURE — 3074F SYST BP LT 130 MM HG: CPT | Mod: CPTII,S$GLB,, | Performed by: GENERAL PRACTICE

## 2023-07-19 PROCEDURE — 3008F PR BODY MASS INDEX (BMI) DOCUMENTED: ICD-10-PCS | Mod: CPTII,S$GLB,, | Performed by: GENERAL PRACTICE

## 2023-07-19 PROCEDURE — 85025 COMPLETE CBC W/AUTO DIFF WBC: CPT | Performed by: GENERAL PRACTICE

## 2023-07-19 PROCEDURE — 3079F DIAST BP 80-89 MM HG: CPT | Mod: CPTII,S$GLB,, | Performed by: GENERAL PRACTICE

## 2023-07-19 PROCEDURE — 84443 ASSAY THYROID STIM HORMONE: CPT | Performed by: GENERAL PRACTICE

## 2023-07-19 PROCEDURE — 1159F MED LIST DOCD IN RCRD: CPT | Mod: CPTII,S$GLB,, | Performed by: GENERAL PRACTICE

## 2023-07-19 PROCEDURE — 36415 COLL VENOUS BLD VENIPUNCTURE: CPT | Mod: PO | Performed by: GENERAL PRACTICE

## 2023-07-19 PROCEDURE — G0101 PR CA SCREEN;PELVIC/BREAST EXAM: ICD-10-PCS | Mod: GZ,S$GLB,, | Performed by: GENERAL PRACTICE

## 2023-07-19 NOTE — PROGRESS NOTES
HISTORY OF THE PRESENT ILLNESS    Carolina is a 48 y.o., using nothing for contraception, here for WWE and questions about menstrual irregularities and fertility.  Has had unprotected sex for 20yrs with no conceptions.  Ex- has kids with new partner and her current  has children from a former relationship.  Her LMP was 10 JUL.    -------------------------------------------------------------------------------------------------------------    SOCIAL HISTORY  Lives with:  and stepson  Domestic Violence: no  Occupation: homemaker  Education Level: Undergraduate Degree  Smoker: non-smoker  Alcohol: yes (weekends 2-3 drinks a night)  Drugs: denies  Relationship:  x 16yrs    PAST MEDICAL HISTORY  -cerebral aneurysm s/p stenting with coil (2020) Dr. 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 on heavy days: Every 2hrs   Intermenstrual bleeding: No   Period frequency: regular every 28-30 days for past two years; prior to that cycle was Q-O-month   Other: Passes clots, wears period underwear overnight plus a tampon     FAMILY HISTORY  HTN: dad  DIABETES: none  BLEEDING DISORDERS: none  CLOTTING DISORDERS: none  BREAST/UTERINE/OVARIAN CANCER: none    REVIEW OF SYSTEMS  Frequency of BM's: q1-2 days  Tendencies to constipation: No  Leak urine if cough / sneeze / laugh:  Yes if severe sneezing  Leak urine if can't get to the bathroom on time: Yes with a full bladder, not often    --------------------------------------------------------------------------------------------------------------    PHYSICAL EXAM  Vitals:    07/19/23 1014   BP: 128/86   Resp: 18       GEN = alert/oriented, nad, pleasant    PAP / HPV (AUG 2020) = neg / neg  CT/NG (AUG 2020) = neg    MMG (12 JUL 2023) = neg    PELVIC US (SEP 2017) = uterus [id number], EMS 10mm, ROV 4s4a6vw, LOV 5r9d7kw    ASSESSMENT AND PLAN:  47yo G0 with AUB/HMB and dysmenorrhea.  Up to date on PAP and MMG.    -lengthy discussion about evaluation of AUB  -to lab for CBC and TSH  -pelvic US ordered  -RTC for EMBx  -treatment options discussed (E+P, progestin-only, NSAIDS or Lysteda, Mirena, ablation, hysterectomy); patient thinks she'd be most interested in DepoProvera; written information provided about all    MD MICAH

## 2023-07-21 ENCOUNTER — HOSPITAL ENCOUNTER (OUTPATIENT)
Dept: RADIOLOGY | Facility: HOSPITAL | Age: 48
Discharge: HOME | End: 2023-07-21
Attending: GENERAL PRACTICE
Payer: MEDICARE

## 2023-07-21 DIAGNOSIS — N92.0 MENORRHAGIA WITH REGULAR CYCLE: ICD-10-CM

## 2023-07-21 PROCEDURE — 76856 US PELVIS COMP WITH TRANSVAG NON-OB (XPD): ICD-10-PCS | Mod: 26,,, | Performed by: RADIOLOGY

## 2023-07-21 PROCEDURE — 76830 US PELVIS COMP WITH TRANSVAG NON-OB (XPD): ICD-10-PCS | Mod: 26,,, | Performed by: RADIOLOGY

## 2023-07-21 PROCEDURE — 76856 US EXAM PELVIC COMPLETE: CPT | Mod: TC,PO

## 2023-07-21 PROCEDURE — 76856 US EXAM PELVIC COMPLETE: CPT | Mod: 26,,, | Performed by: RADIOLOGY

## 2023-07-21 PROCEDURE — 76830 TRANSVAGINAL US NON-OB: CPT | Mod: 26,,, | Performed by: RADIOLOGY

## 2023-07-26 ENCOUNTER — OFFICE VISIT (OUTPATIENT)
Dept: OBSTETRICS AND GYNECOLOGY | Facility: CLINIC | Age: 48
End: 2023-07-26
Payer: MEDICARE

## 2023-07-26 VITALS — HEIGHT: 67 IN | RESPIRATION RATE: 18 BRPM | BODY MASS INDEX: 36.64 KG/M2 | WEIGHT: 233.44 LBS

## 2023-07-26 DIAGNOSIS — Z30.9 ENCOUNTER FOR CONTRACEPTIVE MANAGEMENT, UNSPECIFIED TYPE: ICD-10-CM

## 2023-07-26 DIAGNOSIS — N92.0 MENORRHAGIA WITH REGULAR CYCLE: Primary | ICD-10-CM

## 2023-07-26 DIAGNOSIS — R31.9 HEMATURIA, UNSPECIFIED TYPE: ICD-10-CM

## 2023-07-26 LAB
B-HCG UR QL: NEGATIVE
CTP QC/QA: YES

## 2023-07-26 PROCEDURE — 88305 TISSUE EXAM BY PATHOLOGIST: CPT | Performed by: PATHOLOGY

## 2023-07-26 PROCEDURE — 3008F BODY MASS INDEX DOCD: CPT | Mod: CPTII,S$GLB,, | Performed by: GENERAL PRACTICE

## 2023-07-26 PROCEDURE — 3008F PR BODY MASS INDEX (BMI) DOCUMENTED: ICD-10-PCS | Mod: CPTII,S$GLB,, | Performed by: GENERAL PRACTICE

## 2023-07-26 PROCEDURE — 1159F PR MEDICATION LIST DOCUMENTED IN MEDICAL RECORD: ICD-10-PCS | Mod: CPTII,S$GLB,, | Performed by: GENERAL PRACTICE

## 2023-07-26 PROCEDURE — 81025 POCT URINE PREGNANCY: ICD-10-PCS | Mod: S$GLB,,, | Performed by: GENERAL PRACTICE

## 2023-07-26 PROCEDURE — 99214 OFFICE O/P EST MOD 30 MIN: CPT | Mod: 25,S$GLB,, | Performed by: GENERAL PRACTICE

## 2023-07-26 PROCEDURE — 99214 PR OFFICE/OUTPT VISIT, EST, LEVL IV, 30-39 MIN: ICD-10-PCS | Mod: 25,S$GLB,, | Performed by: GENERAL PRACTICE

## 2023-07-26 PROCEDURE — 1159F MED LIST DOCD IN RCRD: CPT | Mod: CPTII,S$GLB,, | Performed by: GENERAL PRACTICE

## 2023-07-26 PROCEDURE — 99999 PR PBB SHADOW E&M-EST. PATIENT-LVL III: ICD-10-PCS | Mod: PBBFAC,,, | Performed by: GENERAL PRACTICE

## 2023-07-26 PROCEDURE — 58100 BIOPSY OF UTERUS LINING: CPT | Mod: S$GLB,,, | Performed by: GENERAL PRACTICE

## 2023-07-26 PROCEDURE — 99999 PR PBB SHADOW E&M-EST. PATIENT-LVL III: CPT | Mod: PBBFAC,,, | Performed by: GENERAL PRACTICE

## 2023-07-26 PROCEDURE — 58100 PR BIOPSY OF UTERUS LINING: ICD-10-PCS | Mod: S$GLB,,, | Performed by: GENERAL PRACTICE

## 2023-07-26 PROCEDURE — 81025 URINE PREGNANCY TEST: CPT | Mod: S$GLB,,, | Performed by: GENERAL PRACTICE

## 2023-07-26 PROCEDURE — 88305 TISSUE EXAM BY PATHOLOGIST: CPT | Mod: 26,,, | Performed by: PATHOLOGY

## 2023-07-26 PROCEDURE — 88305 TISSUE EXAM BY PATHOLOGIST: ICD-10-PCS | Mod: 26,,, | Performed by: PATHOLOGY

## 2023-07-26 NOTE — PROGRESS NOTES
Carolina is a 48 y.o. G0, using nothing for contraception, here for an endometrial biopsy.  Please see my last note for details.    Today patient reports having hematuria a couple days ago.  Has had before.  Saw urology 6mo to a year ago, and CT was normal except for small stones. (Per patient)    LMP 10 JUL 2023    GYNECOLOGIC HISTORY  PAP'S: no h/o abnormals  STI'S: no past history  GENITAL HSV: no  DYSMENORRHEA: Yes L>R, shooting pain plus cramping  DYSPAREUNIA: No  PELVIC PRESSURE OR PAIN: No  MENSES:  Menarche: 14 yoa   Period duration: 5 days   # Heavy Days: 3-4   Pad/tampon ? on heavy days: Every 2hrs   Intermenstrual bleeding: No   Period frequency: regular every 28-30 days for past two years; prior to that cycle was Q-O-month   Other: Passes clots, wears period underwear overnight plus a tampon     Lab Date: 19 JUL 2023   WBC 6   HGB 16   HCT 49        TSH = 1.98 (19 JUL 2023)    PAP / HPV (AUG 2020) = neg / neg  CT/NG (AUG 2020) = neg    MMG (12 JUL 2023) = neg    PELVIC US (SEP 2017) = uterus 4w5p2fu, EMS 10mm, ROV 8c1m5bp, LOV 8u6w2wz  PELVIC US (21 JUL 2023) = uterus 8n4p9hn, EMS 7mm, ROV 4O0O4jq and has 8-15 follicles, LOV [id number]    Informed Consent: The indication for endometrial biopsy (AUB/HMB ) has been reviewed with the patient.  She understands the risks of the procedure (bleeding, infection, pain, uterine perforation, inadequate tissue for diagnosis) and agrees to proceed.  All of her questions have been answered.    Site Verification:  Proper patient, procedure, and site were confirmed prior to starting.    VITALS =   Vitals:    07/26/23 1101   BP: (P) 124/78   Resp: 18     GEN = nad, alert/oriented, pleasant   = nefg, no lesions, uterus small / anteverted / nontender / mobile, no adnexal mass or tenderness    Procedure: A speculum was placed in the vagina.  Normal vagina/ CVX with no lesions, normal discharge.  The cervix was cleaned with iodine.  A tenaculum was applied to the anterior  lip of the cervix.  The pipelle was introduced, and the uterus sounded to 9cm.   One pass was made with copious return of tissue.  The tenaculum was removed, and hemostasis was noted.  The speculum was removed.  The patient tolerated the procedure well.    A/P 47yo with HMB and regular cycles.  Recent gross hematuria.  -The patient was warned about bloody/black discharge which is normal and advised to return immediately for signs of infection, intolerable pain, or heavy bleeding.  -Advised pelvic rest x 48hrs  -Will notify patient with biopsy results via [employer]  -treatment options previously discussed (E+P, progestin-only, NSAIDS or Lysteda, Mirena, ablation, hysterectomy); patient previously stated she'd want to try DepoProvera but today saying she leans toward Lysteda --> will await Bx result prior to Rx  -UA and Cx to be done in a couple days (to avoid vaginal bleeding contamination)    MD MICAH

## 2023-07-28 ENCOUNTER — LAB VISIT (OUTPATIENT)
Dept: LAB | Facility: HOSPITAL | Age: 48
End: 2023-07-28
Attending: GENERAL PRACTICE
Payer: MEDICARE

## 2023-07-28 DIAGNOSIS — R31.9 HEMATURIA, UNSPECIFIED TYPE: ICD-10-CM

## 2023-07-28 LAB
BACTERIA #/AREA URNS AUTO: ABNORMAL /HPF
BILIRUB UR QL STRIP: NEGATIVE
CLARITY UR REFRACT.AUTO: CLEAR
COLOR UR AUTO: YELLOW
FINAL PATHOLOGIC DIAGNOSIS: NORMAL
GLUCOSE UR QL STRIP: NEGATIVE
GROSS: NORMAL
HGB UR QL STRIP: ABNORMAL
KETONES UR QL STRIP: NEGATIVE
LEUKOCYTE ESTERASE UR QL STRIP: ABNORMAL
Lab: NORMAL
MICROSCOPIC COMMENT: ABNORMAL
NITRITE UR QL STRIP: NEGATIVE
PH UR STRIP: 5 [PH] (ref 5–8)
PROT UR QL STRIP: NEGATIVE
RBC #/AREA URNS AUTO: 6 /HPF (ref 0–4)
SP GR UR STRIP: 1.01 (ref 1–1.03)
SQUAMOUS #/AREA URNS AUTO: 0 /HPF
URN SPEC COLLECT METH UR: ABNORMAL
WBC #/AREA URNS AUTO: 85 /HPF (ref 0–5)

## 2023-07-28 PROCEDURE — 87186 SC STD MICRODIL/AGAR DIL: CPT | Performed by: GENERAL PRACTICE

## 2023-07-28 PROCEDURE — 87086 URINE CULTURE/COLONY COUNT: CPT | Performed by: GENERAL PRACTICE

## 2023-07-28 PROCEDURE — 87088 URINE BACTERIA CULTURE: CPT | Performed by: GENERAL PRACTICE

## 2023-07-28 PROCEDURE — 87077 CULTURE AEROBIC IDENTIFY: CPT | Performed by: GENERAL PRACTICE

## 2023-07-28 PROCEDURE — 81001 URINALYSIS AUTO W/SCOPE: CPT | Performed by: GENERAL PRACTICE

## 2023-07-30 DIAGNOSIS — N30.01 ACUTE CYSTITIS WITH HEMATURIA: Primary | ICD-10-CM

## 2023-07-30 LAB — BACTERIA UR CULT: ABNORMAL

## 2023-07-30 RX ORDER — SULFAMETHOXAZOLE AND TRIMETHOPRIM 800; 160 MG/1; MG/1
1 TABLET ORAL 2 TIMES DAILY
Qty: 6 TABLET | Refills: 0 | Status: SHIPPED | OUTPATIENT
Start: 2023-07-30 | End: 2023-08-02

## 2023-08-01 NOTE — PROGRESS NOTES
Good morning,    Your endometrial biopsy result is benign.  No further evaluation or treatment is required at this time.  Let me know if you'd like to start something to manage your bleeding.    Sincerely,  Dr. Good    Endometrial biopsy:   Disordered proliferative phase endometrium   Negative for hyperplasia or malignancy

## 2023-08-07 DIAGNOSIS — N92.0 MENORRHAGIA WITH REGULAR CYCLE: Primary | ICD-10-CM

## 2023-08-07 RX ORDER — TRANEXAMIC ACID 650 MG/1
1300 TABLET ORAL 3 TIMES DAILY
Qty: 30 TABLET | Refills: 11 | Status: SHIPPED | OUTPATIENT
Start: 2023-08-07 | End: 2024-08-06

## 2023-09-18 ENCOUNTER — PATIENT MESSAGE (OUTPATIENT)
Dept: OPTOMETRY | Facility: CLINIC | Age: 48
End: 2023-09-18
Payer: MEDICARE

## 2023-09-27 ENCOUNTER — OFFICE VISIT (OUTPATIENT)
Dept: DERMATOLOGY | Facility: CLINIC | Age: 48
End: 2023-09-27
Payer: MEDICARE

## 2023-09-27 DIAGNOSIS — T07.XXXA MULTIPLE EXCORIATIONS: ICD-10-CM
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
## 2023-09-27 DIAGNOSIS — B36.0 TINEA VERSICOLOR: Primary | ICD-10-CM

## 2023-09-27 PROCEDURE — 99203 OFFICE O/P NEW LOW 30 MIN: CPT | Mod: 25,S$GLB,, | Performed by: STUDENT IN AN ORGANIZED HEALTH CARE EDUCATION/TRAINING PROGRAM

## 2023-09-27 PROCEDURE — 1159F PR MEDICATION LIST DOCUMENTED IN MEDICAL RECORD: ICD-10-PCS | Mod: CPTII,S$GLB,, | Performed by: STUDENT IN AN ORGANIZED HEALTH CARE EDUCATION/TRAINING PROGRAM

## 2023-09-27 PROCEDURE — 1160F PR REVIEW ALL MEDS BY PRESCRIBER/CLIN PHARMACIST DOCUMENTED: ICD-10-PCS | Mod: CPTII,S$GLB,, | Performed by: STUDENT IN AN ORGANIZED HEALTH CARE EDUCATION/TRAINING PROGRAM

## 2023-09-27 PROCEDURE — 87220 PR  TISSUE EXAM BY KOH: ICD-10-PCS | Mod: S$GLB,,, | Performed by: STUDENT IN AN ORGANIZED HEALTH CARE EDUCATION/TRAINING PROGRAM

## 2023-09-27 PROCEDURE — 1160F RVW MEDS BY RX/DR IN RCRD: CPT | Mod: CPTII,S$GLB,, | Performed by: STUDENT IN AN ORGANIZED HEALTH CARE EDUCATION/TRAINING PROGRAM

## 2023-09-27 PROCEDURE — 99203 PR OFFICE/OUTPT VISIT, NEW, LEVL III, 30-44 MIN: ICD-10-PCS | Mod: 25,S$GLB,, | Performed by: STUDENT IN AN ORGANIZED HEALTH CARE EDUCATION/TRAINING PROGRAM

## 2023-09-27 PROCEDURE — 87220 TISSUE EXAM FOR FUNGI: CPT | Mod: S$GLB,,, | Performed by: STUDENT IN AN ORGANIZED HEALTH CARE EDUCATION/TRAINING PROGRAM

## 2023-09-27 PROCEDURE — 1159F MED LIST DOCD IN RCRD: CPT | Mod: CPTII,S$GLB,, | Performed by: STUDENT IN AN ORGANIZED HEALTH CARE EDUCATION/TRAINING PROGRAM

## 2023-09-27 RX ORDER — TRIAMCINOLONE ACETONIDE 1 MG/G
CREAM TOPICAL 2 TIMES DAILY
Qty: 15 G | Refills: 0 | Status: SHIPPED | OUTPATIENT
Start: 2023-09-27

## 2023-09-27 RX ORDER — KETOCONAZOLE 20 MG/G
CREAM TOPICAL
Qty: 60 G | Refills: 5 | Status: SHIPPED | OUTPATIENT
Start: 2023-09-27

## 2023-09-27 RX ORDER — FLUCONAZOLE 200 MG/1
TABLET ORAL
Qty: 4 TABLET | Refills: 1 | Status: SHIPPED | OUTPATIENT
Start: 2023-09-27 | End: 2023-11-20

## 2023-09-27 NOTE — PROGRESS NOTES
Subjective:      Patient ID:  Carolina Andrews is a 48 y.o. female who presents for   Chief Complaint   Patient presents with    Rash     back     LOV 6/3/20 [facility]     Patient here today for rash on back x years. Comes and goes - triggered by heat. Patient states it will get itchy. States it will never resolve fully but improves. Cleared twice while she was on oral antifungals. Washes with dial soap in the shower.  States peeling skin on shoulders when she scratches. States it seems like a sunburn but she has not been in the sun.       Review of Systems   Constitutional:  Negative for fever, chills and fatigue.   Respiratory:  Negative for cough and shortness of breath.    Gastrointestinal:  Negative for nausea and vomiting.   Skin:  Positive for itching and rash.       Objective:   Physical Exam   Constitutional: She appears well-developed and well-nourished.   Neurological: She is alert and oriented to person, place, and time.   Psychiatric: She has a normal mood and affect.   Skin:   Areas Examined (abnormalities noted in diagram):   Chest / Axilla Inspection Performed  Abdomen Inspection Performed  Back Inspection Performed  RLE Inspected  LLE Inspection Performed            Diagram Legend     Erythematous scaling macule/papule c/w actinic keratosis       Vascular papule c/w angioma      Pigmented verrucoid papule/plaque c/w seborrheic keratosis      Yellow umbilicated papule c/w sebaceous hyperplasia      Irregularly shaped tan macule c/w lentigo     1-2 mm smooth white papules consistent with Milia      Movable subcutaneous cyst with punctum c/w epidermal inclusion cyst      Subcutaneous movable cyst c/w pilar cyst      Firm pink to brown papule c/w dermatofibroma      Pedunculated fleshy papule(s) c/w skin tag(s)      Evenly pigmented macule c/w junctional nevus     Mildly variegated pigmented, slightly irregular-bordered macule c/w mildly atypical nevus      Flesh colored to evenly pigmented papule c/w  intradermal nevus       Pink pearly papule/plaque c/w basal cell carcinoma      Erythematous hyperkeratotic cursted plaque c/w SCC      Surgical scar with no sign of skin cancer recurrence      Open and closed comedones      Inflammatory papules and pustules      Verrucoid papule consistent consistent with wart     Erythematous eczematous patches and plaques     Dystrophic onycholytic nail with subungual debris c/w onychomycosis     Umbilicated papule    Erythematous-base heme-crusted tan verrucoid plaque consistent with inflamed seborrheic keratosis     Erythematous Silvery Scaling Plaque c/w Psoriasis     See annotation      Assessment / Plan:        Tinea versicolor  -     ketoconazole (NIZORAL) 2 % cream; Use on AA of back daily x 2 weeks then 3 times weekly afterward  Dispense: 60 g; Refill: 5  -     fluconazole (DIFLUCAN) 200 MG Tab; 1 po biw x 2 weeks  Dispense: 4 tablet; Refill: 1  KOH + for hyphae and yeast  Hand out given  Does not take lexapro daily    Multiple excoriations  Arthropod bite, initial encounter  Tac cream BID x 2 weeks  Avoid scratching             No follow-ups on file.

## 2023-11-20 ENCOUNTER — OFFICE VISIT (OUTPATIENT)
Dept: FAMILY MEDICINE | Facility: CLINIC | Age: 48
End: 2023-11-20
Attending: FAMILY MEDICINE
Payer: MEDICARE

## 2023-11-20 VITALS
BODY MASS INDEX: 37.49 KG/M2 | HEART RATE: 90 BPM | OXYGEN SATURATION: 97 % | HEIGHT: 67 IN | SYSTOLIC BLOOD PRESSURE: 122 MMHG | TEMPERATURE: 98 F | DIASTOLIC BLOOD PRESSURE: 94 MMHG | RESPIRATION RATE: 17 BRPM | WEIGHT: 238.88 LBS

## 2023-11-20 DIAGNOSIS — E78.5 HYPERLIPIDEMIA, UNSPECIFIED HYPERLIPIDEMIA TYPE: ICD-10-CM

## 2023-11-20 DIAGNOSIS — Z00.00 ENCOUNTER FOR PREVENTIVE HEALTH EXAMINATION: Primary | ICD-10-CM

## 2023-11-20 DIAGNOSIS — I67.1 ANTERIOR COMMUNICATING ARTERY ANEURYSM: Chronic | ICD-10-CM

## 2023-11-20 DIAGNOSIS — G44.209 TENSION HEADACHE: ICD-10-CM

## 2023-11-20 DIAGNOSIS — H54.8 LEGALLY BLIND: ICD-10-CM

## 2023-11-20 DIAGNOSIS — H35.53 CONE DYSTROPHY: ICD-10-CM

## 2023-11-20 DIAGNOSIS — H93.19 TINNITUS, UNSPECIFIED LATERALITY: ICD-10-CM

## 2023-11-20 DIAGNOSIS — E66.01 SEVERE OBESITY (BMI 35.0-39.9) WITH COMORBIDITY: ICD-10-CM

## 2023-11-20 DIAGNOSIS — R10.11 RUQ PAIN: ICD-10-CM

## 2023-11-20 DIAGNOSIS — R03.0 ELEVATED BLOOD PRESSURE READING IN OFFICE WITHOUT DIAGNOSIS OF HYPERTENSION: ICD-10-CM

## 2023-11-20 DIAGNOSIS — K80.20 CALCULUS OF GALLBLADDER WITHOUT CHOLECYSTITIS WITHOUT OBSTRUCTION: ICD-10-CM

## 2023-11-20 DIAGNOSIS — R51.9 TEMPORAL HEADACHE: ICD-10-CM

## 2023-11-20 DIAGNOSIS — F41.8 DEPRESSION WITH ANXIETY: ICD-10-CM

## 2023-11-20 DIAGNOSIS — G44.89 OTHER HEADACHE SYNDROME: ICD-10-CM

## 2023-11-20 PROCEDURE — 3074F SYST BP LT 130 MM HG: CPT | Mod: CPTII,S$GLB,, | Performed by: FAMILY MEDICINE

## 2023-11-20 PROCEDURE — 3080F PR MOST RECENT DIASTOLIC BLOOD PRESSURE >= 90 MM HG: ICD-10-PCS | Mod: CPTII,S$GLB,, | Performed by: FAMILY MEDICINE

## 2023-11-20 PROCEDURE — 99396 PREV VISIT EST AGE 40-64: CPT | Mod: GZ,S$GLB,, | Performed by: FAMILY MEDICINE

## 2023-11-20 PROCEDURE — 1160F PR REVIEW ALL MEDS BY PRESCRIBER/CLIN PHARMACIST DOCUMENTED: ICD-10-PCS | Mod: CPTII,S$GLB,, | Performed by: FAMILY MEDICINE

## 2023-11-20 PROCEDURE — 99999 PR PBB SHADOW E&M-EST. PATIENT-LVL V: CPT | Mod: PBBFAC,,, | Performed by: FAMILY MEDICINE

## 2023-11-20 PROCEDURE — 1159F MED LIST DOCD IN RCRD: CPT | Mod: CPTII,S$GLB,, | Performed by: FAMILY MEDICINE

## 2023-11-20 PROCEDURE — 3008F PR BODY MASS INDEX (BMI) DOCUMENTED: ICD-10-PCS | Mod: CPTII,S$GLB,, | Performed by: FAMILY MEDICINE

## 2023-11-20 PROCEDURE — 1160F RVW MEDS BY RX/DR IN RCRD: CPT | Mod: CPTII,S$GLB,, | Performed by: FAMILY MEDICINE

## 2023-11-20 PROCEDURE — 3008F BODY MASS INDEX DOCD: CPT | Mod: CPTII,S$GLB,, | Performed by: FAMILY MEDICINE

## 2023-11-20 PROCEDURE — 99999 PR PBB SHADOW E&M-EST. PATIENT-LVL V: ICD-10-PCS | Mod: PBBFAC,,, | Performed by: FAMILY MEDICINE

## 2023-11-20 PROCEDURE — 99396 PR PREVENTIVE VISIT,EST,40-64: ICD-10-PCS | Mod: GZ,S$GLB,, | Performed by: FAMILY MEDICINE

## 2023-11-20 PROCEDURE — 3080F DIAST BP >= 90 MM HG: CPT | Mod: CPTII,S$GLB,, | Performed by: FAMILY MEDICINE

## 2023-11-20 PROCEDURE — 1159F PR MEDICATION LIST DOCUMENTED IN MEDICAL RECORD: ICD-10-PCS | Mod: CPTII,S$GLB,, | Performed by: FAMILY MEDICINE

## 2023-11-20 PROCEDURE — 3074F PR MOST RECENT SYSTOLIC BLOOD PRESSURE < 130 MM HG: ICD-10-PCS | Mod: CPTII,S$GLB,, | Performed by: FAMILY MEDICINE

## 2023-11-20 RX ORDER — GABAPENTIN 300 MG/1
600 CAPSULE ORAL NIGHTLY
Qty: 180 CAPSULE | Refills: 1 | Status: SHIPPED | OUTPATIENT
Start: 2023-11-20

## 2023-11-20 RX ORDER — NORTRIPTYLINE HYDROCHLORIDE 25 MG/1
25 CAPSULE ORAL NIGHTLY
Qty: 90 CAPSULE | Refills: 3 | Status: SHIPPED | OUTPATIENT
Start: 2023-11-20 | End: 2024-11-19

## 2023-11-20 RX ORDER — ESCITALOPRAM OXALATE 10 MG/1
10 TABLET ORAL DAILY
Qty: 90 TABLET | Refills: 3 | Status: SHIPPED | OUTPATIENT
Start: 2023-11-20

## 2023-11-20 NOTE — PROGRESS NOTES
Subjective:       Patient ID: Carolina Andrews is a 48 y.o. female.    Chief Complaint: Annual Exam (Pt states that she is here for her annual exam )    48-year-old female coming in for annual exam.  She has a history of an anterior communicating artery aneurysms status post coiling and is being followed by Interventional Radiology with MRAs previously every year and now every other year.  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  She is status post cholecystectomy and the cerebral aneurysm coiling.  Her colonoscopy was last done February of 2019 by Dr. Childers.  Epic continues to indicate that she needs a 10 year recheck but Dr. Childers does report after reviewing her pathology results indicated she should recheck in five years, next February.  The health maintenance function was updated.  The patient was doing well with chronic tension headaches using a combination of gabapentin 300 mg two HS and nortriptyline 25 mg HS.  She ran out of the nortriptyline sometime back in the headaches have been gradually getting worse almost to the point where she sought treatment originally.  The gabapentin by itself does not appear to be sufficient to control them.  She is also noted to have elevated blood pressure without a history today.  On questioning she  has been eating some salty snacks.  We discussed the possibility of treatment and she wants to make an effort at lifestyle modification for three months before doing so.  She is willing to come back in for recheck on weight and blood pressure in three months.    Past Medical History:  No date: Arthritis  No date: Blindness  No date: Cholelithiasis  No date: Liver hemangioma  No date: Mental disorder       Comment:  Periodic antidepressant use since childhood, not                currently taking or endorsing depressive ideation  No date: Pinched nerve    Past Surgical History:  08/06/2019: ANGIOGRAPHY; N/A      Comment:  Procedure: ANGIOGRAM/EMBOLIZATION;  Surgeon: Andressa                Surgeon;  Location: [facility];  Service: Anesthesiology;                Laterality: N/A;  [address]  2009: ANKLE FRACTURE SURGERY  07/15/2019: CEREBRAL ANGIOGRAM; N/A      Comment:  Procedure: ANGIOGRAM-CEREBRAL;  Surgeon: [facility]               Provider;  [facility];  Service:                Radiology;  Laterality: N/A;  [address]  02/04/2020: CEREBRAL ANGIOGRAM; N/A      Comment:  Procedure: ANGIOGRAM-CEREBRAL;  Surgeon: [facility]               Provider;  Location: [facility] OR [address] [place] [address];  Service:                Radiology;  Laterality: N/A;  [address]  02/04/2019: COLONOSCOPY; N/A      Comment:  Procedure: COLONOSCOPY;  Surgeon: Dale Childers MD;                Location: [facility] ENDO;  Service: Endoscopy;  Laterality:                N/A;  11/19/2018: ESOPHAGOGASTRODUODENOSCOPY; N/A      Comment:  Procedure: EGD (ESOPHAGOGASTRODUODENOSCOPY);  Surgeon:                Dale Childers MD;  Location: [facility];  Service:                Endoscopy;  Laterality: N/A;  2010: FRACTURE SURGERY; Left      Comment:  left ankle  04/22/2022: LAPAROSCOPIC CHOLECYSTECTOMY; N/A      Comment:  Procedure: CHOLECYSTECTOMY, LAPAROSCOPIC;  Surgeon: Salomón Milian MD;  Location: [facility];  Service: General;                 Laterality: N/A;  ~1998: UPPER GASTROINTESTINAL ENDOSCOPY      Comment:  normal findings per patient report    Review of patient's family history indicates:  Problem: Hypertension      Relation: Father          Age of Onset: (Not Specified)  Problem: Lung disease      Relation: Father          Age of Onset: (Not Specified)          Comment: calapse x 3   Problem: Stroke      Relation:  Father          Age of Onset: (Not Specified)          Comment: in eye   Problem: Retinal detachment      Relation: Father          Age of Onset: (Not Specified)  Problem: Cancer      Relation: Sister          Age of Onset: 23          Comment: polyps found incidentally  Problem: Vision loss      Relation: Sister          Age of Onset: (Not Specified)  Problem: Lung disease      Relation: Sister          Age of Onset: (Not Specified)          Comment: spontanious lung collapse  Problem: GI problems      Relation: Sister          Age of Onset: (Not Specified)  Problem: Psoriasis      Relation: Sister          Age of Onset: (Not Specified)  Problem: Retinal detachment      Relation: Mother          Age of Onset: (Not Specified)  Problem: Cancer      Relation: Paternal Grandfather          Age of Onset: (Not Specified)  Problem: Stroke      Relation: Maternal Grandmother          Age of Onset: (Not Specified)  Problem: Colon cancer      Relation: Paternal Aunt          Age of Onset: (Not Specified)  Problem: Colon polyps      Relation: Maternal Uncle          Age of Onset: (Not Specified)  Problem: Psoriasis      Relation: Maternal Uncle          Age of Onset: (Not Specified)  Problem: No Known Problems      Relation: Paternal Uncle          Age of Onset: (Not Specified)  Problem: Parkinsonism      Relation: Maternal Grandfather          Age of Onset: (Not Specified)  Problem: Cancer      Relation: Maternal Grandfather          Age of Onset: (Not Specified)          Comment: prostate  Problem: Colon polyps      Relation: Maternal Grandfather          Age of Onset: (Not Specified)  Problem: Cancer      Relation: Paternal Grandmother          Age of Onset: (Not Specified)          Comment: skin cancer in colon   Problem: Alzheimer's disease      Relation: Paternal Grandmother          Age of Onset: (Not Specified)  Problem: Heart disease      Relation: Paternal Aunt          Age of Onset: (Not Specified)  Problem: Breast  cancer      Relation: Neg Hx          Age of Onset: (Not Specified)  Problem: Diabetes      Relation: Neg Hx          Age of Onset: (Not Specified)  Problem: Miscarriages / Stillbirths      Relation: Neg Hx          Age of Onset: (Not Specified)  Problem: Crohn's disease      Relation: Neg Hx          Age of Onset: (Not Specified)  Problem: Ulcerative colitis      Relation: Neg Hx          Age of Onset: (Not Specified)  Problem: Glaucoma      Relation: Neg Hx          Age of Onset: (Not Specified)  Problem: Eczema      Relation: Neg Hx          Age of Onset: (Not Specified)  Problem: Lupus      Relation: Neg Hx          Age of Onset: (Not Specified)  Problem: Melanoma      Relation: Neg Hx          Age of Onset: (Not Specified)    Social History    Tobacco Use      Smoking status: Never      Smokeless tobacco: Never    Alcohol use: Yes      Alcohol/week: 6.0 standard drinks of alcohol      Types: 6 Glasses of wine per week      Comment: 2-3/ week wine or beer    Drug use: No    Current Outpatient Medications on File Prior to Visit:  acetaminophen (TYLENOL) 500 MG tablet, Take 500 mg by mouth every 6 (six) hours as needed for Pain., Disp: , Rfl:   aspirin (ECOTRIN) 81 MG EC tablet, Take 1 tablet (81 mg total) by mouth once daily., Disp: 30 tablet, Rfl: 11  fexofenadine HCl (ALLEGRA ORAL), Take 1 tablet by mouth as needed., Disp: , Rfl:   ketoconazole (NIZORAL) 2 % cream, Use on AA of back daily x 2 weeks then 3 times weekly afterward, Disp: 60 g, Rfl: 5  LORazepam (ATIVAN) 1 MG tablet, Take 1 tablet (1 mg total) by mouth every 8 (eight) hours as needed for Anxiety., Disp: 21 tablet, Rfl: 0  mupirocin (BACTROBAN) 2 % ointment, Apply topically 3 (three) times daily., Disp: 15 g, Rfl: 1  tranexamic acid (LYSTEDA) 650 mg tablet, Take 2 tablets (1,300 mg total) by mouth 3 (three) times daily. Take for up to 5 days during your period., Disp: 30 tablet, Rfl: 11  triamcinolone acetonide 0.1% (KENALOG) 0.1 % cream, Apply  topically 2 (two) times daily., Disp: 15 g, Rfl: 0  [DISCONTINUED] gabapentin (NEURONTIN) 300 MG capsule, Take 2 capsules (600 mg total) by mouth every evening., Disp: 360 capsule, Rfl: 1  [DISCONTINUED] nortriptyline (PAMELOR) 25 MG capsule, Take 1 capsule (25 mg total) by mouth every evening., Disp: 90 capsule, Rfl: 3  b complex vitamins tablet, Take 1 tablet by mouth once daily., Disp: , Rfl:   cholecalciferol, vitamin D3, (VITAMIN D3) 25 mcg (1,000 unit) capsule, Take 1,000 Units by mouth once daily., Disp: , Rfl:   [DISCONTINUED] EScitalopram oxalate (LEXAPRO) 10 MG tablet, Take 1 tablet (10 mg total) by mouth once daily. (Patient not taking: Reported on 7/26/2023), Disp: 90 tablet, Rfl: 1  [DISCONTINUED] fluconazole (DIFLUCAN) 200 MG Tab, 1 po biw x 2 weeks (Patient not taking: Reported on 11/20/2023), Disp: 4 tablet, Rfl: 1  [DISCONTINUED] pantoprazole (PROTONIX) 20 MG tablet, Take 1 tablet (20 mg total) by mouth once daily. (Patient not taking: Reported on 7/26/2023), Disp: 90 tablet, Rfl: 3    No current facility-administered medications on file prior to visit.          Review of Systems   Constitutional:  Negative for chills, diaphoresis, fatigue, fever and unexpected weight change.   HENT:  Negative for congestion, ear pain, hearing loss, postnasal drip, sinus pressure, sneezing, sore throat, tinnitus and trouble swallowing.    Eyes:  Negative for itching and visual disturbance.   Respiratory:  Negative for cough, chest tightness, shortness of breath and wheezing.    Cardiovascular:  Negative for chest pain, palpitations and leg swelling.   Gastrointestinal:  Negative for abdominal pain, blood in stool, constipation, diarrhea, nausea and vomiting.   Genitourinary:  Negative for dysuria, frequency, hematuria, menstrual problem, pelvic pain, vaginal bleeding and vaginal discharge.   Musculoskeletal:  Negative for arthralgias, back pain, joint swelling and myalgias.   Neurological:  Negative for dizziness  and headaches.   Hematological:  Negative for adenopathy.   Psychiatric/Behavioral:  Negative for sleep disturbance. The patient is not nervous/anxious.        Objective:      Physical Exam  Vitals and nursing note reviewed.   Constitutional:       General: She is not in acute distress.     Appearance: Normal appearance. She is well-developed. She is obese. She is not ill-appearing, toxic-appearing or diaphoretic.      Comments:   Elevated diastolic blood pressure   Severe obesity with a BMI of 37.4 she is down 1/2 lb from her last physical March 30, 2022   HENT:      Head: Normocephalic and atraumatic.      Right Ear: Tympanic membrane and external ear normal. There is no impacted cerumen.      Left Ear: Tympanic membrane, ear canal and external ear normal. There is no impacted cerumen.      Nose: Nose normal. No congestion or rhinorrhea.      Mouth/Throat:      Mouth: Mucous membranes are moist.      Pharynx: Oropharynx is clear. No oropharyngeal exudate or posterior oropharyngeal erythema.   Eyes:      General: No scleral icterus.        Right eye: No discharge.         Left eye: No discharge.      Extraocular Movements: Extraocular movements intact.      Conjunctiva/sclera: Conjunctivae normal.      Pupils: Pupils are equal, round, and reactive to light.   Neck:      Thyroid: No thyromegaly.      Vascular: No carotid bruit or JVD.   Cardiovascular:      Rate and Rhythm: Normal rate and regular rhythm.      Pulses: Normal pulses.      Heart sounds: Normal heart sounds. No murmur heard.     No friction rub. No gallop.   Pulmonary:      Effort: Pulmonary effort is normal. No respiratory distress.      Breath sounds: Normal breath sounds. No stridor. No wheezing, rhonchi or rales.   Chest:      Chest wall: No tenderness.   Abdominal:      General: Bowel sounds are normal. There is no distension.      Palpations: Abdomen is soft. There is no mass.      Tenderness: There is no abdominal tenderness. There is no  guarding or rebound.      Hernia: No hernia is present.   Musculoskeletal:         General: No swelling, tenderness, deformity or signs of injury. Normal range of motion.      Cervical back: Normal range of motion and neck supple. No rigidity or tenderness.      Right lower leg: No edema.      Left lower leg: No edema.   Lymphadenopathy:      Cervical: No cervical adenopathy.   Skin:     General: Skin is warm and dry.      Coloration: Skin is not jaundiced or pale.      Findings: No bruising, erythema, lesion or rash.   Neurological:      General: No focal deficit present.      Mental Status: She is alert and oriented to person, place, and time. Mental status is at baseline.      Cranial Nerves: No cranial nerve deficit.      Sensory: No sensory deficit.      Motor: No weakness.      Coordination: Coordination normal.      Gait: Gait normal.      Deep Tendon Reflexes: Reflexes are normal and symmetric. Reflexes normal.   Psychiatric:         Mood and Affect: Mood normal.         Thought Content: Thought content normal.         Judgment: Judgment normal.         Assessment:       1. Encounter for preventive health examination    2. Elevated blood pressure reading in office without diagnosis of hypertension    3. Other headache syndrome    4. Anterior communicating artery aneurysm    5. Depression with anxiety    6. Legally blind    7. Cone dystrophy    8. Tinnitus, unspecified laterality    9. Calculus of gallbladder without cholecystitis without obstruction    10. RUQ pain    11. Temporal headache    12. Tension headache    13. Hyperlipidemia, unspecified hyperlipidemia type    14. Severe obesity (BMI 35.0-39.9) with comorbidity        Plan:       1. Encounter for preventive health examination  - Comprehensive Metabolic Panel; Future  - Lipid Panel; Future  - CBC Auto Differential; Future    2. Elevated blood pressure reading in office without diagnosis of hypertension   Reviewed salt restrictions, exercise, weight  control and their affects on blood pressure.  She will try to increase her exercise, she is site impaired and her neighborhood is not safe for walking due to numerous vehicles driving and cell phoning at the same time.   She will come in in three months for a recheck blood pressure and weight  - Comprehensive Metabolic Panel; Future  - Lipid Panel; Future  - CBC Auto Differential; Future    3. Other headache syndrome   Resume gabapentin plus nortriptyline 25 mg daily    4. Anterior communicating artery aneurysm   Status post coiling, followed by Dr. Bonilla in Interventional Radiology    5. Depression with anxiety   Well controlled on Lexapro.  She has used very little of the Ativan in the last two years  - EScitalopram oxalate (LEXAPRO) 10 MG tablet; Take 1 tablet (10 mg total) by mouth once daily.  Dispense: 90 tablet; Refill: 3    6. Legally blind   Secondary to cone dystrophy    7. Cone dystrophy   See above    8. Tinnitus, unspecified laterality   Chronic and stable    9. Calculus of gallbladder without cholecystitis without obstruction   Status post laparoscopic cholecystectomy    10. RUQ pain   resolved  - gabapentin (NEURONTIN) 300 MG capsule; Take 2 capsules (600 mg total) by mouth every evening.  Dispense: 180 capsule; Refill: 1    11. Temporal headache   See above  - nortriptyline (PAMELOR) 25 MG capsule; Take 1 capsule (25 mg total) by mouth every evening.  Dispense: 90 capsule; Refill: 3    12. Tension headache   See above  - nortriptyline (PAMELOR) 25 MG capsule; Take 1 capsule (25 mg total) by mouth every evening.  Dispense: 90 capsule; Refill: 3    13. Hyperlipidemia, unspecified hyperlipidemia type   Await lipid panel results  - Comprehensive Metabolic Panel; Future  - Lipid Panel; Future    14. Severe obesity (BMI 35.0-39.9) with comorbidity   See above

## 2023-11-27 ENCOUNTER — LAB VISIT (OUTPATIENT)
Dept: LAB | Facility: HOSPITAL | Age: 48
End: 2023-11-27
Attending: FAMILY MEDICINE
Payer: MEDICARE

## 2023-11-27 DIAGNOSIS — E78.5 HYPERLIPIDEMIA, UNSPECIFIED HYPERLIPIDEMIA TYPE: ICD-10-CM

## 2023-11-27 DIAGNOSIS — Z00.00 ENCOUNTER FOR PREVENTIVE HEALTH EXAMINATION: ICD-10-CM

## 2023-11-27 DIAGNOSIS — R03.0 ELEVATED BLOOD PRESSURE READING IN OFFICE WITHOUT DIAGNOSIS OF HYPERTENSION: ICD-10-CM

## 2023-11-27 LAB
ALBUMIN SERPL BCP-MCNC: 3.8 G/DL (ref 3.5–5.2)
ALP SERPL-CCNC: 77 U/L (ref 55–135)
ALT SERPL W/O P-5'-P-CCNC: 36 U/L (ref 10–44)
ANION GAP SERPL CALC-SCNC: 9 MMOL/L (ref 8–16)
AST SERPL-CCNC: 31 U/L (ref 10–40)
BASOPHILS # BLD AUTO: 0.05 K/UL (ref 0–0.2)
BASOPHILS NFR BLD: 0.9 % (ref 0–1.9)
BILIRUB SERPL-MCNC: 0.6 MG/DL (ref 0.1–1)
BUN SERPL-MCNC: 8 MG/DL (ref 6–20)
CALCIUM SERPL-MCNC: 9.6 MG/DL (ref 8.7–10.5)
CHLORIDE SERPL-SCNC: 106 MMOL/L (ref 95–110)
CHOLEST SERPL-MCNC: 227 MG/DL (ref 120–199)
CHOLEST/HDLC SERPL: 4 {RATIO} (ref 2–5)
CO2 SERPL-SCNC: 26 MMOL/L (ref 23–29)
CREAT SERPL-MCNC: 0.8 MG/DL (ref 0.5–1.4)
DIFFERENTIAL METHOD: ABNORMAL
EOSINOPHIL # BLD AUTO: 0.1 K/UL (ref 0–0.5)
EOSINOPHIL NFR BLD: 1.9 % (ref 0–8)
ERYTHROCYTE [DISTWIDTH] IN BLOOD BY AUTOMATED COUNT: 12.9 % (ref 11.5–14.5)
EST. GFR  (NO RACE VARIABLE): >60 ML/MIN/1.73 M^2
GLUCOSE SERPL-MCNC: 90 MG/DL (ref 70–110)
HCT VFR BLD AUTO: 45.8 % (ref 37–48.5)
HDLC SERPL-MCNC: 57 MG/DL (ref 40–75)
HDLC SERPL: 25.1 % (ref 20–50)
HGB BLD-MCNC: 15.1 G/DL (ref 12–16)
IMM GRANULOCYTES # BLD AUTO: 0.05 K/UL (ref 0–0.04)
IMM GRANULOCYTES NFR BLD AUTO: 0.9 % (ref 0–0.5)
LDLC SERPL CALC-MCNC: 139.6 MG/DL (ref 63–159)
LYMPHOCYTES # BLD AUTO: 1.9 K/UL (ref 1–4.8)
LYMPHOCYTES NFR BLD: 32.9 % (ref 18–48)
MCH RBC QN AUTO: 31.1 PG (ref 27–31)
MCHC RBC AUTO-ENTMCNC: 33 G/DL (ref 32–36)
MCV RBC AUTO: 94 FL (ref 82–98)
MONOCYTES # BLD AUTO: 0.3 K/UL (ref 0.3–1)
MONOCYTES NFR BLD: 5 % (ref 4–15)
NEUTROPHILS # BLD AUTO: 3.4 K/UL (ref 1.8–7.7)
NEUTROPHILS NFR BLD: 58.4 % (ref 38–73)
NONHDLC SERPL-MCNC: 170 MG/DL
NRBC BLD-RTO: 0 /100 WBC
PLATELET # BLD AUTO: 248 K/UL (ref 150–450)
PMV BLD AUTO: 11.6 FL (ref 9.2–12.9)
POTASSIUM SERPL-SCNC: 4.3 MMOL/L (ref 3.5–5.1)
PROT SERPL-MCNC: 7.6 G/DL (ref 6–8.4)
RBC # BLD AUTO: 4.86 M/UL (ref 4–5.4)
SODIUM SERPL-SCNC: 141 MMOL/L (ref 136–145)
TRIGL SERPL-MCNC: 152 MG/DL (ref 30–150)
WBC # BLD AUTO: 5.75 K/UL (ref 3.9–12.7)

## 2023-11-27 PROCEDURE — 85025 COMPLETE CBC W/AUTO DIFF WBC: CPT | Performed by: FAMILY MEDICINE

## 2023-11-27 PROCEDURE — 80061 LIPID PANEL: CPT | Performed by: FAMILY MEDICINE

## 2023-11-27 PROCEDURE — 36415 COLL VENOUS BLD VENIPUNCTURE: CPT | Mod: PO | Performed by: FAMILY MEDICINE

## 2023-11-27 PROCEDURE — 80053 COMPREHEN METABOLIC PANEL: CPT | Performed by: FAMILY MEDICINE

## 2024-01-11 ENCOUNTER — OFFICE VISIT (OUTPATIENT)
Dept: FAMILY MEDICINE | Facility: CLINIC | Age: 49
End: 2024-01-11
Payer: MEDICARE

## 2024-01-11 VITALS
HEIGHT: 67 IN | BODY MASS INDEX: 38.17 KG/M2 | TEMPERATURE: 98 F | DIASTOLIC BLOOD PRESSURE: 78 MMHG | OXYGEN SATURATION: 96 % | SYSTOLIC BLOOD PRESSURE: 118 MMHG | WEIGHT: 243.19 LBS | HEART RATE: 102 BPM

## 2024-01-11 DIAGNOSIS — R31.9 URINARY TRACT INFECTION WITH HEMATURIA, SITE UNSPECIFIED: ICD-10-CM

## 2024-01-11 DIAGNOSIS — N39.0 URINARY TRACT INFECTION WITH HEMATURIA, SITE UNSPECIFIED: ICD-10-CM

## 2024-01-11 DIAGNOSIS — R30.0 DYSURIA: Primary | ICD-10-CM

## 2024-01-11 LAB
BILIRUBIN, UA POC OHS: NEGATIVE
BLOOD, UA POC OHS: ABNORMAL
CLARITY, UA POC OHS: ABNORMAL
COLOR, UA POC OHS: YELLOW
GLUCOSE, UA POC OHS: NEGATIVE
KETONES, UA POC OHS: NEGATIVE
LEUKOCYTES, UA POC OHS: ABNORMAL
NITRITE, UA POC OHS: NEGATIVE
PH, UA POC OHS: 5.5
PROTEIN, UA POC OHS: NEGATIVE
SPECIFIC GRAVITY, UA POC OHS: <=1.005
UROBILINOGEN, UA POC OHS: 0.2

## 2024-01-11 PROCEDURE — 87088 URINE BACTERIA CULTURE: CPT | Performed by: FAMILY MEDICINE

## 2024-01-11 PROCEDURE — 87077 CULTURE AEROBIC IDENTIFY: CPT | Performed by: FAMILY MEDICINE

## 2024-01-11 PROCEDURE — 3008F BODY MASS INDEX DOCD: CPT | Mod: CPTII,S$GLB,, | Performed by: FAMILY MEDICINE

## 2024-01-11 PROCEDURE — 3078F DIAST BP <80 MM HG: CPT | Mod: CPTII,S$GLB,, | Performed by: FAMILY MEDICINE

## 2024-01-11 PROCEDURE — 87186 SC STD MICRODIL/AGAR DIL: CPT | Performed by: FAMILY MEDICINE

## 2024-01-11 PROCEDURE — 1160F RVW MEDS BY RX/DR IN RCRD: CPT | Mod: CPTII,S$GLB,, | Performed by: FAMILY MEDICINE

## 2024-01-11 PROCEDURE — 87086 URINE CULTURE/COLONY COUNT: CPT | Performed by: FAMILY MEDICINE

## 2024-01-11 PROCEDURE — 99213 OFFICE O/P EST LOW 20 MIN: CPT | Mod: S$GLB,,, | Performed by: FAMILY MEDICINE

## 2024-01-11 PROCEDURE — 99999 PR PBB SHADOW E&M-EST. PATIENT-LVL IV: CPT | Mod: PBBFAC,,, | Performed by: FAMILY MEDICINE

## 2024-01-11 PROCEDURE — 1159F MED LIST DOCD IN RCRD: CPT | Mod: CPTII,S$GLB,, | Performed by: FAMILY MEDICINE

## 2024-01-11 PROCEDURE — 3074F SYST BP LT 130 MM HG: CPT | Mod: CPTII,S$GLB,, | Performed by: FAMILY MEDICINE

## 2024-01-11 PROCEDURE — 81003 URINALYSIS AUTO W/O SCOPE: CPT | Mod: QW,S$GLB,, | Performed by: FAMILY MEDICINE

## 2024-01-11 RX ORDER — SULFAMETHOXAZOLE AND TRIMETHOPRIM 800; 160 MG/1; MG/1
1 TABLET ORAL 2 TIMES DAILY
Qty: 10 TABLET | Refills: 0 | Status: SHIPPED | OUTPATIENT
Start: 2024-01-11 | End: 2024-01-16

## 2024-01-11 NOTE — PROGRESS NOTES
Subjective:       Patient ID: Carolina Andrews is a 48 y.o. female.    Chief Complaint: No chief complaint on file.    Patient here for UC visit.  Onset 1 week of terminal dysuria and blood-tinge on toilet paper.  One episode of right CVA area pain a few nights ago.  No fever or CVA pain now.        Review of Systems   Constitutional:  Negative for fever.   Respiratory:  Negative for shortness of breath.    Cardiovascular:  Negative for chest pain.   Gastrointestinal:  Negative for abdominal pain and nausea.   Genitourinary:  Positive for dysuria and hematuria.   Skin:  Negative for rash.   All other systems reviewed and are negative.      Objective:      Physical Exam  Vitals reviewed.   Constitutional:       General: She is not in acute distress.     Appearance: She is well-developed. She is not ill-appearing.   Cardiovascular:      Rate and Rhythm: Normal rate and regular rhythm.      Heart sounds: No murmur heard.  Pulmonary:      Effort: Pulmonary effort is normal.      Breath sounds: Normal breath sounds.   Abdominal:      Palpations: Abdomen is soft.      Tenderness: There is abdominal tenderness in the suprapubic area. There is no right CVA tenderness or left CVA tenderness.   Neurological:      Mental Status: She is alert.         Assessment:       1. Dysuria    2. Urinary tract infection with hematuria, site unspecified        Plan:       Dysuria  -     POCT Urinalysis(Instrument)    Urinary tract infection with hematuria, site unspecified  -     sulfamethoxazole-trimethoprim 800-160mg (BACTRIM DS) 800-160 mg Tab; Take 1 tablet by mouth 2 (two) times daily.  Dispense: 10 tablet; Refill: 0  -     Urine culture      Patient Instructions   Push fluids intake.  Drink plenty of water.     Contact your PCP if any worsening or for any new concerns as we discussed.

## 2024-01-14 LAB — BACTERIA UR CULT: ABNORMAL

## 2024-04-19 ENCOUNTER — OFFICE VISIT (OUTPATIENT)
Dept: FAMILY MEDICINE | Facility: CLINIC | Age: 49
End: 2024-04-19
Attending: FAMILY MEDICINE
Payer: MEDICARE

## 2024-04-19 ENCOUNTER — LAB VISIT (OUTPATIENT)
Dept: LAB | Facility: HOSPITAL | Age: 49
End: 2024-04-19
Attending: FAMILY MEDICINE
Payer: MEDICARE

## 2024-04-19 VITALS
SYSTOLIC BLOOD PRESSURE: 116 MMHG | OXYGEN SATURATION: 97 % | HEART RATE: 86 BPM | DIASTOLIC BLOOD PRESSURE: 68 MMHG | TEMPERATURE: 98 F | HEIGHT: 67 IN | WEIGHT: 236.69 LBS | BODY MASS INDEX: 37.15 KG/M2

## 2024-04-19 DIAGNOSIS — R41.3 MEMORY DEFICIT: ICD-10-CM

## 2024-04-19 DIAGNOSIS — G24.9 DYSTONIA: Primary | ICD-10-CM

## 2024-04-19 DIAGNOSIS — G24.9 DYSTONIA: ICD-10-CM

## 2024-04-19 LAB
ALBUMIN SERPL BCP-MCNC: 4 G/DL (ref 3.5–5.2)
ALP SERPL-CCNC: 79 U/L (ref 55–135)
ALT SERPL W/O P-5'-P-CCNC: 33 U/L (ref 10–44)
ANION GAP SERPL CALC-SCNC: 10 MMOL/L (ref 8–16)
AST SERPL-CCNC: 25 U/L (ref 10–40)
BASOPHILS # BLD AUTO: 0.04 K/UL (ref 0–0.2)
BASOPHILS NFR BLD: 0.7 % (ref 0–1.9)
BILIRUB SERPL-MCNC: 0.6 MG/DL (ref 0.1–1)
BUN SERPL-MCNC: 8 MG/DL (ref 6–20)
CALCIUM SERPL-MCNC: 10 MG/DL (ref 8.7–10.5)
CHLORIDE SERPL-SCNC: 105 MMOL/L (ref 95–110)
CO2 SERPL-SCNC: 24 MMOL/L (ref 23–29)
CREAT SERPL-MCNC: 1 MG/DL (ref 0.5–1.4)
DIFFERENTIAL METHOD BLD: ABNORMAL
EOSINOPHIL # BLD AUTO: 0.1 K/UL (ref 0–0.5)
EOSINOPHIL NFR BLD: 1.4 % (ref 0–8)
ERYTHROCYTE [DISTWIDTH] IN BLOOD BY AUTOMATED COUNT: 12.8 % (ref 11.5–14.5)
ERYTHROCYTE [SEDIMENTATION RATE] IN BLOOD BY WESTERGREN METHOD: 17 MM/HR (ref 0–20)
EST. GFR  (NO RACE VARIABLE): >60 ML/MIN/1.73 M^2
GLUCOSE SERPL-MCNC: 110 MG/DL (ref 70–110)
HCT VFR BLD AUTO: 46.1 % (ref 37–48.5)
HGB BLD-MCNC: 15.6 G/DL (ref 12–16)
IMM GRANULOCYTES # BLD AUTO: 0.02 K/UL (ref 0–0.04)
IMM GRANULOCYTES NFR BLD AUTO: 0.4 % (ref 0–0.5)
LYMPHOCYTES # BLD AUTO: 2 K/UL (ref 1–4.8)
LYMPHOCYTES NFR BLD: 34.2 % (ref 18–48)
MAGNESIUM SERPL-MCNC: 2.4 MG/DL (ref 1.6–2.6)
MCH RBC QN AUTO: 31.1 PG (ref 27–31)
MCHC RBC AUTO-ENTMCNC: 33.8 G/DL (ref 32–36)
MCV RBC AUTO: 92 FL (ref 82–98)
MONOCYTES # BLD AUTO: 0.3 K/UL (ref 0.3–1)
MONOCYTES NFR BLD: 4.9 % (ref 4–15)
NEUTROPHILS # BLD AUTO: 3.3 K/UL (ref 1.8–7.7)
NEUTROPHILS NFR BLD: 58.4 % (ref 38–73)
NRBC BLD-RTO: 0 /100 WBC
PLATELET # BLD AUTO: 235 K/UL (ref 150–450)
PMV BLD AUTO: 11.3 FL (ref 9.2–12.9)
POTASSIUM SERPL-SCNC: 4.6 MMOL/L (ref 3.5–5.1)
PROT SERPL-MCNC: 7.6 G/DL (ref 6–8.4)
RBC # BLD AUTO: 5.01 M/UL (ref 4–5.4)
SODIUM SERPL-SCNC: 139 MMOL/L (ref 136–145)
TSH SERPL DL<=0.005 MIU/L-ACNC: 1.77 UIU/ML (ref 0.4–4)
WBC # BLD AUTO: 5.7 K/UL (ref 3.9–12.7)

## 2024-04-19 PROCEDURE — 1160F RVW MEDS BY RX/DR IN RCRD: CPT | Mod: CPTII,S$GLB,, | Performed by: FAMILY MEDICINE

## 2024-04-19 PROCEDURE — 83735 ASSAY OF MAGNESIUM: CPT | Performed by: FAMILY MEDICINE

## 2024-04-19 PROCEDURE — 99213 OFFICE O/P EST LOW 20 MIN: CPT | Mod: S$GLB,,, | Performed by: FAMILY MEDICINE

## 2024-04-19 PROCEDURE — 85025 COMPLETE CBC W/AUTO DIFF WBC: CPT | Performed by: FAMILY MEDICINE

## 2024-04-19 PROCEDURE — 3074F SYST BP LT 130 MM HG: CPT | Mod: CPTII,S$GLB,, | Performed by: FAMILY MEDICINE

## 2024-04-19 PROCEDURE — 3078F DIAST BP <80 MM HG: CPT | Mod: CPTII,S$GLB,, | Performed by: FAMILY MEDICINE

## 2024-04-19 PROCEDURE — 86200 CCP ANTIBODY: CPT | Performed by: FAMILY MEDICINE

## 2024-04-19 PROCEDURE — 84443 ASSAY THYROID STIM HORMONE: CPT | Performed by: FAMILY MEDICINE

## 2024-04-19 PROCEDURE — 80053 COMPREHEN METABOLIC PANEL: CPT | Performed by: FAMILY MEDICINE

## 2024-04-19 PROCEDURE — 1159F MED LIST DOCD IN RCRD: CPT | Mod: CPTII,S$GLB,, | Performed by: FAMILY MEDICINE

## 2024-04-19 PROCEDURE — 99999 PR PBB SHADOW E&M-EST. PATIENT-LVL IV: CPT | Mod: PBBFAC,,, | Performed by: FAMILY MEDICINE

## 2024-04-19 PROCEDURE — 3008F BODY MASS INDEX DOCD: CPT | Mod: CPTII,S$GLB,, | Performed by: FAMILY MEDICINE

## 2024-04-19 PROCEDURE — 85651 RBC SED RATE NONAUTOMATED: CPT | Performed by: FAMILY MEDICINE

## 2024-04-19 NOTE — PROGRESS NOTES
"Subjective:       Patient ID: Carolina Andrews is a 48 y.o. female.    Chief Complaint: No chief complaint on file.    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  She is getting strange sensations that movement is abnormal but does not report any choreoathetoid movements or other strange movements.  It is only when she makes a move that it feels atypical.  She has had symptoms involving the mouth and she is noted to lick her lips repeatedly.  She has discontinued most of her medications out of concern that they may be causing it, she has been on Lysteda which is reported to cause musculoskeletal pain and she discontinued nortriptyline about a month ago which has reported instances of pyramidal reactions.  In addition, she had some renovation work done on her house in the contractors left leaks in the roof and she suspect she may have mold problems which could possibly be related to neurotoxin effects.  She was having some headaches which appear to have resolved    Past Medical History:  No date: Arthritis  No date: Blindness  No date: Cholelithiasis  No date: Liver hemangioma  No date: Mental disorder      Comment:  Periodic antidepressant use since childhood, not                currently taking or endorsing depressive ideation  No date: Pinched nerve    Past Surgical History:  08/06/2019: ANGIOGRAPHY; N/A      Comment:  Procedure: ANGIOGRAM/EMBOLIZATION;  Surgeon: Andressa                Surgeon;  Location: [facility];  Service: Anesthesiology;                Laterality: N/A;   190/Damian  2009: ANKLE FRACTURE SURGERY  07/15/2019: CEREBRAL ANGIOGRAM; N/A      Comment:  Procedure: ANGIOGRAM-CEREBRAL;  Surgeon: Leon Diagnostic               Provider;  Location: [facility]" OR 2ND FLR;  Service:                Radiology;  Laterality: N/A;  Rm 190/Damian  02/04/2020: CEREBRAL ANGIOGRAM; N/A      Comment:  Procedure: ANGIOGRAM-CEREBRAL;  Surgeon: [facility] Diagnostic               Provider;  Location: [facility] OR 2ND FLR;  Service:                Radiology;  Laterality: N/A;  /[place]  02/04/2019: COLONOSCOPY; N/A      Comment:  Procedure: COLONOSCOPY;  Surgeon: Dale Childers MD;                Location: [facility] ENDO;  Service: Endoscopy;  Laterality:                N/A;  11/19/2018: ESOPHAGOGASTRODUODENOSCOPY; N/A      Comment:  Procedure: EGD (ESOPHAGOGASTRODUODENOSCOPY);  Surgeon:                Dale Childers MD;  Location: [facility] ENDO;  Service:                Endoscopy;  Laterality: N/A;  2010: FRACTURE SURGERY; Left      Comment:  left ankle  04/22/2022: LAPAROSCOPIC CHOLECYSTECTOMY; N/A      Comment:  Procedure: CHOLECYSTECTOMY, LAPAROSCOPIC;  Surgeon: Salomón Milian MD;  Location: [facility];  Service: General;                 Laterality: N/A;  ~1998: UPPER GASTROINTESTINAL ENDOSCOPY      Comment:  normal findings per patient report    Current Outpatient Medications on File Prior to Visit:  acetaminophen (TYLENOL) 500 MG tablet, Take 500 mg by mouth every 6 (six) hours as needed for Pain., Disp: , Rfl:   b complex vitamins tablet, Take 1 tablet by mouth once daily., Disp: , Rfl:   cholecalciferol, vitamin D3, (VITAMIN D3) 25 mcg (1,000 unit) capsule, Take 1,000 Units by mouth once daily., Disp: , Rfl:   EScitalopram oxalate (LEXAPRO) 10 MG tablet, Take 1 tablet (10 mg total) by mouth once daily., Disp: 90 tablet, Rfl: 3  fexofenadine HCl (ALLEGRA ORAL), Take 1 tablet by mouth as needed., Disp: , Rfl:   gabapentin (NEURONTIN) 300 MG capsule, Take 2 capsules (600 mg total) by mouth every evening., Disp: 180 capsule, Rfl: 1  ketoconazole (NIZORAL) 2 % cream, Use on AA of back daily x 2 weeks then 3 times weekly afterward, Disp: 60 g, Rfl: 5  LORazepam (ATIVAN) 1 MG  tablet, Take 1 tablet (1 mg total) by mouth every 8 (eight) hours as needed for Anxiety., Disp: 21 tablet, Rfl: 0  mupirocin (BACTROBAN) 2 % ointment, Apply topically 3 (three) times daily., Disp: 15 g, Rfl: 1  nortriptyline (PAMELOR) 25 MG capsule, Take 1 capsule (25 mg total) by mouth every evening., Disp: 90 capsule, Rfl: 3  tranexamic acid (LYSTEDA) 650 mg tablet, Take 2 tablets (1,300 mg total) by mouth 3 (three) times daily. Take for up to 5 days during your period., Disp: 30 tablet, Rfl: 11  triamcinolone acetonide 0.1% (KENALOG) 0.1 % cream, Apply topically 2 (two) times daily., Disp: 15 g, Rfl: 0  aspirin (ECOTRIN) 81 MG EC tablet, Take 1 tablet (81 mg total) by mouth once daily., Disp: 30 tablet, Rfl: 11    No current facility-administered medications on file prior to visit.          Review of Systems   Constitutional:  Positive for activity change. Negative for unexpected weight change.   HENT:  Negative for hearing loss, rhinorrhea and trouble swallowing.    Eyes:  Negative for discharge and visual disturbance.   Respiratory:  Negative for chest tightness and wheezing.    Cardiovascular:  Negative for chest pain and palpitations.   Gastrointestinal:  Negative for blood in stool, constipation, diarrhea and vomiting.   Endocrine: Negative for polydipsia and polyuria.   Genitourinary:  Negative for difficulty urinating, dysuria, hematuria and menstrual problem.   Musculoskeletal:  Negative for arthralgias, joint swelling, myalgias and neck pain.   Neurological:  Positive for weakness. Negative for headaches.   Psychiatric/Behavioral:  Positive for confusion. Negative for dysphoric mood.        Objective:      Physical Exam  Neurological:      Mental Status: She is alert and oriented to person, place, and time.      GCS: GCS eye subscore is 4. GCS verbal subscore is 5. GCS motor subscore is 6.      Motor: No weakness, tremor, atrophy, abnormal muscle tone or seizure activity.      Deep Tendon Reflexes:       Reflex Scores:       Tricep reflexes are 2+ on the right side and 2+ on the left side.       Bicep reflexes are 2+ on the right side and 2+ on the left side.       Brachioradialis reflexes are 2+ on the right side and 2+ on the left side.       Patellar reflexes are 2+ on the right side and 2+ on the left side.       Achilles reflexes are 2+ on the right side and 2+ on the left side.        Assessment:       1. Dystonia    2. Memory deficit        Plan:       1. Dystonia  Uncertain etiology of sensation.  Does not appear to have any hysterical tendencies and is very much able to describe the sensation she is feeling in detail.  She is observed to have repetitive tongue movements but no other repetitive movements were noted.  We will seek neurology consult.  - Ambulatory referral/consult to Neurology; Future  - TSH; Future  - Sedimentation rate; Future  - Cyclic Citrullinated Peptide Antibody, IgG; Future  - Magnesium; Future  - CBC Auto Differential; Future    2. Memory deficit  - TSH; Future  - Sedimentation rate; Future  - Cyclic Citrullinated Peptide Antibody, IgG; Future  - Comprehensive Metabolic Panel; Future

## 2024-04-20 LAB — CCP IGG SERPL-ACNC: <15.6 U

## 2024-06-11 DIAGNOSIS — G45.9 TIA (TRANSIENT ISCHEMIC ATTACK): Primary | ICD-10-CM

## 2024-06-12 DIAGNOSIS — G45.9 TIA (TRANSIENT ISCHEMIC ATTACK): Primary | ICD-10-CM

## 2024-08-14 ENCOUNTER — PATIENT MESSAGE (OUTPATIENT)
Dept: ADMINISTRATIVE | Facility: HOSPITAL | Age: 49
End: 2024-08-14
Payer: MEDICARE

## 2024-08-15 ENCOUNTER — PATIENT MESSAGE (OUTPATIENT)
Dept: GASTROENTEROLOGY | Facility: CLINIC | Age: 49
End: 2024-08-15
Payer: MEDICARE

## 2024-08-15 ENCOUNTER — PATIENT OUTREACH (OUTPATIENT)
Dept: ADMINISTRATIVE | Facility: HOSPITAL | Age: 49
End: 2024-08-15
Payer: MEDICARE

## 2024-08-15 DIAGNOSIS — Z12.31 ENCOUNTER FOR SCREENING MAMMOGRAM FOR MALIGNANT NEOPLASM OF BREAST: ICD-10-CM

## 2024-08-15 DIAGNOSIS — Z12.11 ENCOUNTER FOR COLORECTAL CANCER SCREENING: Primary | ICD-10-CM

## 2024-08-15 DIAGNOSIS — Z12.12 ENCOUNTER FOR COLORECTAL CANCER SCREENING: Primary | ICD-10-CM

## 2024-08-15 NOTE — PROGRESS NOTES
Population Health Chart Review & Patient Outreach Details      Additional [facility] Health Notes:               Updates Requested / Reviewed:      Updated Care Coordination Note         Health Maintenance Topics Overdue:      VB Score: 3     Colon Cancer Screening  Mammogram  Hemoglobin A1c                       Health Maintenance Topic(s) Outreach Outcomes & Actions Taken:    Breast Cancer Screening - Outreach Outcomes & Actions Taken  : Mammogram Order Placed    Colorectal Cancer Screening - Outreach Outcomes & Actions Taken  : Colonoscopy Case Request / Referral / Home Test Order Placed

## 2024-08-19 ENCOUNTER — HOSPITAL ENCOUNTER (OUTPATIENT)
Dept: RADIOLOGY | Facility: HOSPITAL | Age: 49
Discharge: HOME | End: 2024-08-19
Attending: FAMILY MEDICINE
Payer: MEDICARE

## 2024-08-19 DIAGNOSIS — R92.8 ABNORMALITY OF LEFT BREAST ON SCREENING MAMMOGRAM: Primary | ICD-10-CM

## 2024-08-19 DIAGNOSIS — Z12.31 ENCOUNTER FOR SCREENING MAMMOGRAM FOR MALIGNANT NEOPLASM OF BREAST: ICD-10-CM

## 2024-08-19 PROCEDURE — 77067 SCR MAMMO BI INCL CAD: CPT | Mod: TC

## 2024-08-19 PROCEDURE — 77063 BREAST TOMOSYNTHESIS BI: CPT | Mod: 26,,, | Performed by: RADIOLOGY

## 2024-08-19 PROCEDURE — 77067 SCR MAMMO BI INCL CAD: CPT | Mod: 26,,, | Performed by: RADIOLOGY

## 2024-09-09 ENCOUNTER — HOSPITAL ENCOUNTER (OUTPATIENT)
Dept: RADIOLOGY | Facility: HOSPITAL | Age: 49
Discharge: HOME | End: 2024-09-09
Attending: FAMILY MEDICINE
Payer: MEDICARE

## 2024-09-09 DIAGNOSIS — R92.8 ABNORMALITY OF LEFT BREAST ON SCREENING MAMMOGRAM: ICD-10-CM

## 2024-09-09 PROCEDURE — 77061 BREAST TOMOSYNTHESIS UNI: CPT | Mod: 26,LT,, | Performed by: RADIOLOGY

## 2024-09-09 PROCEDURE — 77061 BREAST TOMOSYNTHESIS UNI: CPT | Mod: TC,LT

## 2024-09-09 PROCEDURE — 77065 DX MAMMO INCL CAD UNI: CPT | Mod: 26,LT,, | Performed by: RADIOLOGY

## 2024-10-28 ENCOUNTER — HOSPITAL ENCOUNTER (OUTPATIENT)
Facility: HOSPITAL | Age: 49
Discharge: HOME | End: 2024-10-28
Attending: INTERNAL MEDICINE | Admitting: INTERNAL MEDICINE
Payer: MEDICARE

## 2024-10-28 ENCOUNTER — ANESTHESIA (OUTPATIENT)
Dept: ENDOSCOPY | Facility: HOSPITAL | Age: 49
End: 2024-10-28
Payer: MEDICARE

## 2024-10-28 ENCOUNTER — ANESTHESIA EVENT (OUTPATIENT)
Dept: ENDOSCOPY | Facility: HOSPITAL | Age: 49
End: 2024-10-28
Payer: MEDICARE

## 2024-10-28 DIAGNOSIS — Z86.0100 HISTORY OF COLON POLYPS: ICD-10-CM

## 2024-10-28 DIAGNOSIS — K63.5 POLYP OF COLON, UNSPECIFIED PART OF COLON, UNSPECIFIED TYPE: ICD-10-CM

## 2024-10-28 DIAGNOSIS — K64.8 INTERNAL HEMORRHOIDS: Primary | ICD-10-CM

## 2024-10-28 DIAGNOSIS — K57.90 DIVERTICULOSIS: ICD-10-CM

## 2024-10-28 LAB
B-HCG UR QL: NEGATIVE
CTP QC/QA: YES

## 2024-10-28 PROCEDURE — 27201089 HC SNARE, DISP (ANY): Performed by: INTERNAL MEDICINE

## 2024-10-28 PROCEDURE — 45385 COLONOSCOPY W/LESION REMOVAL: CPT | Mod: PT,,, | Performed by: INTERNAL MEDICINE

## 2024-10-28 PROCEDURE — 45380 COLONOSCOPY AND BIOPSY: CPT | Mod: PT,59 | Performed by: INTERNAL MEDICINE

## 2024-10-28 PROCEDURE — 81025 URINE PREGNANCY TEST: CPT | Performed by: INTERNAL MEDICINE

## 2024-10-28 PROCEDURE — 45385 COLONOSCOPY W/LESION REMOVAL: CPT | Mod: PT | Performed by: INTERNAL MEDICINE

## 2024-10-28 PROCEDURE — 37000009 HC ANESTHESIA EA ADD 15 MINS: Performed by: INTERNAL MEDICINE

## 2024-10-28 PROCEDURE — 63600175 PHARM REV CODE 636 W HCPCS: Performed by: NURSE ANESTHETIST, CERTIFIED REGISTERED

## 2024-10-28 PROCEDURE — 27201012 HC FORCEPS, HOT/COLD, DISP: Performed by: INTERNAL MEDICINE

## 2024-10-28 PROCEDURE — 37000008 HC ANESTHESIA 1ST 15 MINUTES: Performed by: INTERNAL MEDICINE

## 2024-10-28 PROCEDURE — 88305 TISSUE EXAM BY PATHOLOGIST: CPT | Mod: TC,59 | Performed by: PATHOLOGY

## 2024-10-28 PROCEDURE — 45380 COLONOSCOPY AND BIOPSY: CPT | Mod: PT,59,, | Performed by: INTERNAL MEDICINE

## 2024-10-28 RX ORDER — LIDOCAINE HYDROCHLORIDE 20 MG/ML
INJECTION INTRAVENOUS
Status: DISCONTINUED | OUTPATIENT
Start: 2024-10-28 | End: 2024-10-28

## 2024-10-28 RX ORDER — PROPOFOL 10 MG/ML
VIAL (ML) INTRAVENOUS
Status: DISCONTINUED | OUTPATIENT
Start: 2024-10-28 | End: 2024-10-28

## 2024-10-28 RX ADMIN — PROPOFOL 50 MG: 10 INJECTION, EMULSION INTRAVENOUS at 12:10

## 2024-10-28 RX ADMIN — LIDOCAINE HYDROCHLORIDE 100 MG: 20 INJECTION, SOLUTION INTRAVENOUS at 12:10

## 2024-10-28 RX ADMIN — PROPOFOL 100 MG: 10 INJECTION, EMULSION INTRAVENOUS at 12:10

## 2024-10-29 VITALS
WEIGHT: 230 LBS | HEART RATE: 77 BPM | DIASTOLIC BLOOD PRESSURE: 88 MMHG | OXYGEN SATURATION: 95 % | RESPIRATION RATE: 17 BRPM | BODY MASS INDEX: 36.02 KG/M2 | SYSTOLIC BLOOD PRESSURE: 123 MMHG | TEMPERATURE: 98 F

## 2024-11-20 ENCOUNTER — PATIENT MESSAGE (OUTPATIENT)
Dept: OPTOMETRY | Facility: CLINIC | Age: 49
End: 2024-11-20
Payer: MEDICARE

## 2024-11-22 ENCOUNTER — OFFICE VISIT (OUTPATIENT)
Dept: FAMILY MEDICINE | Facility: CLINIC | Age: 49
End: 2024-11-22
Attending: FAMILY MEDICINE
Payer: MEDICARE

## 2024-11-22 ENCOUNTER — OFFICE VISIT (OUTPATIENT)
Dept: OPTOMETRY | Facility: CLINIC | Age: 49
End: 2024-11-22
Payer: MEDICARE

## 2024-11-22 VITALS
BODY MASS INDEX: 36.57 KG/M2 | WEIGHT: 233 LBS | DIASTOLIC BLOOD PRESSURE: 78 MMHG | OXYGEN SATURATION: 99 % | SYSTOLIC BLOOD PRESSURE: 112 MMHG | TEMPERATURE: 98 F | HEART RATE: 59 BPM | HEIGHT: 67 IN

## 2024-11-22 DIAGNOSIS — K21.9 GASTROESOPHAGEAL REFLUX DISEASE WITHOUT ESOPHAGITIS: ICD-10-CM

## 2024-11-22 DIAGNOSIS — E78.5 HYPERLIPIDEMIA, UNSPECIFIED HYPERLIPIDEMIA TYPE: ICD-10-CM

## 2024-11-22 DIAGNOSIS — H35.53 CONE DYSTROPHY: ICD-10-CM

## 2024-11-22 DIAGNOSIS — H35.53 CONE DYSTROPHY: Primary | ICD-10-CM

## 2024-11-22 DIAGNOSIS — H52.7 REFRACTIVE ERROR: ICD-10-CM

## 2024-11-22 DIAGNOSIS — E66.01 SEVERE OBESITY (BMI 35.0-39.9) WITH COMORBIDITY: ICD-10-CM

## 2024-11-22 DIAGNOSIS — I67.1 ANTERIOR COMMUNICATING ARTERY ANEURYSM: Chronic | ICD-10-CM

## 2024-11-22 DIAGNOSIS — R73.9 HYPERGLYCEMIA: ICD-10-CM

## 2024-11-22 DIAGNOSIS — Z00.00 ENCOUNTER FOR PREVENTIVE HEALTH EXAMINATION: Primary | ICD-10-CM

## 2024-11-22 PROCEDURE — 99999 PR PBB SHADOW E&M-EST. PATIENT-LVL III: CPT | Mod: PBBFAC,,, | Performed by: FAMILY MEDICINE

## 2024-11-22 PROCEDURE — 99999 PR PBB SHADOW E&M-EST. PATIENT-LVL III: CPT | Mod: PBBFAC,,, | Performed by: OPTOMETRIST

## 2024-11-22 NOTE — PROGRESS NOTES
Subjective:       Patient ID: Carolina Andrews is a 49 y.o. female.    Chief Complaint: Annual Exam    49-year-old female coming in for annual exam.  She has a history of an anterior communicating artery aneurysm and has undergone coiling.  She has been followed by Interventional Radiology who has been doing an MRA of the brain yearly up until recently and now is doing them every other year.  She has a history of cone dystrophy and poor vision, she is legally blind.  She has hyperlipidemia, tinnitus, vertigo, a history of depression with anxiety but not currently on any medications and a history of reflux also not on any current medications.  She had a colonoscopy October 28, 2024 by Dr. Childers with a five year recheck recommended.  She is due for a flu vaccine which she declines and she is not fasting today.  Reviewing recent labs reveals a CBC and a CMP that were done in April of this year in both were satisfactory but the blood glucose on the CMP was 110 putting her in the prediabetic range.  She is due for a lipid panel and we need to recheck the glucose which will be done with a BMP and will also get an A1c for screening purposes.    Past Medical History:  No date: Arthritis  No date: Blindness  No date: Cholelithiasis  No date: Liver hemangioma  No date: Mental disorder      Comment:  Periodic antidepressant use since childhood, not                currently taking or endorsing depressive ideation  No date: Pinched nerve    Past Surgical History:  08/06/2019: ANGIOGRAPHY; N/A      Comment:  Procedure: ANGIOGRAM/EMBOLIZATION;  Surgeon: Andressa                Surgeon;  Location: [facility];  Service: Anesthesiology;                Laterality: N/A;   190/Damian  2009: ANKLE FRACTURE SURGERY  07/15/2019: CEREBRAL ANGIOGRAM; N/A      Comment:  Procedure: ANGIOGRAM-CEREBRAL;  Surgeon: Leon Diagnostic               Provider;  Location: [facility] OR [address] [place] [address];  Service:                Radiology;  Laterality: N/A;    190/Damian  02/04/2020: CEREBRAL ANGIOGRAM; N/A      Comment:  Procedure: ANGIOGRAM-CEREBRAL;  Surgeon: Leon Diagnostic               Provider;  [facility];  Service:                Radiology;  Laterality: N/A;  /Damian  02/04/2019: COLONOSCOPY; N/A      Comment:  Procedure: COLONOSCOPY;  Surgeon: Dale Childers MD;                Location: [facility] ENDO;  Service: Endoscopy;  Laterality:                N/A;  10/28/2024: COLONOSCOPY; N/A      Comment:  Procedure: COLONOSCOPY;  Surgeon: Dale Childers MD;               Location: [facility] ENDO;  Service: Endoscopy;  Laterality:                N/A;  ppm  11/19/2018: ESOPHAGOGASTRODUODENOSCOPY; N/A      Comment:  Procedure: EGD (ESOPHAGOGASTRODUODENOSCOPY);  Surgeon:                Dale Childers MD;  Location: [facility];  Service:                Endoscopy;  Laterality: N/A;  2010: FRACTURE SURGERY; Left      Comment:  left ankle  04/22/2022: LAPAROSCOPIC CHOLECYSTECTOMY; N/A      Comment:  Procedure: CHOLECYSTECTOMY, LAPAROSCOPIC;  Surgeon: Salomón Milian MD;  Location: [facility];  Service: General;                 Laterality: N/A;  ~1998: UPPER GASTROINTESTINAL ENDOSCOPY      Comment:  normal findings per patient report    Review of patient's family history indicates:  Problem: Hypertension      Relation: Father          Name:               Age of Onset: (Not Specified)  Problem: Lung disease      Relation: Father          Name:               Age of Onset: (Not Specified)              Comment: calapse x 3   Problem: Stroke      Relation: Father          Name:               Age of Onset: (Not Specified)              Comment: in eye   Problem: Retinal detachment      Relation: Father          Name:               Age of Onset: (Not Specified)  Problem: Cancer      Relation: Sister          Name:               Age of Onset: 23              Comment: polyps found incidentally  Problem: Vision loss      Relation: Sister          Name:                Age of Onset: (Not Specified)  Problem: Lung disease      Relation: Sister          Name:               Age of Onset: (Not Specified)              Comment: spontanious lung collapse  Problem: GI problems      Relation: Sister          Name:               Age of Onset: (Not Specified)  Problem: Psoriasis      Relation: Sister          Name:               Age of Onset: (Not Specified)  Problem: Retinal detachment      Relation: Mother          Name:               Age of Onset: (Not Specified)  Problem: Cancer      Relation: Paternal Grandfather          Name:               Age of Onset: (Not Specified)  Problem: Stroke      Relation: Maternal Grandmother          Name:               Age of Onset: (Not Specified)  Problem: Colon cancer      Relation: Paternal Aunt          Name:               Age of Onset: (Not Specified)  Problem: Colon polyps      Relation: Maternal Uncle          Name:               Age of Onset: (Not Specified)  Problem: Psoriasis      Relation: Maternal Uncle          Name:               Age of Onset: (Not Specified)  Problem: No Known Problems      Relation: Paternal Uncle          Name:               Age of Onset: (Not Specified)  Problem: Parkinsonism      Relation: Maternal Grandfather          Name:               Age of Onset: (Not Specified)  Problem: Cancer      Relation: Maternal Grandfather          Name:               Age of Onset: (Not Specified)              Comment: prostate  Problem: Colon polyps      Relation: Maternal Grandfather          Name:               Age of Onset: (Not Specified)  Problem: Cancer      Relation: Paternal Grandmother          Name:               Age of Onset: (Not Specified)              Comment: skin cancer in colon   Problem: Alzheimer's disease      Relation: Paternal Grandmother          Name:               Age of Onset: (Not Specified)  Problem: Heart disease      Relation: Paternal Aunt          Name:               Age of Onset: (Not  Specified)  Problem: Breast cancer      Relation: Neg Hx          Name:               Age of Onset: (Not Specified)  Problem: Diabetes      Relation: Neg Hx          Name:               Age of Onset: (Not Specified)  Problem: Miscarriages / Stillbirths      Relation: Neg Hx          Name:               Age of Onset: (Not Specified)  Problem: Crohn's disease      Relation: Neg Hx          Name:               Age of Onset: (Not Specified)  Problem: Ulcerative colitis      Relation: Neg Hx          Name:               Age of Onset: (Not Specified)  Problem: Glaucoma      Relation: Neg Hx          Name:               Age of Onset: (Not Specified)  Problem: Eczema      Relation: Neg Hx          Name:               Age of Onset: (Not Specified)  Problem: Lupus      Relation: Neg Hx          Name:               Age of Onset: (Not Specified)  Problem: Melanoma      Relation: Neg Hx          Name:               Age of Onset: (Not Specified)    Social History    Tobacco Use      Smoking status: Never      Smokeless tobacco: Never    Alcohol use: Yes      Alcohol/week: 6.0 standard drinks of alcohol      Types: 6 Glasses of wine per week      Comment: 2-3/ week wine or beer    Drug use: No    Current Outpatient Medications on File Prior to Visit:  acetaminophen (TYLENOL) 500 MG tablet, Take 500 mg by mouth every 6 (six) hours as needed for Pain., Disp: , Rfl:   gabapentin (NEURONTIN) 300 MG capsule, Take 2 capsules (600 mg total) by mouth every evening. (Patient taking differently: Take 600 mg by mouth every evening. Patient taking PRN), Disp: 180 capsule, Rfl: 1  fexofenadine HCl (ALLEGRA ORAL), Take 1 tablet by mouth as needed. (Patient not taking: Reported on 11/22/2024), Disp: , Rfl:   ketoconazole (NIZORAL) 2 % cream, Use on AA of back daily x 2 weeks then 3 times weekly afterward (Patient not taking: Reported on 11/22/2024), Disp: 60 g, Rfl: 5  [DISCONTINUED] aspirin (ECOTRIN) 81 MG EC tablet, Take 1 tablet (81 mg total)  by mouth once daily. (Patient not taking: Reported on 11/22/2024), Disp: 30 tablet, Rfl: 11  [DISCONTINUED] b complex vitamins tablet, Take 1 tablet by mouth once daily. (Patient not taking: Reported on 11/22/2024), Disp: , Rfl:   [DISCONTINUED] cholecalciferol, vitamin D3, (VITAMIN D3) 25 mcg (1,000 unit) capsule, Take 1,000 Units by mouth once daily. (Patient not taking: Reported on 11/22/2024), Disp: , Rfl:   [DISCONTINUED] EScitalopram oxalate (LEXAPRO) 10 MG tablet, Take 1 tablet (10 mg total) by mouth once daily. (Patient not taking: Reported on 11/22/2024), Disp: 90 tablet, Rfl: 3  [DISCONTINUED] LORazepam (ATIVAN) 1 MG tablet, Take 1 tablet (1 mg total) by mouth every 8 (eight) hours as needed for Anxiety. (Patient not taking: Reported on 11/22/2024), Disp: 21 tablet, Rfl: 0  [DISCONTINUED] mupirocin (BACTROBAN) 2 % ointment, Apply topically 3 (three) times daily. (Patient not taking: Reported on 11/22/2024), Disp: 15 g, Rfl: 1  [DISCONTINUED] nortriptyline (PAMELOR) 25 MG capsule, Take 1 capsule (25 mg total) by mouth every evening. (Patient not taking: Reported on 11/22/2024), Disp: 90 capsule, Rfl: 3  [DISCONTINUED] triamcinolone acetonide 0.1% (KENALOG) 0.1 % cream, Apply topically 2 (two) times daily. (Patient not taking: Reported on 11/22/2024), Disp: 15 g, Rfl: 0    No current facility-administered medications on file prior to visit.          Review of Systems   Constitutional:  Negative for chills, diaphoresis, fatigue, fever and unexpected weight change.   HENT:  Positive for congestion, postnasal drip, rhinorrhea, sneezing and tinnitus. Negative for ear pain, hearing loss and sinus pressure.    Eyes:  Positive for visual disturbance. Negative for itching.   Respiratory:  Negative for cough, chest tightness, shortness of breath and wheezing.    Cardiovascular:  Negative for chest pain, palpitations and leg swelling.   Gastrointestinal:  Negative for abdominal pain, blood in stool, constipation,  diarrhea, nausea and vomiting.   Genitourinary:  Negative for dysuria, frequency and hematuria.   Musculoskeletal:  Positive for back pain (She has been having pain in the low back, lower lumbar and sacroiliac areas, that she attributes to a new bed that she and her  recently purchased.  She does not feel the hardness is appropriate for her). Negative for arthralgias, joint swelling and myalgias.   Skin:  Positive for rash (She gets flare ups of tinea versicolor in the spring and will usually use ketoconazole cream until clear.  No activity at this time).   Neurological:  Negative for dizziness and headaches.   Hematological:  Negative for adenopathy.   Psychiatric/Behavioral:  Negative for sleep disturbance. The patient is not nervous/anxious.        Objective:      Physical Exam  Vitals and nursing note reviewed.   Constitutional:       General: She is not in acute distress.     Appearance: Normal appearance. She is well-developed. She is obese. She is not ill-appearing, toxic-appearing or diaphoretic.      Comments: Good blood pressure control   Mild bradycardia with regular rhythm   Severe obesity with a BMI of 36.5 but she is down 5.8 lb over her physical last year November 20, 2023   HENT:      Head: Normocephalic and atraumatic.      Right Ear: Tympanic membrane, ear canal and external ear normal. There is no impacted cerumen.      Left Ear: Tympanic membrane, ear canal and external ear normal. There is no impacted cerumen.      Nose: Nose normal. No congestion or rhinorrhea.      Mouth/Throat:      Mouth: Mucous membranes are moist.      Pharynx: Oropharynx is clear. No oropharyngeal exudate or posterior oropharyngeal erythema.   Eyes:      General: No scleral icterus.        Right eye: No discharge.         Left eye: No discharge.      Extraocular Movements: Extraocular movements intact.      Conjunctiva/sclera: Conjunctivae normal.      Pupils: Pupils are equal, round, and reactive to light.   Neck:       Thyroid: No thyromegaly.      Vascular: No carotid bruit or JVD.   Cardiovascular:      Rate and Rhythm: Normal rate and regular rhythm.      Pulses: Normal pulses.      Heart sounds: Normal heart sounds. No murmur heard.     No friction rub. No gallop.   Pulmonary:      Effort: Pulmonary effort is normal. No respiratory distress.      Breath sounds: Normal breath sounds. No stridor. No wheezing, rhonchi or rales.   Chest:      Chest wall: No tenderness.   Abdominal:      General: Bowel sounds are normal. There is no distension.      Palpations: Abdomen is soft. There is no mass.      Tenderness: There is no abdominal tenderness. There is no guarding or rebound.      Hernia: No hernia is present.   Musculoskeletal:         General: No tenderness or deformity. Normal range of motion.      Cervical back: Normal range of motion and neck supple. No rigidity or tenderness.      Lumbar back: Bony tenderness (Tender over the lower lumbar and right sacroiliac area without deformity) present.      Right lower leg: No edema.      Left lower leg: No edema.   Lymphadenopathy:      Cervical: No cervical adenopathy.   Skin:     General: Skin is warm and dry.      Coloration: Skin is not jaundiced or pale.      Findings: No bruising, erythema, lesion or rash.   Neurological:      General: No focal deficit present.      Mental Status: She is alert and oriented to person, place, and time. Mental status is at baseline.      Cranial Nerves: No cranial nerve deficit.      Deep Tendon Reflexes: Reflexes are normal and symmetric.   Psychiatric:         Mood and Affect: Mood normal.         Behavior: Behavior normal.         Thought Content: Thought content normal.         Assessment:       1. Encounter for preventive health examination    2. Anterior communicating artery aneurysm    3. Cone dystrophy    4. Hyperlipidemia, unspecified hyperlipidemia type    5. Gastroesophageal reflux disease without esophagitis    6. Hyperglycemia     7. Severe obesity (BMI 35.0-39.9) with comorbidity        Plan:       1. Encounter for preventive health examination (Primary)  - Basic Metabolic Panel; Future  - Lipid Panel; Future    2. Anterior communicating artery aneurysm  Status post coiling, stable and followed by Interventional Radiology every two years now    3. Cone dystrophy  Followed by Dr. Boss and Dr. Langley    4. Hyperlipidemia, unspecified hyperlipidemia type  Await lipid panel results and reassess cardiovascular risk score  - Lipid Panel; Future    5. Gastroesophageal reflux disease without esophagitis  Asymptomatic, not currently on any PPI or H2 blocker    6. Hyperglycemia  Check fasting glucose and A1c level  - Basic Metabolic Panel; Future  - Hemoglobin A1C; Future    7. Severe obesity (BMI 35.0-39.9) with comorbidity  Improving

## 2024-11-22 NOTE — PROGRESS NOTES
HPI    Pt states unsure of any changes in vision  Some burning  Says after extended periods of time reading, will get double vision for a   little while    (-)gtts  (+)headaches, always had  (+)FOL/floaters     Last edited by Lydia Colorado on 11/22/2024  8:54 AM.            Assessment /Plan     For exam results, see Encounter Report.    Cone dystrophy    Refractive error      1. Cone dystrophy (Primary)  Stable [facility]  Reviewed genetic testing with patient  Overdue for eval with Dr. Langley - scheduled appt     2. Refractive error  Discussed possible refraction fee  Dispensed updated spectacle Rx. Discussed various spectacle lens options - computer only vs PALs.  Return if any trouble with specs

## 2024-11-25 ENCOUNTER — LAB VISIT (OUTPATIENT)
Dept: LAB | Facility: HOSPITAL | Age: 49
End: 2024-11-25
Attending: FAMILY MEDICINE
Payer: MEDICARE

## 2024-11-25 DIAGNOSIS — Z00.00 ENCOUNTER FOR PREVENTIVE HEALTH EXAMINATION: ICD-10-CM

## 2024-11-25 DIAGNOSIS — R73.9 HYPERGLYCEMIA: ICD-10-CM

## 2024-11-25 DIAGNOSIS — E78.5 HYPERLIPIDEMIA, UNSPECIFIED HYPERLIPIDEMIA TYPE: ICD-10-CM

## 2024-11-25 LAB
ANION GAP SERPL CALC-SCNC: 9 MMOL/L (ref 8–16)
BUN SERPL-MCNC: 8 MG/DL (ref 6–20)
CALCIUM SERPL-MCNC: 9.5 MG/DL (ref 8.7–10.5)
CHLORIDE SERPL-SCNC: 107 MMOL/L (ref 95–110)
CHOLEST SERPL-MCNC: 224 MG/DL (ref 120–199)
CHOLEST/HDLC SERPL: 4.4 {RATIO} (ref 2–5)
CO2 SERPL-SCNC: 24 MMOL/L (ref 23–29)
CREAT SERPL-MCNC: 0.8 MG/DL (ref 0.5–1.4)
EST. GFR  (NO RACE VARIABLE): >60 ML/MIN/1.73 M^2
ESTIMATED AVG GLUCOSE: 94 MG/DL (ref 68–131)
GLUCOSE SERPL-MCNC: 92 MG/DL (ref 70–110)
HBA1C MFR BLD: 4.9 % (ref 4.5–6.2)
HDLC SERPL-MCNC: 51 MG/DL (ref 40–75)
HDLC SERPL: 22.8 % (ref 20–50)
LDLC SERPL CALC-MCNC: 132 MG/DL (ref 63–159)
NONHDLC SERPL-MCNC: 173 MG/DL
POTASSIUM SERPL-SCNC: 4.3 MMOL/L (ref 3.5–5.1)
SODIUM SERPL-SCNC: 140 MMOL/L (ref 136–145)
TRIGL SERPL-MCNC: 205 MG/DL (ref 30–150)

## 2024-11-25 PROCEDURE — 80061 LIPID PANEL: CPT | Performed by: FAMILY MEDICINE

## 2024-11-25 PROCEDURE — 80048 BASIC METABOLIC PNL TOTAL CA: CPT | Performed by: FAMILY MEDICINE

## 2024-11-25 PROCEDURE — 83036 HEMOGLOBIN GLYCOSYLATED A1C: CPT | Performed by: FAMILY MEDICINE

## 2024-12-27 ENCOUNTER — PATIENT MESSAGE (OUTPATIENT)
Dept: OPTOMETRY | Facility: CLINIC | Age: 49
End: 2024-12-27
Payer: MEDICARE

## 2025-02-18 NOTE — PROGRESS NOTES
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     Pt states vision is worse. Things are blending together more. Like a film   . Headaches     Denies pain/ FOL/ floaters.     No gtts.     Last edited by Nicci Montgomery on 2/19/2025  8:01 AM.           A/P    ICD-10-CM ICD-9-CM   1. Cone dystrophy  H35.53 362.75   2. Legally blind  H54.8 369.4   3. Dry eye syndrome of both eyes  H04.123 375.15   4. Vitreomacular adhesion of both eyes  H43.823 379.27   5. Age-related nuclear cataract of both eyes  H25.13 366.16   6. Choroidal nevus, right  D31.31 224.6       1. Cone dystrophy  2. 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     Today VA 20/70 (stable)  OD 20/60 (stable) OS, stable pigmentary mottling with outer retina JF loss/disruption OU    Plan: Observation for now, counseled on nature of disease process and need for monitoring. Will continue to be on look out for relevant clinical/research trials as currently none for KCNV2 related at this time    3. Dry eye syndrome of both eyes  Mild dry eye  ATs prn    4. Vitreomacular adhesion of both eyes  No RT/RD  Plan: Observation      5. Age-related nuclear cataract of both eyes  Mild NS, NVS  Plan: Observation Update Mrx prn     6. Choroidal nevus, right  Stable nevus, baseline photos taken today  Plan: Observation annually      RTC 12 mo DFE/OCTm/Fundus photos OU       I saw and examined the patient and reviewed in detail the findings documented. The final examination findings, image interpretations, and plan as documented in the record represent my personal judgment and conclusions.    Edgar Langley MD  Vitreoretinal Surgery   [facility]  [facility]

## 2025-02-19 ENCOUNTER — OFFICE VISIT (OUTPATIENT)
Dept: OPHTHALMOLOGY | Facility: CLINIC | Age: 50
End: 2025-02-19
Payer: MEDICARE

## 2025-02-19 DIAGNOSIS — H04.123 DRY EYE SYNDROME OF BOTH EYES: ICD-10-CM

## 2025-02-19 DIAGNOSIS — H35.53 CONE DYSTROPHY: Primary | ICD-10-CM

## 2025-02-19 DIAGNOSIS — H54.8 LEGALLY BLIND: ICD-10-CM

## 2025-02-19 DIAGNOSIS — D31.31 CHOROIDAL NEVUS, RIGHT: ICD-10-CM

## 2025-02-19 DIAGNOSIS — H43.823 VITREOMACULAR ADHESION OF BOTH EYES: ICD-10-CM

## 2025-02-19 DIAGNOSIS — H25.13 AGE-RELATED NUCLEAR CATARACT OF BOTH EYES: ICD-10-CM

## 2025-04-14 ENCOUNTER — HOSPITAL ENCOUNTER (OUTPATIENT)
Dept: RADIOLOGY | Facility: HOSPITAL | Age: 50
Discharge: HOME | End: 2025-04-14
Attending: FAMILY MEDICINE
Payer: MEDICARE

## 2025-04-14 ENCOUNTER — OFFICE VISIT (OUTPATIENT)
Dept: FAMILY MEDICINE | Facility: CLINIC | Age: 50
End: 2025-04-14
Payer: MEDICARE

## 2025-04-14 VITALS
HEIGHT: 67 IN | SYSTOLIC BLOOD PRESSURE: 120 MMHG | DIASTOLIC BLOOD PRESSURE: 80 MMHG | WEIGHT: 229.06 LBS | OXYGEN SATURATION: 99 % | BODY MASS INDEX: 35.95 KG/M2 | HEART RATE: 77 BPM | TEMPERATURE: 98 F | RESPIRATION RATE: 16 BRPM

## 2025-04-14 DIAGNOSIS — M54.16 LUMBAR RADICULOPATHY: Primary | ICD-10-CM

## 2025-04-14 DIAGNOSIS — M54.16 LUMBAR RADICULOPATHY: ICD-10-CM

## 2025-04-14 PROCEDURE — 1159F MED LIST DOCD IN RCRD: CPT | Mod: CPTII,S$GLB,, | Performed by: FAMILY MEDICINE

## 2025-04-14 PROCEDURE — 99999 PR PBB SHADOW E&M-EST. PATIENT-LVL III: CPT | Mod: PBBFAC,,, | Performed by: FAMILY MEDICINE

## 2025-04-14 PROCEDURE — 3079F DIAST BP 80-89 MM HG: CPT | Mod: CPTII,S$GLB,, | Performed by: FAMILY MEDICINE

## 2025-04-14 PROCEDURE — 3008F BODY MASS INDEX DOCD: CPT | Mod: CPTII,S$GLB,, | Performed by: FAMILY MEDICINE

## 2025-04-14 PROCEDURE — 72100 X-RAY EXAM L-S SPINE 2/3 VWS: CPT | Mod: 26,,, | Performed by: RADIOLOGY

## 2025-04-14 PROCEDURE — 3074F SYST BP LT 130 MM HG: CPT | Mod: CPTII,S$GLB,, | Performed by: FAMILY MEDICINE

## 2025-04-14 PROCEDURE — 99214 OFFICE O/P EST MOD 30 MIN: CPT | Mod: S$GLB,,, | Performed by: FAMILY MEDICINE

## 2025-04-14 PROCEDURE — G2211 COMPLEX E/M VISIT ADD ON: HCPCS | Mod: S$GLB,,, | Performed by: FAMILY MEDICINE

## 2025-04-14 PROCEDURE — 72100 X-RAY EXAM L-S SPINE 2/3 VWS: CPT | Mod: TC

## 2025-04-14 RX ORDER — GABAPENTIN 300 MG/1
300 CAPSULE ORAL NIGHTLY PRN
Qty: 90 CAPSULE | Refills: 0 | Status: SHIPPED | OUTPATIENT
Start: 2025-04-14

## 2025-04-14 RX ORDER — MELOXICAM 15 MG/1
15 TABLET ORAL DAILY PRN
Qty: 30 TABLET | Refills: 1 | Status: SHIPPED | OUTPATIENT
Start: 2025-04-14

## 2025-04-15 ENCOUNTER — RESULTS FOLLOW-UP (OUTPATIENT)
Dept: FAMILY MEDICINE | Facility: CLINIC | Age: 50
End: 2025-04-15

## 2025-04-15 DIAGNOSIS — M54.16 LUMBAR RADICULOPATHY: Primary | ICD-10-CM

## 2025-04-15 NOTE — PROGRESS NOTES
Subjective:   Patient ID: Carolina Andrews is a 49 y.o. female     Chief Complaint:Back Pain      Here for back pain. Going on for 4+ weeks. Notes pain at times radiates down leg and in to groin. Worse with activity. Improves with rest. Keeps awake at night. Has not found anything that helps.       Review of Systems   Respiratory:  Negative for shortness of breath.    Cardiovascular:  Negative for chest pain.   Gastrointestinal:  Negative for abdominal pain.   Genitourinary:  Negative for dysuria.   Musculoskeletal:  Positive for back pain.     Past Medical History:   Diagnosis Date    Arthritis     Blindness     Cholelithiasis     Liver hemangioma     Mental disorder     Periodic antidepressant use since childhood, not currently taking or endorsing depressive ideation    Pinched nerve      Past Surgical History:   Procedure Laterality Date    ANGIOGRAPHY N/A 08/06/2019    Procedure: ANGIOGRAM/EMBOLIZATION;  Surgeon: Andressa Surgeon;  Location: [facility];  Service: Anesthesiology;  Laterality: N/A;   [address]/[place]    ANKLE FRACTURE SURGERY  2009    CEREBRAL ANGIOGRAM N/A 07/15/2019    Procedure: ANGIOGRAM-CEREBRAL;  Surgeon: [facility] Provider;  Location: [address];  Service: Radiology;  Laterality: N/A;   [address]/[place]    CEREBRAL ANGIOGRAM N/A 02/04/2020    Procedure: ANGIOGRAM-CEREBRAL;  Surgeon: [facility] Diagnostic Provider;  Location: [facility] OR [address] [place] [address];  Service: Radiology;  Laterality: N/A;   [address]/[place]    COLONOSCOPY N/A 02/04/2019    Procedure: COLONOSCOPY;  Surgeon: Dale Childers MD;  Location: [facility];  Service: Endoscopy;  Laterality: N/A;    COLONOSCOPY N/A 10/28/2024    Procedure: COLONOSCOPY;  Surgeon: Dale Childers MD;  Location: [facility];  Service: Endoscopy;  Laterality: N/A;  ppm    ESOPHAGOGASTRODUODENOSCOPY N/A 11/19/2018    Procedure: EGD (ESOPHAGOGASTRODUODENOSCOPY);  Surgeon: Dale Childers MD;  Location: [facility];  Service: Endoscopy;  Laterality: N/A;    FRACTURE  SURGERY Left 2010    left ankle    LAPAROSCOPIC CHOLECYSTECTOMY N/A 04/22/2022    Procedure: CHOLECYSTECTOMY, LAPAROSCOPIC;  Surgeon: Salomón Milian MD;  Location: [facility];  Service: General;  Laterality: N/A;    UPPER GASTROINTESTINAL ENDOSCOPY  ~1998    normal findings per patient report     Objective:     Vitals:    04/14/25 1601   BP: 120/80   Pulse: 77   Resp: 16   Temp: 97.8 °F (36.6 °C)     Body mass index is 35.88 kg/m².  Physical Exam  Vitals and nursing note reviewed.   Constitutional:       Appearance: She is well-developed.   HENT:      Head: Normocephalic and atraumatic.   Eyes:      General: No scleral icterus.     Conjunctiva/sclera: Conjunctivae normal.   Pulmonary:      Effort: Pulmonary effort is normal. No respiratory distress.   Musculoskeletal:         General: No deformity. Normal range of motion.      Cervical back: Normal range of motion and neck supple.   Skin:     Coloration: Skin is not pale.      Findings: No rash.   Neurological:      Mental Status: She is alert and oriented to person, place, and time.   Psychiatric:         Behavior: Behavior normal.         Thought Content: Thought content normal.         Judgment: Judgment normal.       Assessment:     1. Lumbar radiculopathy      Plan:   Lumbar radiculopathy  -     X-Ray Lumbar Spine AP And Lateral; Future; Expected date: 04/14/2025  -     gabapentin (NEURONTIN) 300 MG capsule; Take 1 capsule (300 mg total) by mouth nightly as needed.  Dispense: 90 capsule; Refill: 0  -     meloxicam (MOBIC) 15 MG tablet; Take 1 tablet (15 mg total) by mouth daily as needed for Pain.  Dispense: 30 tablet; Refill: 1      Chronic condition not otherwise mentioned is stable      Total time spent of Less than 30 minutes minutes on the day of the visit.This includes face to face time and preparing to see the patient, obtaining and reviewing separately obtained history, documenting clinical information in the electronic or other health record,  independently interpreting results and communicating results to the patient/family/caregiver, or care coordinator.    Established patient with me has been instructed that must see me at least 1 time yearly (every 365 days) for refills of medications. Seeing other providers in this clinic is fine but expectation is to see me yearly.    Fernando Kidd MD  04/15/2025    Portions of this note have been dictated with NAUN Medel

## 2025-04-21 ENCOUNTER — RESULTS FOLLOW-UP (OUTPATIENT)
Dept: SPINE | Facility: CLINIC | Age: 50
End: 2025-04-21

## 2025-04-21 ENCOUNTER — HOSPITAL ENCOUNTER (OUTPATIENT)
Dept: RADIOLOGY | Facility: HOSPITAL | Age: 50
Discharge: HOME | End: 2025-04-21
Attending: PHYSICAL MEDICINE & REHABILITATION
Payer: MEDICARE

## 2025-04-21 ENCOUNTER — OFFICE VISIT (OUTPATIENT)
Dept: SPINE | Facility: CLINIC | Age: 50
End: 2025-04-21
Payer: MEDICARE

## 2025-04-21 VITALS — BODY MASS INDEX: 35.95 KG/M2 | WEIGHT: 229.06 LBS | HEIGHT: 67 IN

## 2025-04-21 DIAGNOSIS — M25.551 PAIN OF RIGHT HIP: ICD-10-CM

## 2025-04-21 DIAGNOSIS — M54.41 ACUTE BILATERAL LOW BACK PAIN WITH RIGHT-SIDED SCIATICA: Primary | ICD-10-CM

## 2025-04-21 DIAGNOSIS — M54.16 LUMBAR RADICULOPATHY: ICD-10-CM

## 2025-04-21 PROCEDURE — 1159F MED LIST DOCD IN RCRD: CPT | Mod: CPTII,S$GLB,, | Performed by: PHYSICAL MEDICINE & REHABILITATION

## 2025-04-21 PROCEDURE — 1160F RVW MEDS BY RX/DR IN RCRD: CPT | Mod: CPTII,S$GLB,, | Performed by: PHYSICAL MEDICINE & REHABILITATION

## 2025-04-21 PROCEDURE — 99204 OFFICE O/P NEW MOD 45 MIN: CPT | Mod: S$GLB,,, | Performed by: PHYSICAL MEDICINE & REHABILITATION

## 2025-04-21 PROCEDURE — 73502 X-RAY EXAM HIP UNI 2-3 VIEWS: CPT | Mod: TC,RT

## 2025-04-21 PROCEDURE — 73502 X-RAY EXAM HIP UNI 2-3 VIEWS: CPT | Mod: 26,RT,, | Performed by: RADIOLOGY

## 2025-04-21 PROCEDURE — 99999 PR PBB SHADOW E&M-EST. PATIENT-LVL III: CPT | Mod: PBBFAC,,, | Performed by: PHYSICAL MEDICINE & REHABILITATION

## 2025-04-21 PROCEDURE — 3008F BODY MASS INDEX DOCD: CPT | Mod: CPTII,S$GLB,, | Performed by: PHYSICAL MEDICINE & REHABILITATION

## 2025-04-21 NOTE — PROGRESS NOTES
SUBJECTIVE:    Patient ID: Carolina Andrwes is a 49 y.o. female.    Chief Complaint: Pain of the Lower Back (Right side radiating down to leg )    This is a 49-year-old woman who is between primary care providers.  She is legally blind and can not drive .  She had coil embolization of her ROLAND in 2019.  She says that she is able to have MRIs.  Presents with a proximally six-month history of spontaneously occurring right greater than left-sided low back pain at the lumbosacral junction that radiates into the right groin and right anterior thigh to the knee.  Worsened over the past couple of months.  No bowel or bladder dysfunction fever chills sweats or unexpected weight loss.  Saw primary care 04/14/2025 and was prescribed gabapentin and Mobic both of which helped.  I personally reviewed x-rays of the lumbar spine done 04/14/2025 which show minimal degenerative changes.  Current pain level is 6/10 and interferes with quality of life in terms of activities of daily living recreation and social activities.        Past Medical History:   Diagnosis Date    Arthritis     Blindness     Cholelithiasis     Liver hemangioma     Mental disorder     Periodic antidepressant use since childhood, not currently taking or endorsing depressive ideation    Pinched nerve      Social History[1]  Past Surgical History:   Procedure Laterality Date    ANGIOGRAPHY N/A 08/06/2019    Procedure: ANGIOGRAM/EMBOLIZATION;  Surgeon: Andressa Surgeon;  Location: [facility];  Service: Anesthesiology;  Laterality: N/A;  Pending sale to [facility]    ANKLE FRACTURE SURGERY  2009    CEREBRAL ANGIOGRAM N/A 07/15/2019    Procedure: ANGIOGRAM-CEREBRAL;  Surgeon: [facility] Diagnostic Provider;  Location: [address];  Service: Radiology;  Laterality: N/A;  [address]    CEREBRAL ANGIOGRAM N/A 02/04/2020    Procedure: ANGIOGRAM-CEREBRAL;  Surgeon: [facility] Diagnostic Provider;  [facility];  Service: Radiology;  Laterality: N/A;  [address]     COLONOSCOPY N/A 02/04/2019    Procedure: COLONOSCOPY;  Surgeon: Dale Childers MD;  Location: [facility];  Service: Endoscopy;  Laterality: N/A;    COLONOSCOPY N/A 10/28/2024    Procedure: COLONOSCOPY;  Surgeon: Dale Childers MD;  Location: [facility];  Service: Endoscopy;  Laterality: N/A;  ppm    ESOPHAGOGASTRODUODENOSCOPY N/A 11/19/2018    Procedure: EGD (ESOPHAGOGASTRODUODENOSCOPY);  Surgeon: Dale Childers MD;  Location: [facility];  Service: Endoscopy;  Laterality: N/A;    FRACTURE SURGERY Left 2010    left ankle    LAPAROSCOPIC CHOLECYSTECTOMY N/A 04/22/2022    Procedure: CHOLECYSTECTOMY, LAPAROSCOPIC;  Surgeon: Salomón Milian MD;  Location: [facility];  Service: General;  Laterality: N/A;    UPPER GASTROINTESTINAL ENDOSCOPY  ~1998    normal findings per patient report     Family History   Problem Relation Name Age of Onset    Hypertension Father      Lung disease Father          calapse x 3     Stroke Father          in eye     Retinal detachment Father      Cancer Sister  23        polyps found incidentally    Vision loss Sister      Lung disease Sister          spontanious lung collapse    GI problems Sister      Psoriasis Sister      Retinal detachment Mother      Cancer Paternal Grandfather      Stroke Maternal Grandmother      Colon cancer Paternal Aunt      Colon polyps Maternal Uncle      Psoriasis Maternal Uncle      No Known Problems Paternal Uncle      Parkinsonism Maternal Grandfather      Cancer Maternal Grandfather          prostate    Colon polyps Maternal Grandfather      Cancer Paternal Grandmother          skin cancer in colon     Alzheimer's disease Paternal Grandmother      Heart disease Paternal Aunt      Breast cancer Neg Hx      Diabetes Neg Hx      Miscarriages / Stillbirths Neg Hx      Crohn's disease Neg Hx      Ulcerative colitis Neg Hx      Glaucoma Neg Hx      Eczema Neg Hx      Lupus Neg Hx      Melanoma Neg Hx       Vitals:    04/21/25  "0836   Weight: 103.9 kg (229 lb 0.9 oz)   Height: 5' 7" (1.702 m)       Review of Systems   Constitutional:  Negative for chills, diaphoresis, fatigue, fever and unexpected weight change.   HENT:  Negative for trouble swallowing.    Eyes:  Negative for visual disturbance.   Respiratory:  Negative for shortness of breath.    Cardiovascular:  Negative for chest pain.   Gastrointestinal:  Negative for abdominal pain, constipation, nausea and vomiting.   Genitourinary:  Negative for difficulty urinating.   Musculoskeletal:  Negative for arthralgias, back pain, gait problem, joint swelling, myalgias, neck pain and neck stiffness.   Neurological:  Negative for dizziness, speech difficulty, weakness, light-headedness, numbness and headaches.          Objective:      Physical Exam  Neurological:      Mental Status: She is alert and oriented to person, place, and time.      Comments: She is awake and in no acute distress  No point tenderness or palpable masses about the lumbar spine  Forward flexion of the lumbar spine is to about 60° before she complains of pain at the lumbosacral junction.  Extension at 10° causes pain at the lumbosacral junction  She can heel and toe walk normally  Reflexes- +1-+2 reflexes at the following:   C5-Biceps   C6-Brachioradialis   C7-Triceps   L3/4-Patellar   S1-Achilles   Columba sign is negative bilaterally  Strength testing- 5/5 strength in the following muscle groups:  C5-Elbow flexion  C6-Wrist extension  C7-Elbow extension  C8-Finger flexion  T1-Finger abduction  L2-Hip flexion  L3-Knee extension  L4-Ankle dorsiflexion  L5-Great toe extension  S1-Ankle plantar flexion       MILENA test negative for reproduction of right groin pain but internal rotation of the right hip reproduces right groin pain           Assessment:       1. Acute bilateral low back pain with right-sided sciatica    2. Lumbar radiculopathy    3. Pain of right hip           Plan:     She has a nonfocal neurological " examination and no historical red flags.  I suspect she has low back pain on basis of degenerative disc disease and facet arthropathy.  Has pain with facet loading.  May have an element of intrinsic hip pathology as well.  Possible radicular component to the right anterior thigh pain.  No evidence of nerve root dysfunction.  I think she can be treated conservatively.  Because she can not drive I am recommending home health physical therapy.  We will get some x-rays on the right hip which we can report over the phone.  She can follow up with me at the completion of therapy or as needed.      Acute bilateral low back pain with right-sided sciatica  -     Ambulatory referral/consult to Home Health; Future; Expected date: 04/22/2025    Lumbar radiculopathy  -     Ambulatory referral/consult to Spine Care    Pain of right hip  -     X-Ray Hip 2 or 3 views Right with Pelvis when performed; Future; Expected date: 04/21/2025                 [1]  Social History  Socioeconomic History    Marital status:    Occupational History     Employer: [employer]   Tobacco Use    Smoking status: Never    Smokeless tobacco: Never   Substance and Sexual Activity    Alcohol use: Yes     Alcohol/week: 6.0 standard drinks of alcohol     Types: 6 Glasses of wine per week     Comment: 2-3/ week wine or beer    Drug use: No    Sexual activity: Yes     Partners: Male     Birth control/protection: None     Social Drivers of Health     Financial Resource Strain: Low Risk  (4/14/2025)    Overall Financial Resource Strain (CARDIA)     Difficulty of Paying Living Expenses: Not hard at all   Food Insecurity: No Food Insecurity (4/14/2025)    Hunger Vital Sign     Worried About Running Out of Food in the Last Year: Never true     Ran Out of Food in the Last Year: Never true   Transportation Needs: No Transportation Needs (4/14/2025)    PRAPARE - Transportation     Lack of Transportation (Medical): No     Lack of Transportation  (Non-Medical): No   Physical Activity: Insufficiently Active (4/14/2025)    Exercise Vital Sign     Days of Exercise per Week: 2 days     Minutes of Exercise per Session: 30 min   Stress: No Stress Concern Present (4/14/2025)    [employer] [place] [employer] - Occupational Stress Questionnaire     Feeling of Stress : Only a little   Housing Stability: Low Risk  (4/14/2025)    Housing Stability Vital Sign     Unable to Pay for Housing in the Last Year: No     Number of Times Moved in the Last Year: 0     Homeless in the Last Year: No

## 2025-04-30 ENCOUNTER — EXTERNAL HOME HEALTH (OUTPATIENT)
Dept: HOME HEALTH SERVICES | Facility: HOSPITAL | Age: 50
End: 2025-04-30
Payer: MEDICARE

## 2025-06-23 ENCOUNTER — HOSPITAL ENCOUNTER (OUTPATIENT)
Dept: RADIOLOGY | Facility: HOSPITAL | Age: 50
Discharge: HOME | End: 2025-06-23
Attending: NURSE PRACTITIONER
Payer: MEDICARE

## 2025-06-23 ENCOUNTER — OFFICE VISIT (OUTPATIENT)
Dept: FAMILY MEDICINE | Facility: CLINIC | Age: 50
End: 2025-06-23
Payer: MEDICARE

## 2025-06-23 VITALS
OXYGEN SATURATION: 98 % | DIASTOLIC BLOOD PRESSURE: 80 MMHG | WEIGHT: 218.94 LBS | TEMPERATURE: 98 F | SYSTOLIC BLOOD PRESSURE: 104 MMHG | HEART RATE: 82 BPM | HEIGHT: 67 IN | BODY MASS INDEX: 34.36 KG/M2

## 2025-06-23 DIAGNOSIS — R10.9 ABDOMINAL PAIN, UNSPECIFIED ABDOMINAL LOCATION: Primary | ICD-10-CM

## 2025-06-23 DIAGNOSIS — L08.9 OOZING SKIN INFLAMMATION: ICD-10-CM

## 2025-06-23 DIAGNOSIS — R10.9 ABDOMINAL PAIN, UNSPECIFIED ABDOMINAL LOCATION: ICD-10-CM

## 2025-06-23 PROCEDURE — 3008F BODY MASS INDEX DOCD: CPT | Mod: CPTII,S$GLB,, | Performed by: NURSE PRACTITIONER

## 2025-06-23 PROCEDURE — 74177 CT ABD & PELVIS W/CONTRAST: CPT | Mod: 26,,, | Performed by: RADIOLOGY

## 2025-06-23 PROCEDURE — 25500020 PHARM REV CODE 255

## 2025-06-23 PROCEDURE — 99999 PR PBB SHADOW E&M-EST. PATIENT-LVL IV: CPT | Mod: PBBFAC,,, | Performed by: NURSE PRACTITIONER

## 2025-06-23 PROCEDURE — 3074F SYST BP LT 130 MM HG: CPT | Mod: CPTII,S$GLB,, | Performed by: NURSE PRACTITIONER

## 2025-06-23 PROCEDURE — 99214 OFFICE O/P EST MOD 30 MIN: CPT | Mod: S$GLB,,, | Performed by: NURSE PRACTITIONER

## 2025-06-23 PROCEDURE — 1160F RVW MEDS BY RX/DR IN RCRD: CPT | Mod: CPTII,S$GLB,, | Performed by: NURSE PRACTITIONER

## 2025-06-23 PROCEDURE — 3079F DIAST BP 80-89 MM HG: CPT | Mod: CPTII,S$GLB,, | Performed by: NURSE PRACTITIONER

## 2025-06-23 PROCEDURE — 74177 CT ABD & PELVIS W/CONTRAST: CPT | Mod: TC

## 2025-06-23 PROCEDURE — 1159F MED LIST DOCD IN RCRD: CPT | Mod: CPTII,S$GLB,, | Performed by: NURSE PRACTITIONER

## 2025-06-23 PROCEDURE — A9698 NON-RAD CONTRAST MATERIALNOC: HCPCS

## 2025-06-23 RX ORDER — PANTOPRAZOLE SODIUM 20 MG/1
20 TABLET, DELAYED RELEASE ORAL DAILY
Qty: 90 TABLET | Refills: 3 | Status: SHIPPED | OUTPATIENT
Start: 2025-06-23 | End: 2026-06-23

## 2025-06-23 RX ORDER — MUPIROCIN 20 MG/G
OINTMENT TOPICAL 3 TIMES DAILY
Qty: 30 G | Refills: 0 | Status: SHIPPED | OUTPATIENT
Start: 2025-06-23

## 2025-06-23 RX ORDER — DOXYCYCLINE 100 MG/1
100 CAPSULE ORAL EVERY 12 HOURS
Qty: 20 CAPSULE | Refills: 0 | Status: SHIPPED | OUTPATIENT
Start: 2025-06-23 | End: 2025-07-03

## 2025-06-23 RX ADMIN — IOHEXOL 100 ML: 350 INJECTION, SOLUTION INTRAVENOUS at 06:06

## 2025-06-23 RX ADMIN — IOHEXOL 1000 ML: 12 SOLUTION ORAL at 06:06

## 2025-06-23 NOTE — PROGRESS NOTES
Subjective:       Patient ID: Carolina Andrews is a 50 y.o. female.    Chief Complaint: Abdominal Pain and lump in belly button     HPI   51 y/o female patient with medical problems listed below presents for upper and lower abdominal pain since last Wednesday. She states noted a lump, redness and oozing around umbilicus since last Monday. States applied topical antibiotic around it. Describes abdominal pain as menstrual cramping, intermittent, non-radiating, worsening after eating, associated with nausea and bloating but denies urinary or bowel symptoms, fever, chills. States took ibuprofen for the abdominal pain with some relief.     Problem List[1]     Review of patient's allergies indicates:   Allergen Reactions    Sulfa (sulfonamide antibiotics)      Rash with trimethoprim/sulfa    Zanaflex [tizanidine] Hallucinations    Pcn [penicillins] Rash     rash     Past Surgical History:   Procedure Laterality Date    ANGIOGRAPHY N/A 08/06/2019    Procedure: ANGIOGRAM/EMBOLIZATION;  Surgeon: Andressa Surgeon;  Location: [facility];  Service: Anesthesiology;  Laterality: N/A;   190/[place]    ANKLE FRACTURE SURGERY  2009    CEREBRAL ANGIOGRAM N/A 07/15/2019    Procedure: ANGIOGRAM-CEREBRAL;  Surgeon: [facility] Diagnostic Provider;  Location: [address];  Service: Radiology;  Laterality: N/A;   190/[place]    CEREBRAL ANGIOGRAM N/A 02/04/2020    Procedure: ANGIOGRAM-CEREBRAL;  Surgeon: [facility] Diagnostic Provider;  [facility];  Service: Radiology;  Laterality: N/A;   [address]/[place]    COLONOSCOPY N/A 02/04/2019    Procedure: COLONOSCOPY;  Surgeon: Dale Childers MD;  Location: [facility];  Service: Endoscopy;  Laterality: N/A;    COLONOSCOPY N/A 10/28/2024    Procedure: COLONOSCOPY;  Surgeon: Dale Childers MD;  Location: [facility];  Service: Endoscopy;  Laterality: N/A;  ppm    ESOPHAGOGASTRODUODENOSCOPY N/A 11/19/2018    Procedure: EGD (ESOPHAGOGASTRODUODENOSCOPY);  Surgeon: Dale Childers MD;  [facility]:  [facility];  Service: Endoscopy;  Laterality: N/A;    FRACTURE SURGERY Left 2010    left ankle    LAPAROSCOPIC CHOLECYSTECTOMY N/A 04/22/2022    Procedure: CHOLECYSTECTOMY, LAPAROSCOPIC;  Surgeon: Salomón Milian MD;  Location: [facility];  Service: General;  Laterality: N/A;    UPPER GASTROINTESTINAL ENDOSCOPY  ~1998    normal findings per patient report      Current Medications[2]    Review of Systems   Constitutional:  Negative for chills and fever.   Respiratory:  Negative for cough and shortness of breath.    Cardiovascular:  Negative for chest pain and palpitations.   Gastrointestinal:  Positive for abdominal pain.   Skin:  Positive for color change.   Neurological:  Negative for dizziness and headaches.       Objective:   /80 (BP Location: Left arm, Patient Position: Sitting)   Pulse 82   Temp 98 °F (36.7 °C) (Oral)   Ht 5' 7" (1.702 m)   Wt 99.3 kg (218 lb 14.7 oz)   SpO2 98%   BMI 34.29 kg/m²         Physical Exam  Constitutional:       General: She is not in acute distress.     Appearance: Normal appearance.   HENT:      Head: Atraumatic.   Cardiovascular:      Rate and Rhythm: Normal rate and regular rhythm.      Pulses: Normal pulses.      Heart sounds: Normal heart sounds.   Pulmonary:      Effort: Pulmonary effort is normal.      Breath sounds: Normal breath sounds.   Abdominal:      General: Abdomen is flat. Bowel sounds are normal.      Palpations: Abdomen is soft.      Tenderness: There is abdominal tenderness.      Comments: +diffuse tenderness in b/l upper and lower abdomen   Skin:     Findings: Erythema present.      Comments: +erythema in periumbilical region with no acute oozing   Neurological:      Mental Status: She is alert.         Assessment:       1. Abdominal pain, unspecified abdominal location    2. Oozing skin inflammation        Plan:       1. Abdominal pain, unspecified abdominal location (Primary)  R/o gastritis vs other etiology   - pantoprazole (PROTONIX) 20 MG tablet; " Take 1 tablet (20 mg total) by mouth once daily.  Dispense: 90 tablet; Refill: 3  - CT Abdomen Pelvis W Wo Contrast; Future  - Urinalysis, Reflex to Urine Culture Urine, Clean Catch; Future  - CBC Auto Differential; Future  - Comprehensive Metabolic Panel; Future  - Lipase; Future    2. Oozing skin inflammation  - doxycycline (VIBRAMYCIN) 100 MG Cap; Take 1 capsule (100 mg total) by mouth every 12 (twelve) hours. for 10 days  Dispense: 20 capsule; Refill: 0  - mupirocin (BACTROBAN) 2 % ointment; Apply topically 3 (three) times daily.  Dispense: 30 g; Refill: 0     Will follow up on the results  Taj DE LOS SANTOS           [1]   Patient Active Problem List  Diagnosis    BMI 36.0-36.9,adult    History of ankle fracture    Posterior tibial tendonitis    Cone dystrophy    Ganglion cyst of left foot    Severe obesity (BMI 35.0-39.9) with comorbidity    Epigastric pain    Vascular abnormality    Vertigo    Tinnitus    Complaints of memory disturbance    Aneurysm    Cerebrovascular lesion    Anterior communicating artery aneurysm    Headache    Depression with anxiety    Gastroesophageal reflux disease without esophagitis    Status post arterial stent    Legally blind    Dry eye syndrome of both eyes    Left shoulder pain    Hyperlipidemia    Vitreomacular adhesion of both eyes    Age-related nuclear cataract of both eyes    Choroidal nevus, right   [2]   Current Outpatient Medications:     acetaminophen (TYLENOL) 500 MG tablet, Take 500 mg by mouth every 6 (six) hours as needed for Pain., Disp: , Rfl:     fexofenadine HCl (ALLEGRA ORAL), Take 1 tablet by mouth as needed., Disp: , Rfl:     doxycycline (VIBRAMYCIN) 100 MG Cap, Take 1 capsule (100 mg total) by mouth every 12 (twelve) hours. for 10 days, Disp: 20 capsule, Rfl: 0    gabapentin (NEURONTIN) 300 MG capsule, Take 1 capsule (300 mg total) by mouth nightly as needed. (Patient not taking: Reported on 6/23/2025), Disp: 90 capsule, Rfl: 0    ketoconazole (NIZORAL) 2 % cream,  Use on AA of back daily x 2 weeks then 3 times weekly afterward (Patient not taking: Reported on 6/23/2025), Disp: 60 g, Rfl: 5    meloxicam (MOBIC) 15 MG tablet, Take 1 tablet (15 mg total) by mouth daily as needed for Pain. (Patient not taking: Reported on 6/23/2025), Disp: 30 tablet, Rfl: 1    mupirocin (BACTROBAN) 2 % ointment, Apply topically 3 (three) times daily., Disp: 30 g, Rfl: 0    pantoprazole (PROTONIX) 20 MG tablet, Take 1 tablet (20 mg total) by mouth once daily., Disp: 90 tablet, Rfl: 3

## 2025-06-24 ENCOUNTER — RESULTS FOLLOW-UP (OUTPATIENT)
Dept: FAMILY MEDICINE | Facility: CLINIC | Age: 50
End: 2025-06-24

## 2025-06-24 DIAGNOSIS — K57.92 DIVERTICULITIS: Primary | ICD-10-CM

## 2025-06-24 RX ORDER — CIPROFLOXACIN 500 MG/1
500 TABLET, FILM COATED ORAL EVERY 12 HOURS
Qty: 14 TABLET | Refills: 0 | Status: SHIPPED | OUTPATIENT
Start: 2025-06-24 | End: 2025-07-01

## 2025-06-24 RX ORDER — METRONIDAZOLE 500 MG/1
500 TABLET ORAL EVERY 8 HOURS
Qty: 21 TABLET | Refills: 0 | Status: SHIPPED | OUTPATIENT
Start: 2025-06-24 | End: 2025-07-01

## (undated) DEVICE — DISSECTOR CURVED 5DCD

## (undated) DEVICE — SCISSOR 5MMX35CM DIRECT DRIVE

## (undated) DEVICE — SUT MONOCRYL 4-0 PS-2

## (undated) DEVICE — KIT ANTIFOG W/SPONG & FLUID

## (undated) DEVICE — BLADE SURG CARBON STEEL SZ11

## (undated) DEVICE — NDL HYPODERMIC BLUNT 18G 1.5IN

## (undated) DEVICE — ELECTRODE REM PLYHSV RETURN 9

## (undated) DEVICE — ADHESIVE DERMABOND ADVANCED

## (undated) DEVICE — ELECTRODE BLADE INSULATED 1 IN

## (undated) DEVICE — SLEEVE SCD EXPRESS KNEE MEDIUM

## (undated) DEVICE — SYR 30CC LUER LOCK

## (undated) DEVICE — IRRIGATOR SUCTION W/TIP

## (undated) DEVICE — TROCAR ENDO Z THREAD KII 5X100

## (undated) DEVICE — PACK CUSTOM ENDO CHOLO SLI

## (undated) DEVICE — TOWEL OR DISP STRL BLUE 4/PK

## (undated) DEVICE — SUT 0 VICRYL / UR6 (J603)

## (undated) DEVICE — APPLIER CLIP EPIX UNIV 5X34

## (undated) DEVICE — CANNULA LAP SEAL Z THRD 5X100

## (undated) DEVICE — APPLICATOR CHLORAPREP ORN 26ML

## (undated) DEVICE — LINER SUCTION 3000CC

## (undated) DEVICE — BAG TISS RETRV MONARCH 10MM

## (undated) DEVICE — TROCAR KII BLLN 12MM 10CM

## (undated) DEVICE — SEE MEDLINE ITEM 157117

## (undated) DEVICE — SOL WATER STRL IRR 1000ML

## (undated) DEVICE — SET TUBE PNEUMOCLEAR SE HI FLO

## (undated) DEVICE — NDL SAFETY 21G X 1 1/2 ECLPSE

## (undated) DEVICE — GLOVE SURG ULTRA TOUCH 7.5

## (undated) DEVICE — STRAP OR TABLE 5IN X 72IN

## (undated) DEVICE — SYR 10CC LUER LOCK